# Patient Record
Sex: FEMALE | Race: WHITE | NOT HISPANIC OR LATINO | Employment: OTHER | URBAN - METROPOLITAN AREA
[De-identification: names, ages, dates, MRNs, and addresses within clinical notes are randomized per-mention and may not be internally consistent; named-entity substitution may affect disease eponyms.]

---

## 2017-04-02 ENCOUNTER — HOSPITAL ENCOUNTER (EMERGENCY)
Facility: HOSPITAL | Age: 32
Discharge: HOME | End: 2017-04-03
Attending: EMERGENCY MEDICINE | Admitting: EMERGENCY MEDICINE

## 2017-04-02 VITALS
RESPIRATION RATE: 20 BRPM | HEIGHT: 63 IN | OXYGEN SATURATION: 94 % | WEIGHT: 188 LBS | SYSTOLIC BLOOD PRESSURE: 115 MMHG | DIASTOLIC BLOOD PRESSURE: 74 MMHG | BODY MASS INDEX: 33.31 KG/M2 | HEART RATE: 122 BPM | TEMPERATURE: 97.9 F

## 2017-04-02 DIAGNOSIS — F32.A DEPRESSION, UNSPECIFIED DEPRESSION TYPE: ICD-10-CM

## 2017-04-02 DIAGNOSIS — E86.0 DEHYDRATION: Primary | ICD-10-CM

## 2017-04-02 DIAGNOSIS — F41.9 ANXIETY DISORDER, UNSPECIFIED TYPE: ICD-10-CM

## 2017-04-02 LAB
AMPHET+METHAMPHET UR QL: NEGATIVE
AMPHETAMINES UR QL: NEGATIVE
B-HCG UR QL: NEGATIVE
BACTERIA UR QL AUTO: ABNORMAL /HPF
BARBITURATES UR QL SCN: NEGATIVE
BASOPHILS # BLD AUTO: 0.04 10*3/MM3 (ref 0–0.2)
BASOPHILS NFR BLD AUTO: 0.4 % (ref 0–1)
BENZODIAZ UR QL SCN: NEGATIVE
BILIRUB UR QL STRIP: ABNORMAL
BUPRENORPHINE SERPL-MCNC: NEGATIVE NG/ML
CANNABINOIDS SERPL QL: NEGATIVE
CLARITY UR: ABNORMAL
COCAINE UR QL: NEGATIVE
COD CRY URNS QL: ABNORMAL /HPF
COLOR UR: ABNORMAL
DEPRECATED RDW RBC AUTO: 48.4 FL (ref 37–54)
EOSINOPHIL # BLD AUTO: 0.19 10*3/MM3 (ref 0.1–0.3)
EOSINOPHIL NFR BLD AUTO: 1.9 % (ref 0–3)
ERYTHROCYTE [DISTWIDTH] IN BLOOD BY AUTOMATED COUNT: 15.1 % (ref 11.3–14.5)
ETHANOL BLD-MCNC: <10 MG/DL (ref 0–10)
GLUCOSE UR STRIP-MCNC: NEGATIVE MG/DL
HCT VFR BLD AUTO: 40.6 % (ref 34.5–44)
HGB BLD-MCNC: 12.6 G/DL (ref 11.5–15.5)
HGB UR QL STRIP.AUTO: ABNORMAL
HYALINE CASTS UR QL AUTO: ABNORMAL /LPF
IMM GRANULOCYTES # BLD: 0.03 10*3/MM3 (ref 0–0.03)
IMM GRANULOCYTES NFR BLD: 0.3 % (ref 0–0.6)
INTERNAL NEGATIVE CONTROL: NEGATIVE
INTERNAL POSITIVE CONTROL: POSITIVE
KETONES UR QL STRIP: NEGATIVE
LEUKOCYTE ESTERASE UR QL STRIP.AUTO: ABNORMAL
LYMPHOCYTES # BLD AUTO: 4.5 10*3/MM3 (ref 0.6–4.8)
LYMPHOCYTES NFR BLD AUTO: 44.7 % (ref 24–44)
Lab: ABNORMAL
MCH RBC QN AUTO: 26.9 PG (ref 27–31)
MCHC RBC AUTO-ENTMCNC: 31 G/DL (ref 32–36)
MCV RBC AUTO: 86.6 FL (ref 80–99)
METHADONE UR QL SCN: NEGATIVE
MONOCYTES # BLD AUTO: 1.13 10*3/MM3 (ref 0–1)
MONOCYTES NFR BLD AUTO: 11.2 % (ref 0–12)
MUCOUS THREADS URNS QL MICRO: ABNORMAL /HPF
NEUTROPHILS # BLD AUTO: 4.18 10*3/MM3 (ref 1.5–8.3)
NEUTROPHILS NFR BLD AUTO: 41.5 % (ref 41–71)
NITRITE UR QL STRIP: NEGATIVE
OPIATES UR QL: POSITIVE
OXYCODONE UR QL SCN: NEGATIVE
PCP UR QL SCN: NEGATIVE
PH UR STRIP.AUTO: 5.5 [PH] (ref 5–8)
PLATELET # BLD AUTO: 271 10*3/MM3 (ref 150–450)
PMV BLD AUTO: 11.5 FL (ref 6–12)
PROPOXYPH UR QL: NEGATIVE
PROT UR QL STRIP: ABNORMAL
RBC # BLD AUTO: 4.69 10*6/MM3 (ref 3.89–5.14)
RBC # UR: ABNORMAL /HPF
REF LAB TEST METHOD: ABNORMAL
SP GR UR STRIP: 1.04 (ref 1–1.03)
SQUAMOUS #/AREA URNS HPF: ABNORMAL /HPF
TRICYCLICS UR QL SCN: POSITIVE
UROBILINOGEN UR QL STRIP: ABNORMAL
WBC NRBC COR # BLD: 10.07 10*3/MM3 (ref 3.5–10.8)
WBC UR QL AUTO: ABNORMAL /HPF

## 2017-04-02 PROCEDURE — 99283 EMERGENCY DEPT VISIT LOW MDM: CPT

## 2017-04-02 PROCEDURE — 93005 ELECTROCARDIOGRAM TRACING: CPT | Performed by: EMERGENCY MEDICINE

## 2017-04-02 PROCEDURE — 81001 URINALYSIS AUTO W/SCOPE: CPT | Performed by: EMERGENCY MEDICINE

## 2017-04-02 PROCEDURE — 96361 HYDRATE IV INFUSION ADD-ON: CPT

## 2017-04-02 PROCEDURE — 87086 URINE CULTURE/COLONY COUNT: CPT | Performed by: EMERGENCY MEDICINE

## 2017-04-02 PROCEDURE — 80053 COMPREHEN METABOLIC PANEL: CPT | Performed by: EMERGENCY MEDICINE

## 2017-04-02 PROCEDURE — 80306 DRUG TEST PRSMV INSTRMNT: CPT | Performed by: EMERGENCY MEDICINE

## 2017-04-02 PROCEDURE — 80307 DRUG TEST PRSMV CHEM ANLYZR: CPT | Performed by: EMERGENCY MEDICINE

## 2017-04-02 PROCEDURE — 25010000002 ONDANSETRON PER 1 MG: Performed by: EMERGENCY MEDICINE

## 2017-04-02 PROCEDURE — 85025 COMPLETE CBC W/AUTO DIFF WBC: CPT | Performed by: EMERGENCY MEDICINE

## 2017-04-02 PROCEDURE — 96374 THER/PROPH/DIAG INJ IV PUSH: CPT

## 2017-04-02 RX ORDER — TRIHEXYPHENIDYL HYDROCHLORIDE 2 MG/1
5 TABLET ORAL 2 TIMES DAILY WITH MEALS
COMMUNITY
End: 2017-05-05 | Stop reason: DRUGHIGH

## 2017-04-02 RX ORDER — OLANZAPINE 10 MG/1
TABLET ORAL
COMMUNITY
End: 2018-02-13

## 2017-04-02 RX ORDER — MELOXICAM 15 MG/1
15 TABLET ORAL DAILY
COMMUNITY
End: 2017-05-05 | Stop reason: SDUPTHER

## 2017-04-02 RX ORDER — ONDANSETRON 2 MG/ML
4 INJECTION INTRAMUSCULAR; INTRAVENOUS ONCE
Status: COMPLETED | OUTPATIENT
Start: 2017-04-02 | End: 2017-04-02

## 2017-04-02 RX ORDER — MORPHINE SULFATE 15 MG/1
15 TABLET ORAL EVERY 4 HOURS PRN
COMMUNITY
End: 2018-02-13

## 2017-04-02 RX ORDER — DICYCLOMINE HYDROCHLORIDE 10 MG/1
10 CAPSULE ORAL 2 TIMES DAILY
COMMUNITY
End: 2017-05-05 | Stop reason: SDUPTHER

## 2017-04-02 RX ORDER — GABAPENTIN 300 MG/1
300 CAPSULE ORAL 3 TIMES DAILY
COMMUNITY
End: 2017-05-05 | Stop reason: SDUPTHER

## 2017-04-02 RX ORDER — SODIUM CHLORIDE 0.9 % (FLUSH) 0.9 %
10 SYRINGE (ML) INJECTION AS NEEDED
Status: DISCONTINUED | OUTPATIENT
Start: 2017-04-02 | End: 2017-04-03 | Stop reason: HOSPADM

## 2017-04-02 RX ORDER — PERPHENAZINE 8 MG/1
16 TABLET ORAL 2 TIMES DAILY
COMMUNITY
End: 2017-05-05 | Stop reason: DRUGHIGH

## 2017-04-02 RX ORDER — PANTOPRAZOLE SODIUM 40 MG/1
40 TABLET, DELAYED RELEASE ORAL DAILY
COMMUNITY
End: 2017-05-05 | Stop reason: SDUPTHER

## 2017-04-02 RX ORDER — PROMETHAZINE HYDROCHLORIDE 25 MG/1
25 TABLET ORAL EVERY 6 HOURS PRN
COMMUNITY
End: 2017-08-08 | Stop reason: SDUPTHER

## 2017-04-02 RX ORDER — MIRTAZAPINE 15 MG/1
15 TABLET, FILM COATED ORAL NIGHTLY
COMMUNITY
End: 2017-08-08 | Stop reason: SDUPTHER

## 2017-04-02 RX ORDER — CYCLOBENZAPRINE HCL 10 MG
10 TABLET ORAL 2 TIMES DAILY PRN
COMMUNITY
End: 2018-02-13

## 2017-04-02 RX ORDER — DULOXETIN HYDROCHLORIDE 60 MG/1
60 CAPSULE, DELAYED RELEASE ORAL DAILY
COMMUNITY
End: 2017-05-05 | Stop reason: ALTCHOICE

## 2017-04-02 RX ORDER — CLONIDINE HYDROCHLORIDE 0.1 MG/1
0.1 TABLET ORAL 2 TIMES DAILY
COMMUNITY
End: 2018-02-13

## 2017-04-02 RX ORDER — OXCARBAZEPINE 300 MG/1
150 TABLET, FILM COATED ORAL 2 TIMES DAILY
COMMUNITY
End: 2018-02-13

## 2017-04-02 RX ADMIN — ONDANSETRON 4 MG: 2 INJECTION INTRAMUSCULAR; INTRAVENOUS at 23:50

## 2017-04-02 RX ADMIN — SODIUM CHLORIDE 1000 ML: 9 INJECTION, SOLUTION INTRAVENOUS at 23:24

## 2017-04-03 LAB
ALBUMIN SERPL-MCNC: 4.8 G/DL (ref 3.2–4.8)
ALBUMIN/GLOB SERPL: 1.4 G/DL (ref 1.5–2.5)
ALP SERPL-CCNC: 131 U/L (ref 25–100)
ALT SERPL W P-5'-P-CCNC: 17 U/L (ref 7–40)
ANION GAP SERPL CALCULATED.3IONS-SCNC: 14 MMOL/L (ref 3–11)
AST SERPL-CCNC: 21 U/L (ref 0–33)
BILIRUB SERPL-MCNC: 0.2 MG/DL (ref 0.3–1.2)
BUN BLD-MCNC: 16 MG/DL (ref 9–23)
BUN/CREAT SERPL: 20 (ref 7–25)
CALCIUM SPEC-SCNC: 10.6 MG/DL (ref 8.7–10.4)
CHLORIDE SERPL-SCNC: 106 MMOL/L (ref 99–109)
CO2 SERPL-SCNC: 30 MMOL/L (ref 20–31)
CREAT BLD-MCNC: 0.8 MG/DL (ref 0.6–1.3)
GFR SERPL CREATININE-BSD FRML MDRD: 82 ML/MIN/1.73
GLOBULIN UR ELPH-MCNC: 3.4 GM/DL
GLUCOSE BLD-MCNC: 118 MG/DL (ref 70–100)
HOLD SPECIMEN: NORMAL
HOLD SPECIMEN: NORMAL
POTASSIUM BLD-SCNC: 4 MMOL/L (ref 3.5–5.5)
PROT SERPL-MCNC: 8.2 G/DL (ref 5.7–8.2)
SODIUM BLD-SCNC: 150 MMOL/L (ref 132–146)
WHOLE BLOOD HOLD SPECIMEN: NORMAL
WHOLE BLOOD HOLD SPECIMEN: NORMAL

## 2017-04-03 RX ORDER — DIAZEPAM 2 MG/1
2 TABLET ORAL ONCE
Status: COMPLETED | OUTPATIENT
Start: 2017-04-03 | End: 2017-04-03

## 2017-04-03 RX ADMIN — DIAZEPAM 2 MG: 2 TABLET ORAL at 00:54

## 2017-04-03 NOTE — ED PROVIDER NOTES
Subjective   HPI Comments: 32 y.o. female presents to the ED for evaluation of depression and anxiety. Pt and  recently moved from [place] to [place] 3 days ago and are unfortunately homeless for the time being. She is currently living in a separate shelter from her . She has a history of schizophrenia and depression, and over the last 3 days has been much more depressed and anxious. She has not been eating or sleeping well. She denies suicidal ideations currently.    Patient is a 32 y.o. female presenting with mental health disorder.   History provided by:  Patient and spouse  Mental Health Problem   Presenting symptoms: agitation and depression    Presenting symptoms: no homicidal ideas and no suicidal thoughts    Degree of incapacity (severity):  Severe  Onset quality:  Gradual  Duration:  3 days  Timing:  Constant  Chronicity:  New  Context: stressful life event    Relieved by:  Nothing  Worsened by:  Nothing  Associated symptoms: anxiety and appetite change    Risk factors: hx of mental illness        Review of Systems   Constitutional: Positive for activity change and appetite change.   Psychiatric/Behavioral: Positive for agitation and dysphoric mood. Negative for homicidal ideas and suicidal ideas. The patient is nervous/anxious.    All other systems reviewed and are negative.      Past Medical History:   Diagnosis Date   • Asthma    • Chronic back pain    • COPD (chronic obstructive pulmonary disease)    • Depression    • GERD (gastroesophageal reflux disease)    • Schizophrenia, schizo-affective        Allergies   Allergen Reactions   • Paxil [Paroxetine Hcl]    • Prozac [Fluoxetine Hcl]        Past Surgical History:   Procedure Laterality Date   • BACK SURGERY         History reviewed. No pertinent family history.    Social History     Social History   • Marital status:      Spouse name: N/A   • Number of children: N/A   • Years of education: N/A     Social History Main Topics   •  Smoking status: Never Smoker   • Smokeless tobacco: None   • Alcohol use No   • Drug use: No   • Sexual activity: Defer     Other Topics Concern   • None     Social History Narrative   • None         Objective   Physical Exam   Constitutional: She appears well-developed and well-nourished. No distress.   HENT:   Head: Normocephalic and atraumatic.   Eyes: Conjunctivae are normal. Pupils are equal, round, and reactive to light.   Neck: Normal range of motion. Neck supple.   Cardiovascular: Regular rhythm.  Tachycardia present.    Pulmonary/Chest: Effort normal and breath sounds normal. No respiratory distress. She has no wheezes. She has no rales. She exhibits no tenderness.   Abdominal: There is tenderness (mild lower abdomen).   Musculoskeletal: She exhibits no edema.   Neurological: She is alert.   Answers questions and following commands appropriately.    Skin: Skin is warm and dry. She is not diaphoretic.   Nursing note and vitals reviewed.      Procedures         ED Course  ED Course       Recent Results (from the past 24 hour(s))   Ethanol    Collection Time: 04/02/17  8:21 PM   Result Value Ref Range    Ethanol <10 0 - 10 mg/dL   Urine Drug Screen    Collection Time: 04/02/17 11:09 PM   Result Value Ref Range    THC, Screen, Urine Negative Negative    Phencyclidine (PCP), Urine Negative Negative    Cocaine Screen, Urine Negative Negative    Methamphetamine, Urine Negative Negative    Opiate Screen Positive (A) Negative    Amphetamine Screen, Urine Negative Negative    Benzodiazepine Screen, Urine Negative Negative    Tricyclic Antidepressants Screen Positive (A) Negative    Methadone Screen, Urine Negative Negative    Barbiturates Screen, Urine Negative Negative    Oxycodone Screen, Urine Negative Negative    Propoxyphene Screen Negative Negative    Buprenorphine, Screen, Urine Negative Negative   Urinalysis With / Culture If Indicated    Collection Time: 04/02/17 11:09 PM   Result Value Ref Range    Color,  UA Dark Yellow (A) Yellow, Straw    Appearance, UA Cloudy (A) Clear    pH, UA 5.5 5.0 - 8.0    Specific Gravity, UA 1.044 (H) 1.001 - 1.030    Glucose, UA Negative Negative    Ketones, UA Negative Negative    Bilirubin, UA Small (1+) (A) Negative    Blood, UA Large (3+) (A) Negative    Protein, UA Trace (A) Negative    Leuk Esterase, UA Small (1+) (A) Negative    Nitrite, UA Negative Negative    Urobilinogen, UA 1.0 E.U./dL 0.2 - 1.0 E.U./dL   Urinalysis, Microscopic Only    Collection Time: 04/02/17 11:09 PM   Result Value Ref Range    RBC, UA 0-2 None Seen, 0-2 /HPF    WBC, UA 6-12 (A) None Seen /HPF    Bacteria, UA Trace None Seen, Trace /HPF    Squamous Epithelial Cells, UA 7-12 (A) None Seen, 0-2 /HPF    Hyaline Casts, UA 0-6 0 - 6 /LPF    Calcium Oxalate Crystals, UA Small/1+ None Seen /HPF    Mucus, UA Moderate/2+ (A) None Seen, Trace /HPF    Methodology Manual Light Microscopy    POCT Pregnancy, urine    Collection Time: 04/02/17 11:10 PM   Result Value Ref Range    HCG, Urine, QL Negative Negative    Lot Number [id number]     Internal Positive Control Positive     Internal Negative Control Negative    Comprehensive Metabolic Panel    Collection Time: 04/02/17 11:21 PM   Result Value Ref Range    Glucose 118 (H) 70 - 100 mg/dL    BUN 16 9 - 23 mg/dL    Creatinine 0.80 0.60 - 1.30 mg/dL    Sodium 150 (H) 132 - 146 mmol/L    Potassium 4.0 3.5 - 5.5 mmol/L    Chloride 106 99 - 109 mmol/L    CO2 30.0 20.0 - 31.0 mmol/L    Calcium 10.6 (H) 8.7 - 10.4 mg/dL    Total Protein 8.2 5.7 - 8.2 g/dL    Albumin 4.80 3.20 - 4.80 g/dL    ALT (SGPT) 17 7 - 40 U/L    AST (SGOT) 21 0 - 33 U/L    Alkaline Phosphatase 131 (H) 25 - 100 U/L    Total Bilirubin 0.2 (L) 0.3 - 1.2 mg/dL    eGFR Non African Amer 82 >60 mL/min/1.73    Globulin 3.4 gm/dL    A/G Ratio 1.4 (L) 1.5 - 2.5 g/dL    BUN/Creatinine Ratio 20.0 7.0 - 25.0    Anion Gap 14.0 (H) 3.0 - 11.0 mmol/L   CBC Auto Differential    Collection Time: 04/02/17 11:21 PM   Result  "Value Ref Range    WBC 10.07 3.50 - 10.80 10*3/mm3    RBC 4.69 3.89 - 5.14 10*6/mm3    Hemoglobin 12.6 11.5 - 15.5 g/dL    Hematocrit 40.6 34.5 - 44.0 %    MCV 86.6 80.0 - 99.0 fL    MCH 26.9 (L) 27.0 - 31.0 pg    MCHC 31.0 (L) 32.0 - 36.0 g/dL    RDW 15.1 (H) 11.3 - 14.5 %    RDW-SD 48.4 37.0 - 54.0 fl    MPV 11.5 6.0 - 12.0 fL    Platelets 271 150 - 450 10*3/mm3    Neutrophil % 41.5 41.0 - 71.0 %    Lymphocyte % 44.7 (H) 24.0 - 44.0 %    Monocyte % 11.2 0.0 - 12.0 %    Eosinophil % 1.9 0.0 - 3.0 %    Basophil % 0.4 0.0 - 1.0 %    Immature Grans % 0.3 0.0 - 0.6 %    Neutrophils, Absolute 4.18 1.50 - 8.30 10*3/mm3    Lymphocytes, Absolute 4.50 0.60 - 4.80 10*3/mm3    Monocytes, Absolute 1.13 (H) 0.00 - 1.00 10*3/mm3    Eosinophils, Absolute 0.19 0.10 - 0.30 10*3/mm3    Basophils, Absolute 0.04 0.00 - 0.20 10*3/mm3    Immature Grans, Absolute 0.03 0.00 - 0.03 10*3/mm3     Note: In addition to lab results from this visit, the labs listed above may include labs taken at another facility or during a different encounter within the last 24 hours. Please correlate lab times with ED admission and discharge times for further clarification of the services performed during this visit.    No orders to display     Vitals:    04/02/17 1936 04/02/17 2208 04/02/17 2220   BP: 122/71 130/79 115/74   BP Location: Right arm     Patient Position: Sitting     Pulse: (!) 122     Resp: 20     Temp: 97.9 °F (36.6 °C)     TempSrc: Oral     SpO2: 95%  94%   Weight: 188 lb (85.3 kg)     Height: 63\" (160 cm)       Medications   sodium chloride 0.9 % flush 10 mL (not administered)   diazePAM (VALIUM) tablet 2 mg (not administered)   sodium chloride 0.9 % bolus 1,000 mL (0 mL Intravenous Stopped 4/3/17 0045)   ondansetron (ZOFRAN) injection 4 mg (4 mg Intravenous Given 4/2/17 0290)     ECG/EMG Results (last 24 hours)     ** No results found for the last 24 hours. **                      MDM  Number of Diagnoses or Management Options  Anxiety " disorder, unspecified type: new and requires workup  Dehydration: new and requires workup  Depression, unspecified depression type: new and requires workup  Diagnosis management comments: Patient has blood in urine, but currently is on period. Appears contaminated, no initiation of antibiotics.     Specific gravity of urine slightly elevated, given IV fluids here and zofran for mild nausea, no vomiting witnessed here.    Patient has remained stable while here in ER.     Discussed patient with psychiatrist at [facility], Dr. Conley who has accepted patient for tranfer.     Patient was evaluated by [facility] staff, and patient has agreed to go voluntarily to [facility].     Patient was also given small dose of valium PO here for anxiety.            Amount and/or Complexity of Data Reviewed  Clinical lab tests: ordered and reviewed  Tests in the radiology section of CPT®: ordered and reviewed  Obtain history from someone other than the patient: yes  Review and summarize past medical records: yes  Discuss the patient with other providers: yes  Independent visualization of images, tracings, or specimens: yes    Patient Progress  Patient progress: stable      Final diagnoses:   Dehydration   Depression, unspecified depression type   Anxiety disorder, unspecified type       Documentation assistance provided by renato Jiménez.  Information recorded by the renato was done at my direction and has been verified and validated by me.     Damaso Jiménez  04/02/17 4611       Ivis Meneses MD  04/03/17 8785

## 2017-04-05 LAB — BACTERIA SPEC AEROBE CULT: NORMAL

## 2017-05-05 ENCOUNTER — TELEPHONE (OUTPATIENT)
Dept: INTERNAL MEDICINE | Facility: CLINIC | Age: 32
End: 2017-05-05

## 2017-05-05 ENCOUNTER — OFFICE VISIT (OUTPATIENT)
Dept: INTERNAL MEDICINE | Facility: CLINIC | Age: 32
End: 2017-05-05

## 2017-05-05 VITALS
HEART RATE: 96 BPM | RESPIRATION RATE: 16 BRPM | OXYGEN SATURATION: 98 % | BODY MASS INDEX: 35.58 KG/M2 | HEIGHT: 63 IN | TEMPERATURE: 98.2 F | DIASTOLIC BLOOD PRESSURE: 76 MMHG | WEIGHT: 200.8 LBS | SYSTOLIC BLOOD PRESSURE: 106 MMHG

## 2017-05-05 DIAGNOSIS — K21.9 GASTROESOPHAGEAL REFLUX DISEASE, ESOPHAGITIS PRESENCE NOT SPECIFIED: ICD-10-CM

## 2017-05-05 DIAGNOSIS — J44.9 CHRONIC OBSTRUCTIVE PULMONARY DISEASE, UNSPECIFIED COPD TYPE (HCC): Primary | ICD-10-CM

## 2017-05-05 DIAGNOSIS — R32 URINARY INCONTINENCE, UNSPECIFIED TYPE: ICD-10-CM

## 2017-05-05 DIAGNOSIS — G89.29 OTHER CHRONIC PAIN: ICD-10-CM

## 2017-05-05 DIAGNOSIS — R10.9 ABDOMINAL PAIN, UNSPECIFIED LOCATION: ICD-10-CM

## 2017-05-05 LAB
BILIRUB BLD-MCNC: NEGATIVE MG/DL
CLARITY, POC: CLEAR
COLOR UR: YELLOW
GLUCOSE UR STRIP-MCNC: NEGATIVE MG/DL
KETONES UR QL: NEGATIVE
LEUKOCYTE EST, POC: NEGATIVE
NITRITE UR-MCNC: NEGATIVE MG/ML
PH UR: 6 [PH] (ref 5–8)
PROT UR STRIP-MCNC: NEGATIVE MG/DL
RBC # UR STRIP: NEGATIVE /UL
SP GR UR: 1.03 (ref 1–1.03)
UROBILINOGEN UR QL: NORMAL

## 2017-05-05 PROCEDURE — 81003 URINALYSIS AUTO W/O SCOPE: CPT | Performed by: NURSE PRACTITIONER

## 2017-05-05 PROCEDURE — 99204 OFFICE O/P NEW MOD 45 MIN: CPT | Performed by: NURSE PRACTITIONER

## 2017-05-05 RX ORDER — NORGESTIMATE AND ETHINYL ESTRADIOL 7DAYSX3 28
1 KIT ORAL DAILY
Qty: 28 TABLET | Refills: 2 | Status: SHIPPED | OUTPATIENT
Start: 2017-05-05 | End: 2018-02-27 | Stop reason: SDUPTHER

## 2017-05-05 RX ORDER — MELOXICAM 15 MG/1
15 TABLET ORAL DAILY
Qty: 30 TABLET | Refills: 2 | Status: SHIPPED | OUTPATIENT
Start: 2017-05-05 | End: 2017-08-08

## 2017-05-05 RX ORDER — PERPHENAZINE 16 MG/1
TABLET ORAL
COMMUNITY
End: 2018-02-13

## 2017-05-05 RX ORDER — TRIHEXYPHENIDYL HYDROCHLORIDE 5 MG/1
5 TABLET ORAL 2 TIMES DAILY
COMMUNITY
End: 2018-02-13

## 2017-05-05 RX ORDER — PANTOPRAZOLE SODIUM 40 MG/1
40 TABLET, DELAYED RELEASE ORAL DAILY
Qty: 30 TABLET | Refills: 2 | Status: SHIPPED | OUTPATIENT
Start: 2017-05-05 | End: 2017-08-04 | Stop reason: SDUPTHER

## 2017-05-05 RX ORDER — GABAPENTIN 300 MG/1
300 CAPSULE ORAL 3 TIMES DAILY
Qty: 90 CAPSULE | Refills: 0 | Status: SHIPPED | OUTPATIENT
Start: 2017-05-05 | End: 2018-08-27

## 2017-05-05 RX ORDER — DICYCLOMINE HYDROCHLORIDE 10 MG/1
10 CAPSULE ORAL 2 TIMES DAILY
Qty: 60 CAPSULE | Refills: 2 | Status: SHIPPED | OUTPATIENT
Start: 2017-05-05 | End: 2018-03-23 | Stop reason: SDUPTHER

## 2017-05-08 ENCOUNTER — TELEPHONE (OUTPATIENT)
Dept: INTERNAL MEDICINE | Facility: CLINIC | Age: 32
End: 2017-05-08

## 2017-05-08 RX ORDER — FLUCONAZOLE 150 MG/1
150 TABLET ORAL ONCE
Qty: 1 TABLET | Refills: 1 | Status: SHIPPED | OUTPATIENT
Start: 2017-05-08 | End: 2017-05-08

## 2017-05-12 ENCOUNTER — TELEPHONE (OUTPATIENT)
Dept: INTERNAL MEDICINE | Facility: CLINIC | Age: 32
End: 2017-05-12

## 2017-05-17 ENCOUNTER — TELEPHONE (OUTPATIENT)
Dept: INTERNAL MEDICINE | Facility: CLINIC | Age: 32
End: 2017-05-17

## 2017-05-22 ENCOUNTER — TELEPHONE (OUTPATIENT)
Dept: INTERNAL MEDICINE | Facility: CLINIC | Age: 32
End: 2017-05-22

## 2017-05-22 RX ORDER — POLYETHYLENE GLYCOL 3350 17 G/17G
17 POWDER, FOR SOLUTION ORAL DAILY
Qty: 30 EACH | Refills: 11 | Status: SHIPPED | OUTPATIENT
Start: 2017-05-22 | End: 2018-02-13

## 2017-05-23 ENCOUNTER — TELEPHONE (OUTPATIENT)
Dept: INTERNAL MEDICINE | Facility: CLINIC | Age: 32
End: 2017-05-23

## 2017-05-24 ENCOUNTER — TELEPHONE (OUTPATIENT)
Dept: INTERNAL MEDICINE | Facility: CLINIC | Age: 32
End: 2017-05-24

## 2017-08-04 ENCOUNTER — TELEPHONE (OUTPATIENT)
Dept: INTERNAL MEDICINE | Facility: CLINIC | Age: 32
End: 2017-08-04

## 2017-08-04 RX ORDER — PANTOPRAZOLE SODIUM 40 MG/1
40 TABLET, DELAYED RELEASE ORAL DAILY
Qty: 30 TABLET | Refills: 2 | Status: SHIPPED | OUTPATIENT
Start: 2017-08-04 | End: 2018-04-10 | Stop reason: SDUPTHER

## 2017-08-04 NOTE — TELEPHONE ENCOUNTER
REFILL: PANTOPRAZOLE @  [place] [facility] [place] [id number] NO LONGER USES [facility].

## 2017-08-07 ENCOUNTER — TELEPHONE (OUTPATIENT)
Dept: INTERNAL MEDICINE | Facility: CLINIC | Age: 32
End: 2017-08-07

## 2017-08-07 RX ORDER — ONDANSETRON 4 MG/1
4 TABLET, FILM COATED ORAL EVERY 8 HOURS PRN
Qty: 30 TABLET | Refills: 0 | Status: SHIPPED | OUTPATIENT
Start: 2017-08-07 | End: 2018-02-13

## 2017-08-08 ENCOUNTER — TELEPHONE (OUTPATIENT)
Dept: INTERNAL MEDICINE | Facility: CLINIC | Age: 32
End: 2017-08-08

## 2017-08-08 RX ORDER — MIRTAZAPINE 15 MG/1
15 TABLET, FILM COATED ORAL NIGHTLY
Qty: 30 TABLET | Refills: 0 | Status: SHIPPED | OUTPATIENT
Start: 2017-08-08 | End: 2018-02-13

## 2017-08-08 RX ORDER — PROMETHAZINE HYDROCHLORIDE 25 MG/1
25 TABLET ORAL EVERY 8 HOURS PRN
Qty: 30 TABLET | Refills: 0 | Status: SHIPPED | OUTPATIENT
Start: 2017-08-08 | End: 2018-02-13

## 2017-08-08 RX ORDER — NAPROXEN 500 MG/1
500 TABLET ORAL 2 TIMES DAILY WITH MEALS
Qty: 60 TABLET | Refills: 0 | Status: SHIPPED | OUTPATIENT
Start: 2017-08-08 | End: 2018-02-13

## 2017-08-08 NOTE — TELEPHONE ENCOUNTER
Patient went to the hospital about 9 days ago for her knee and want Naproxen sent in for her because that helped her a lot. She also said the Zofran is not covered so she wants the Phenergan sent in now and She also wants her Remeron refilled. She said that the other Dr that prescribed her other medication will not fill this for her and she thought that you would?

## 2017-08-22 ENCOUNTER — TELEPHONE (OUTPATIENT)
Dept: INTERNAL MEDICINE | Facility: CLINIC | Age: 32
End: 2017-08-22

## 2018-02-13 ENCOUNTER — OFFICE VISIT (OUTPATIENT)
Dept: FAMILY MEDICINE CLINIC | Facility: CLINIC | Age: 33
End: 2018-02-13

## 2018-02-13 VITALS
HEIGHT: 63 IN | BODY MASS INDEX: 34.23 KG/M2 | HEART RATE: 80 BPM | TEMPERATURE: 97.6 F | DIASTOLIC BLOOD PRESSURE: 62 MMHG | RESPIRATION RATE: 20 BRPM | WEIGHT: 193.2 LBS | SYSTOLIC BLOOD PRESSURE: 98 MMHG

## 2018-02-13 DIAGNOSIS — J45.20 MILD INTERMITTENT ASTHMA WITHOUT COMPLICATION: Primary | ICD-10-CM

## 2018-02-13 DIAGNOSIS — R10.9 ABDOMINAL DISCOMFORT: ICD-10-CM

## 2018-02-13 PROCEDURE — 99213 OFFICE O/P EST LOW 20 MIN: CPT | Performed by: NURSE PRACTITIONER

## 2018-02-13 RX ORDER — BACLOFEN 10 MG/1
20 TABLET ORAL 3 TIMES DAILY
COMMUNITY
End: 2019-06-05

## 2018-02-13 RX ORDER — ALBUTEROL SULFATE 90 UG/1
2 AEROSOL, METERED RESPIRATORY (INHALATION) EVERY 4 HOURS PRN
COMMUNITY
End: 2018-05-25

## 2018-02-13 RX ORDER — QUETIAPINE FUMARATE 300 MG/1
300 TABLET, FILM COATED ORAL NIGHTLY
COMMUNITY
End: 2019-12-26

## 2018-02-13 RX ORDER — TIZANIDINE 4 MG/1
4 TABLET ORAL EVERY 8 HOURS PRN
COMMUNITY
End: 2018-08-27 | Stop reason: SDUPTHER

## 2018-02-13 RX ORDER — BUSPIRONE HYDROCHLORIDE 7.5 MG/1
15 TABLET ORAL 3 TIMES DAILY
COMMUNITY
End: 2020-05-12

## 2018-02-13 RX ORDER — METHADONE HYDROCHLORIDE 5 MG/1
5 TABLET ORAL EVERY 8 HOURS PRN
COMMUNITY
End: 2019-01-31 | Stop reason: ALTCHOICE

## 2018-02-13 RX ORDER — CITALOPRAM 40 MG/1
40 TABLET ORAL DAILY
COMMUNITY
End: 2018-06-06 | Stop reason: ALTCHOICE

## 2018-02-13 NOTE — PROGRESS NOTES
Subjective   Marguerite Edwards is a 32 y.o. female.     History of Present Illness   Pt is accompanied by her  with numerous complaints  Pt is poor historian and relies heavily on her  to discuss problems she is currently having.  She requests a referral to pulmonary for asthma and wants a new breathing machine  A pulmonary referral was made approx 6 months ago but pt did not keep appointment. She has a hx of making appt then cancelling them or not showing   She has Albuterol inhaler and Breo inhaler for treatment    Stomach problems, cramping in stomach, IBS stable on Bentyl  Was just seen at  GI specialist 2/2/18 less than 2 weeks ago but pt says she has not recollection of this    Hx of urinary incontinence, she sees urology    She is on numerous psych meds for schizoprenia and insomnia. She is unable to say who is treating her     Low back pain  She is on methadone for chronic pain; hx of several back surgeries, not sure if she was in an accident     The following portions of the patient's history were reviewed and updated as appropriate: allergies, current medications, past family history, past medical history, past social history, past surgical history and problem list.    Review of Systems   Respiratory: Negative for cough, shortness of breath and wheezing.    Gastrointestinal: Positive for abdominal distention and abdominal pain. Negative for blood in stool.   Genitourinary: Positive for difficulty urinating. Negative for dysuria.   Musculoskeletal: Positive for back pain.   Neurological: Positive for light-headedness.   Psychiatric/Behavioral: Positive for sleep disturbance. The patient is nervous/anxious.        Objective   Physical Exam   Constitutional: She appears well-developed and well-nourished. No distress.   HENT:   Head: Normocephalic.   Right Ear: External ear normal.   Left Ear: External ear normal.   Nose: Nose normal.   Mouth/Throat: Oropharynx is clear and moist.   Neck: Neck  supple. No thyromegaly present.   Cardiovascular: Normal rate, regular rhythm and normal heart sounds.    Pulmonary/Chest: Effort normal and breath sounds normal. No respiratory distress. She has no wheezes. She has no rales.   Abdominal: Soft. Bowel sounds are normal. She exhibits no distension. There is tenderness (generalized tenderness).   Musculoskeletal: She exhibits no edema.   Neurological: She is alert.   Skin: Skin is warm and dry.   Psychiatric:   Looks to her  through out the visit for answers to my questions copied her husbands responses   Nursing note and vitals reviewed.      Assessment/Plan   Marguerite was seen today for establish care, dizziness, vomiting, vaginal discharge, insomnia and earache.    Diagnoses and all orders for this visit:    Mild intermittent asthma without complication    Abdominal discomfort  Continue Bentyl for abdominal discomfort offered to refill, keep regular follow ups with GI (has an appt for EGD and colonoscopy coming up    Pt requesting referral to pulmonary but did not keep appt made a few months ago. Her asthma could be managed by her PCP then if having difficulty with asthma control can make referral   Pt and her  became angered when referral denied and said they would not be back.

## 2018-02-14 ENCOUNTER — HOSPITAL ENCOUNTER (EMERGENCY)
Facility: HOSPITAL | Age: 33
Discharge: HOME | End: 2018-02-14
Attending: EMERGENCY MEDICINE | Admitting: EMERGENCY MEDICINE

## 2018-02-14 VITALS
RESPIRATION RATE: 20 BRPM | OXYGEN SATURATION: 99 % | BODY MASS INDEX: 34.02 KG/M2 | HEART RATE: 70 BPM | WEIGHT: 192 LBS | TEMPERATURE: 98.3 F | SYSTOLIC BLOOD PRESSURE: 99 MMHG | HEIGHT: 63 IN | DIASTOLIC BLOOD PRESSURE: 57 MMHG

## 2018-02-14 DIAGNOSIS — J45.909 UNCOMPLICATED ASTHMA, UNSPECIFIED ASTHMA SEVERITY, UNSPECIFIED WHETHER PERSISTENT: ICD-10-CM

## 2018-02-14 DIAGNOSIS — M94.0 COSTOCHONDRITIS: Primary | ICD-10-CM

## 2018-02-14 DIAGNOSIS — K59.00 CONSTIPATION, UNSPECIFIED CONSTIPATION TYPE: ICD-10-CM

## 2018-02-14 PROCEDURE — 99283 EMERGENCY DEPT VISIT LOW MDM: CPT

## 2018-02-14 RX ORDER — PREDNISONE 20 MG/1
40 TABLET ORAL DAILY
Qty: 8 TABLET | Refills: 0 | Status: SHIPPED | OUTPATIENT
Start: 2018-02-14 | End: 2018-02-27

## 2018-02-14 RX ORDER — POLYETHYLENE GLYCOL 3350 17 G/17G
17 POWDER, FOR SOLUTION ORAL DAILY
Qty: 10 EACH | Refills: 0 | Status: SHIPPED | OUTPATIENT
Start: 2018-02-14 | End: 2018-03-01 | Stop reason: SDUPTHER

## 2018-02-14 NOTE — ED PROVIDER NOTES
"Subjective   HPI Comments: Marguerite Edwards is a 32 y.o.female with a history of COPD, asthma who presents to the ED with c/o chest pain that onset several years ago but she is unable to specify the exact time the chest pain onset. She states her anterior chest wall pain worsens with deep breathing but she denies shortness of breath. She has a history of costochondritis and this is the same pain that she is experiencing.  She also c/o constipation but denies abdominal pain or any other other complaints at this time. She is requesting a prescription for \"something to help it\" along with a referral to a pulmonologist and GI. Please see KATRINA Gates note from yesterday.     Patient is a 32 y.o. female presenting with chest pain.   History provided by:  Patient  Chest Pain   Pain location:  Substernal area  Pain quality comment:  Unable to specify  Pain radiates to:  Does not radiate  Pain severity:  Mild  Onset quality:  Gradual  Timing:  Intermittent  Progression:  Unchanged  Chronicity:  Recurrent  Context: breathing    Relieved by:  Nothing  Worsened by:  Deep breathing  Ineffective treatments:  Rest  Associated symptoms: no abdominal pain and no shortness of breath    Risk factors comment:  COPD      Review of Systems   Respiratory: Negative for shortness of breath.    Cardiovascular: Positive for chest pain.   Gastrointestinal: Positive for constipation. Negative for abdominal pain.   All other systems reviewed and are negative.      Past Medical History:   Diagnosis Date   • Anxiety    • Asthma    • Bronchitis    • Chronic back pain    • COPD (chronic obstructive pulmonary disease)    • Depression    • GERD (gastroesophageal reflux disease)    • Hyperlipidemia    • Kidney stone    • PTSD (post-traumatic stress disorder)    • Schizo affective schizophrenia    • Schizophrenia, schizo-affective    • Urinary incontinence    • Urinary tract infection        Allergies   Allergen Reactions   • Paxil [Paroxetine Hcl] " Mental Status Change   • Prozac [Fluoxetine Hcl] Mental Status Change       Past Surgical History:   Procedure Laterality Date   • ADENOIDECTOMY     • BACK SURGERY  2013    x2; discetomy and herniated discs   • BLADDER SURGERY     • CHOLECYSTECTOMY     • FOOT SURGERY  2011    achilles tendon repair   • TONSILLECTOMY         Family History   Problem Relation Age of Onset   • Cancer Paternal Grandfather      possible stomach cancer   • Mental illness Father    • Pancreatitis Father    • Hypertension Father    • Diabetes Father    • Hyperlipidemia Father    • Mental illness Sister        Social History     Social History   • Marital status:      Spouse name: N/A   • Number of children: N/A   • Years of education: N/A     Social History Main Topics   • Smoking status: Former Smoker     Types: Cigarettes     Quit date: 1999   • Smokeless tobacco: Never Used   • Alcohol use No   • Drug use: No      Comment: former marijuana   • Sexual activity: Yes     Birth control/ protection: Condom      Comment:      Other Topics Concern   • None     Social History Narrative         Objective   Physical Exam   Constitutional: She is oriented to person, place, and time. She appears well-developed and well-nourished. No distress.   HENT:   Head: Normocephalic and atraumatic.   Right Ear: External ear normal.   Left Ear: External ear normal.   Nose: Nose normal.   Ear wax in the left ear canal but still able to view the left TM.    Eyes: Conjunctivae are normal. No scleral icterus.   Neck: Normal range of motion. Neck supple.   Cardiovascular: Normal rate, regular rhythm and normal heart sounds.    No murmur heard.  Pulmonary/Chest: Effort normal and breath sounds normal. No respiratory distress. She has no wheezes. She has no rales.   Abdominal: Soft. Bowel sounds are normal. She exhibits no distension. There is no tenderness.   Musculoskeletal: Normal range of motion. She exhibits tenderness. She exhibits no edema.   Pain  "easily reproducible to anterior chest wall. This is the same pain she has experienced for years.    Neurological: She is alert and oriented to person, place, and time.   Skin: Skin is warm. She is not diaphoretic.   Psychiatric: She has a normal mood and affect. Her behavior is normal.   Nursing note and vitals reviewed.      Procedures         ED Course  No results found for this or any previous visit (from the past 24 hour(s)).  Note: In addition to lab results from this visit, the labs listed above may include labs taken at another facility or during a different encounter within the last 24 hours. Please correlate lab times with ED admission and discharge times for further clarification of the services performed during this visit.    No orders to display     Vitals:    02/14/18 1318   BP: 99/57   BP Location: Right arm   Patient Position: Sitting   Pulse: 70   Resp: 20   Temp: 98.3 °F (36.8 °C)   TempSrc: Oral   SpO2: 99%   Weight: 87.1 kg (192 lb)   Height: 160 cm (63\")     Medications - No data to display  ECG/EMG Results (last 24 hours)     ** No results found for the last 24 hours. **          ED Course   Comment By Time   Not in room Mynor Boyd, KATRINA 02/14 4468   Here pain is cw here usual cw pain she has had for years.    There is no fever. She is non toxic. She does have a hx of mental illness. She would like a pulm referral for her asthma, gi referral for constipation, steroids for her cw pain, miralax for constipation and otc ear drops for her wax.    She says several times thankful for being nice. She mentions she has been to [facility] recently and they were mean and did not giver her or her  a sandwich.    Agreeable to poc. All thankful and agreeable. Well aware of the ss of worsening condition. Mynor Oliver, APRZULEYMA 02/14 1411                     [facility]    Final diagnoses:   Costochondritis   Constipation, unspecified constipation type   Uncomplicated asthma, unspecified asthma severity, unspecified whether " persistent       Documentation assistance provided by renato Mensah.  Information recorded by the scribreilly was done at my direction and has been verified and validated by me.     Toribio Mensah  02/14/18 1420       KATRINA Haro  02/14/18 1421       KATRINA Haro  02/14/18 1500

## 2018-02-27 ENCOUNTER — OFFICE VISIT (OUTPATIENT)
Dept: FAMILY MEDICINE CLINIC | Facility: CLINIC | Age: 33
End: 2018-02-27

## 2018-02-27 VITALS
WEIGHT: 191 LBS | OXYGEN SATURATION: 100 % | RESPIRATION RATE: 16 BRPM | TEMPERATURE: 97.2 F | DIASTOLIC BLOOD PRESSURE: 58 MMHG | BODY MASS INDEX: 33.84 KG/M2 | HEART RATE: 64 BPM | HEIGHT: 63 IN | SYSTOLIC BLOOD PRESSURE: 96 MMHG

## 2018-02-27 DIAGNOSIS — N76.0 ACUTE VAGINITIS: ICD-10-CM

## 2018-02-27 DIAGNOSIS — J45.30 MILD PERSISTENT ASTHMA WITHOUT COMPLICATION: Primary | ICD-10-CM

## 2018-02-27 DIAGNOSIS — J44.9 CHRONIC OBSTRUCTIVE PULMONARY DISEASE, UNSPECIFIED COPD TYPE (HCC): ICD-10-CM

## 2018-02-27 DIAGNOSIS — R07.89 CHEST WALL PAIN: ICD-10-CM

## 2018-02-27 PROBLEM — F20.9 SCHIZOPHRENIA (HCC): Status: ACTIVE | Noted: 2018-02-27

## 2018-02-27 PROCEDURE — 99214 OFFICE O/P EST MOD 30 MIN: CPT | Performed by: FAMILY MEDICINE

## 2018-02-27 RX ORDER — IBUPROFEN 600 MG/1
600 TABLET ORAL EVERY 8 HOURS PRN
Qty: 30 TABLET | Refills: 5 | Status: SHIPPED | OUTPATIENT
Start: 2018-02-27 | End: 2018-06-04

## 2018-02-27 RX ORDER — FLUCONAZOLE 150 MG/1
TABLET ORAL
Qty: 2 TABLET | Refills: 0 | Status: SHIPPED | OUTPATIENT
Start: 2018-02-27 | End: 2018-03-23

## 2018-02-27 RX ORDER — NORGESTIMATE AND ETHINYL ESTRADIOL 7DAYSX3 28
1 KIT ORAL DAILY
Qty: 28 TABLET | Refills: 5 | Status: SHIPPED | OUTPATIENT
Start: 2018-02-27 | End: 2018-04-30 | Stop reason: ALTCHOICE

## 2018-02-27 RX ORDER — ALBUTEROL SULFATE 2.5 MG/3ML
2.5 SOLUTION RESPIRATORY (INHALATION) EVERY 6 HOURS PRN
Qty: 50 VIAL | Refills: 5 | Status: SHIPPED | OUTPATIENT
Start: 2018-02-27 | End: 2019-02-06

## 2018-02-27 NOTE — PROGRESS NOTES
Assessment/Plan     Problem List Items Addressed This Visit     None      Visit Diagnoses     Mild persistent asthma without complication    -  Primary    Relevant Medications    albuterol (PROVENTIL) (2.5 MG/3ML) 0.083% nebulizer solution    Other Relevant Orders    Home Nebulizer    Chronic obstructive pulmonary disease, unspecified COPD type        Relevant Medications    albuterol (PROVENTIL) (2.5 MG/3ML) 0.083% nebulizer solution    Other Relevant Orders    Home Nebulizer    Acute vaginitis        Relevant Medications    fluconazole (DIFLUCAN) 150 MG tablet    Chest wall pain        Relevant Medications    ibuprofen (ADVIL,MOTRIN) 600 MG tablet           Follow up: Return if symptoms worsen or fail to improve.     DISCUSSION  Vaginal discharge.  Most likely yeast vaginitis.  Will try Diflucan.  Told to only take one half dose of citalopram well on this given potential interaction.  She and her  agree.    Mild persistent asthma with possible diagnosis of COPD.  Will be seeing pulmonology as being arranged previously.  Prescription for nebulizer was given since Thursday in storage and she needs to have this to help with her breathing.  If not improving or worsening, she is to call.    Chest wall pain.  Most likely musculoskeletal.  May try Motrin as prescribed.  Prescription sent.    Schizophrenia.  Stable.  Continue follow-up with psychiatry.      MEDICATIONS PRESCRIBED  Requested Prescriptions     Signed Prescriptions Disp Refills   • fluconazole (DIFLUCAN) 150 MG tablet 2 tablet 0     Sig: one by mouth today and repeat in 3 days if not better   • albuterol (PROVENTIL) (2.5 MG/3ML) 0.083% nebulizer solution 50 vial 5     Sig: Take 2.5 mg by nebulization Every 6 (Six) Hours As Needed for Wheezing or Shortness of Air.   • ibuprofen (ADVIL,MOTRIN) 600 MG tablet 30 tablet 5     Sig: Take 1 tablet by mouth Every 8 (Eight) Hours As Needed for Mild Pain  or Moderate Pain .   • norgestimate-ethinyl estradiol  "(TRINESSA, 28,) 0.18/0.215/0.25 MG-35 MCG per tablet 28 tablet 5     Sig: Take 1 tablet by mouth Daily.               -------------------------------------------    Subjective     Chief Complaint   Patient presents with   • Vaginitis     white discharge, itching   • hurts when breathing     pt states she has asthma and COPD and she is needing a new neb machine.    • left ear hurting.   • Med Refill     miralax and birth control       HPI    Vaginal d/c  White and creamy  Sx for 6 days  No dysuria   + itches  No odor    Feels like when had yeast infection  Has had diflucan and helps in he past    Having 4th botox for bladder infection , [facility] 3/8/2018    Asthma/COPD  Had a nebulizer and in storage and not able to get to it  See pulm 3/22/2018  On ventolin and does not help    No tobacco, no exposure now to cigarette  Does not have the med for it    Anterior chest is tender at times.  Sharp pain.  Hurts to breathe.    Ear pain  Left ear pain   Since used drops on 2/14  Helped the wax    Schizophrenia  Dr Tristan    Comp pain specialist  Methadone 5 mg q 8 hrs      LMP  1/21/2018  Denies pregnancy  Not sexual active      Past Medical History,Medications, Allergies, and social history was reviewed.    Review of Systems   Constitutional: Negative.    HENT: Positive for ear pain. Negative for congestion.    Respiratory: Positive for wheezing. Negative for shortness of breath.    Cardiovascular: Negative.    Gastrointestinal: Negative.    Genitourinary: Positive for vaginal discharge. Negative for dysuria and frequency.   Musculoskeletal: Negative.    Neurological: Negative.    Psychiatric/Behavioral: The patient is nervous/anxious.         Diagnosis of schizophrenia       Objective     Vitals:    02/27/18 1114   BP: 96/58   Pulse: 64   Resp: 16   Temp: 97.2 °F (36.2 °C)   TempSrc: Temporal Artery    SpO2: 100%   Weight: 86.6 kg (191 lb)   Height: 160 cm (62.99\")        Physical Exam   Constitutional: She is oriented to person, " place, and time. She appears well-developed and well-nourished.   HENT:   Head: Normocephalic and atraumatic.   Right Ear: Hearing, tympanic membrane, external ear and ear canal normal.   Left Ear: Hearing, tympanic membrane, external ear and ear canal normal.   Mouth/Throat: Oropharynx is clear and moist.   Eyes: Conjunctivae and EOM are normal. Pupils are equal, round, and reactive to light.   Neck: Normal range of motion. Neck supple. No thyromegaly present.   Cardiovascular: Normal rate, regular rhythm and normal heart sounds.  Exam reveals no gallop and no friction rub.    No murmur heard.  Pulmonary/Chest: Effort normal. No respiratory distress. She has wheezes (faint expiratory). She has no rales.   Abdominal: Soft. Bowel sounds are normal. She exhibits no distension. There is no tenderness.   Musculoskeletal: She exhibits no edema.   Neurological: She is alert and oriented to person, place, and time.   Skin: Skin is warm and dry.   Psychiatric: She has a normal mood and affect. Her behavior is normal. Her speech is tangential.   Nursing note and vitals reviewed.  Tender anterior left chest wall pain.  Sharp in nature.  It same pain that she is explaining or describing.            Vish Torres MD

## 2018-03-01 ENCOUNTER — TELEPHONE (OUTPATIENT)
Dept: FAMILY MEDICINE CLINIC | Facility: CLINIC | Age: 33
End: 2018-03-01

## 2018-03-01 RX ORDER — POLYETHYLENE GLYCOL 3350 17 G/17G
17 POWDER, FOR SOLUTION ORAL DAILY
Qty: 30 PACKET | Refills: 5 | Status: SHIPPED | OUTPATIENT
Start: 2018-03-01 | End: 2019-03-12

## 2018-03-01 NOTE — TELEPHONE ENCOUNTER
----- Message from Oleg Wilkinson sent at 3/1/2018  4:51 PM EST -----  Contact: SCOTT / PT CALL  PT CALLED AND IS REQUESTING RX FOR MIRALAX       Pharmacy:   - [facility] - [place], [place] - [address] [place] [address] - [phone]  - [phone] FX Phone:  [phone] Fax:  [phone]   Address:  [address] [place] [address] [address]OM [place] [address]     PT CALL BACK [phone]

## 2018-03-09 ENCOUNTER — TELEPHONE (OUTPATIENT)
Dept: FAMILY MEDICINE CLINIC | Facility: CLINIC | Age: 33
End: 2018-03-09

## 2018-03-09 DIAGNOSIS — R23.2 HOT FLASHES: Primary | ICD-10-CM

## 2018-03-09 NOTE — TELEPHONE ENCOUNTER
----- Message from Katie Gray sent at 3/9/2018  1:03 PM EST -----  Contact: SCOTT;PT CALLED  PT IS HAVING HOT FLASHES HAVING USE ICE PACKS-SHE IS REQUESTING A REFERRAL FOR A SPECIALIST-LAISHA-    [phone]  MESSAGE OK

## 2018-03-12 ENCOUNTER — TELEPHONE (OUTPATIENT)
Dept: FAMILY MEDICINE CLINIC | Facility: CLINIC | Age: 33
End: 2018-03-12

## 2018-03-12 DIAGNOSIS — R23.2 HOT FLASHES: Primary | ICD-10-CM

## 2018-03-12 NOTE — TELEPHONE ENCOUNTER
She had requested referral to endocrinology for hot flashes. They rec labs be done 1st and if normal, then would need to see GYN for this and not endocrinology.   I have placed orders for labs.

## 2018-03-12 NOTE — TELEPHONE ENCOUNTER
Spoke with pt and went over msg. Verbalized understanding and will come in for labs tomorrow. She has an appt with GYN 3/14/18.

## 2018-03-13 ENCOUNTER — RESULTS ENCOUNTER (OUTPATIENT)
Dept: FAMILY MEDICINE CLINIC | Facility: CLINIC | Age: 33
End: 2018-03-13

## 2018-03-13 ENCOUNTER — TELEPHONE (OUTPATIENT)
Dept: FAMILY MEDICINE CLINIC | Facility: CLINIC | Age: 33
End: 2018-03-13

## 2018-03-13 DIAGNOSIS — R23.2 HOT FLASHES: ICD-10-CM

## 2018-03-13 RX ORDER — PROMETHAZINE HYDROCHLORIDE 25 MG/1
25 TABLET ORAL EVERY 6 HOURS PRN
Qty: 20 TABLET | Refills: 0 | Status: SHIPPED | OUTPATIENT
Start: 2018-03-13 | End: 2018-05-25

## 2018-03-13 NOTE — TELEPHONE ENCOUNTER
----- Message from Katie Gray sent at 3/13/2018  2:18 PM EDT -----  Contact: SCOTT; PT STOP BY  PT IS HAVING A LOT OF SWEATING AND CAUSING HER TO HAVE UPSET  STOMACH/NAUSEA-COULD SHE GET SOME PHENERGAN CALLED INTO  PHARMACY ON FILE[facility]    [phone]

## 2018-03-14 LAB
ALBUMIN SERPL-MCNC: 4.3 G/DL (ref 3.2–4.8)
ALBUMIN/GLOB SERPL: 1.5 G/DL (ref 1.5–2.5)
ALP SERPL-CCNC: 160 U/L (ref 25–100)
ALT SERPL-CCNC: 21 U/L (ref 7–40)
AST SERPL-CCNC: 26 U/L (ref 0–33)
BASOPHILS # BLD AUTO: 0.02 10*3/MM3 (ref 0–0.2)
BASOPHILS NFR BLD AUTO: 0.3 % (ref 0–1)
BILIRUB SERPL-MCNC: 0.4 MG/DL (ref 0.3–1.2)
BUN SERPL-MCNC: 18 MG/DL (ref 9–23)
BUN/CREAT SERPL: 18 (ref 7–25)
CALCIUM SERPL-MCNC: 9.6 MG/DL (ref 8.7–10.4)
CHLORIDE SERPL-SCNC: 102 MMOL/L (ref 99–109)
CHOLEST SERPL-MCNC: 207 MG/DL (ref 0–200)
CHOLEST/HDLC SERPL: 4.6 {RATIO}
CO2 SERPL-SCNC: 27 MMOL/L (ref 20–31)
CREAT SERPL-MCNC: 1 MG/DL (ref 0.6–1.3)
EOSINOPHIL # BLD AUTO: 0.1 10*3/MM3 (ref 0–0.3)
EOSINOPHIL NFR BLD AUTO: 1.5 % (ref 0–3)
ERYTHROCYTE [DISTWIDTH] IN BLOOD BY AUTOMATED COUNT: 16.5 % (ref 11.3–14.5)
ESTRADIOL SERPL-MCNC: <5 PG/ML
GFR SERPLBLD CREATININE-BSD FMLA CKD-EPI: 63 ML/MIN/1.73
GFR SERPLBLD CREATININE-BSD FMLA CKD-EPI: 76 ML/MIN/1.73
GLOBULIN SER CALC-MCNC: 2.9 GM/DL
GLUCOSE SERPL-MCNC: 96 MG/DL (ref 70–100)
HCT VFR BLD AUTO: 38.5 % (ref 34.5–44)
HDLC SERPL-MCNC: 45 MG/DL (ref 40–60)
HGB BLD-MCNC: 12.3 G/DL (ref 11.5–15.5)
IMM GRANULOCYTES # BLD: 0.01 10*3/MM3 (ref 0–0.03)
IMM GRANULOCYTES NFR BLD: 0.1 % (ref 0–0.6)
LDLC SERPL CALC-MCNC: 104 MG/DL (ref 0–100)
LYMPHOCYTES # BLD AUTO: 2.36 10*3/MM3 (ref 0.6–4.8)
LYMPHOCYTES NFR BLD AUTO: 34.6 % (ref 24–44)
MCH RBC QN AUTO: 26.6 PG (ref 27–31)
MCHC RBC AUTO-ENTMCNC: 31.9 G/DL (ref 32–36)
MCV RBC AUTO: 83.3 FL (ref 80–99)
MONOCYTES # BLD AUTO: 0.72 10*3/MM3 (ref 0–1)
MONOCYTES NFR BLD AUTO: 10.5 % (ref 0–12)
NEUTROPHILS # BLD AUTO: 3.62 10*3/MM3 (ref 1.5–8.3)
NEUTROPHILS NFR BLD AUTO: 53 % (ref 41–71)
PLATELET # BLD AUTO: 222 10*3/MM3 (ref 150–450)
POTASSIUM SERPL-SCNC: 5 MMOL/L (ref 3.5–5.5)
PROT SERPL-MCNC: 7.2 G/DL (ref 5.7–8.2)
RBC # BLD AUTO: 4.62 10*6/MM3 (ref 3.89–5.14)
SODIUM SERPL-SCNC: 140 MMOL/L (ref 132–146)
T3 SERPL-MCNC: 170 NG/DL (ref 71–180)
T4 FREE SERPL-MCNC: 0.87 NG/DL (ref 0.89–1.76)
TRIGL SERPL-MCNC: 292 MG/DL (ref 0–150)
TSH SERPL DL<=0.005 MIU/L-ACNC: 3.75 MIU/ML (ref 0.35–5.35)
VLDLC SERPL CALC-MCNC: 58.4 MG/DL
WBC # BLD AUTO: 6.83 10*3/MM3 (ref 3.5–10.8)

## 2018-03-15 ENCOUNTER — TELEPHONE (OUTPATIENT)
Dept: FAMILY MEDICINE CLINIC | Facility: CLINIC | Age: 33
End: 2018-03-15

## 2018-03-15 DIAGNOSIS — R23.2 HOT FLASHES: Primary | ICD-10-CM

## 2018-03-15 DIAGNOSIS — N95.1 MENOPAUSE SYNDROME: ICD-10-CM

## 2018-03-15 NOTE — TELEPHONE ENCOUNTER
----- Message from Katie Gray sent at 3/15/2018  9:16 AM EDT -----  Contact: SCOTT;PT CALLED  PT CALLING FOR LAB RESULTS    [phone]  MESSAGE OK

## 2018-03-16 ENCOUNTER — TELEPHONE (OUTPATIENT)
Dept: OBSTETRICS AND GYNECOLOGY | Facility: CLINIC | Age: 33
End: 2018-03-16

## 2018-03-20 ENCOUNTER — OFFICE VISIT (OUTPATIENT)
Dept: GASTROENTEROLOGY | Facility: CLINIC | Age: 33
End: 2018-03-20

## 2018-03-20 VITALS
WEIGHT: 188 LBS | OXYGEN SATURATION: 99 % | TEMPERATURE: 97.1 F | RESPIRATION RATE: 16 BRPM | DIASTOLIC BLOOD PRESSURE: 62 MMHG | HEIGHT: 63 IN | SYSTOLIC BLOOD PRESSURE: 108 MMHG | HEART RATE: 78 BPM | BODY MASS INDEX: 33.31 KG/M2

## 2018-03-20 DIAGNOSIS — R10.9 ABDOMINAL PAIN, UNSPECIFIED ABDOMINAL LOCATION: ICD-10-CM

## 2018-03-20 DIAGNOSIS — K59.00 CONSTIPATION, UNSPECIFIED CONSTIPATION TYPE: Primary | ICD-10-CM

## 2018-03-20 PROCEDURE — 99203 OFFICE O/P NEW LOW 30 MIN: CPT | Performed by: INTERNAL MEDICINE

## 2018-03-20 RX ORDER — DICYCLOMINE HYDROCHLORIDE 10 MG/1
10 CAPSULE ORAL 3 TIMES DAILY PRN
Qty: 180 CAPSULE | Refills: 3 | Status: SHIPPED | OUTPATIENT
Start: 2018-03-20 | End: 2018-06-04

## 2018-03-20 NOTE — PROGRESS NOTES
"PCP: Vish Torres MD    Chief Complaint   Patient presents with   • Constipation        History of Present Illness:   Magruerite Edwards is a 33 y.o. female who presents to the GI clinic after ER visit for abdominal pain. Previously followed at  for IBS, She has a h/o chronic constipation, opiate use/methadone, schizoprenia, and homelessness-since resolved. She complains of heartburn and difficulty with bowel movements. +epigastric 5/10, intermittent, nonradiating, \"achy\" pain.  + 30 lb wt loss.    Past Medical History:   Diagnosis Date   • Anxiety    • Asthma    • Bronchitis    • Chronic back pain    • COPD (chronic obstructive pulmonary disease)    • Depression    • GERD (gastroesophageal reflux disease)    • Hyperlipidemia    • Kidney stone    • PTSD (post-traumatic stress disorder)    • Schizo affective schizophrenia    • Schizophrenia, schizo-affective    • Urinary incontinence    • Urinary tract infection        Past Surgical History:   Procedure Laterality Date   • ADENOIDECTOMY     • BACK SURGERY  2013    x2; discetomy and herniated discs   • BLADDER SURGERY     • CHOLECYSTECTOMY     • FOOT SURGERY  2011    achilles tendon repair   • TONSILLECTOMY           Current Outpatient Prescriptions:   •  albuterol (PROVENTIL HFA;VENTOLIN HFA) 108 (90 Base) MCG/ACT inhaler, Inhale 2 puffs Every 4 (Four) Hours As Needed for Wheezing., Disp: , Rfl:   •  albuterol (PROVENTIL) (2.5 MG/3ML) 0.083% nebulizer solution, Take 2.5 mg by nebulization Every 6 (Six) Hours As Needed for Wheezing or Shortness of Air., Disp: 50 vial, Rfl: 5  •  baclofen (LIORESAL) 10 MG tablet, Take 20 mg by mouth 3 (Three) Times a Day., Disp: , Rfl:   •  busPIRone (BUSPAR) 7.5 MG tablet, Take 7.5 mg by mouth Daily., Disp: , Rfl:   •  citalopram (CeleXA) 40 MG tablet, Take 40 mg by mouth Daily., Disp: , Rfl:   •  dicyclomine (BENTYL) 10 MG capsule, Take 1 capsule by mouth 2 (Two) Times a Day. (Patient taking differently: Take 20 mg by mouth 4 " (Four) Times a Day Before Meals & at Bedtime.), Disp: 60 capsule, Rfl: 2  •  fluconazole (DIFLUCAN) 150 MG tablet, one by mouth today and repeat in 3 days if not better, Disp: 2 tablet, Rfl: 0  •  gabapentin (NEURONTIN) 300 MG capsule, Take 1 capsule by mouth 3 (Three) Times a Day., Disp: 90 capsule, Rfl: 0  •  ibuprofen (ADVIL,MOTRIN) 600 MG tablet, Take 1 tablet by mouth Every 8 (Eight) Hours As Needed for Mild Pain  or Moderate Pain ., Disp: 30 tablet, Rfl: 5  •  methadone (DOLOPHINE) 5 MG tablet, Take 5 mg by mouth Every 8 (Eight) Hours As Needed for Moderate Pain ., Disp: , Rfl:   •  norgestimate-ethinyl estradiol (TRINESSA, 28,) 0.18/0.215/0.25 MG-35 MCG per tablet, Take 1 tablet by mouth Daily., Disp: 28 tablet, Rfl: 5  •  pantoprazole (PROTONIX) 40 MG EC tablet, Take 1 tablet by mouth Daily., Disp: 30 tablet, Rfl: 2  •  polyethylene glycol (MIRALAX) packet, Take 17 g by mouth Daily. Mix with 8 oz water of juice, Disp: 30 packet, Rfl: 5  •  promethazine (PHENERGAN) 25 MG tablet, Take 1 tablet by mouth Every 6 (Six) Hours As Needed for Nausea or Vomiting., Disp: 20 tablet, Rfl: 0  •  QUEtiapine (SEROquel) 300 MG tablet, Take 300 mg by mouth Every Night., Disp: , Rfl:   •  tiZANidine (ZANAFLEX) 4 MG tablet, Take 4 mg by mouth Every 8 (Eight) Hours As Needed for Muscle Spasms., Disp: , Rfl:     Allergies   Allergen Reactions   • Paxil [Paroxetine Hcl] Mental Status Change   • Prozac [Fluoxetine Hcl] Mental Status Change   • Amitiza [Lubiprostone] Palpitations and Dizziness       Family History   Problem Relation Age of Onset   • Cancer Paternal Grandfather      possible stomach cancer   • Mental illness Father    • Pancreatitis Father    • Hypertension Father    • Diabetes Father    • Hyperlipidemia Father    • Mental illness Sister        Social History     Social History   • Marital status:      Spouse name: N/A   • Number of children: N/A   • Years of education: N/A     Occupational History   • Not on  file.     Social History Main Topics   • Smoking status: Former Smoker     Types: Cigarettes     Quit date: 1999   • Smokeless tobacco: Never Used   • Alcohol use No   • Drug use: No      Comment: former marijuana   • Sexual activity: Yes     Birth control/ protection: Condom      Comment:      Other Topics Concern   • Not on file     Social History Narrative   • No narrative on file       Review of Systems   Constitutional: Positive for unexpected weight change. Negative for fever.   HENT: Negative for nosebleeds.    Eyes: Negative for pain.   Respiratory: Negative for choking.    Gastrointestinal: Positive for constipation and nausea.        Heartburn     Allergic/Immunologic: Negative for food allergies and immunocompromised state.   Psychiatric/Behavioral: Negative for confusion and suicidal ideas.     All other systems reviewed and are negative.    Vitals:    03/20/18 1442   BP: 108/62   Pulse: 78   Resp: 16   Temp: 97.1 °F (36.2 °C)   SpO2: 99%       Physical Exam  General Appearance:  Vitals as above. no acute distress  High bmi  Head/face:  Normocephalic, atraumatic  Eyes:   EOMI, no conjunctivitis or icterus   Nose/Sinuses:  Nares patent bilaterally without discharge or lesions  Mouth/Throat:  Posterior pharynx is pink without drainage or exudates;  dentition is in good condition and repair  Neck:  trachea is midline, no thyromegaly  Lungs:  Clear to auscultation bilaterally  Heart:  Regular rate and rhythm without murmur, gallop or rub  Abdomen:  Soft, non-tender to palpation, no obvious masses, bowel sounds positive in four quadrants; no abdominal bruits; no guarding or rebound tenderness  Neurologic:  Alert; no focal deficits; age appropriate behavior and speech  Psychiatric: mood is good, affect is incongruent  Vascular: extremities without edema  Skin: no rash or cyanosis.      Assessment/Plan  1.) Heartburn  2.) wt loss  3.) Constipation  4.) h/o IBS, managed at     Due to persistent  symptoms, wt loss, heartburn, will go to bidirectional endoscopy. She may have opiate induced constipation, medicine (psychiatric) induced constipation or IBS-C exacerbated by opiates. Gave samples of linzess. Will refill linzess and bentyl if insurance will pay.  F/u in 3months after endoscopy.    Ronny Thomas MD  3/20/2018

## 2018-03-21 ENCOUNTER — TELEPHONE (OUTPATIENT)
Dept: GASTROENTEROLOGY | Facility: CLINIC | Age: 33
End: 2018-03-21

## 2018-03-21 DIAGNOSIS — R11.0 NAUSEA: Primary | ICD-10-CM

## 2018-03-21 RX ORDER — ONDANSETRON 4 MG/1
4 TABLET, FILM COATED ORAL EVERY 6 HOURS PRN
Qty: 60 TABLET | Refills: 0 | Status: SHIPPED | OUTPATIENT
Start: 2018-03-21 | End: 2018-07-10 | Stop reason: SDUPTHER

## 2018-03-21 NOTE — TELEPHONE ENCOUNTER
Got the message.     Ok for zofran 4 mg q 6 hours as needed for nausea  Qs: 60  No refills    She was educated on risk/benefit of this medicine including psychiatric and constipation.  I think we should keep egd as scheduled and try to optimize her bowel movements.

## 2018-03-21 NOTE — TELEPHONE ENCOUNTER
Patient called this morning stating she ate when she got home yesterday, patient states that she vomited eight times afterward. Patient would like to know if she should have EGD sooner and if you would be able to call her in some Zofran.

## 2018-03-22 ENCOUNTER — TELEPHONE (OUTPATIENT)
Dept: GASTROENTEROLOGY | Facility: CLINIC | Age: 33
End: 2018-03-22

## 2018-03-22 NOTE — TELEPHONE ENCOUNTER
RENATO  [facility] REP CALLED CONCERNING ROCKY 290 MCG PA. INSURANCE WANTS TO KNOW IF PATIENT HAS TRIED OR FAILED ANY ALTERNATIVES. PLEASE CALL THEM BACK. THANKS

## 2018-03-23 ENCOUNTER — TELEPHONE (OUTPATIENT)
Dept: OBSTETRICS AND GYNECOLOGY | Facility: CLINIC | Age: 33
End: 2018-03-23

## 2018-03-23 ENCOUNTER — OFFICE VISIT (OUTPATIENT)
Dept: OBSTETRICS AND GYNECOLOGY | Facility: CLINIC | Age: 33
End: 2018-03-23

## 2018-03-23 VITALS — RESPIRATION RATE: 14 BRPM | HEIGHT: 62 IN | WEIGHT: 183.8 LBS | BODY MASS INDEX: 33.82 KG/M2

## 2018-03-23 DIAGNOSIS — N95.1 VASOMOTOR SYMPTOMS DUE TO MENOPAUSE: Primary | ICD-10-CM

## 2018-03-23 DIAGNOSIS — D22.9 CHANGE IN MOLE: Primary | ICD-10-CM

## 2018-03-23 PROCEDURE — 99203 OFFICE O/P NEW LOW 30 MIN: CPT | Performed by: OBSTETRICS & GYNECOLOGY

## 2018-03-23 RX ORDER — OXCARBAZEPINE 150 MG/1
TABLET, FILM COATED ORAL
COMMUNITY
Start: 2018-02-22 | End: 2020-02-10 | Stop reason: ALTCHOICE

## 2018-03-26 ENCOUNTER — LAB (OUTPATIENT)
Dept: LAB | Facility: HOSPITAL | Age: 33
End: 2018-03-26

## 2018-03-26 ENCOUNTER — TELEPHONE (OUTPATIENT)
Dept: OBSTETRICS AND GYNECOLOGY | Facility: CLINIC | Age: 33
End: 2018-03-26

## 2018-03-26 DIAGNOSIS — N95.1 VASOMOTOR SYMPTOMS DUE TO MENOPAUSE: ICD-10-CM

## 2018-03-26 LAB
25(OH)D3 SERPL-MCNC: 11.6 NG/ML
ESTRADIOL SERPL HS-MCNC: <12 PG/ML
FSH SERPL-ACNC: 7 MIU/ML
LH SERPL-ACNC: 3.4 MIU/ML
PROGEST SERPL-MCNC: <0.15 NG/ML

## 2018-03-26 PROCEDURE — 84144 ASSAY OF PROGESTERONE: CPT

## 2018-03-26 PROCEDURE — 82306 VITAMIN D 25 HYDROXY: CPT | Performed by: OBSTETRICS & GYNECOLOGY

## 2018-03-26 PROCEDURE — 82670 ASSAY OF TOTAL ESTRADIOL: CPT

## 2018-03-26 PROCEDURE — 83002 ASSAY OF GONADOTROPIN (LH): CPT

## 2018-03-26 PROCEDURE — 36415 COLL VENOUS BLD VENIPUNCTURE: CPT

## 2018-03-26 PROCEDURE — 83001 ASSAY OF GONADOTROPIN (FSH): CPT

## 2018-03-26 NOTE — TELEPHONE ENCOUNTER
Provider Name:Yousif  Reason for Call: calling for Results  Pharmacy Name  Call Back Number:[phone]

## 2018-03-26 NOTE — PROGRESS NOTES
"Subjective   Chief Complaint   Patient presents with   • Establish Care     Hot flashes and excessive sweating     Margueriteagnes Edwards is a 33 y.o. year old .  Patient's last menstrual period was 2018 (exact date).  She presents to be seen for Vasomotor symptoms.  She describes hot flashes and excessive sweating episodes.  She also notes some vaginal dryness and discomfort with intercourse.     OTHER COMPLAINTS:  She also notes a blister type lesion on the right breast that has been present for approximately 3 months    The following portions of the patient's history were reviewed and updated as appropriate:current medications, allergies, past family history, past medical history, past social history and past surgical history    Smoking status: Former Smoker                                                              Packs/day: 0.00      Years: 0.00         Types: Cigarettes     Quit date:   Smokeless tobacco: Never Used                        Review of Systems  Constitutional POS: see HPI    NEG: anorexia or night sweats   Gastointestinal POS: nothing reported    NEG: bloating, change in bowel habits, melena or reflux symptoms   Genitourinary POS: see HPI    NEG: dysuria or hematuria   Integument POS: see HPI    NEG: moles that are changing in size, shape, color or rashes   Breast POS: nothing reported    NEG: persistent breast lump, skin dimpling or nipple discharge         Objective   Resp 14   Ht 157.5 cm (62\")   Wt 83.4 kg (183 lb 12.8 oz)   LMP 2018 (Exact Date)   BMI 33.62 kg/m²     General:  well developed; well nourished  no acute distress   Skin:  No suspicious lesions seen   Thyroid: normal to inspection and palpation   Lungs:  breathing is unlabored   Heart:  regular rate and rhythm, S1, S2 normal, no murmur, click, rub or gallop   Breasts:  Patient with a 3 x 1 cm elongated blister like lesion that is to the right of the nipple approximately 2 cm.  Single lesion noted.  Mildly " tender to the touch and fluctuant to palpation   Abdomen: soft, non-tender; no masses  no umbilical or inginual hernias are present  no hepato-splenomegaly   Pelvis: Clinical staff was present for exam     Lab Review   No data reviewed    Imaging   No data reviewed        Assessment   1. Vasomotor symptoms  2. Breast lesion     Plan   1. Await lab results for plan of care.  Dermatology referral sent      New Medications Ordered This Visit   Medications   • OXcarbazepine (TRILEPTAL) 150 MG tablet          This note was electronically signed.    Moncho Dodd M.D. MARIUSZ

## 2018-03-27 ENCOUNTER — TELEPHONE (OUTPATIENT)
Dept: OBSTETRICS AND GYNECOLOGY | Facility: CLINIC | Age: 33
End: 2018-03-27

## 2018-03-27 NOTE — TELEPHONE ENCOUNTER
Provider Name: Yousif  Reason for Call: Results . Needs a nurse to call  Pharmacy Name:   Call Back Number:[phone]

## 2018-03-28 ENCOUNTER — TELEPHONE (OUTPATIENT)
Dept: OBSTETRICS AND GYNECOLOGY | Facility: CLINIC | Age: 33
End: 2018-03-28

## 2018-03-28 NOTE — TELEPHONE ENCOUNTER
DR REYNOLDS PATIENT    PATIENT CALLING BACK FOR BLOOD WORK RESULTS    CALL BACK NUMBER  [phone]

## 2018-03-28 NOTE — TELEPHONE ENCOUNTER
Dr. Dodd patient called requesting her lab results.   [phone] I called patient back and informed her that Dr. Dodd has not reviewed her lab results yet. I will have Dr. Dodd review her results and I will call her back later today. Patient verbalized understanding.

## 2018-03-28 NOTE — TELEPHONE ENCOUNTER
Provider Name:Yousif  Reason for Call: Results of form hormone level, she states she needs them today so she can start back on her birth control  Pharmacy Name  Call Back Number:[phone]

## 2018-03-28 NOTE — TELEPHONE ENCOUNTER
Dr. Dodd patient called requesting lab results.  [phone] I called patient back and informed her that Dr. Dodd has not reviewed her lab results yet. I will have Dr. Dodd review her results and I will call her back later today.

## 2018-03-29 ENCOUNTER — TELEPHONE (OUTPATIENT)
Dept: OBSTETRICS AND GYNECOLOGY | Facility: CLINIC | Age: 33
End: 2018-03-29

## 2018-03-29 RX ORDER — ERGOCALCIFEROL 1.25 MG/1
50000 CAPSULE ORAL WEEKLY
Qty: 4 CAPSULE | Refills: 11 | Status: SHIPPED | OUTPATIENT
Start: 2018-03-29 | End: 2018-06-06 | Stop reason: DRUGHIGH

## 2018-03-29 RX ORDER — PAROXETINE 7.5 MG/1
1 CAPSULE ORAL DAILY
Qty: 30 CAPSULE | Refills: 3 | Status: SHIPPED | OUTPATIENT
Start: 2018-03-29 | End: 2018-05-25

## 2018-03-29 NOTE — TELEPHONE ENCOUNTER
Kaylan about her lab results.  Will start meds and have her return to clinic in 3 months.  She will need to be called for an appointment.

## 2018-03-29 NOTE — TELEPHONE ENCOUNTER
Provider Name: Yousif  Reason for Call: The medicine called in she is allergic to it ,she states she needs something else called in  Pharmacy Name: [facility]  Call Back Number

## 2018-03-29 NOTE — TELEPHONE ENCOUNTER
DR REYNOLDS PATIENT    WOULD LIKE URIEL OR DR REYNOLDS TO CALL HER TODAY REGARDING HER MEDICINE; NOT COVERED BY INSURANCE, CANNOT TAKE PAXIL OR THE MEDICINE LIKE PAXIL DUE TO ALLERGY    PHARMACY, [facility] IN [place]  (PLEASE TAKE OUT [facility] IN [place])    CALL BACK NUMBER  [phone]

## 2018-03-29 NOTE — TELEPHONE ENCOUNTER
DR REYNOLDS PATIENT    PATIENT CALLING TO LET DR REYNOLDS KNOW THAT MEDICINE THAT WAS CALLED IN IS NOT COVERED BY HER INSURANCE, WOULD LIKE SUBSTITUTE    PHARMACY:  [facility] [place] [facility]    CALL BACK NUMBER  [phone]

## 2018-03-29 NOTE — TELEPHONE ENCOUNTER
Prescription sent to [facility], not [facility].  Patient had 4 pharmacies in her profile. Took all out except [facility], called patient and advised her  that prempro is at correct pharmacy.

## 2018-04-02 ENCOUNTER — TELEPHONE (OUTPATIENT)
Dept: GASTROENTEROLOGY | Facility: CLINIC | Age: 33
End: 2018-04-02

## 2018-04-02 NOTE — TELEPHONE ENCOUNTER
Patient called and ask if she can have vanilla pudding starting the day before. I explained to patient that she couldn't have vanilla pudding the day before because it's not consider a clear liquids. Only jello, chicken or beef broth, popsicles, clear liquids. Transfer call to Cherry. Patient had some more questions to ask. Dr Thomas patient's.

## 2018-04-03 ENCOUNTER — TELEPHONE (OUTPATIENT)
Dept: GASTROENTEROLOGY | Facility: CLINIC | Age: 33
End: 2018-04-03

## 2018-04-03 ENCOUNTER — TELEPHONE (OUTPATIENT)
Dept: OBSTETRICS AND GYNECOLOGY | Facility: CLINIC | Age: 33
End: 2018-04-03

## 2018-04-03 NOTE — TELEPHONE ENCOUNTER
Spoke with patient, went over her bowel prep and what she was allowed to eat and drink the day before her colon. Patient gave verbal understanding.

## 2018-04-03 NOTE — TELEPHONE ENCOUNTER
Provider Name  Dr Dodd    Reason for Call:  Wants to know how long it will take the medicine to work, patient continues to sweat, and has questions about when to start birth control if she doesn't start her period    Pharmacy Name  [facility] on [address]  (please change pharmacy)    Call Back Number  [phone]

## 2018-04-03 NOTE — TELEPHONE ENCOUNTER
Cherry,  Patient called back. She left message on nurse line. She stated she isn't going to the bathroom like she should. Please call patient back. Thanks

## 2018-04-05 ENCOUNTER — TELEPHONE (OUTPATIENT)
Dept: OBSTETRICS AND GYNECOLOGY | Facility: CLINIC | Age: 33
End: 2018-04-05

## 2018-04-05 NOTE — TELEPHONE ENCOUNTER
Provider Name:Yousif  Reason for Call: Patient states that she needs Dr Dodd to give her a call  Pharmacy Name:  Call Back Number:[phone]

## 2018-04-05 NOTE — TELEPHONE ENCOUNTER
Dr. Dodd patient  [phone] returned patient's call. Patient wants to know the name of the medication that Dr. Dodd put her on. I advised patient that Dr. Dodd put her on Prempro to help with her hot flashed and excessive sweating. Patient verbalized understanding.

## 2018-04-06 ENCOUNTER — TELEPHONE (OUTPATIENT)
Dept: GASTROENTEROLOGY | Facility: CLINIC | Age: 33
End: 2018-04-06

## 2018-04-06 ENCOUNTER — TELEPHONE (OUTPATIENT)
Dept: FAMILY MEDICINE CLINIC | Facility: CLINIC | Age: 33
End: 2018-04-06

## 2018-04-06 RX ORDER — FLUCONAZOLE 150 MG/1
TABLET ORAL
Qty: 2 TABLET | Refills: 0 | Status: SHIPPED | OUTPATIENT
Start: 2018-04-06 | End: 2018-04-30

## 2018-04-06 NOTE — TELEPHONE ENCOUNTER
Got the message. Would not recommend increasing linzess at this time. Would recommend to do a 48 hour clear liquid diet prior to colonoscopy, bottle of magnesium citrate night before bowel prep, clear liquid diet and standard bowel prep as given in instructions prior to colonoscopy.  Follow up after colonoscopy

## 2018-04-06 NOTE — TELEPHONE ENCOUNTER
Gave patient the message, patient states she is unable to schedule her colonoscopy at this time and will call back when she is ready.

## 2018-04-06 NOTE — TELEPHONE ENCOUNTER
Cherry,  Patient called back today. She wants to know if you Dr Thomas will increase Linzess. Patient cancelled Colonoscopy yesterday.

## 2018-04-06 NOTE — TELEPHONE ENCOUNTER
----- Message from Lola Marshall sent at 4/6/2018  8:05 AM EDT -----  Contact: SCOTT; MED REQUEST   Pt is having a yeast infection and she would like to know if you could call her in two dicflucan for her. Please send to [facility] in [place].       Call back   [phone]

## 2018-04-06 NOTE — TELEPHONE ENCOUNTER
Pt is has been having itching for the past 5 days, did not mention any discharge or odor etc. . .

## 2018-04-06 NOTE — TELEPHONE ENCOUNTER
When I called to inform pt she wanted you to know that the doctor you sent her to found out she is perimenopausal and they gave her meds to help with the sweating.

## 2018-04-09 ENCOUNTER — TELEPHONE (OUTPATIENT)
Dept: GASTROENTEROLOGY | Facility: CLINIC | Age: 33
End: 2018-04-09

## 2018-04-10 ENCOUNTER — TELEPHONE (OUTPATIENT)
Dept: FAMILY MEDICINE CLINIC | Facility: CLINIC | Age: 33
End: 2018-04-10

## 2018-04-10 DIAGNOSIS — K21.9 GASTROESOPHAGEAL REFLUX DISEASE, ESOPHAGITIS PRESENCE NOT SPECIFIED: Primary | ICD-10-CM

## 2018-04-10 DIAGNOSIS — M79.642 LEFT HAND PAIN: Primary | ICD-10-CM

## 2018-04-10 RX ORDER — PANTOPRAZOLE SODIUM 40 MG/1
40 TABLET, DELAYED RELEASE ORAL DAILY
Qty: 90 TABLET | Refills: 3 | Status: SHIPPED | OUTPATIENT
Start: 2018-04-10 | End: 2018-08-27

## 2018-04-10 NOTE — TELEPHONE ENCOUNTER
----- Message from Katie Gray sent at 4/10/2018 11:39 AM EDT -----  Contact: SCOTT;PT CALLED  PT REQUESTING A ORDER FOR XRAY LEFT HAND DUE TO PAIN FROM A FALL  SEVERAL MONTHS AGO WITH A BROKEN FINGERS-WOULD LIKE IT FOR [facility].  PT WAS OFFERED AN APPT BUT WOULD NOT SEE ANOTHER PROVIDER DUE  TO DR CHAVEZ'S NEXT APPT IS April 23.    [phone]

## 2018-04-10 NOTE — TELEPHONE ENCOUNTER
Please call.  I placed order for the hand x-ray in the system.  She can go to any [facility] facility and they can do that x-ray.

## 2018-04-30 ENCOUNTER — OFFICE VISIT (OUTPATIENT)
Dept: PULMONOLOGY | Facility: CLINIC | Age: 33
End: 2018-04-30

## 2018-04-30 VITALS
WEIGHT: 188 LBS | HEIGHT: 63 IN | SYSTOLIC BLOOD PRESSURE: 92 MMHG | HEART RATE: 71 BPM | BODY MASS INDEX: 33.31 KG/M2 | TEMPERATURE: 98.2 F | RESPIRATION RATE: 18 BRPM | OXYGEN SATURATION: 98 % | DIASTOLIC BLOOD PRESSURE: 70 MMHG

## 2018-04-30 DIAGNOSIS — J45.20 MILD INTERMITTENT ASTHMA WITHOUT COMPLICATION: ICD-10-CM

## 2018-04-30 DIAGNOSIS — F20.9 SCHIZOPHRENIA, UNSPECIFIED TYPE (HCC): ICD-10-CM

## 2018-04-30 DIAGNOSIS — K21.9 GASTROESOPHAGEAL REFLUX DISEASE, ESOPHAGITIS PRESENCE NOT SPECIFIED: ICD-10-CM

## 2018-04-30 DIAGNOSIS — R06.02 SOB (SHORTNESS OF BREATH): Primary | ICD-10-CM

## 2018-04-30 PROCEDURE — 99214 OFFICE O/P EST MOD 30 MIN: CPT | Performed by: NURSE PRACTITIONER

## 2018-04-30 PROCEDURE — 94729 DIFFUSING CAPACITY: CPT | Performed by: NURSE PRACTITIONER

## 2018-04-30 PROCEDURE — 94375 RESPIRATORY FLOW VOLUME LOOP: CPT | Performed by: NURSE PRACTITIONER

## 2018-04-30 PROCEDURE — 94726 PLETHYSMOGRAPHY LUNG VOLUMES: CPT | Performed by: NURSE PRACTITIONER

## 2018-05-01 PROBLEM — J45.909 ASTHMA: Status: ACTIVE | Noted: 2018-05-01

## 2018-05-01 PROBLEM — K21.9 GERD (GASTROESOPHAGEAL REFLUX DISEASE): Status: ACTIVE | Noted: 2018-05-01

## 2018-05-01 RX ORDER — ALBUTEROL SULFATE 90 UG/1
2 AEROSOL, METERED RESPIRATORY (INHALATION) EVERY 4 HOURS PRN
Qty: 6.7 G | Refills: 11 | Status: SHIPPED | OUTPATIENT
Start: 2018-05-01 | End: 2019-02-06

## 2018-05-01 NOTE — PROGRESS NOTES
[facility] Pulmonary Evaluation    CHIEF COMPLAINT    Asthma    Refered by:  KATRINA Marcano  [facility] family medicine      HISTORY OF PRESENT ILLNESS    Marguerite Edwards is a 33 y.o.female here today for evaluation of a history of COPD and asthma.  She recently moved from [place] take here and needs to establish care.  She states she was diagnosed with COPD and asthma in 2015.  She's had multiple inhalers including Dulera, Serevent, Symbicort, Advair she doesn't feel like anything has helped very much.  She does feel like Breo helps her overall symptoms.  She does, no shortness of air, not worsened with activity.  She occasionally has some wheezing.  She denies any cough or sputum production.    She does have schizophrenia, so history is a bit difficult.  She also has significant reflux and IBS symptoms.  She follows up at  gastroenterology.  She does have significant reflux symptoms.  We did go over how reflux does attribute to her worsening airway disease.  She is on a PPI but does not follow reflux precautions.    She does have a history of tobacco abuse, she quit in 1999.          Patient Active Problem List   Diagnosis   • Anxiety   • Urinary incontinence   • Schizophrenia   • GERD (gastroesophageal reflux disease)   • Asthma       Allergies   Allergen Reactions   • Paxil [Paroxetine Hcl] Mental Status Change   • Prozac [Fluoxetine Hcl] Mental Status Change   • Amitiza [Lubiprostone] Palpitations and Dizziness       Current Outpatient Prescriptions:   •  albuterol (PROVENTIL HFA;VENTOLIN HFA) 108 (90 Base) MCG/ACT inhaler, Inhale 2 puffs Every 4 (Four) Hours As Needed for Wheezing., Disp: , Rfl:   •  albuterol (PROVENTIL) (2.5 MG/3ML) 0.083% nebulizer solution, Take 2.5 mg by nebulization Every 6 (Six) Hours As Needed for Wheezing or Shortness of Air., Disp: 50 vial, Rfl: 5  •  baclofen (LIORESAL) 10 MG tablet, Take 20 mg by mouth 3 (Three) Times a Day., Disp: , Rfl:   •  busPIRone (BUSPAR) 7.5 MG  tablet, Take 7.5 mg by mouth Daily., Disp: , Rfl:   •  citalopram (CeleXA) 40 MG tablet, Take 40 mg by mouth Daily., Disp: , Rfl:   •  dicyclomine (BENTYL) 10 MG capsule, Take 1 capsule by mouth 3 (Three) Times a Day As Needed (abdominal pain)., Disp: 180 capsule, Rfl: 3  •  ergocalciferol (ERGOCALCIFEROL) 69085 units capsule, Take 1 capsule by mouth 1 (One) Time Per Week., Disp: 4 capsule, Rfl: 11  •  estrogen, conjugated,-medroxyprogesterone (PREMPRO) 0.3-1.5 MG per tablet, Take 1 tablet by mouth Daily., Disp: 30 tablet, Rfl: 2  •  gabapentin (NEURONTIN) 300 MG capsule, Take 1 capsule by mouth 3 (Three) Times a Day., Disp: 90 capsule, Rfl: 0  •  ibuprofen (ADVIL,MOTRIN) 600 MG tablet, Take 1 tablet by mouth Every 8 (Eight) Hours As Needed for Mild Pain  or Moderate Pain ., Disp: 30 tablet, Rfl: 5  •  methadone (DOLOPHINE) 5 MG tablet, Take 5 mg by mouth Every 8 (Eight) Hours As Needed for Moderate Pain ., Disp: , Rfl:   •  ondansetron (ZOFRAN) 4 MG tablet, Take 1 tablet by mouth Every 6 (Six) Hours As Needed for Nausea or Vomiting., Disp: 60 tablet, Rfl: 0  •  OXcarbazepine (TRILEPTAL) 150 MG tablet, , Disp: , Rfl:   •  pantoprazole (PROTONIX) 40 MG EC tablet, Take 1 tablet by mouth Daily., Disp: 90 tablet, Rfl: 3  •  PARoxetine Mesylate 7.5 MG capsule, Take 1 tablet by mouth Daily., Disp: 30 capsule, Rfl: 3  •  polyethylene glycol (MIRALAX) packet, Take 17 g by mouth Daily. Mix with 8 oz water of juice (Patient taking differently: Take 17 g by mouth As Needed. Mix with 8 oz water of juice), Disp: 30 packet, Rfl: 5  •  promethazine (PHENERGAN) 25 MG tablet, Take 1 tablet by mouth Every 6 (Six) Hours As Needed for Nausea or Vomiting., Disp: 20 tablet, Rfl: 0  •  QUEtiapine (SEROquel) 300 MG tablet, Take 300 mg by mouth Every Night., Disp: , Rfl:   •  tiZANidine (ZANAFLEX) 4 MG tablet, Take 4 mg by mouth Every 8 (Eight) Hours As Needed for Muscle Spasms., Disp: , Rfl:   •  Fluticasone Furoate-Vilanterol 100-25  "MCG/INH aerosol powder , Inhale 1 puff Daily. 1 inhalation once a day, Disp: 1 each, Rfl: 6    MEDICATION LIST AND ALLERGIES REVIEWED.    Social History   Substance Use Topics   • Smoking status: Former Smoker     Types: Cigarettes     Quit date: 1999   • Smokeless tobacco: Never Used   • Alcohol use 0.6 oz/week     1 Cans of beer per week      Comment: occasionally       FAMILY AND SOCIAL HISTORY REVIEWED AND UPDATED IN EPIC    Review of Systems   Constitutional: Positive for fatigue. Negative for chills, fever and unexpected weight change.   HENT: Negative for congestion, nosebleeds, postnasal drip, rhinorrhea, sinus pressure and trouble swallowing.    Respiratory: Positive for shortness of breath. Negative for cough, chest tightness and wheezing.    Cardiovascular: Negative for chest pain and leg swelling.   Gastrointestinal: Positive for constipation. Negative for abdominal pain, diarrhea, nausea and vomiting.   Genitourinary: Negative for dysuria, frequency, hematuria and urgency.   Musculoskeletal: Positive for back pain. Negative for myalgias.   Neurological: Negative for dizziness, weakness, numbness and headaches.   Psychiatric/Behavioral: Positive for dysphoric mood. The patient is nervous/anxious.    All other systems reviewed and are negative.  .    BP 92/70 (BP Location: Right arm, Patient Position: Sitting, Cuff Size: Adult)   Pulse 71   Temp 98.2 °F (36.8 °C)   Resp 18   Ht 160 cm (63\")   Wt 85.3 kg (188 lb)   SpO2 98%   BMI 33.30 kg/m²   Physical Exam   Constitutional: She is oriented to person, place, and time. She appears well-developed and well-nourished.   HENT:   Head: Normocephalic and atraumatic.   Eyes: EOM are normal. Pupils are equal, round, and reactive to light.   Neck: Normal range of motion. Neck supple.   Cardiovascular: Normal rate and regular rhythm.    No murmur heard.  Pulmonary/Chest: Effort normal and breath sounds normal. No respiratory distress. She has no wheezes. She " has no rales.   Abdominal: Soft. Bowel sounds are normal. She exhibits no distension.   Musculoskeletal: Normal range of motion. She exhibits no edema.   Neurological: She is alert and oriented to person, place, and time.   Skin: Skin is warm and dry. No erythema.   Psychiatric: She has a normal mood and affect. Her behavior is normal.   Vitals reviewed.      RESULTS    Lab Results   Component Value Date    WBC 6.83 03/13/2018    HGB 12.3 03/13/2018    HCT 38.5 03/13/2018    MCV 83.3 03/13/2018     03/13/2018     Lab Results   Component Value Date    GLUCOSE 118 (H) 04/02/2017    CALCIUM 9.6 03/13/2018     03/13/2018    K 5.0 03/13/2018    CO2 27.0 03/13/2018     03/13/2018    BUN 18 03/13/2018    CREATININE 1.00 03/13/2018    EGFRIFAFRI 76 03/13/2018    EGFRIFNONA 63 03/13/2018    BCR 18.0 03/13/2018    ANIONGAP 14.0 (H) 04/02/2017       PFTs done in the office today, and read by me:  Normal PFTs, no obstruction or restriction FEV1 3.34, 111%     PA/LAT CXR done in the office today, and reviewed by me:  No acute process, no infiltrates or effusions.  Evidence of an old granulomatous disease in the left lower lobe    no comparison available    PROBLEM LIST    Problem List Items Addressed This Visit        Respiratory    Asthma    Relevant Medications    Fluticasone Furoate-Vilanterol 100-25 MCG/INH aerosol powder        Digestive    GERD (gastroesophageal reflux disease)       Other    Schizophrenia      Other Visit Diagnoses     SOB (shortness of breath)    -  Primary    Relevant Orders    XR Chest PA & Lateral    Pulmonary Function Test (Completed)            DISCUSSION    We reviewed her PFTs in the office today, there is no evidence of obstruction or restriction.  She can continue to use the Breo for symptom management of her airway inflammation and occasional wheezing.  She may need a prior authorization, she has tried and failed multiple other inhalers as noted above.    I did assure her  that there is no evidence of obstruction on her pulmonary function testing.      We did go over reflux precautions, and how reflux and her chronic GI issues can cause worsening airway inflammation and irritation.  I gave her reflux precaution handout in the office today and encouraged her to follow-up with gastroenterology    Follow-up in 3 months or as needed.    Diane Parra, APRN  04/30/201811:39 AM  Electronically signed     Please note that portions of this note were completed with a voice recognition program. Efforts were made to edit the dictations, but occasionally words are mistranscribed.      CC: Vish Torres MD

## 2018-05-08 ENCOUNTER — RESULTS ENCOUNTER (OUTPATIENT)
Dept: OBSTETRICS AND GYNECOLOGY | Facility: CLINIC | Age: 33
End: 2018-05-08

## 2018-05-08 ENCOUNTER — OFFICE VISIT (OUTPATIENT)
Dept: OBSTETRICS AND GYNECOLOGY | Facility: CLINIC | Age: 33
End: 2018-05-08

## 2018-05-08 VITALS
WEIGHT: 181.8 LBS | DIASTOLIC BLOOD PRESSURE: 60 MMHG | BODY MASS INDEX: 32.21 KG/M2 | RESPIRATION RATE: 14 BRPM | HEIGHT: 63 IN | OXYGEN SATURATION: 99 % | HEART RATE: 89 BPM | SYSTOLIC BLOOD PRESSURE: 100 MMHG

## 2018-05-08 DIAGNOSIS — B37.49 CANDIDA INFECTION OF GENITAL REGION: ICD-10-CM

## 2018-05-08 DIAGNOSIS — B37.49 CANDIDA INFECTION OF GENITAL REGION: Primary | ICD-10-CM

## 2018-05-08 PROCEDURE — 99213 OFFICE O/P EST LOW 20 MIN: CPT | Performed by: OBSTETRICS & GYNECOLOGY

## 2018-05-08 RX ORDER — FLUCONAZOLE 150 MG/1
150 TABLET ORAL DAILY
Qty: 2 TABLET | Refills: 2 | Status: SHIPPED | OUTPATIENT
Start: 2018-05-08 | End: 2018-05-25 | Stop reason: SDUPTHER

## 2018-05-08 NOTE — PROGRESS NOTES
"Subjective   Chief Complaint   Patient presents with   • Follow-up     Menses absent since March, hot flashes and yeast infection: external itching and pain     Marguerite QUIQUE Edwards is a 33 y.o. year old .  No LMP recorded (lmp unknown).  She presents to be seen for A 2 week history of a thick vaginal discharge and labial irritation.     OTHER COMPLAINTS:  Nothing else    The following portions of the patient's history were reviewed and updated as appropriate:current medications and allergies    Smoking status: Former Smoker                                                              Packs/day: 0.00      Years: 0.00         Types: Cigarettes     Quit date:   Smokeless tobacco: Never Used                        Review of Systems  Constitutional POS: nothing reported    NEG: anorexia or night sweats   Gastointestinal POS: nothing reported    NEG: bloating, change in bowel habits, melena or reflux symptoms   Genitourinary POS: see HPI    NEG: dysuria or hematuria   Integument POS: nothing reported    NEG: moles that are changing in size, shape, color or rashes   Breast POS: nothing reported    NEG: persistent breast lump, skin dimpling or nipple discharge         Objective   /60   Pulse 89   Resp 14   Ht 160 cm (63\")   Wt 82.5 kg (181 lb 12.8 oz)   LMP  (LMP Unknown)   SpO2 99%   Breastfeeding? No   BMI 32.20 kg/m²     General:  well developed; well nourished  no acute distress   Skin:  No suspicious lesions seen   Thyroid: normal to inspection and palpation   Lungs:  breathing is unlabored   Heart:  regular rate and rhythm, S1, S2 normal, no murmur, click, rub or gallop   Breasts:  Not performed.   Abdomen: soft, non-tender; no masses  no umbilical or inginual hernias are present  no hepato-splenomegaly   Pelvis: Clinical staff was present for exam  External genitalia:  normal appearance of the external genitalia including Bartholin's and [place]'s glands.  :  urethral meatus normal;  Vaginal:  " normal pink mucosa without prolapse or lesions. discharge present -  watery, white, thick and One swabs collected;     Lab Review   No data reviewed    Imaging   No data reviewed        Assessment   1. Candidiasis     Plan   1. Await culture results for plan of care.  Will give the patient empiric Diflucan for treatment      No orders of the defined types were placed in this encounter.         This note was electronically signed.    Moncho Dodd M.D. MARIUSZ

## 2018-05-16 ENCOUNTER — TELEPHONE (OUTPATIENT)
Dept: OBSTETRICS AND GYNECOLOGY | Facility: CLINIC | Age: 33
End: 2018-05-16

## 2018-05-16 RX ORDER — ESTROGEN,CON/M-PROGEST ACET 0.3-1.5MG
1 TABLET ORAL DAILY
Qty: 30 TABLET | Refills: 3 | Status: SHIPPED | OUTPATIENT
Start: 2018-05-16 | End: 2018-05-25

## 2018-05-16 NOTE — TELEPHONE ENCOUNTER
Dr Dodd Pt    Pt called and said she needs a refill on Prempro.  Pt states she needs it today.  Pt states she has been taking two a day per Dr. Dodd.    Callback  [phone]    Mary Markham

## 2018-05-16 NOTE — TELEPHONE ENCOUNTER
Sent rx thru epic for prempro, called and left message for patient that this medication is daily not twice a day.

## 2018-05-16 NOTE — TELEPHONE ENCOUNTER
Patient was very rude to me on the phone, raising her voice at me saying that she could yell at me if she wanted to.  I was trying to tell patient that prempro is a once daily med and that I would see if Dr Dodd would increase dosage. Tried to tell her that after I talked with Dr Dodd I would send in a new script.  She would not let me talk, so I told her goodbye and we would call her back .

## 2018-05-18 ENCOUNTER — TELEPHONE (OUTPATIENT)
Dept: OBSTETRICS AND GYNECOLOGY | Facility: CLINIC | Age: 33
End: 2018-05-18

## 2018-05-18 NOTE — TELEPHONE ENCOUNTER
She won't need any blood work drawn for 2 more months.  If she wants to take vitamin D daily she can buy over-the-counter vitamin D and stop the once every week vitamin D

## 2018-05-18 NOTE — TELEPHONE ENCOUNTER
Provider Name  Dr Dodd    Reason for Call  Wants to know when she will need blood work drawn again, would also like to know if she can take more vitamin D (would like to take it daily) also started Prempro Tuesday as is doing well on it    Pharmacy Name  [facility] on [address]    Call Back Number  [phone]

## 2018-05-21 ENCOUNTER — TELEPHONE (OUTPATIENT)
Dept: OBSTETRICS AND GYNECOLOGY | Facility: CLINIC | Age: 33
End: 2018-05-21

## 2018-05-21 NOTE — TELEPHONE ENCOUNTER
Dr. Dodd patient   [phone] [facility] 5/20/2018 patient states she is having real bad cramps, having to take Tylenol because she cannot take Ibuprofen it causes her to bleed. Patient wants to switch from Dr. Dodd to KATRINA [facility]. I will call patient back tomorrow to schedule with Jaci.

## 2018-05-21 NOTE — TELEPHONE ENCOUNTER
Yousif pt called stating she is upset because she started her cycle and has no energy. Is wanting to change to another provider here in the practice. Is wanting to speak to a nurse. Please contact her back

## 2018-05-23 NOTE — TELEPHONE ENCOUNTER
[phone] left detailed message for patient to return my call regarding her appointment with Jaci Marinelli on 5/30/2018. I informed patient that I need to reschedule her appointment for that day.

## 2018-05-25 ENCOUNTER — OFFICE VISIT (OUTPATIENT)
Dept: FAMILY MEDICINE CLINIC | Facility: CLINIC | Age: 33
End: 2018-05-25

## 2018-05-25 VITALS
OXYGEN SATURATION: 99 % | TEMPERATURE: 98.1 F | WEIGHT: 186.5 LBS | DIASTOLIC BLOOD PRESSURE: 58 MMHG | BODY MASS INDEX: 33.04 KG/M2 | SYSTOLIC BLOOD PRESSURE: 82 MMHG | HEART RATE: 69 BPM | HEIGHT: 63 IN | RESPIRATION RATE: 16 BRPM

## 2018-05-25 DIAGNOSIS — E55.9 VITAMIN D DEFICIENCY: ICD-10-CM

## 2018-05-25 DIAGNOSIS — N76.0 ACUTE VAGINITIS: ICD-10-CM

## 2018-05-25 DIAGNOSIS — J30.1 ACUTE SEASONAL ALLERGIC RHINITIS DUE TO POLLEN: ICD-10-CM

## 2018-05-25 DIAGNOSIS — R07.89 CHEST WALL PAIN: Primary | ICD-10-CM

## 2018-05-25 DIAGNOSIS — I95.9 HYPOTENSION, UNSPECIFIED HYPOTENSION TYPE: ICD-10-CM

## 2018-05-25 DIAGNOSIS — J45.20 MILD INTERMITTENT ASTHMA WITHOUT COMPLICATION: ICD-10-CM

## 2018-05-25 LAB
25(OH)D3+25(OH)D2 SERPL-MCNC: 26.8 NG/ML
ALBUMIN SERPL-MCNC: 4.5 G/DL (ref 3.2–4.8)
ALBUMIN/GLOB SERPL: 1.6 G/DL (ref 1.5–2.5)
ALP SERPL-CCNC: 119 U/L (ref 25–100)
ALT SERPL-CCNC: 17 U/L (ref 7–40)
AST SERPL-CCNC: 19 U/L (ref 0–33)
BASOPHILS # BLD AUTO: 0.03 10*3/MM3 (ref 0–0.2)
BASOPHILS NFR BLD AUTO: 0.3 % (ref 0–1)
BILIRUB SERPL-MCNC: 0.2 MG/DL (ref 0.3–1.2)
BUN SERPL-MCNC: 18 MG/DL (ref 9–23)
BUN/CREAT SERPL: 22.5 (ref 7–25)
CALCIUM SERPL-MCNC: 9.7 MG/DL (ref 8.7–10.4)
CHLORIDE SERPL-SCNC: 102 MMOL/L (ref 99–109)
CO2 SERPL-SCNC: 29 MMOL/L (ref 20–31)
CREAT SERPL-MCNC: 0.8 MG/DL (ref 0.6–1.3)
EOSINOPHIL # BLD AUTO: 0.11 10*3/MM3 (ref 0–0.3)
EOSINOPHIL NFR BLD AUTO: 1.3 % (ref 0–3)
ERYTHROCYTE [DISTWIDTH] IN BLOOD BY AUTOMATED COUNT: 12.9 % (ref 11.3–14.5)
GFR SERPLBLD CREATININE-BSD FMLA CKD-EPI: 82 ML/MIN/1.73
GFR SERPLBLD CREATININE-BSD FMLA CKD-EPI: 99 ML/MIN/1.73
GLOBULIN SER CALC-MCNC: 2.8 GM/DL
GLUCOSE SERPL-MCNC: 119 MG/DL (ref 70–100)
HCT VFR BLD AUTO: 40.4 % (ref 34.5–44)
HGB BLD-MCNC: 12.6 G/DL (ref 11.5–15.5)
IMM GRANULOCYTES # BLD: 0.01 10*3/MM3 (ref 0–0.03)
IMM GRANULOCYTES NFR BLD: 0.1 % (ref 0–0.6)
LYMPHOCYTES # BLD AUTO: 3.56 10*3/MM3 (ref 0.6–4.8)
LYMPHOCYTES NFR BLD AUTO: 40.8 % (ref 24–44)
MCH RBC QN AUTO: 27.3 PG (ref 27–31)
MCHC RBC AUTO-ENTMCNC: 31.2 G/DL (ref 32–36)
MCV RBC AUTO: 87.6 FL (ref 80–99)
MONOCYTES # BLD AUTO: 0.62 10*3/MM3 (ref 0–1)
MONOCYTES NFR BLD AUTO: 7.1 % (ref 0–12)
NEUTROPHILS # BLD AUTO: 4.39 10*3/MM3 (ref 1.5–8.3)
NEUTROPHILS NFR BLD AUTO: 50.4 % (ref 41–71)
PLATELET # BLD AUTO: 240 10*3/MM3 (ref 150–450)
POTASSIUM SERPL-SCNC: 5.1 MMOL/L (ref 3.5–5.5)
PROT SERPL-MCNC: 7.3 G/DL (ref 5.7–8.2)
RBC # BLD AUTO: 4.61 10*6/MM3 (ref 3.89–5.14)
SODIUM SERPL-SCNC: 138 MMOL/L (ref 132–146)
WBC # BLD AUTO: 8.72 10*3/MM3 (ref 3.5–10.8)

## 2018-05-25 PROCEDURE — 99214 OFFICE O/P EST MOD 30 MIN: CPT | Performed by: FAMILY MEDICINE

## 2018-05-25 RX ORDER — FLUCONAZOLE 150 MG/1
150 TABLET ORAL DAILY
Qty: 2 TABLET | Refills: 2 | Status: SHIPPED | OUTPATIENT
Start: 2018-05-25 | End: 2018-07-02

## 2018-05-25 RX ORDER — LORATADINE 10 MG/1
10 TABLET ORAL DAILY
COMMUNITY
Start: 2018-05-25 | End: 2018-07-02

## 2018-05-25 RX ORDER — NAPROXEN 500 MG/1
500 TABLET ORAL 2 TIMES DAILY WITH MEALS
Qty: 40 TABLET | Refills: 2 | Status: SHIPPED | OUTPATIENT
Start: 2018-05-25 | End: 2018-07-02

## 2018-05-25 NOTE — PROGRESS NOTES
Assessment/Plan       Problems Addressed this Visit        Respiratory    Asthma      Other Visit Diagnoses     Chest wall pain    -  Primary    Relevant Medications    naproxen (NAPROSYN) 500 MG tablet    Acute vaginitis        Relevant Medications    fluconazole (DIFLUCAN) 150 MG tablet    Acute seasonal allergic rhinitis due to pollen        Relevant Medications    loratadine (CLARITIN) 10 MG tablet    Vitamin D deficiency        Relevant Orders    Vitamin D 25 hydroxy    Hypotension, unspecified hypotension type        Relevant Orders    Comprehensive Metabolic Panel    CBC & Differential            Follow up: Return if symptoms worsen or fail to improve.     DISCUSSION  Chest wall pain.  We will try naproxen.  Side effects explained.    Recurrent vaginitis.  Will try Diflucan again.  Follow-up with oncology.    I am indeed deficiency.  Recheck vitamin D level.    Asthma.  Continue follow-up with specialist.    Hypotension.  Blood pressures a little low today.  Really not symptomatic except she just does not feel well.  Denies dizziness.  No shortness of breath.  No chest pain other than related to chest wall pain.  Check labs as noted to evaluate for a hydration, kidney issues or anemia.  If feels worse over the weekend, recommend emergency room evaluation  She and her  agree.    Allergic rhinitis.  Recommend over-the-counter Claritin.      MEDICATIONS PRESCRIBED  Requested Prescriptions     Signed Prescriptions Disp Refills   • naproxen (NAPROSYN) 500 MG tablet 40 tablet 2     Sig: Take 1 tablet by mouth 2 (Two) Times a Day With Meals.   • loratadine (CLARITIN) 10 MG tablet       Sig: Take 1 tablet by mouth Daily.   • fluconazole (DIFLUCAN) 150 MG tablet 2 tablet 2     Sig: Take 1 tablet by mouth Daily. Repeat in 3 days        -------------------------------------------    Subjective     Chief Complaint   Patient presents with   • hormones   • Vaginitis     discharge, itching   • Allergies   • right  "arm pain         History of Present Illness    Chest wall pain  Still hurting  Ibuprofen no help  Feels worse  No shortness of breath  If laugh hard, increased pain     Asthma  Saw  and no COPD. + asthma    Allergies and nose runs all the time  Nose spray run downs her throat  Not taking any meds for this       Vaginal infection  Started 2 weeks ago  + discharge, white, + itch,   Diflucan helped in the past  Has seen Dr Dodd for gyn  Seeing female in that office now    Had botox surg      Hypotension   urology 80/50 yesterday    Tired, no dizziness  No chest pain except the chest wall pain   Has been drinking fluids  No fevers    Vitamin D deficiency  Recent vitamin D level was 11.  Has been on replacement.  Needs recheck.        History   Smoking Status   • Former Smoker   • Types: Cigarettes   • Quit date: 1999   Smokeless Tobacco   • Never Used        Past Medical History,Medications, Allergies, and social history was reviewed.      Review of Systems   Constitutional: Positive for fatigue.   HENT: Positive for congestion ( Allergy symptoms.).    Respiratory: Negative.    Cardiovascular: Negative.    Gastrointestinal: Negative.    Genitourinary: Positive for vaginal discharge.   Neurological: Negative.  Negative for dizziness and syncope.       Objective     Vitals:    05/25/18 1054 05/25/18 1058   BP: (!) 80/50 (!) 82/58   Pulse: 69    Resp: 16    Temp: 98.1 °F (36.7 °C)    TempSrc: Temporal Artery     SpO2: 99%    Weight: 84.6 kg (186 lb 8 oz)    Height: 160 cm (62.99\")           Physical Exam   Constitutional: She is oriented to person, place, and time. She appears well-developed and well-nourished.   HENT:   Head: Normocephalic and atraumatic.   Right Ear: Hearing and external ear normal.   Left Ear: Hearing and external ear normal.   Mouth/Throat: Oropharynx is clear and moist.   Eyes: Conjunctivae and EOM are normal. Pupils are equal, round, and reactive to light.   Cardiovascular: Normal rate, regular " rhythm and normal heart sounds.  Exam reveals no friction rub.    No murmur heard.  Pulmonary/Chest: Effort normal and breath sounds normal. No respiratory distress. She has no wheezes. She has no rales.   Neurological: She is alert and oriented to person, place, and time.   Skin: Skin is warm.   Psychiatric: She has a normal mood and affect. Her behavior is normal.   Nursing note and vitals reviewed.                Vish Torres MD

## 2018-05-29 RX ORDER — CHOLECALCIFEROL (VITAMIN D3) 25 MCG
1000 CAPSULE ORAL DAILY
Qty: 30 CAPSULE | Refills: 5 | Status: SHIPPED | OUTPATIENT
Start: 2018-05-29 | End: 2018-11-13 | Stop reason: SDUPTHER

## 2018-06-04 ENCOUNTER — HOSPITAL ENCOUNTER (EMERGENCY)
Facility: HOSPITAL | Age: 33
Discharge: HOME | End: 2018-06-04
Attending: EMERGENCY MEDICINE | Admitting: EMERGENCY MEDICINE

## 2018-06-04 ENCOUNTER — TELEPHONE (OUTPATIENT)
Dept: FAMILY MEDICINE CLINIC | Facility: CLINIC | Age: 33
End: 2018-06-04

## 2018-06-04 VITALS
BODY MASS INDEX: 32.25 KG/M2 | SYSTOLIC BLOOD PRESSURE: 128 MMHG | RESPIRATION RATE: 18 BRPM | OXYGEN SATURATION: 96 % | DIASTOLIC BLOOD PRESSURE: 76 MMHG | WEIGHT: 182 LBS | TEMPERATURE: 98.7 F | HEART RATE: 93 BPM | HEIGHT: 63 IN

## 2018-06-04 DIAGNOSIS — N39.0 URINARY TRACT INFECTION WITH HEMATURIA, SITE UNSPECIFIED: Primary | ICD-10-CM

## 2018-06-04 DIAGNOSIS — R31.9 URINARY TRACT INFECTION WITH HEMATURIA, SITE UNSPECIFIED: Primary | ICD-10-CM

## 2018-06-04 LAB
BACTERIA UR QL AUTO: ABNORMAL /HPF
BILIRUB UR QL STRIP: NEGATIVE
CLARITY UR: ABNORMAL
COLOR UR: YELLOW
GLUCOSE UR STRIP-MCNC: NEGATIVE MG/DL
HGB UR QL STRIP.AUTO: ABNORMAL
HYALINE CASTS UR QL AUTO: ABNORMAL /LPF
KETONES UR QL STRIP: NEGATIVE
LEUKOCYTE ESTERASE UR QL STRIP.AUTO: ABNORMAL
NITRITE UR QL STRIP: NEGATIVE
PH UR STRIP.AUTO: 6 [PH] (ref 5–8)
PROT UR QL STRIP: ABNORMAL
RBC # UR: ABNORMAL /HPF
REF LAB TEST METHOD: ABNORMAL
SP GR UR STRIP: 1.02 (ref 1–1.03)
SQUAMOUS #/AREA URNS HPF: ABNORMAL /HPF
UROBILINOGEN UR QL STRIP: ABNORMAL
WBC UR QL AUTO: ABNORMAL /HPF

## 2018-06-04 PROCEDURE — 87186 SC STD MICRODIL/AGAR DIL: CPT | Performed by: EMERGENCY MEDICINE

## 2018-06-04 PROCEDURE — 99283 EMERGENCY DEPT VISIT LOW MDM: CPT

## 2018-06-04 PROCEDURE — 81001 URINALYSIS AUTO W/SCOPE: CPT

## 2018-06-04 PROCEDURE — 25010000002 CEFTRIAXONE PER 250 MG: Performed by: PHYSICIAN ASSISTANT

## 2018-06-04 PROCEDURE — 87077 CULTURE AEROBIC IDENTIFY: CPT | Performed by: EMERGENCY MEDICINE

## 2018-06-04 PROCEDURE — 96372 THER/PROPH/DIAG INJ SC/IM: CPT

## 2018-06-04 PROCEDURE — 87086 URINE CULTURE/COLONY COUNT: CPT

## 2018-06-04 RX ORDER — TRAMADOL HYDROCHLORIDE 50 MG/1
50 TABLET ORAL ONCE
Status: COMPLETED | OUTPATIENT
Start: 2018-06-04 | End: 2018-06-04

## 2018-06-04 RX ORDER — CEFDINIR 300 MG/1
300 CAPSULE ORAL 2 TIMES DAILY
Qty: 14 CAPSULE | Refills: 0 | Status: SHIPPED | OUTPATIENT
Start: 2018-06-04 | End: 2018-07-02

## 2018-06-04 RX ORDER — CEFTRIAXONE 1 G/1
1000 INJECTION, POWDER, FOR SOLUTION INTRAMUSCULAR; INTRAVENOUS ONCE
Status: COMPLETED | OUTPATIENT
Start: 2018-06-04 | End: 2018-06-04

## 2018-06-04 RX ORDER — LIDOCAINE HYDROCHLORIDE 10 MG/ML
2.1 INJECTION, SOLUTION EPIDURAL; INFILTRATION; INTRACAUDAL; PERINEURAL ONCE
Status: COMPLETED | OUTPATIENT
Start: 2018-06-04 | End: 2018-06-04

## 2018-06-04 RX ORDER — TRAMADOL HYDROCHLORIDE 50 MG/1
50 TABLET ORAL EVERY 6 HOURS PRN
Qty: 15 TABLET | Refills: 0 | Status: SHIPPED | OUTPATIENT
Start: 2018-06-04 | End: 2018-07-02

## 2018-06-04 RX ADMIN — TRAMADOL HYDROCHLORIDE 50 MG: 50 TABLET, COATED ORAL at 16:12

## 2018-06-04 RX ADMIN — CEFTRIAXONE 1000 MG: 1 INJECTION, POWDER, FOR SOLUTION INTRAMUSCULAR; INTRAVENOUS at 16:11

## 2018-06-04 RX ADMIN — LIDOCAINE HYDROCHLORIDE 2.1 ML: 10 INJECTION, SOLUTION EPIDURAL; INFILTRATION; INTRACAUDAL; PERINEURAL at 16:11

## 2018-06-04 NOTE — TELEPHONE ENCOUNTER
----- Message from Lola Marshall sent at 6/4/2018  8:06 AM EDT -----  Contact: SCOTT; REFRRAL REQUEST   PT CALLED IN STATED THAT SHE WOULD LIKE TO HAVE A REFERRAL TO A UROLOGIST. THE CURRENT ONE THAT SHE IS SEEING IS NOT HELPING HER. SHE WOULD LIKE TO STAY WITH [facility] IF POSSIBLE.     CALL BACK   [phone]

## 2018-06-04 NOTE — DISCHARGE INSTRUCTIONS
Increase clear fluid intake.  Follow-up with Dr. Walker with urology, call tomorrow to schedule your appointment.  Follow-up with your primary care provider in 2-3 days for recheck.  Return to the emergency department immediately if any change or worsening of symptoms.
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
## 2018-06-04 NOTE — TELEPHONE ENCOUNTER
Please call.  What symptoms is she having related to her urinary tract.  Infection, incontinence?  More information please

## 2018-06-05 ENCOUNTER — TELEPHONE (OUTPATIENT)
Dept: FAMILY MEDICINE CLINIC | Facility: CLINIC | Age: 33
End: 2018-06-05

## 2018-06-05 DIAGNOSIS — N39.0 RECURRENT UTI: Primary | ICD-10-CM

## 2018-06-05 NOTE — TELEPHONE ENCOUNTER
----- Message from Lola Marshall sent at 6/5/2018  3:11 PM EDT -----  Contact: MARCELINO CHAVEZ REQUEST   PT WOULD LIKE A REFERRAL TO A UROLOGIST DUE TO FREQ UTI.     CALL BACK   [phone]

## 2018-06-06 ENCOUNTER — OFFICE VISIT (OUTPATIENT)
Dept: OBSTETRICS AND GYNECOLOGY | Facility: CLINIC | Age: 33
End: 2018-06-06

## 2018-06-06 VITALS
RESPIRATION RATE: 14 BRPM | HEIGHT: 63 IN | WEIGHT: 192.8 LBS | BODY MASS INDEX: 34.16 KG/M2 | SYSTOLIC BLOOD PRESSURE: 104 MMHG | OXYGEN SATURATION: 99 % | DIASTOLIC BLOOD PRESSURE: 62 MMHG | HEART RATE: 87 BPM

## 2018-06-06 DIAGNOSIS — E28.39 PREMATURE OVARIAN FAILURE: Primary | ICD-10-CM

## 2018-06-06 LAB — BACTERIA SPEC AEROBE CULT: ABNORMAL

## 2018-06-06 PROCEDURE — 99215 OFFICE O/P EST HI 40 MIN: CPT | Performed by: NURSE PRACTITIONER

## 2018-06-06 RX ORDER — MEDROXYPROGESTERONE ACETATE 2.5 MG/1
2.5 TABLET ORAL DAILY
Qty: 30 TABLET | Refills: 5 | Status: SHIPPED | OUTPATIENT
Start: 2018-06-06 | End: 2018-08-27

## 2018-06-06 RX ORDER — ESTRADIOL 2 MG/1
2 TABLET ORAL DAILY
Qty: 30 TABLET | Refills: 5 | Status: SHIPPED | OUTPATIENT
Start: 2018-06-06 | End: 2018-07-18 | Stop reason: ALTCHOICE
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
## 2018-06-07 ENCOUNTER — TELEPHONE (OUTPATIENT)
Dept: FAMILY MEDICINE CLINIC | Facility: CLINIC | Age: 33
End: 2018-06-07

## 2018-06-07 NOTE — TELEPHONE ENCOUNTER
PLease call, more info. Is this for the incontinence?  What pharmacy? How many per day does she use?

## 2018-06-07 NOTE — TELEPHONE ENCOUNTER
SPOKE WITH PT AND GENTLEMAN WITH HER ALL TIME AND PT IS USING FOR INCONTINENCE AND THEY USE [facility] [address] AND SHE USES 2 DAILY ONE IN MORNING AND ONE AT NIGHT.   PT CALLED BACK AND STATES THAT WE NEED TO WRITE A NOTE THAT SHE NEEDS 90 DIAPERS AND FAX TO INS. PT WAS INFORMED THAT ISN'T THE WAY IT WORKS. PT STATES THAT IS WHAT THEY TOLD HER AND THEY WILL MAIL THEM TO HER DOOR. PT INFORMED TO CALL [employer] AND FIND OUT WHAT MAIL ORDER SHE NEEDS TO USE AND WE CAN REQUEST. PT STATES SHE DOESN'T USE MAIL ORDER AND INFORMED HER THAT SHE WILL NEED TO SEND THIS TO A PHARM NOT [employer]. PT ASK FOR ME TO CALL INS TO FINE OUT. PT INFORMED THAT I WAS BUSY AND WASN'T ABLE TO CALL TO ASK WHAT MAIL ORDER SHE USES. PT THEN GOT UPSET AND SAID SHE WOULD CALL. WILL WAIT FOR INS TO RETURN CALL OR PT.

## 2018-06-07 NOTE — TELEPHONE ENCOUNTER
"----- Message from Yasemin Guzman sent at 6/7/2018 11:12 AM EDT -----  Contact: Torres  Patient states she can get diapers through her insurance for $4 and would like Dr. Torres to do an \"override\" for this. Please call patient with any questions [phone].  "

## 2018-06-12 ENCOUNTER — TELEPHONE (OUTPATIENT)
Dept: FAMILY MEDICINE CLINIC | Facility: CLINIC | Age: 33
End: 2018-06-12

## 2018-06-12 ENCOUNTER — OFFICE VISIT (OUTPATIENT)
Dept: GASTROENTEROLOGY | Facility: CLINIC | Age: 33
End: 2018-06-12

## 2018-06-12 VITALS
OXYGEN SATURATION: 95 % | TEMPERATURE: 97.6 F | BODY MASS INDEX: 33.49 KG/M2 | SYSTOLIC BLOOD PRESSURE: 118 MMHG | HEART RATE: 41 BPM | WEIGHT: 189 LBS | RESPIRATION RATE: 16 BRPM | DIASTOLIC BLOOD PRESSURE: 78 MMHG | HEIGHT: 63 IN

## 2018-06-12 DIAGNOSIS — M79.642 BILATERAL HAND PAIN: ICD-10-CM

## 2018-06-12 DIAGNOSIS — M25.562 CHRONIC PAIN OF BOTH KNEES: Primary | ICD-10-CM

## 2018-06-12 DIAGNOSIS — M25.561 CHRONIC PAIN OF BOTH KNEES: Primary | ICD-10-CM

## 2018-06-12 DIAGNOSIS — M79.641 BILATERAL HAND PAIN: ICD-10-CM

## 2018-06-12 DIAGNOSIS — G89.29 CHRONIC PAIN OF BOTH KNEES: Primary | ICD-10-CM

## 2018-06-12 DIAGNOSIS — K21.9 GASTROESOPHAGEAL REFLUX DISEASE, ESOPHAGITIS PRESENCE NOT SPECIFIED: Primary | ICD-10-CM

## 2018-06-12 PROCEDURE — 99213 OFFICE O/P EST LOW 20 MIN: CPT | Performed by: INTERNAL MEDICINE

## 2018-06-12 RX ORDER — PANTOPRAZOLE SODIUM 40 MG/1
TABLET, DELAYED RELEASE ORAL
Qty: 90 TABLET | Refills: 3 | Status: SHIPPED | OUTPATIENT
Start: 2018-06-12 | End: 2018-07-10 | Stop reason: SDUPTHER
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
## 2018-06-12 NOTE — TELEPHONE ENCOUNTER
Please call.  Has she had x-rays of her knees done?  If not, recommend checking x-rays first before referring so we know what we are dealing with.  His me know if she wants to get x-rays and I will place order for that.  Find out where she would want to get the x-rays done because she may need to  an order if not done any [facility].

## 2018-06-12 NOTE — TELEPHONE ENCOUNTER
----- Message from Sergey Islas sent at 6/12/2018 10:48 AM EDT -----  Contact: PT   PT CALLED AND IS REQUESTING THAT DR CHAVEZ WILL SET HER UP WITH A KNEE DR, BOTH KNEES HAVE ARTHRITIS AND SHE WANTS TO GET IT CHECKED OUT, STATES SHE IS IN A LOT OF PAIN

## 2018-06-13 ENCOUNTER — TELEPHONE (OUTPATIENT)
Dept: FAMILY MEDICINE CLINIC | Facility: CLINIC | Age: 33
End: 2018-06-13

## 2018-06-13 NOTE — TELEPHONE ENCOUNTER
----- Message from Isabel Mendez sent at 6/13/2018 11:21 AM EDT -----  Contact: DR CHAVEZ  PATIENT WANTS A REFERRAL TO A RHEUMATOLOGIST FOR HER ARTHRITIS. SHE WANTS ONE AFFILIATED WITH [facility].

## 2018-06-13 NOTE — TELEPHONE ENCOUNTER
Pt has had knee xrays years ago, she doesn't remember when or where. She is willing to have them done at a [facility]. She also wants to have the hand xray done that you ordered in April too but she say's, she is having pain in both hands and would like both of them done.

## 2018-06-14 ENCOUNTER — TELEPHONE (OUTPATIENT)
Dept: OBSTETRICS AND GYNECOLOGY | Facility: CLINIC | Age: 33
End: 2018-06-14

## 2018-06-14 NOTE — TELEPHONE ENCOUNTER
[phone] Called patient to let her know that Jaci has placed the referral in Epic for Endocrinology. I informed patient if she has not heard from Endocrinology by Thursday of next week to give us a call back. Patient verbalized understanding.

## 2018-06-14 NOTE — TELEPHONE ENCOUNTER
Jaci Marinelli Pt    Pt called to ask how long it would take for the medicine presribed at her last appt - Estradiol to take effect.  Pt also asked about an appointment with another doctor.    Callback [phone]    [facility] [place]

## 2018-06-14 NOTE — TELEPHONE ENCOUNTER
Please call.  See other message.  We need to wait until we get the x-ray reports back before making a referral. antwon

## 2018-06-14 NOTE — TELEPHONE ENCOUNTER
Jaci Marinelli patient  [phone] returned patient's call. Patient wants KATRINA Main to know that she just started taking her medicines (Estradiol and Provera) this morning. Patient requesting a referral to see an Endocrinologist.

## 2018-06-19 ENCOUNTER — TELEPHONE (OUTPATIENT)
Dept: INTERNAL MEDICINE | Facility: CLINIC | Age: 33
End: 2018-06-19

## 2018-06-19 NOTE — TELEPHONE ENCOUNTER
CALL BACK [phone] PATIENT IS REQUESTING A RETURN CALL TO DISCUSS QUESTIONS SHE HAS ABOUT HER 'HORMONES AND STUFF'. SHE WILL NOT BE SEEN UNTIL November AS A NEW PATIENT. I ASKED THE PATIENT TO ELABORATE MORE ABOUT HER CONCERNS AND SHE WAS UNABLE TO FOR THIS MESSAGE.

## 2018-06-19 NOTE — TELEPHONE ENCOUNTER
Spoke with patient and she wanted to have Dr. Sanchez to give her a call about her premenopausal symptoms and trying to conceive . Informed patient she will have to be seen  for her appointment to have questions answered being that she will be a new patient.

## 2018-06-21 ENCOUNTER — TELEPHONE (OUTPATIENT)
Dept: GASTROENTEROLOGY | Facility: CLINIC | Age: 33
End: 2018-06-21

## 2018-06-21 DIAGNOSIS — T40.2X5A THERAPEUTIC OPIOID INDUCED CONSTIPATION: Primary | ICD-10-CM

## 2018-06-21 DIAGNOSIS — K59.03 THERAPEUTIC OPIOID INDUCED CONSTIPATION: Primary | ICD-10-CM

## 2018-06-21 NOTE — TELEPHONE ENCOUNTER
Alma,  Patient called and stated that the Symproic is working. Dr Thomas gave samples. Can you send prescription to her pharmacy? Thanks

## 2018-06-22 ENCOUNTER — TELEPHONE (OUTPATIENT)
Dept: GASTROENTEROLOGY | Facility: CLINIC | Age: 33
End: 2018-06-22

## 2018-06-22 DIAGNOSIS — T40.2X5A THERAPEUTIC OPIOID INDUCED CONSTIPATION: ICD-10-CM

## 2018-06-22 DIAGNOSIS — K59.03 THERAPEUTIC OPIOID INDUCED CONSTIPATION: ICD-10-CM

## 2018-06-22 NOTE — TELEPHONE ENCOUNTER
Called patient to inform her that the Symproic has been sent to pharmacy. PA has also been completed. It takes up to 24-72 hours to hear back from insurance company to see if they will pay for medication.

## 2018-06-25 ENCOUNTER — TELEPHONE (OUTPATIENT)
Dept: FAMILY MEDICINE CLINIC | Facility: CLINIC | Age: 33
End: 2018-06-25

## 2018-06-25 NOTE — TELEPHONE ENCOUNTER
Please call, I have not sent anything because we had not heard back on where to send. I have not rec'd a fax form [facility] [employer] for this.     Also, need to know what size.     Vish Torres MD

## 2018-06-25 NOTE — TELEPHONE ENCOUNTER
----- Message from Katie Gray sent at 6/25/2018  4:42 PM EDT -----  Contact: SCOTT;PT CALLED  CHECKING TO SEE IF DR CHAVEZ HAS SENT IN THE PAPERWORK FOR HER DIAPERS  FROM [employer]?    ALSO SHE WENT TO SEE DR BRYSON BUT WOULD NOT BE SEEN BECAUSE   HE DID NOT LIKE HER????      [phone]

## 2018-06-26 ENCOUNTER — HOSPITAL ENCOUNTER (EMERGENCY)
Facility: HOSPITAL | Age: 33
Discharge: HOME | End: 2018-06-26
Attending: EMERGENCY MEDICINE | Admitting: EMERGENCY MEDICINE

## 2018-06-26 VITALS
RESPIRATION RATE: 16 BRPM | WEIGHT: 188 LBS | BODY MASS INDEX: 33.31 KG/M2 | TEMPERATURE: 98.5 F | HEART RATE: 99 BPM | OXYGEN SATURATION: 99 % | SYSTOLIC BLOOD PRESSURE: 116 MMHG | HEIGHT: 63 IN | DIASTOLIC BLOOD PRESSURE: 66 MMHG

## 2018-06-26 DIAGNOSIS — R30.0 DYSURIA: Primary | ICD-10-CM

## 2018-06-26 LAB
BACTERIA UR QL AUTO: NORMAL /HPF
BILIRUB UR QL STRIP: NEGATIVE
BUN BLDA-MCNC: 9 MG/DL (ref 8–26)
CA-I BLDA-SCNC: 1.34 MMOL/L (ref 1.2–1.32)
CHLORIDE BLDA-SCNC: 100 MMOL/L (ref 98–109)
CLARITY UR: ABNORMAL
CO2 BLDA-SCNC: 27 MMOL/L (ref 24–29)
COLOR UR: YELLOW
CREAT BLDA-MCNC: 0.6 MG/DL (ref 0.6–1.3)
GLUCOSE BLDC GLUCOMTR-MCNC: 100 MG/DL (ref 70–130)
GLUCOSE UR STRIP-MCNC: NEGATIVE MG/DL
HCT VFR BLDA CALC: 37 % (ref 38–51)
HGB BLDA-MCNC: 12.6 G/DL (ref 12–17)
HGB UR QL STRIP.AUTO: NEGATIVE
HYALINE CASTS UR QL AUTO: NORMAL /LPF
KETONES UR QL STRIP: NEGATIVE
LEUKOCYTE ESTERASE UR QL STRIP.AUTO: ABNORMAL
NITRITE UR QL STRIP: NEGATIVE
PH UR STRIP.AUTO: <=5 [PH] (ref 5–8)
POTASSIUM BLDA-SCNC: 4.1 MMOL/L (ref 3.5–4.9)
PROT UR QL STRIP: NEGATIVE
RBC # UR: NORMAL /HPF
REF LAB TEST METHOD: NORMAL
SODIUM BLDA-SCNC: 144 MMOL/L (ref 138–146)
SP GR UR STRIP: 1.02 (ref 1–1.03)
SQUAMOUS #/AREA URNS HPF: NORMAL /HPF
UROBILINOGEN UR QL STRIP: ABNORMAL
WBC UR QL AUTO: NORMAL /HPF

## 2018-06-26 PROCEDURE — 85014 HEMATOCRIT: CPT

## 2018-06-26 PROCEDURE — 96361 HYDRATE IV INFUSION ADD-ON: CPT

## 2018-06-26 PROCEDURE — 80047 BASIC METABLC PNL IONIZED CA: CPT

## 2018-06-26 PROCEDURE — 96374 THER/PROPH/DIAG INJ IV PUSH: CPT

## 2018-06-26 PROCEDURE — 81001 URINALYSIS AUTO W/SCOPE: CPT | Performed by: EMERGENCY MEDICINE

## 2018-06-26 PROCEDURE — 25010000002 ONDANSETRON PER 1 MG: Performed by: EMERGENCY MEDICINE

## 2018-06-26 PROCEDURE — 99283 EMERGENCY DEPT VISIT LOW MDM: CPT

## 2018-06-26 RX ORDER — ONDANSETRON 2 MG/ML
4 INJECTION INTRAMUSCULAR; INTRAVENOUS ONCE
Status: COMPLETED | OUTPATIENT
Start: 2018-06-26 | End: 2018-06-26

## 2018-06-26 RX ADMIN — ONDANSETRON 4 MG: 2 INJECTION, SOLUTION INTRAMUSCULAR; INTRAVENOUS at 09:15

## 2018-06-26 RX ADMIN — SODIUM CHLORIDE 500 ML: 9 INJECTION, SOLUTION INTRAVENOUS at 09:14

## 2018-06-27 ENCOUNTER — TELEPHONE (OUTPATIENT)
Dept: GASTROENTEROLOGY | Facility: CLINIC | Age: 33
End: 2018-06-27

## 2018-06-27 NOTE — TELEPHONE ENCOUNTER
SPOKE WITH MALE THAT IS WITH PT AND INFORMED HIM OF THIS AS PT WAS SLEEPING. MALE VERBALIZED UNDERSTANDING. HE STATES THEY WILL CALL [facility] AND TOUCH BASE WITH THEM AND HAVE THEM FAX PAPERWORK TO US.

## 2018-06-27 NOTE — TELEPHONE ENCOUNTER
Patient called and stated that the new medication is not working. She had a bowel movement two days ago and has not had another one. She wants to take OTC laxative.

## 2018-07-02 ENCOUNTER — OFFICE VISIT (OUTPATIENT)
Dept: FAMILY MEDICINE CLINIC | Facility: CLINIC | Age: 33
End: 2018-07-02

## 2018-07-02 ENCOUNTER — HOSPITAL ENCOUNTER (OUTPATIENT)
Dept: GENERAL RADIOLOGY | Facility: HOSPITAL | Age: 33
Discharge: HOME | End: 2018-07-02
Admitting: FAMILY MEDICINE

## 2018-07-02 VITALS
BODY MASS INDEX: 34.11 KG/M2 | RESPIRATION RATE: 16 BRPM | DIASTOLIC BLOOD PRESSURE: 52 MMHG | WEIGHT: 192.5 LBS | HEIGHT: 63 IN | SYSTOLIC BLOOD PRESSURE: 106 MMHG | TEMPERATURE: 97.7 F | OXYGEN SATURATION: 98 % | HEART RATE: 91 BPM

## 2018-07-02 DIAGNOSIS — M25.562 CHRONIC PAIN OF BOTH KNEES: Primary | ICD-10-CM

## 2018-07-02 DIAGNOSIS — N39.498 OTHER URINARY INCONTINENCE: ICD-10-CM

## 2018-07-02 DIAGNOSIS — G89.29 CHRONIC PAIN OF BOTH KNEES: Primary | ICD-10-CM

## 2018-07-02 DIAGNOSIS — M25.561 CHRONIC PAIN OF BOTH KNEES: Primary | ICD-10-CM

## 2018-07-02 DIAGNOSIS — M79.641 BILATERAL HAND PAIN: ICD-10-CM

## 2018-07-02 DIAGNOSIS — J30.1 CHRONIC SEASONAL ALLERGIC RHINITIS DUE TO POLLEN: ICD-10-CM

## 2018-07-02 DIAGNOSIS — M79.642 BILATERAL HAND PAIN: ICD-10-CM

## 2018-07-02 PROCEDURE — 73560 X-RAY EXAM OF KNEE 1 OR 2: CPT

## 2018-07-02 PROCEDURE — 99214 OFFICE O/P EST MOD 30 MIN: CPT | Performed by: FAMILY MEDICINE

## 2018-07-02 PROCEDURE — 73130 X-RAY EXAM OF HAND: CPT

## 2018-07-02 RX ORDER — CETIRIZINE HYDROCHLORIDE 10 MG/1
10 TABLET ORAL DAILY
Qty: 30 TABLET | Refills: 5 | Status: SHIPPED | OUTPATIENT
Start: 2018-07-02 | End: 2018-08-29

## 2018-07-02 NOTE — PROGRESS NOTES
Assessment/Plan       Problems Addressed this Visit        Other    Urinary incontinence      Other Visit Diagnoses     Chronic pain of both knees    -  Primary    Relevant Orders    XR Knee 1 or 2 View Bilateral    Bilateral hand pain        Relevant Orders    XR hand 3+ vw bilateral    Chronic seasonal allergic rhinitis due to pollen        Relevant Medications    cetirizine (zyrTEC) 10 MG tablet            Follow up: Return for follow up depends on review of labs and testing.     DISCUSSION  Check x-rays of knees and hands given chronic persistent pain.    Chronic seasonal allergic rhinitis.  Has failed Claritin, Allegra, Benadryl.  Would like Zyrtec.  Prescription sent.    Urinary incontinence.  Chronic.  Continue adult diapers.  Recommend that she go back to [facility] urology since she did not get along well with Dr. Dubose in [place].  She agrees.    Abrasions in ear.  Appears to be scratches.  She denies doing that.  Although she has used Q-tips.  Recommend antibiotic ointment and avoid taking her scratching.      MEDICATIONS PRESCRIBED  Requested Prescriptions     Signed Prescriptions Disp Refills   • cetirizine (zyrTEC) 10 MG tablet 30 tablet 5     Sig: Take 1 tablet by mouth Daily.            -------------------------------------------    Subjective     Chief Complaint   Patient presents with   • follow up ER     please place order for XR for pt as she is going down to get them at [facility] when leaving office   • ear right         History of Present Illness    Knee pain   Pain x years  Give out and falls at times  Some swelling at times  gabapentin and pain meds  Naproxen was not helping        Hand pain   Pain x years  Hands swell at times  injuries in the past  Broke 5th finger of the left hand in the past    Right ear pain  + bleeds  Has place there  No picking it    Allergic rhinitis    Meds no help  Nose runs  + sneezing at times    Claritin did not  "help  loratadine  Allegra  Benadryl  None have helped    Worse here    Urinary incontinence  Saw [facility] and had issue with catheter order  Saw Dr Dubose but he would not see her  Rec go back to [facility]                History   Smoking Status   • Former Smoker   • Types: Cigarettes   • Quit date: 1999   Smokeless Tobacco   • Never Used        Past Medical History,Medications, Allergies, and social history was reviewed.      Review of Systems   Constitutional: Positive for fatigue.   HENT: Positive for congestion, rhinorrhea and sneezing.    Respiratory: Negative.    Cardiovascular: Negative.    Gastrointestinal: Negative.    Genitourinary: Positive for urinary incontinence and difficulty urinating.   Musculoskeletal: Positive for arthralgias, gait problem and joint swelling.   Psychiatric/Behavioral: The patient is nervous/anxious.        Objective     Vitals:    07/02/18 1539   BP: 106/52   Pulse: 91   Resp: 16   Temp: 97.7 °F (36.5 °C)   TempSrc: Temporal Artery    SpO2: 98%   Weight: 87.3 kg (192 lb 8 oz)   Height: 160 cm (62.99\")          Physical Exam   Constitutional: She is oriented to person, place, and time. She appears well-developed and well-nourished.   HENT:   Head: Normocephalic and atraumatic.   Right Ear: Hearing, tympanic membrane and external ear normal. Tympanic membrane is not erythematous and not retracted.   Left Ear: Hearing, tympanic membrane and external ear normal. Tympanic membrane is not erythematous and not retracted.   On inside each ear canal, there is a very small scab.  One on each side.  Otherwise no lesions.   Eyes: Conjunctivae and EOM are normal. Pupils are equal, round, and reactive to light.   Cardiovascular: Normal rate, regular rhythm and normal heart sounds.  Exam reveals no friction rub.    No murmur heard.  Pulmonary/Chest: Effort normal.   Musculoskeletal:        Right knee: She exhibits decreased range of motion. She exhibits no swelling and no effusion.        Left knee: She " exhibits decreased range of motion. She exhibits no swelling and no effusion.        Right hand: Normal.        Left hand: Normal.   Neurological: She is alert and oriented to person, place, and time.   Skin: Skin is warm.   Psychiatric: She has a normal mood and affect. Her behavior is normal.   Nursing note and vitals reviewed.                Vish Torres MD

## 2018-07-05 ENCOUNTER — TELEPHONE (OUTPATIENT)
Dept: FAMILY MEDICINE CLINIC | Facility: CLINIC | Age: 33
End: 2018-07-05

## 2018-07-05 DIAGNOSIS — M79.641 BILATERAL HAND PAIN: Primary | ICD-10-CM

## 2018-07-05 DIAGNOSIS — G89.29 CHRONIC PAIN OF BOTH KNEES: ICD-10-CM

## 2018-07-05 DIAGNOSIS — M25.562 CHRONIC PAIN OF BOTH KNEES: ICD-10-CM

## 2018-07-05 DIAGNOSIS — M25.561 CHRONIC PAIN OF BOTH KNEES: ICD-10-CM

## 2018-07-05 DIAGNOSIS — M79.642 BILATERAL HAND PAIN: Primary | ICD-10-CM

## 2018-07-05 RX ORDER — NABUMETONE 500 MG/1
500 TABLET, FILM COATED ORAL 2 TIMES DAILY PRN
Qty: 40 TABLET | Refills: 0 | Status: SHIPPED | OUTPATIENT
Start: 2018-07-05 | End: 2018-07-20

## 2018-07-05 NOTE — TELEPHONE ENCOUNTER
Please call, the arthritis is minimal in both hands. Hand braces would not be indicated for arthritis and could make it worse. Needs to keep moving the hands. We can refer to ortho for eval of both hand pain and knee pain. I will place referral. Previous message had requested referral to rheumatologist but would better to see ortho to eval the knees as well,   Also, I will send in med called Relafen to take for the pain.

## 2018-07-05 NOTE — TELEPHONE ENCOUNTER
Spoke with pt and informed her of message and pt verbalized understanding. Pt would like to go to [facility] in [place]

## 2018-07-05 NOTE — TELEPHONE ENCOUNTER
----- Message from Katie Gray sent at 7/3/2018  4:47 PM EDT -----  Contact: SCOTT;PT CALLED  PT CALLED FOR RESULTS OF HER XRAY FROM YESTERDAY    [phone]

## 2018-07-05 NOTE — TELEPHONE ENCOUNTER
----- Message from Katie Nelson sent at 7/5/2018  8:23 AM EDT -----  Contact: CHAVEZ  PATIENT CALLED TO REQUEST A REFERRAL TO ORTHOPEDIC FOR THE ARTHRITIS IN HER HANDS.   ALSO, SHE WOULD LIKE BRACES FOR BOTH HANDS SENT TO [facility] THIS IS WHERE SHE GETS HER OTHER PRODUCTS.     [phone]

## 2018-07-10 ENCOUNTER — HOSPITAL ENCOUNTER (EMERGENCY)
Facility: HOSPITAL | Age: 33
Discharge: HOME | End: 2018-07-10
Attending: EMERGENCY MEDICINE | Admitting: EMERGENCY MEDICINE

## 2018-07-10 VITALS
WEIGHT: 188 LBS | SYSTOLIC BLOOD PRESSURE: 100 MMHG | OXYGEN SATURATION: 100 % | DIASTOLIC BLOOD PRESSURE: 66 MMHG | RESPIRATION RATE: 18 BRPM | HEART RATE: 88 BPM | BODY MASS INDEX: 33.31 KG/M2 | TEMPERATURE: 98.8 F | HEIGHT: 63 IN

## 2018-07-10 DIAGNOSIS — R11.2 NON-INTRACTABLE VOMITING WITH NAUSEA, UNSPECIFIED VOMITING TYPE: ICD-10-CM

## 2018-07-10 DIAGNOSIS — N39.0 RECURRENT URINARY TRACT INFECTION: Primary | ICD-10-CM

## 2018-07-10 DIAGNOSIS — R32 URINARY INCONTINENCE, UNSPECIFIED TYPE: ICD-10-CM

## 2018-07-10 LAB
ALBUMIN SERPL-MCNC: 4.39 G/DL (ref 3.2–4.8)
ALBUMIN/GLOB SERPL: 1.5 G/DL (ref 1.5–2.5)
ALP SERPL-CCNC: 139 U/L (ref 25–100)
ALT SERPL W P-5'-P-CCNC: 41 U/L (ref 7–40)
ANION GAP SERPL CALCULATED.3IONS-SCNC: 8 MMOL/L (ref 3–11)
AST SERPL-CCNC: 55 U/L (ref 0–33)
BACTERIA UR QL AUTO: ABNORMAL /HPF
BASOPHILS # BLD AUTO: 0.02 10*3/MM3 (ref 0–0.2)
BASOPHILS NFR BLD AUTO: 0.3 % (ref 0–1)
BILIRUB SERPL-MCNC: 0.2 MG/DL (ref 0.3–1.2)
BILIRUB UR QL STRIP: NEGATIVE
BUN BLD-MCNC: 13 MG/DL (ref 9–23)
BUN/CREAT SERPL: 20 (ref 7–25)
CALCIUM SPEC-SCNC: 9.7 MG/DL (ref 8.7–10.4)
CHLORIDE SERPL-SCNC: 103 MMOL/L (ref 99–109)
CLARITY UR: ABNORMAL
CO2 SERPL-SCNC: 29 MMOL/L (ref 20–31)
COLOR UR: YELLOW
CREAT BLD-MCNC: 0.65 MG/DL (ref 0.6–1.3)
DEPRECATED RDW RBC AUTO: 40.3 FL (ref 37–54)
EOSINOPHIL # BLD AUTO: 0.09 10*3/MM3 (ref 0–0.3)
EOSINOPHIL NFR BLD AUTO: 1.2 % (ref 0–3)
ERYTHROCYTE [DISTWIDTH] IN BLOOD BY AUTOMATED COUNT: 13.2 % (ref 11.3–14.5)
GFR SERPL CREATININE-BSD FRML MDRD: 104 ML/MIN/1.73
GLOBULIN UR ELPH-MCNC: 2.9 GM/DL
GLUCOSE BLD-MCNC: 88 MG/DL (ref 70–100)
GLUCOSE UR STRIP-MCNC: NEGATIVE MG/DL
HCT VFR BLD AUTO: 38.8 % (ref 34.5–44)
HGB BLD-MCNC: 12.5 G/DL (ref 11.5–15.5)
HGB UR QL STRIP.AUTO: NEGATIVE
HYALINE CASTS UR QL AUTO: ABNORMAL /LPF
IMM GRANULOCYTES # BLD: 0.01 10*3/MM3 (ref 0–0.03)
IMM GRANULOCYTES NFR BLD: 0.1 % (ref 0–0.6)
KETONES UR QL STRIP: NEGATIVE
LEUKOCYTE ESTERASE UR QL STRIP.AUTO: ABNORMAL
LYMPHOCYTES # BLD AUTO: 3.04 10*3/MM3 (ref 0.6–4.8)
LYMPHOCYTES NFR BLD AUTO: 40 % (ref 24–44)
MCH RBC QN AUTO: 27.3 PG (ref 27–31)
MCHC RBC AUTO-ENTMCNC: 32.2 G/DL (ref 32–36)
MCV RBC AUTO: 84.7 FL (ref 80–99)
MONOCYTES # BLD AUTO: 0.66 10*3/MM3 (ref 0–1)
MONOCYTES NFR BLD AUTO: 8.7 % (ref 0–12)
MUCOUS THREADS URNS QL MICRO: ABNORMAL /HPF
NEUTROPHILS # BLD AUTO: 3.79 10*3/MM3 (ref 1.5–8.3)
NEUTROPHILS NFR BLD AUTO: 49.8 % (ref 41–71)
NITRITE UR QL STRIP: NEGATIVE
PH UR STRIP.AUTO: <=5 [PH] (ref 5–8)
PLATELET # BLD AUTO: 222 10*3/MM3 (ref 150–450)
PMV BLD AUTO: 11.9 FL (ref 6–12)
POTASSIUM BLD-SCNC: 4.8 MMOL/L (ref 3.5–5.5)
PROT SERPL-MCNC: 7.3 G/DL (ref 5.7–8.2)
PROT UR QL STRIP: NEGATIVE
RBC # BLD AUTO: 4.58 10*6/MM3 (ref 3.89–5.14)
RBC # UR: ABNORMAL /HPF
REF LAB TEST METHOD: ABNORMAL
SODIUM BLD-SCNC: 140 MMOL/L (ref 132–146)
SP GR UR STRIP: 1.02 (ref 1–1.03)
SQUAMOUS #/AREA URNS HPF: ABNORMAL /HPF
UROBILINOGEN UR QL STRIP: ABNORMAL
WBC NRBC COR # BLD: 7.6 10*3/MM3 (ref 3.5–10.8)
WBC UR QL AUTO: ABNORMAL /HPF

## 2018-07-10 PROCEDURE — 85025 COMPLETE CBC W/AUTO DIFF WBC: CPT | Performed by: EMERGENCY MEDICINE

## 2018-07-10 PROCEDURE — 96375 TX/PRO/DX INJ NEW DRUG ADDON: CPT

## 2018-07-10 PROCEDURE — 25010000002 ONDANSETRON PER 1 MG: Performed by: EMERGENCY MEDICINE

## 2018-07-10 PROCEDURE — 96365 THER/PROPH/DIAG IV INF INIT: CPT

## 2018-07-10 PROCEDURE — 99284 EMERGENCY DEPT VISIT MOD MDM: CPT

## 2018-07-10 PROCEDURE — 87491 CHLMYD TRACH DNA AMP PROBE: CPT | Performed by: EMERGENCY MEDICINE

## 2018-07-10 PROCEDURE — 25010000002 CEFTRIAXONE PER 250 MG: Performed by: EMERGENCY MEDICINE

## 2018-07-10 PROCEDURE — 80053 COMPREHEN METABOLIC PANEL: CPT | Performed by: EMERGENCY MEDICINE

## 2018-07-10 PROCEDURE — 87086 URINE CULTURE/COLONY COUNT: CPT | Performed by: EMERGENCY MEDICINE

## 2018-07-10 PROCEDURE — 81001 URINALYSIS AUTO W/SCOPE: CPT | Performed by: EMERGENCY MEDICINE

## 2018-07-10 PROCEDURE — 87591 N.GONORRHOEAE DNA AMP PROB: CPT | Performed by: EMERGENCY MEDICINE

## 2018-07-10 RX ORDER — ONDANSETRON 2 MG/ML
4 INJECTION INTRAMUSCULAR; INTRAVENOUS ONCE
Status: COMPLETED | OUTPATIENT
Start: 2018-07-10 | End: 2018-07-10

## 2018-07-10 RX ORDER — ONDANSETRON 8 MG/1
8 TABLET, ORALLY DISINTEGRATING ORAL EVERY 8 HOURS PRN
Qty: 15 TABLET | Refills: 0 | Status: SHIPPED | OUTPATIENT
Start: 2018-07-10 | End: 2018-07-20 | Stop reason: ALTCHOICE

## 2018-07-10 RX ORDER — FLUCONAZOLE 100 MG/1
100 TABLET ORAL DAILY
Qty: 1 TABLET | Refills: 0 | Status: SHIPPED | OUTPATIENT
Start: 2018-07-10 | End: 2018-07-16

## 2018-07-10 RX ORDER — CEFDINIR 300 MG/1
300 CAPSULE ORAL 2 TIMES DAILY
Qty: 14 CAPSULE | Refills: 0 | Status: SHIPPED | OUTPATIENT
Start: 2018-07-10 | End: 2018-07-10

## 2018-07-10 RX ORDER — SODIUM CHLORIDE 0.9 % (FLUSH) 0.9 %
10 SYRINGE (ML) INJECTION AS NEEDED
Status: DISCONTINUED | OUTPATIENT
Start: 2018-07-10 | End: 2018-07-10 | Stop reason: HOSPADM

## 2018-07-10 RX ORDER — FLUCONAZOLE 100 MG/1
100 TABLET ORAL ONCE
Status: COMPLETED | OUTPATIENT
Start: 2018-07-10 | End: 2018-07-10

## 2018-07-10 RX ORDER — CEFTRIAXONE SODIUM 1 G/50ML
1 INJECTION, SOLUTION INTRAVENOUS ONCE
Status: COMPLETED | OUTPATIENT
Start: 2018-07-10 | End: 2018-07-10

## 2018-07-10 RX ORDER — LORATADINE 10 MG/1
10 TABLET ORAL DAILY
Qty: 30 TABLET | Refills: 3 | Status: SHIPPED | OUTPATIENT
Start: 2018-07-10 | End: 2018-08-29

## 2018-07-10 RX ADMIN — ONDANSETRON 4 MG: 2 INJECTION, SOLUTION INTRAMUSCULAR; INTRAVENOUS at 12:13

## 2018-07-10 RX ADMIN — SODIUM CHLORIDE 1000 ML: 9 INJECTION, SOLUTION INTRAVENOUS at 12:12

## 2018-07-10 RX ADMIN — CEFTRIAXONE SODIUM 1 G: 1 INJECTION, SOLUTION INTRAVENOUS at 13:20

## 2018-07-10 RX ADMIN — FLUCONAZOLE 100 MG: 100 TABLET ORAL at 13:24
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
## 2018-07-12 ENCOUNTER — TELEPHONE (OUTPATIENT)
Dept: GASTROENTEROLOGY | Facility: CLINIC | Age: 33
End: 2018-07-12

## 2018-07-12 LAB
BACTERIA SPEC AEROBE CULT: NORMAL
C TRACH RRNA SPEC DONR QL NAA+PROBE: NEGATIVE
N GONORRHOEA DNA SPEC QL NAA+PROBE: NEGATIVE

## 2018-07-12 NOTE — TELEPHONE ENCOUNTER
Returned pt call, advised pt to use dulcolax prn. Pt verbalized understanding.  Pt stated she is currently using it bid.

## 2018-07-16 ENCOUNTER — HOSPITAL ENCOUNTER (EMERGENCY)
Facility: HOSPITAL | Age: 33
Discharge: HOME | End: 2018-07-16
Attending: EMERGENCY MEDICINE | Admitting: EMERGENCY MEDICINE

## 2018-07-16 ENCOUNTER — OFFICE VISIT (OUTPATIENT)
Dept: OBSTETRICS AND GYNECOLOGY | Facility: CLINIC | Age: 33
End: 2018-07-16

## 2018-07-16 VITALS
HEART RATE: 78 BPM | WEIGHT: 188 LBS | SYSTOLIC BLOOD PRESSURE: 116 MMHG | TEMPERATURE: 98.8 F | HEIGHT: 63 IN | BODY MASS INDEX: 33.31 KG/M2 | RESPIRATION RATE: 18 BRPM | OXYGEN SATURATION: 100 % | DIASTOLIC BLOOD PRESSURE: 59 MMHG

## 2018-07-16 VITALS
WEIGHT: 195 LBS | SYSTOLIC BLOOD PRESSURE: 118 MMHG | RESPIRATION RATE: 16 BRPM | DIASTOLIC BLOOD PRESSURE: 70 MMHG | BODY MASS INDEX: 34.54 KG/M2

## 2018-07-16 DIAGNOSIS — M25.562 CHRONIC PAIN OF LEFT KNEE: ICD-10-CM

## 2018-07-16 DIAGNOSIS — R68.82 DECREASED LIBIDO: ICD-10-CM

## 2018-07-16 DIAGNOSIS — M15.9 OSTEOARTHRITIS OF MULTIPLE JOINTS, UNSPECIFIED OSTEOARTHRITIS TYPE: ICD-10-CM

## 2018-07-16 DIAGNOSIS — G89.29 CHRONIC PAIN OF LEFT KNEE: ICD-10-CM

## 2018-07-16 DIAGNOSIS — R23.2 HOT FLASHES: Primary | ICD-10-CM

## 2018-07-16 DIAGNOSIS — M65.9 TENOSYNOVITIS OF THUMB: Primary | ICD-10-CM

## 2018-07-16 DIAGNOSIS — E28.39 PREMATURE OVARIAN FAILURE: ICD-10-CM

## 2018-07-16 PROCEDURE — 99214 OFFICE O/P EST MOD 30 MIN: CPT | Performed by: NURSE PRACTITIONER

## 2018-07-16 PROCEDURE — 99283 EMERGENCY DEPT VISIT LOW MDM: CPT

## 2018-07-16 RX ORDER — ONDANSETRON 4 MG/1
4 TABLET, FILM COATED ORAL EVERY 8 HOURS PRN
Qty: 10 TABLET | Refills: 0 | Status: SHIPPED | OUTPATIENT
Start: 2018-07-16 | End: 2018-07-20 | Stop reason: ALTCHOICE

## 2018-07-16 RX ORDER — ONDANSETRON 4 MG/1
4 TABLET, ORALLY DISINTEGRATING ORAL ONCE
Status: COMPLETED | OUTPATIENT
Start: 2018-07-16 | End: 2018-07-16

## 2018-07-16 RX ADMIN — ONDANSETRON 4 MG: 4 TABLET, ORALLY DISINTEGRATING ORAL at 17:26
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
## 2018-07-17 ENCOUNTER — OFFICE VISIT (OUTPATIENT)
Dept: ORTHOPEDIC SURGERY | Facility: CLINIC | Age: 33
End: 2018-07-17

## 2018-07-17 ENCOUNTER — TELEPHONE (OUTPATIENT)
Dept: OBSTETRICS AND GYNECOLOGY | Facility: CLINIC | Age: 33
End: 2018-07-17

## 2018-07-17 VITALS — OXYGEN SATURATION: 97 % | WEIGHT: 188.05 LBS | BODY MASS INDEX: 33.32 KG/M2 | HEART RATE: 82 BPM | HEIGHT: 63 IN

## 2018-07-17 DIAGNOSIS — M79.641 PAIN IN BOTH HANDS: Primary | ICD-10-CM

## 2018-07-17 DIAGNOSIS — M79.642 PAIN IN BOTH HANDS: Primary | ICD-10-CM

## 2018-07-17 DIAGNOSIS — M65.4 DE QUERVAIN'S DISEASE (RADIAL STYLOID TENOSYNOVITIS): ICD-10-CM

## 2018-07-17 PROCEDURE — 99203 OFFICE O/P NEW LOW 30 MIN: CPT | Performed by: ORTHOPAEDIC SURGERY

## 2018-07-17 RX ORDER — DICLOFENAC SODIUM 75 MG/1
75 TABLET, DELAYED RELEASE ORAL 2 TIMES DAILY
Qty: 60 TABLET | Refills: 0 | Status: SHIPPED | OUTPATIENT
Start: 2018-07-17 | End: 2018-08-27

## 2018-07-17 NOTE — TELEPHONE ENCOUNTER
This is being done through [facility].   I told the patient that this will not be covered by her insurance. [facility] can often do compounded testosterone at a low cost to the patient and they will be calling her with a price later this week. It will depend on if the cash cost is something they are able to afford or not. It is not possible to prior-auth compounded testosterones for women. Thanks!
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
## 2018-07-17 NOTE — TELEPHONE ENCOUNTER
Jaci Marinelli    Pt calling since yesterday was prescibed a hormone cream to rub on and was denied since need approval.    Pt call back [phone]    Pt not sure what pharmacy script sent to

## 2018-07-18 ENCOUNTER — TELEPHONE (OUTPATIENT)
Dept: ORTHOPEDIC SURGERY | Facility: CLINIC | Age: 33
End: 2018-07-18

## 2018-07-18 RX ORDER — ESTERIFIED ESTROGEN AND METHYLTESTOSTERONE .625; 1.25 MG/1; MG/1
1 TABLET ORAL DAILY
Qty: 30 TABLET | Refills: 5 | Status: SHIPPED | OUTPATIENT
Start: 2018-07-18 | End: 2018-08-24

## 2018-07-18 RX ORDER — ESTERIFIED ESTROGEN AND METHYLTESTOSTERONE .625; 1.25 MG/1; MG/1
1 TABLET ORAL DAILY
Qty: 30 TABLET | Refills: 5 | Status: SHIPPED | OUTPATIENT
Start: 2018-07-18 | End: 2018-07-18 | Stop reason: SDUPTHER

## 2018-07-18 NOTE — TELEPHONE ENCOUNTER
PATIENT SAYS HER L KNEE IS SWOLLEN AND VERY PAINFUL.  SHE SAYS SHE CAN' T BEND IT.  SHE HAS AN APPT Friday WITH DR MORRISON.  PLEASE ADVISE.

## 2018-07-18 NOTE — TELEPHONE ENCOUNTER
Pt calling back regarding and wants to speak to Jaci Rodriguez since she really needs to speak to her. Also pt cant remember what endocrinologist she referred to.

## 2018-07-18 NOTE — TELEPHONE ENCOUNTER
Returned call to patient and explained again testosterone prescriptions. She states the lowest cost through [facility] will be $30 and they just cannot afford that at this time. She asks if we can try the oral EstraTest. I explained at her visit that this is not on formulary with her insurance, but we can try to [place] for her. With this rx, her Estrace will need to be discontinued, but she will continue her Provera. If not covered, she may return to Estradiol 2 mg with Provera.   Questions also answered regarding second endocrinology referral.

## 2018-07-18 NOTE — TELEPHONE ENCOUNTER
I called her back,  I explained there wasn't much we can do at this point just rest ice and elevate until we see her on Friday.  She understood and will do so.  Khushboo

## 2018-07-20 ENCOUNTER — OFFICE VISIT (OUTPATIENT)
Dept: ORTHOPEDIC SURGERY | Facility: CLINIC | Age: 33
End: 2018-07-20

## 2018-07-20 ENCOUNTER — OFFICE VISIT (OUTPATIENT)
Dept: FAMILY MEDICINE CLINIC | Facility: CLINIC | Age: 33
End: 2018-07-20

## 2018-07-20 VITALS
BODY MASS INDEX: 35.44 KG/M2 | DIASTOLIC BLOOD PRESSURE: 52 MMHG | SYSTOLIC BLOOD PRESSURE: 84 MMHG | HEIGHT: 63 IN | TEMPERATURE: 98 F | RESPIRATION RATE: 16 BRPM | HEART RATE: 73 BPM | WEIGHT: 200 LBS | OXYGEN SATURATION: 99 %

## 2018-07-20 VITALS — OXYGEN SATURATION: 97 % | HEIGHT: 63 IN | HEART RATE: 82 BPM | WEIGHT: 188.05 LBS | BODY MASS INDEX: 33.32 KG/M2

## 2018-07-20 DIAGNOSIS — M94.20 CHONDROMALACIA: ICD-10-CM

## 2018-07-20 DIAGNOSIS — M17.0 PRIMARY OSTEOARTHRITIS OF BOTH KNEES: Primary | ICD-10-CM

## 2018-07-20 DIAGNOSIS — R20.0 NUMBNESS OF RIGHT HAND: ICD-10-CM

## 2018-07-20 DIAGNOSIS — R10.32 INTERMITTENT LEFT LOWER QUADRANT ABDOMINAL PAIN: ICD-10-CM

## 2018-07-20 DIAGNOSIS — N76.0 ACUTE VAGINITIS: Primary | ICD-10-CM

## 2018-07-20 PROCEDURE — 99214 OFFICE O/P EST MOD 30 MIN: CPT | Performed by: FAMILY MEDICINE

## 2018-07-20 PROCEDURE — 99214 OFFICE O/P EST MOD 30 MIN: CPT | Performed by: ORTHOPAEDIC SURGERY

## 2018-07-20 RX ORDER — ONDANSETRON 4 MG/1
TABLET, ORALLY DISINTEGRATING ORAL
COMMUNITY
Start: 2018-07-17 | End: 2019-02-06

## 2018-07-20 RX ORDER — FLUCONAZOLE 150 MG/1
TABLET ORAL
Qty: 2 TABLET | Refills: 0 | Status: SHIPPED | OUTPATIENT
Start: 2018-07-20 | End: 2018-07-31
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
## 2018-07-20 NOTE — PROGRESS NOTES
Assessment/Plan       Problems Addressed this Visit     None      Visit Diagnoses     Acute vaginitis    -  Primary    Relevant Medications    fluconazole (DIFLUCAN) 150 MG tablet    Numbness of right hand        Intermittent left lower quadrant abdominal pain                Follow up: Return if symptoms worsen or fail to improve.     DISCUSSION  Recurrent vaginitis.  Yeast infection.  Recommend follow-up with gynecology.  Will retreat with Diflucan.    Numbness of right hand.  Mainly the right fingers.  May be carpal tunnel or ulnar tunnel issue.  Right now she has a thumb spica splint on.  Continue this and if persisting, we will need a nerve conduction study.    Intermittent left lower quadrant abdominal pain.  Unclear etiology.  Has seen multiple specialists.  Being started on Voltaren for the synovitis in thumbs.  We will see if that helps with this as well.      MEDICATIONS PRESCRIBED  Requested Prescriptions     Signed Prescriptions Disp Refills   • fluconazole (DIFLUCAN) 150 MG tablet 2 tablet 0     Sig: one by mouth today and repeat in 3 days if not better             -------------------------------------------    Subjective     Chief Complaint   Patient presents with   • Abdominal Pain   • Vaginitis     itching and uncomfortable         History of Present Illness    Lower abd pain   Left lower and pain  Has seen multiple specialists.  Sees urology.  Going to have Botox injections.  Has seen gynecology as well.  No reported change in bowel movements.  Does have urinary incontinence and supposed to have self catheter or  is to catheterize her to help prevent her from keeping wet.    Vaginal Discharge  Diflucan helps and then symptoms come back  Has been going on for several weeks and comes and goes.  Gets better with Diflucan but then symptoms come back.  Has seen gynecology on several occasions.    Hand pain   Arthritis in hand  And tendonitis , in thumb spica splint    Baclofen was given for  "urinary sx and pelvic pain   Still has been taking it  Tizanidine and Pain meds for back  To get botox in Oct. Has to wait (q 3 months)    Numb x 2 weeks in the right hand      Depressed due to pain and sexual issues as well  No sexual desire  Bothers her          History   Smoking Status   • Former Smoker   • Types: Cigarettes   • Quit date: 1999   Smokeless Tobacco   • Never Used        Past Medical History,Medications, Allergies, and social history was reviewed.      Review of Systems   Constitutional: Positive for fatigue.   HENT: Negative.    Respiratory: Negative.    Cardiovascular: Negative.    Gastrointestinal: Negative.    Genitourinary: Positive for urinary incontinence and vaginal discharge.   Musculoskeletal: Positive for arthralgias and gait problem.   Neurological: Positive for numbness.       Objective     Vitals:    07/20/18 1230   BP: (!) 84/52   Pulse: 73   Resp: 16   Temp: 98 °F (36.7 °C)   TempSrc: Temporal Artery    SpO2: 99%   Weight: 90.7 kg (200 lb)   Height: 160 cm (62.99\")          Physical Exam   Constitutional: She is oriented to person, place, and time. She appears well-developed and well-nourished.   HENT:   Head: Normocephalic and atraumatic.   Right Ear: Hearing and external ear normal.   Left Ear: Hearing and external ear normal.   Eyes: Pupils are equal, round, and reactive to light. Conjunctivae and EOM are normal.   Cardiovascular: Normal rate, regular rhythm and normal heart sounds.  Exam reveals no friction rub.    No murmur heard.  Pulmonary/Chest: Effort normal and breath sounds normal. No respiratory distress. She has no wheezes. She has no rales.   Abdominal: Soft. Bowel sounds are normal. She exhibits no distension. There is tenderness (mild left lower quadrant). There is no rebound and no guarding.   Musculoskeletal:   Decreased sensation of the fingers of the right hand.  Splints are in place and both wrists.   Neurological: She is alert and oriented to person, place, and " time.   Skin: Skin is warm.   Psychiatric: She has a normal mood and affect.   Nursing note and vitals reviewed.                Vish Torres MD

## 2018-07-25 ENCOUNTER — TELEPHONE (OUTPATIENT)
Dept: GASTROENTEROLOGY | Facility: CLINIC | Age: 33
End: 2018-07-25

## 2018-07-25 ENCOUNTER — HOSPITAL ENCOUNTER (EMERGENCY)
Facility: HOSPITAL | Age: 33
Discharge: LEFT BEFORE DISCHARGE | End: 2018-07-25
Attending: EMERGENCY MEDICINE | Admitting: EMERGENCY MEDICINE

## 2018-07-25 VITALS
TEMPERATURE: 98.8 F | BODY MASS INDEX: 33.31 KG/M2 | DIASTOLIC BLOOD PRESSURE: 58 MMHG | SYSTOLIC BLOOD PRESSURE: 115 MMHG | WEIGHT: 188 LBS | RESPIRATION RATE: 18 BRPM | HEIGHT: 63 IN | HEART RATE: 72 BPM | OXYGEN SATURATION: 98 %

## 2018-07-25 DIAGNOSIS — Z53.29 CARE REFUSED BY PATIENT: Primary | ICD-10-CM

## 2018-07-25 DIAGNOSIS — R19.7 DIARRHEA, UNSPECIFIED TYPE: ICD-10-CM

## 2018-07-25 DIAGNOSIS — R10.9 ABDOMINAL PAIN, UNSPECIFIED ABDOMINAL LOCATION: ICD-10-CM

## 2018-07-25 DIAGNOSIS — Z53.29 LEFT AGAINST MEDICAL ADVICE: ICD-10-CM

## 2018-07-25 LAB
ALBUMIN SERPL-MCNC: 4.89 G/DL (ref 3.2–4.8)
ALBUMIN/GLOB SERPL: 1.4 G/DL (ref 1.5–2.5)
ALP SERPL-CCNC: 136 U/L (ref 25–100)
ALT SERPL W P-5'-P-CCNC: 16 U/L (ref 7–40)
ANION GAP SERPL CALCULATED.3IONS-SCNC: 10 MMOL/L (ref 3–11)
AST SERPL-CCNC: 18 U/L (ref 0–33)
B-HCG UR QL: NEGATIVE
BACTERIA UR QL AUTO: ABNORMAL /HPF
BASOPHILS # BLD AUTO: 0.02 10*3/MM3 (ref 0–0.2)
BASOPHILS NFR BLD AUTO: 0.2 % (ref 0–1)
BILIRUB SERPL-MCNC: 0.2 MG/DL (ref 0.3–1.2)
BILIRUB UR QL STRIP: ABNORMAL
BUN BLD-MCNC: 14 MG/DL (ref 9–23)
BUN/CREAT SERPL: 18.7 (ref 7–25)
CALCIUM SPEC-SCNC: 10 MG/DL (ref 8.7–10.4)
CHLORIDE SERPL-SCNC: 105 MMOL/L (ref 99–109)
CLARITY UR: ABNORMAL
CO2 SERPL-SCNC: 23 MMOL/L (ref 20–31)
COLOR UR: ABNORMAL
CREAT BLD-MCNC: 0.75 MG/DL (ref 0.6–1.3)
CRP SERPL-MCNC: 3.14 MG/DL (ref 0–1)
DEPRECATED RDW RBC AUTO: 40.2 FL (ref 37–54)
EOSINOPHIL # BLD AUTO: 0.11 10*3/MM3 (ref 0–0.3)
EOSINOPHIL NFR BLD AUTO: 1.2 % (ref 0–3)
ERYTHROCYTE [DISTWIDTH] IN BLOOD BY AUTOMATED COUNT: 13.3 % (ref 11.3–14.5)
GFR SERPL CREATININE-BSD FRML MDRD: 88 ML/MIN/1.73
GLOBULIN UR ELPH-MCNC: 3.6 GM/DL
GLUCOSE BLD-MCNC: 76 MG/DL (ref 70–100)
GLUCOSE UR STRIP-MCNC: NEGATIVE MG/DL
HCT VFR BLD AUTO: 39.5 % (ref 34.5–44)
HGB BLD-MCNC: 13.4 G/DL (ref 11.5–15.5)
HGB UR QL STRIP.AUTO: ABNORMAL
HOLD SPECIMEN: NORMAL
HOLD SPECIMEN: NORMAL
HYALINE CASTS UR QL AUTO: ABNORMAL /LPF
IMM GRANULOCYTES # BLD: 0.02 10*3/MM3 (ref 0–0.03)
IMM GRANULOCYTES NFR BLD: 0.2 % (ref 0–0.6)
INTERNAL NEGATIVE CONTROL: NEGATIVE
INTERNAL POSITIVE CONTROL: POSITIVE
KETONES UR QL STRIP: ABNORMAL
LEUKOCYTE ESTERASE UR QL STRIP.AUTO: ABNORMAL
LIPASE SERPL-CCNC: 34 U/L (ref 6–51)
LYMPHOCYTES # BLD AUTO: 3.78 10*3/MM3 (ref 0.6–4.8)
LYMPHOCYTES NFR BLD AUTO: 39.7 % (ref 24–44)
Lab: NORMAL
MCH RBC QN AUTO: 28.2 PG (ref 27–31)
MCHC RBC AUTO-ENTMCNC: 33.9 G/DL (ref 32–36)
MCV RBC AUTO: 83.2 FL (ref 80–99)
MONOCYTES # BLD AUTO: 0.79 10*3/MM3 (ref 0–1)
MONOCYTES NFR BLD AUTO: 8.3 % (ref 0–12)
NEUTROPHILS # BLD AUTO: 4.82 10*3/MM3 (ref 1.5–8.3)
NEUTROPHILS NFR BLD AUTO: 50.6 % (ref 41–71)
NITRITE UR QL STRIP: NEGATIVE
PH UR STRIP.AUTO: <=5 [PH] (ref 5–8)
PLATELET # BLD AUTO: 244 10*3/MM3 (ref 150–450)
PMV BLD AUTO: 12.1 FL (ref 6–12)
POTASSIUM BLD-SCNC: 4 MMOL/L (ref 3.5–5.5)
PROT SERPL-MCNC: 8.5 G/DL (ref 5.7–8.2)
PROT UR QL STRIP: ABNORMAL
RBC # BLD AUTO: 4.75 10*6/MM3 (ref 3.89–5.14)
RBC # UR: ABNORMAL /HPF
REF LAB TEST METHOD: ABNORMAL
SODIUM BLD-SCNC: 138 MMOL/L (ref 132–146)
SP GR UR STRIP: 1.03 (ref 1–1.03)
SQUAMOUS #/AREA URNS HPF: ABNORMAL /HPF
UROBILINOGEN UR QL STRIP: ABNORMAL
WBC NRBC COR # BLD: 9.52 10*3/MM3 (ref 3.5–10.8)
WBC UR QL AUTO: ABNORMAL /HPF
WHOLE BLOOD HOLD SPECIMEN: NORMAL
WHOLE BLOOD HOLD SPECIMEN: NORMAL

## 2018-07-25 PROCEDURE — 87086 URINE CULTURE/COLONY COUNT: CPT | Performed by: EMERGENCY MEDICINE

## 2018-07-25 PROCEDURE — 85025 COMPLETE CBC W/AUTO DIFF WBC: CPT | Performed by: EMERGENCY MEDICINE

## 2018-07-25 PROCEDURE — 80053 COMPREHEN METABOLIC PANEL: CPT | Performed by: EMERGENCY MEDICINE

## 2018-07-25 PROCEDURE — 81025 URINE PREGNANCY TEST: CPT | Performed by: EMERGENCY MEDICINE

## 2018-07-25 PROCEDURE — 87186 SC STD MICRODIL/AGAR DIL: CPT | Performed by: EMERGENCY MEDICINE

## 2018-07-25 PROCEDURE — 99284 EMERGENCY DEPT VISIT MOD MDM: CPT

## 2018-07-25 PROCEDURE — 83690 ASSAY OF LIPASE: CPT | Performed by: EMERGENCY MEDICINE

## 2018-07-25 PROCEDURE — 87077 CULTURE AEROBIC IDENTIFY: CPT | Performed by: EMERGENCY MEDICINE

## 2018-07-25 PROCEDURE — 36415 COLL VENOUS BLD VENIPUNCTURE: CPT

## 2018-07-25 PROCEDURE — 86140 C-REACTIVE PROTEIN: CPT | Performed by: EMERGENCY MEDICINE

## 2018-07-25 PROCEDURE — 81001 URINALYSIS AUTO W/SCOPE: CPT | Performed by: EMERGENCY MEDICINE

## 2018-07-25 RX ORDER — SODIUM CHLORIDE 0.9 % (FLUSH) 0.9 %
10 SYRINGE (ML) INJECTION AS NEEDED
Status: DISCONTINUED | OUTPATIENT
Start: 2018-07-25 | End: 2018-07-25

## 2018-07-25 NOTE — TELEPHONE ENCOUNTER
Patient left voicemail states she has not had BM in week and wanted to know what Dr. Thomas has suggest she take. LVM for her to call back.       If patient calls back please give her this message:  She can take OTC laxative. Dulcolax bid, miralax tid and prn enemas. Per Dr. Thomas on 06/28/2018.

## 2018-07-26 ENCOUNTER — TELEPHONE (OUTPATIENT)
Dept: ORTHOPEDIC SURGERY | Facility: CLINIC | Age: 33
End: 2018-07-26

## 2018-07-26 NOTE — TELEPHONE ENCOUNTER
Patient stated that she has been taking the Voltaren that Dr. Cosme had prescribed her on 07/17/18 but it is not helping and is wondering what else she could do. I advised her that she is able to take OTC medication to help alleviate some of that pain she is having in her shoulder. I spoke with her and her  both and told them that Dr. Cosme suggested that she takes either 600mg ibuprofen or 800mg to help with some of the pain which they can get it at the store in 200mg form. They understood and stated that if she is having any more pain that is intolerable, she will go to the ER.

## 2018-07-26 NOTE — TELEPHONE ENCOUNTER
PT CALLED COMPLAINING OF HER ARM PAIN THAT SHE SAYS SHE'S SEEN DR TINOCO FOR IN THE PAST. SHE SAID IT'S THROBBING AND WANTS TO KNOW WHAT SHE SHOULD DO UNTIL HER APPT ON Tuesday?

## 2018-07-27 ENCOUNTER — HOSPITAL ENCOUNTER (EMERGENCY)
Facility: HOSPITAL | Age: 33
Discharge: HOME | End: 2018-07-27
Attending: EMERGENCY MEDICINE | Admitting: EMERGENCY MEDICINE

## 2018-07-27 VITALS
TEMPERATURE: 98.7 F | HEIGHT: 63 IN | RESPIRATION RATE: 20 BRPM | BODY MASS INDEX: 31.54 KG/M2 | DIASTOLIC BLOOD PRESSURE: 79 MMHG | OXYGEN SATURATION: 99 % | SYSTOLIC BLOOD PRESSURE: 110 MMHG | HEART RATE: 64 BPM | WEIGHT: 178 LBS

## 2018-07-27 DIAGNOSIS — N76.0 BACTERIAL VAGINITIS: ICD-10-CM

## 2018-07-27 DIAGNOSIS — B37.31 CANDIDA VAGINITIS: Primary | ICD-10-CM

## 2018-07-27 DIAGNOSIS — B96.89 BACTERIAL VAGINITIS: ICD-10-CM

## 2018-07-27 DIAGNOSIS — M79.601 RIGHT ARM PAIN: ICD-10-CM

## 2018-07-27 LAB
BACTERIA SPEC AEROBE CULT: ABNORMAL
CLUE CELLS SPEC QL WET PREP: ABNORMAL
HYDATID CYST SPEC WET PREP: ABNORMAL
KOH PREP NAIL: ABNORMAL
T VAGINALIS SPEC QL WET PREP: ABNORMAL
WBC SPEC QL WET PREP: ABNORMAL
YEAST GENITAL QL WET PREP: ABNORMAL

## 2018-07-27 PROCEDURE — 87210 SMEAR WET MOUNT SALINE/INK: CPT | Performed by: EMERGENCY MEDICINE

## 2018-07-27 PROCEDURE — 99284 EMERGENCY DEPT VISIT MOD MDM: CPT

## 2018-07-27 PROCEDURE — 87591 N.GONORRHOEAE DNA AMP PROB: CPT | Performed by: EMERGENCY MEDICINE

## 2018-07-27 PROCEDURE — 87491 CHLMYD TRACH DNA AMP PROBE: CPT | Performed by: EMERGENCY MEDICINE

## 2018-07-27 PROCEDURE — 87220 TISSUE EXAM FOR FUNGI: CPT | Performed by: EMERGENCY MEDICINE

## 2018-07-27 RX ORDER — LIDOCAINE 50 MG/G
1 PATCH TOPICAL
Status: DISCONTINUED | OUTPATIENT
Start: 2018-07-27 | End: 2018-07-27 | Stop reason: HOSPADM

## 2018-07-27 RX ORDER — LIDOCAINE 50 MG/G
1 PATCH TOPICAL EVERY 24 HOURS
Qty: 7 PATCH | Refills: 0 | Status: SHIPPED | OUTPATIENT
Start: 2018-07-27 | End: 2018-07-31

## 2018-07-27 RX ORDER — METRONIDAZOLE 500 MG/1
500 TABLET ORAL 2 TIMES DAILY
Qty: 14 TABLET | Refills: 0 | Status: SHIPPED | OUTPATIENT
Start: 2018-07-27 | End: 2018-08-27

## 2018-07-27 RX ORDER — FLUCONAZOLE 150 MG/1
150 TABLET ORAL ONCE
Qty: 1 TABLET | Refills: 1 | Status: SHIPPED | OUTPATIENT
Start: 2018-07-27 | End: 2018-07-28

## 2018-07-27 RX ADMIN — LIDOCAINE 1 PATCH: 50 PATCH CUTANEOUS at 14:15

## 2018-07-30 LAB
C TRACH RRNA SPEC DONR QL NAA+PROBE: NEGATIVE
N GONORRHOEA DNA SPEC QL NAA+PROBE: NEGATIVE

## 2018-07-31 ENCOUNTER — HOSPITAL ENCOUNTER (EMERGENCY)
Facility: HOSPITAL | Age: 33
Discharge: HOME | End: 2018-07-31
Attending: EMERGENCY MEDICINE | Admitting: EMERGENCY MEDICINE

## 2018-07-31 VITALS
RESPIRATION RATE: 12 BRPM | WEIGHT: 178 LBS | BODY MASS INDEX: 31.54 KG/M2 | HEIGHT: 63 IN | DIASTOLIC BLOOD PRESSURE: 53 MMHG | TEMPERATURE: 97.7 F | HEART RATE: 79 BPM | OXYGEN SATURATION: 96 % | SYSTOLIC BLOOD PRESSURE: 94 MMHG

## 2018-07-31 DIAGNOSIS — L03.113 CELLULITIS OF RIGHT UPPER EXTREMITY: ICD-10-CM
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
## 2018-07-31 PROCEDURE — 99283 EMERGENCY DEPT VISIT LOW MDM: CPT

## 2018-07-31 RX ORDER — HYDROCODONE BITARTRATE AND ACETAMINOPHEN 10; 325 MG/1; MG/1
1 TABLET ORAL ONCE
Status: COMPLETED | OUTPATIENT
Start: 2018-07-31 | End: 2018-07-31

## 2018-07-31 RX ORDER — FLUCONAZOLE 150 MG/1
150 TABLET ORAL DAILY
Qty: 2 TABLET | Refills: 0 | Status: SHIPPED | OUTPATIENT
Start: 2018-07-31 | End: 2018-08-24

## 2018-07-31 RX ORDER — SULFAMETHOXAZOLE AND TRIMETHOPRIM 800; 160 MG/1; MG/1
1 TABLET ORAL 2 TIMES DAILY
Qty: 20 TABLET | Refills: 0 | Status: SHIPPED | OUTPATIENT
Start: 2018-07-31 | End: 2018-08-24

## 2018-07-31 RX ORDER — CEPHALEXIN 500 MG/1
500 CAPSULE ORAL 4 TIMES DAILY
Qty: 40 CAPSULE | Refills: 0 | Status: SHIPPED | OUTPATIENT
Start: 2018-07-31 | End: 2018-08-24

## 2018-07-31 RX ADMIN — HYDROCODONE BITARTRATE AND ACETAMINOPHEN 1 TABLET: 10; 325 TABLET ORAL at 10:46

## 2018-08-02 ENCOUNTER — TELEPHONE (OUTPATIENT)
Dept: GASTROENTEROLOGY | Facility: CLINIC | Age: 33
End: 2018-08-02

## 2018-08-03 DIAGNOSIS — R10.9 ABDOMINAL CRAMPING: Primary | ICD-10-CM

## 2018-08-03 NOTE — TELEPHONE ENCOUNTER
Called patient to let her Levsin had been refilled. Patient wanted me to let Dr. Thomas know she has been sick all night and the  Zofran is not helping.

## 2018-08-07 ENCOUNTER — OFFICE VISIT (OUTPATIENT)
Dept: GASTROENTEROLOGY | Facility: CLINIC | Age: 33
End: 2018-08-07

## 2018-08-07 ENCOUNTER — TELEPHONE (OUTPATIENT)
Dept: OBSTETRICS AND GYNECOLOGY | Facility: CLINIC | Age: 33
End: 2018-08-07

## 2018-08-07 VITALS
WEIGHT: 189 LBS | HEART RATE: 85 BPM | TEMPERATURE: 97.3 F | SYSTOLIC BLOOD PRESSURE: 112 MMHG | BODY MASS INDEX: 33.49 KG/M2 | RESPIRATION RATE: 16 BRPM | OXYGEN SATURATION: 95 % | DIASTOLIC BLOOD PRESSURE: 68 MMHG | HEIGHT: 63 IN

## 2018-08-07 DIAGNOSIS — K59.03 DRUG-INDUCED CONSTIPATION: Primary | ICD-10-CM

## 2018-08-07 PROCEDURE — 99213 OFFICE O/P EST LOW 20 MIN: CPT | Performed by: INTERNAL MEDICINE

## 2018-08-07 RX ORDER — PROMETHAZINE HYDROCHLORIDE 25 MG/1
25 TABLET ORAL EVERY 6 HOURS PRN
Qty: 45 TABLET | Refills: 0 | Status: SHIPPED | OUTPATIENT
Start: 2018-08-07 | End: 2019-02-06
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
## 2018-08-07 NOTE — TELEPHONE ENCOUNTER
Pt transferring from Jaci Marinelli to Dr. Bone-called stating she is on Estradiol and Provera that is helping with her hot flashes and cycle. She wants to know if she can be on a birth control to help regulate her cycle and for contraception. Please advise pt at [phone]

## 2018-08-09 ENCOUNTER — TELEPHONE (OUTPATIENT)
Dept: OBSTETRICS AND GYNECOLOGY | Facility: CLINIC | Age: 33
End: 2018-08-09

## 2018-08-09 NOTE — TELEPHONE ENCOUNTER
Spoke with [facility] pharmacy concerning this issue, they had to script on hold and will now fill today.

## 2018-08-09 NOTE — TELEPHONE ENCOUNTER
Dr. Bone Pt    Pt called very upset.  [facility] pharmacy told her that we had discontinued her Estradiol.  Pt has only a few left and needs them for hot flashes.  Pt would greatly a call back today.    Callback [phone]    Mary Callahan

## 2018-08-10 ENCOUNTER — TELEPHONE (OUTPATIENT)
Dept: OBSTETRICS AND GYNECOLOGY | Facility: CLINIC | Age: 33
End: 2018-08-10
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
## 2018-08-10 NOTE — TELEPHONE ENCOUNTER
"Spoke with patient and she complains that no one here at the office has taken care of her. Patient complains of being on hormones for a lot of years and her rx had . When her Rx , she called and asked for a refill. It had been so long that she needed to be seen, so she was put in with Jaci Rodriguez. Jaci has documented the conversations in her office visits. Per patient's chart she has only been on the meds since 18. Patient is very upset because she called [facility] and [facility] continues to tell her that they have not heard from our office about her meds. I explained that, that statement is true because we are waiting on the prior authorization to get approved. The prior auth was sent in at 3:41pm on 8/10/18 and per Cover My Meds (Prior auth portal)  it will take 3-5 days. Because I could not \"make\" the insurance say \"yes\" to covering her meds, she stated that she was upset with me also and that she was going to transfer her care.   "

## 2018-08-11 ENCOUNTER — DOCUMENTATION (OUTPATIENT)
Dept: OBSTETRICS AND GYNECOLOGY | Facility: CLINIC | Age: 33
End: 2018-08-11

## 2018-08-16 ENCOUNTER — TRANSCRIBE ORDERS (OUTPATIENT)
Dept: LAB | Facility: HOSPITAL | Age: 33
End: 2018-08-16

## 2018-08-16 ENCOUNTER — LAB (OUTPATIENT)
Dept: LAB | Facility: HOSPITAL | Age: 33
End: 2018-08-16

## 2018-08-16 DIAGNOSIS — N95.1 SYMPTOMATIC MENOPAUSAL OR FEMALE CLIMACTERIC STATES: Primary | ICD-10-CM

## 2018-08-16 DIAGNOSIS — N95.1 SYMPTOMATIC MENOPAUSAL OR FEMALE CLIMACTERIC STATES: ICD-10-CM

## 2018-08-16 LAB
ESTRADIOL SERPL HS-MCNC: <12 PG/ML
FSH SERPL-ACNC: 8.2 MIU/ML
LH SERPL-ACNC: 4.8 MIU/ML
PROGEST SERPL-MCNC: <0.15 NG/ML
PROLACTIN SERPL-MCNC: 4.7 NG/ML
TSH SERPL DL<=0.05 MIU/L-ACNC: 2.21 MIU/ML (ref 0.35–5.35)

## 2018-08-16 PROCEDURE — 82670 ASSAY OF TOTAL ESTRADIOL: CPT

## 2018-08-16 PROCEDURE — 36415 COLL VENOUS BLD VENIPUNCTURE: CPT

## 2018-08-16 PROCEDURE — 84146 ASSAY OF PROLACTIN: CPT

## 2018-08-16 PROCEDURE — 84443 ASSAY THYROID STIM HORMONE: CPT

## 2018-08-16 PROCEDURE — 83002 ASSAY OF GONADOTROPIN (LH): CPT

## 2018-08-16 PROCEDURE — 83001 ASSAY OF GONADOTROPIN (FSH): CPT

## 2018-08-16 PROCEDURE — 84144 ASSAY OF PROGESTERONE: CPT

## 2018-08-21 ENCOUNTER — HOSPITAL ENCOUNTER (EMERGENCY)
Facility: HOSPITAL | Age: 33
Discharge: HOME | End: 2018-08-21
Attending: EMERGENCY MEDICINE | Admitting: EMERGENCY MEDICINE

## 2018-08-21 VITALS
WEIGHT: 185 LBS | HEART RATE: 87 BPM | DIASTOLIC BLOOD PRESSURE: 71 MMHG | TEMPERATURE: 97.5 F | BODY MASS INDEX: 32.78 KG/M2 | RESPIRATION RATE: 18 BRPM | OXYGEN SATURATION: 98 % | HEIGHT: 63 IN | SYSTOLIC BLOOD PRESSURE: 109 MMHG

## 2018-08-21 DIAGNOSIS — R10.9 ABDOMINAL PAIN, UNSPECIFIED ABDOMINAL LOCATION: Primary | ICD-10-CM

## 2018-08-21 LAB
ALBUMIN SERPL-MCNC: 4.61 G/DL (ref 3.2–4.8)
ALBUMIN/GLOB SERPL: 1.7 G/DL (ref 1.5–2.5)
ALP SERPL-CCNC: 112 U/L (ref 25–100)
ALT SERPL W P-5'-P-CCNC: 39 U/L (ref 7–40)
ANION GAP SERPL CALCULATED.3IONS-SCNC: 9 MMOL/L (ref 3–11)
AST SERPL-CCNC: 36 U/L (ref 0–33)
BASOPHILS # BLD AUTO: 0.03 10*3/MM3 (ref 0–0.2)
BASOPHILS NFR BLD AUTO: 0.4 % (ref 0–1)
BILIRUB SERPL-MCNC: 0.2 MG/DL (ref 0.3–1.2)
BUN BLD-MCNC: 7 MG/DL (ref 9–23)
BUN/CREAT SERPL: 9.7 (ref 7–25)
CALCIUM SPEC-SCNC: 9.6 MG/DL (ref 8.7–10.4)
CHLORIDE SERPL-SCNC: 107 MMOL/L (ref 99–109)
CO2 SERPL-SCNC: 26 MMOL/L (ref 20–31)
CREAT BLD-MCNC: 0.72 MG/DL (ref 0.6–1.3)
DEPRECATED RDW RBC AUTO: 42.9 FL (ref 37–54)
EOSINOPHIL # BLD AUTO: 0.29 10*3/MM3 (ref 0–0.3)
EOSINOPHIL NFR BLD AUTO: 3.6 % (ref 0–3)
ERYTHROCYTE [DISTWIDTH] IN BLOOD BY AUTOMATED COUNT: 13.7 % (ref 11.3–14.5)
GFR SERPL CREATININE-BSD FRML MDRD: 92 ML/MIN/1.73
GLOBULIN UR ELPH-MCNC: 2.8 GM/DL
GLUCOSE BLD-MCNC: 97 MG/DL (ref 70–100)
HCT VFR BLD AUTO: 39.2 % (ref 34.5–44)
HGB BLD-MCNC: 12.5 G/DL (ref 11.5–15.5)
HOLD SPECIMEN: NORMAL
HOLD SPECIMEN: NORMAL
IMM GRANULOCYTES # BLD: 0.01 10*3/MM3 (ref 0–0.03)
IMM GRANULOCYTES NFR BLD: 0.1 % (ref 0–0.6)
LYMPHOCYTES # BLD AUTO: 3.45 10*3/MM3 (ref 0.6–4.8)
LYMPHOCYTES NFR BLD AUTO: 43.1 % (ref 24–44)
MCH RBC QN AUTO: 27.5 PG (ref 27–31)
MCHC RBC AUTO-ENTMCNC: 31.9 G/DL (ref 32–36)
MCV RBC AUTO: 86.2 FL (ref 80–99)
MONOCYTES # BLD AUTO: 0.82 10*3/MM3 (ref 0–1)
MONOCYTES NFR BLD AUTO: 10.3 % (ref 0–12)
NEUTROPHILS # BLD AUTO: 3.41 10*3/MM3 (ref 1.5–8.3)
NEUTROPHILS NFR BLD AUTO: 42.6 % (ref 41–71)
PLATELET # BLD AUTO: 254 10*3/MM3 (ref 150–450)
PMV BLD AUTO: 11.7 FL (ref 6–12)
POTASSIUM BLD-SCNC: 4.4 MMOL/L (ref 3.5–5.5)
PROT SERPL-MCNC: 7.4 G/DL (ref 5.7–8.2)
RBC # BLD AUTO: 4.55 10*6/MM3 (ref 3.89–5.14)
SODIUM BLD-SCNC: 142 MMOL/L (ref 132–146)
TROPONIN I SERPL-MCNC: 0 NG/ML (ref 0–0.07)
WBC NRBC COR # BLD: 8 10*3/MM3 (ref 3.5–10.8)
WHOLE BLOOD HOLD SPECIMEN: NORMAL
WHOLE BLOOD HOLD SPECIMEN: NORMAL

## 2018-08-21 PROCEDURE — 99211 OFF/OP EST MAY X REQ PHY/QHP: CPT

## 2018-08-21 PROCEDURE — 93005 ELECTROCARDIOGRAM TRACING: CPT

## 2018-08-21 PROCEDURE — 80053 COMPREHEN METABOLIC PANEL: CPT | Performed by: EMERGENCY MEDICINE

## 2018-08-21 PROCEDURE — 93005 ELECTROCARDIOGRAM TRACING: CPT | Performed by: EMERGENCY MEDICINE

## 2018-08-21 PROCEDURE — 84484 ASSAY OF TROPONIN QUANT: CPT

## 2018-08-21 PROCEDURE — 80053 COMPREHEN METABOLIC PANEL: CPT

## 2018-08-21 PROCEDURE — 85025 COMPLETE CBC W/AUTO DIFF WBC: CPT | Performed by: EMERGENCY MEDICINE

## 2018-08-21 PROCEDURE — 85025 COMPLETE CBC W/AUTO DIFF WBC: CPT

## 2018-08-21 PROCEDURE — 99284 EMERGENCY DEPT VISIT MOD MDM: CPT

## 2018-08-21 RX ORDER — SODIUM CHLORIDE 0.9 % (FLUSH) 0.9 %
10 SYRINGE (ML) INJECTION AS NEEDED
Status: DISCONTINUED | OUTPATIENT
Start: 2018-08-21 | End: 2018-08-21 | Stop reason: HOSPADM
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
## 2018-08-22 ENCOUNTER — TELEPHONE (OUTPATIENT)
Dept: GASTROENTEROLOGY | Facility: CLINIC | Age: 33
End: 2018-08-22

## 2018-08-22 DIAGNOSIS — K59.00 CONSTIPATION, UNSPECIFIED CONSTIPATION TYPE: Primary | ICD-10-CM

## 2018-08-22 NOTE — TELEPHONE ENCOUNTER
Got the message    Relistor   450 mg po q am  Qs 30 days  3 refills    This is going to be expensive and we may need to activate [facility] stating she has failed otc laxatives and linzess and takes methadone

## 2018-08-23 ENCOUNTER — TELEPHONE (OUTPATIENT)
Dept: GASTROENTEROLOGY | Facility: CLINIC | Age: 33
End: 2018-08-23

## 2018-08-23 NOTE — TELEPHONE ENCOUNTER
Called patient to let her know that we needed her to sign appeal for [employer]. Patient will come by tomorrow. I have also left patient samples at front until we can the medication approved.

## 2018-08-24 ENCOUNTER — OFFICE VISIT (OUTPATIENT)
Dept: FAMILY MEDICINE CLINIC | Facility: CLINIC | Age: 33
End: 2018-08-24

## 2018-08-24 VITALS
HEIGHT: 63 IN | BODY MASS INDEX: 33.66 KG/M2 | RESPIRATION RATE: 18 BRPM | TEMPERATURE: 97.8 F | WEIGHT: 190 LBS | DIASTOLIC BLOOD PRESSURE: 72 MMHG | SYSTOLIC BLOOD PRESSURE: 108 MMHG | HEART RATE: 76 BPM
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
## 2018-08-24 DIAGNOSIS — R82.81 PYURIA: ICD-10-CM

## 2018-08-24 DIAGNOSIS — R42 DIZZINESS: Primary | ICD-10-CM

## 2018-08-24 DIAGNOSIS — R11.0 NAUSEA: ICD-10-CM

## 2018-08-24 LAB
BILIRUB BLD-MCNC: NEGATIVE MG/DL
GLUCOSE UR STRIP-MCNC: NEGATIVE MG/DL
KETONES UR QL: NEGATIVE
LEUKOCYTE EST, POC: ABNORMAL
NITRITE UR-MCNC: NEGATIVE MG/ML
PH UR: 1 [PH] (ref 5–8)
PROT UR STRIP-MCNC: ABNORMAL MG/DL
RBC # UR STRIP: NEGATIVE /UL
SP GR UR: 7 (ref 1–1.03)
UROBILINOGEN UR QL: NORMAL

## 2018-08-24 PROCEDURE — 99214 OFFICE O/P EST MOD 30 MIN: CPT | Performed by: FAMILY MEDICINE

## 2018-08-24 RX ORDER — TRIAMCINOLONE ACETONIDE 1 MG/G
CREAM TOPICAL 2 TIMES DAILY
Qty: 30 G | Refills: 1 | Status: SHIPPED | OUTPATIENT
Start: 2018-08-24 | End: 2019-02-06

## 2018-08-24 NOTE — PROGRESS NOTES
Assessment/Plan       Problems Addressed this Visit     None      Visit Diagnoses     Dizziness    -  Primary    Nausea        Relevant Orders    POC Urinalysis Dipstick, Automated (Completed)    Insect bite, initial encounter        Relevant Medications    triamcinolone (KENALOG) 0.1 % cream    Pyuria        Relevant Orders    Urine Culture - Urine, Urine, Clean Catch            Follow up: Return if symptoms worsen or fail to improve.     DISCUSSION  Hold Norethindrone for 1-2 days and see if sx go away.     Sees endocrinology on Monday    Had labs at ER on Tuesday and normal    Insect bite is benign appearing.  May try some steroid cream.    Check urine culture and treat if positive.    MEDICATIONS PRESCRIBED  Requested Prescriptions     Signed Prescriptions Disp Refills   • triamcinolone (KENALOG) 0.1 % cream 30 g 1     Sig: Apply  topically to the appropriate area as directed 2 (Two) Times a Day.          -------------------------------------------    Subjective     Chief Complaint   Patient presents with   • Dizziness     nausea, weak.    • Insect Bite         Dizziness   This is a new (went to ER on Tuesday, no labs and no eval, had labs last Friday ( OB GYN)) problem. The current episode started in the past 7 days (since Sunday). The problem occurs constantly. The problem has been unchanged. Associated symptoms include fatigue, nausea and vertigo. Pertinent negatives include no fever or vomiting. Associated symptoms comments: Has been getting bug bites, one on the left elbow and arm, 3-4 weeks ago. + sore to touch. Nothing aggravates the symptoms. She has tried nothing for the symptoms. The treatment provided no relief.   Insect Bite   This is a new (has gotten multiple bited) problem. The current episode started 1 to 4 weeks ago. Associated symptoms include fatigue, nausea and vertigo. Pertinent negatives include no fever or vomiting.     New med Norethindrone on Friday, symptoms seem to start after  "that.          History   Smoking Status   • Former Smoker   • Types: Cigarettes   • Quit date: 1999   Smokeless Tobacco   • Never Used        Past Medical History,Medications, Allergies, and social history was reviewed.      Review of Systems   Constitutional: Positive for fatigue. Negative for fever.   HENT: Negative.    Respiratory: Negative.    Cardiovascular: Negative.    Gastrointestinal: Positive for nausea. Negative for vomiting.   Neurological: Positive for dizziness and vertigo.   Psychiatric/Behavioral: The patient is nervous/anxious.        Objective     Vitals:    08/24/18 1108   BP: 108/72   Pulse: 76   Resp: 18   Temp: 97.8 °F (36.6 °C)   Weight: 86.2 kg (190 lb)   Height: 160 cm (63\")          Physical Exam   Constitutional: She appears well-developed and well-nourished.   HENT:   Head: Normocephalic and atraumatic.   Right Ear: Hearing normal.   Left Ear: Hearing normal.   Eyes: Pupils are equal, round, and reactive to light. Conjunctivae and EOM are normal.   Cardiovascular: Normal rate, regular rhythm and normal heart sounds.  Exam reveals no friction rub.    No murmur heard.  Pulmonary/Chest: Effort normal and breath sounds normal. No respiratory distress. She has no wheezes. She has no rales.   Abdominal: Soft. Bowel sounds are normal. She exhibits no distension. There is no tenderness. There is no guarding.   Neurological: She is alert.   Skin: Skin is warm.   Psychiatric: She has a normal mood and affect. Her behavior is normal.   Nursing note and vitals reviewed.  Area of left lateral forearm shows a very mild erythematous area slightly tender.  No abscess formation.    Urinalysis shows leukocyte esterase.  Nitrate negative.          Vish Torres MD    "

## 2018-08-27 ENCOUNTER — TELEPHONE (OUTPATIENT)
Dept: FAMILY MEDICINE CLINIC | Facility: CLINIC | Age: 33
End: 2018-08-27

## 2018-08-27 ENCOUNTER — OFFICE VISIT (OUTPATIENT)
Dept: ENDOCRINOLOGY | Facility: CLINIC | Age: 33
End: 2018-08-27

## 2018-08-27 VITALS
OXYGEN SATURATION: 96 % | WEIGHT: 188 LBS | HEART RATE: 76 BPM | BODY MASS INDEX: 33.31 KG/M2 | SYSTOLIC BLOOD PRESSURE: 120 MMHG | HEIGHT: 63 IN | DIASTOLIC BLOOD PRESSURE: 90 MMHG

## 2018-08-27 DIAGNOSIS — N91.1 SECONDARY AMENORRHEA: Primary | ICD-10-CM

## 2018-08-27 PROBLEM — E28.39 PREMATURE OVARIAN FAILURE: Status: ACTIVE | Noted: 2018-08-27

## 2018-08-27 LAB
FSH SERPL-ACNC: 11.4 MIU/ML
LH SERPL-ACNC: 4.1 MIU/ML

## 2018-08-27 PROCEDURE — 99244 OFF/OP CNSLTJ NEW/EST MOD 40: CPT | Performed by: INTERNAL MEDICINE

## 2018-08-27 PROCEDURE — 83519 RIA NONANTIBODY: CPT | Performed by: INTERNAL MEDICINE

## 2018-08-27 PROCEDURE — 83001 ASSAY OF GONADOTROPIN (FSH): CPT | Performed by: INTERNAL MEDICINE

## 2018-08-27 PROCEDURE — 83002 ASSAY OF GONADOTROPIN (LH): CPT | Performed by: INTERNAL MEDICINE

## 2018-08-27 PROCEDURE — 82397 CHEMILUMINESCENT ASSAY: CPT | Performed by: INTERNAL MEDICINE

## 2018-08-27 RX ORDER — MEDROXYPROGESTERONE ACETATE 10 MG/1
TABLET ORAL
Qty: 12 TABLET | Refills: 5 | Status: SHIPPED | OUTPATIENT
Start: 2018-08-27 | End: 2018-09-26 | Stop reason: SDUPTHER

## 2018-08-27 RX ORDER — ESTRADIOL 1 MG/1
1 TABLET ORAL DAILY
Qty: 30 TABLET | Refills: 5 | Status: SHIPPED | OUTPATIENT
Start: 2018-08-27 | End: 2018-11-29 | Stop reason: DRUGHIGH

## 2018-08-27 NOTE — TELEPHONE ENCOUNTER
----- Message from Katie Nelson sent at 8/27/2018  2:27 PM EDT -----  Contact: CHAVEZ  PATIENT ASK IF YOU COULD PLEASE GIVE HER A CALL IF ANY OF HER LAB RESULTS ARE IN.

## 2018-08-27 NOTE — TELEPHONE ENCOUNTER
----- Message from Meenu Barros sent at 8/27/2018 12:49 PM EDT -----  Contact: sue, patient  Lab results? [phone] may leave a message

## 2018-08-29 ENCOUNTER — OFFICE VISIT (OUTPATIENT)
Dept: PULMONOLOGY | Facility: CLINIC | Age: 33
End: 2018-08-29

## 2018-08-29 ENCOUNTER — TELEPHONE (OUTPATIENT)
Dept: INTERNAL MEDICINE | Facility: CLINIC | Age: 33
End: 2018-08-29

## 2018-08-29 ENCOUNTER — TELEPHONE (OUTPATIENT)
Dept: FAMILY MEDICINE CLINIC | Facility: CLINIC | Age: 33
End: 2018-08-29

## 2018-08-29 VITALS
DIASTOLIC BLOOD PRESSURE: 80 MMHG | BODY MASS INDEX: 33.31 KG/M2 | OXYGEN SATURATION: 93 % | HEIGHT: 63 IN | TEMPERATURE: 98.4 F | HEART RATE: 90 BPM | SYSTOLIC BLOOD PRESSURE: 120 MMHG | WEIGHT: 188 LBS

## 2018-08-29 DIAGNOSIS — K21.9 GASTROESOPHAGEAL REFLUX DISEASE, ESOPHAGITIS PRESENCE NOT SPECIFIED: ICD-10-CM

## 2018-08-29 DIAGNOSIS — J30.2 ACUTE SEASONAL ALLERGIC RHINITIS, UNSPECIFIED TRIGGER: ICD-10-CM

## 2018-08-29 DIAGNOSIS — J45.20 MILD INTERMITTENT ASTHMA WITHOUT COMPLICATION: Primary | ICD-10-CM

## 2018-08-29 DIAGNOSIS — F20.9 SCHIZOPHRENIA, UNSPECIFIED TYPE (HCC): ICD-10-CM

## 2018-08-29 PROCEDURE — 99213 OFFICE O/P EST LOW 20 MIN: CPT | Performed by: NURSE PRACTITIONER

## 2018-08-29 RX ORDER — LEVOCETIRIZINE DIHYDROCHLORIDE 5 MG/1
5 TABLET, FILM COATED ORAL EVERY EVENING
Qty: 30 TABLET | Refills: 3 | Status: SHIPPED | OUTPATIENT
Start: 2018-08-29 | End: 2018-12-31 | Stop reason: SDUPTHER

## 2018-08-29 RX ORDER — OXYMETAZOLINE HYDROCHLORIDE 0.05 G/100ML
2 SPRAY NASAL 2 TIMES DAILY
Qty: 30 ML | Refills: 2 | Status: SHIPPED | OUTPATIENT
Start: 2018-08-29 | End: 2018-12-14

## 2018-08-29 NOTE — TELEPHONE ENCOUNTER
----- Message from Katie Nelson sent at 8/29/2018 11:28 AM EDT -----  Contact: JULIUS ESTRADA HAS CALLED EVERY DAY SEVERAL TIMES A DAY TO CHECK ON HER LAB RESULTS. THERE IS STILL ONE THAT SAYS ACTIVE IN PROGRESS. CAN YOU CALL HER WITH THE RESULTS IF BACK OR IF THEY ARE NOT BACK CAN YOU CALL AND CHECK ON THOSE PLEASE.

## 2018-08-29 NOTE — PROGRESS NOTES
[facility] Pulmonary Follow up    CHIEF COMPLAINT    Mild intermittent asthma    HISTORY OF PRESENT ILLNESS    Marguerite Edwards is a 33 y.o.female here today for follow-up on mild intermittent asthma after her evaluation in April.  She moved here from [place] to [facility] care with history of COPD and asthma.  She has been on multiple inhalers in the past currently she is on Breo, and tolerating it very well with overall relief of her symptoms.  She rarely has to use her rescue inhaler.  her PFTs in April showed no obstruction and no restriction.  She has no cough or sputum production no chest tightness or wheezing.  She has not been of any dyspnea on exertion.    She does have a history of significant reflux and IBS symptoms.  She follows up with gastroenterology.  She is on a PPI but does not follow reflux precautions.    Today she's having a bit of postnasal drainage and rhinorrhea, she does complain of chronic seasonal allergies.        Patient Active Problem List   Diagnosis   • Anxiety   • Urinary incontinence   • Schizophrenia (CMS/HCC)   • GERD (gastroesophageal reflux disease)   • Asthma   • Premature ovarian failure   • Acute seasonal allergic rhinitis       Allergies   Allergen Reactions   • Paxil [Paroxetine Hcl] Mental Status Change   • Prozac [Fluoxetine Hcl] Mental Status Change   • Amitiza [Lubiprostone] Palpitations and Dizziness       Current Outpatient Prescriptions:   •  albuterol (PROVENTIL HFA;VENTOLIN HFA) 108 (90 Base) MCG/ACT inhaler, Inhale 2 puffs Every 4 (Four) Hours As Needed for Wheezing., Disp: 6.7 g, Rfl: 11  •  albuterol (PROVENTIL) (2.5 MG/3ML) 0.083% nebulizer solution, Take 2.5 mg by nebulization Every 6 (Six) Hours As Needed for Wheezing or Shortness of Air., Disp: 50 vial, Rfl: 5  •  baclofen (LIORESAL) 10 MG tablet, Take 20 mg by mouth 3 (Three) Times a Day., Disp: , Rfl:   •  busPIRone (BUSPAR) 7.5 MG tablet, Take 7.5 mg by mouth Daily., Disp: , Rfl:   •  Cholecalciferol  (VITAMIN D-3) 1000 units capsule, Take 1,000 Units by mouth Daily., Disp: 30 capsule, Rfl: 5  •  estradiol (ESTRACE) 1 MG tablet, Take 1 tablet by mouth Daily., Disp: 30 tablet, Rfl: 5  •  Fluticasone Furoate-Vilanterol 100-25 MCG/INH aerosol powder , Inhale 1 puff Daily. 1 inhalation once a day, Disp: 1 each, Rfl: 6  •  gabapentin (NEURONTIN) 300 MG capsule, Take 1 capsule by mouth 3 (Three) Times a Day. (Refill no sooner than 30 days), Disp: 90 capsule, Rfl: 1  •  hyoscyamine (LEVSIN) 0.125 MG SL tablet, Take 1 tablet by mouth Every 4 (Four) Hours As Needed (bladder spasms)., Disp: 30 tablet, Rfl: 0  •  medroxyPROGESTERone (PROVERA) 10 MG tablet, Take on tablet daily the first 12 days of the month, Disp: 12 tablet, Rfl: 5  •  methadone (DOLOPHINE) 5 MG tablet, Take 5 mg by mouth Every 8 (Eight) Hours As Needed for Moderate Pain ., Disp: , Rfl:   •  Methylnaltrexone Bromide (RELISTOR) 150 MG tablet, Take 450 mg by mouth Daily., Disp: 270 tablet, Rfl: 3  •  Naldemedine Tosylate 0.2 MG tablet, Take 0.2 mg by mouth Daily., Disp: 30 tablet, Rfl: 2  •  ondansetron ODT (ZOFRAN-ODT) 4 MG disintegrating tablet, , Disp: , Rfl:   •  OXcarbazepine (TRILEPTAL) 150 MG tablet, , Disp: , Rfl:   •  polyethylene glycol (MIRALAX) packet, Take 17 g by mouth Daily. Mix with 8 oz water of juice (Patient taking differently: Take 17 g by mouth As Needed. Mix with 8 oz water of juice), Disp: 30 packet, Rfl: 5  •  promethazine (PHENERGAN) 25 MG tablet, Take 1 tablet by mouth Every 6 (Six) Hours As Needed for Nausea or Vomiting., Disp: 45 tablet, Rfl: 0  •  QUEtiapine (SEROquel) 300 MG tablet, Take 300 mg by mouth Every Night., Disp: , Rfl:   •  tiZANidine (ZANAFLEX) 4 MG tablet, Take 1 tablet by mouth 3 (Three) Times a Day., Disp: 90 tablet, Rfl: 1  •  triamcinolone (KENALOG) 0.1 % cream, Apply  topically to the appropriate area as directed 2 (Two) Times a Day., Disp: 30 g, Rfl: 1  •  levocetirizine (XYZAL) 5 MG tablet, Take 1 tablet by  "mouth Every Evening., Disp: 30 tablet, Rfl: 3  •  oxymetazoline (QC NASAL RELIEF SINUS) 0.05 % nasal spray, Use 2 sprays into each nostril as directed by provider 2 (Two) Times a Day., Disp: 30 mL, Rfl: 2  MEDICATION LIST AND ALLERGIES REVIEWED.    Social History   Substance Use Topics   • Smoking status: Former Smoker     Types: Cigarettes     Quit date: 1999   • Smokeless tobacco: Never Used   • Alcohol use 0.6 oz/week     1 Cans of beer per week      Comment: occasionally       FAMILY AND SOCIAL HISTORY REVIEWED.    Review of Systems   Constitutional: Negative for chills, fatigue, fever and unexpected weight change.   HENT: Positive for postnasal drip and rhinorrhea. Negative for congestion, nosebleeds, sinus pressure and trouble swallowing.    Respiratory: Negative for cough, chest tightness, shortness of breath and wheezing.    Cardiovascular: Negative for chest pain and leg swelling.   Gastrointestinal: Negative for abdominal pain, constipation, diarrhea, nausea and vomiting.   Genitourinary: Negative for dysuria, frequency, hematuria and urgency.   Musculoskeletal: Negative for myalgias.   Neurological: Negative for dizziness, weakness, numbness and headaches.   All other systems reviewed and are negative.  .    /80   Pulse 90   Temp 98.4 °F (36.9 °C)   Ht 160 cm (63\")   Wt 85.3 kg (188 lb)   SpO2 93% Comment: RA at rest  BMI 33.30 kg/m²       There is no immunization history on file for this patient.    Physical Exam   Constitutional: She is oriented to person, place, and time. She appears well-developed and well-nourished.   HENT:   Head: Normocephalic and atraumatic.   Eyes: Pupils are equal, round, and reactive to light. EOM are normal.   Neck: Normal range of motion. Neck supple.   Cardiovascular: Normal rate and regular rhythm.    No murmur heard.  Pulmonary/Chest: Effort normal and breath sounds normal. No respiratory distress. She has no wheezes. She has no rales.   Abdominal: Soft. Bowel " sounds are normal. She exhibits no distension.   Musculoskeletal: Normal range of motion. She exhibits no edema.   Neurological: She is alert and oriented to person, place, and time.   Skin: Skin is warm and dry. No erythema.   Psychiatric: She has a normal mood and affect. Her behavior is normal.   Vitals reviewed.        RESULTS        PROBLEM LIST    Problem List Items Addressed This Visit        Respiratory    Asthma - Primary    Acute seasonal allergic rhinitis       Digestive    GERD (gastroesophageal reflux disease)       Other    Schizophrenia (CMS/[facility])            DISCUSSION    Continue on her Breo daily with albuterol as needed.    For her seasonal allergies I added her on some Xyzal and Qnasl.     Continued follow-up with gastroenterology and adherence to reflux precautions.    Follow-up in 6 months    I spent 15 minutes with the patient and family. I spent > 50% percent of this time counseling and discussing evaluation, current status, treatment options and management.    Diane Parra, KATRINA  08/29/201812:43 PM  Electronically signed     Please note that portions of this note were completed with a voice recognition program. Efforts were made to edit the dictations, but occasionally words are mistranscribed.      CC: Vish Torres MD

## 2018-08-29 NOTE — TELEPHONE ENCOUNTER
Patient wanted to know the urine test results done on the 24th. Informed her of the dipstick results again and that we haven't received a urine culture back yet.

## 2018-08-29 NOTE — TELEPHONE ENCOUNTER
Please call, these were ordered by specialist. She needs to call them to expedite results since we did not order.

## 2018-08-31 ENCOUNTER — TELEPHONE (OUTPATIENT)
Dept: FAMILY MEDICINE CLINIC | Facility: CLINIC | Age: 33
End: 2018-08-31

## 2018-08-31 RX ORDER — FLUCONAZOLE 150 MG/1
TABLET ORAL
Qty: 2 TABLET | Refills: 0 | Status: SHIPPED | OUTPATIENT
Start: 2018-08-31 | End: 2018-09-14

## 2018-08-31 RX ORDER — NITROFURANTOIN 25; 75 MG/1; MG/1
100 CAPSULE ORAL EVERY 12 HOURS SCHEDULED
Qty: 14 CAPSULE | Refills: 0 | Status: SHIPPED | OUTPATIENT
Start: 2018-08-31 | End: 2019-02-06

## 2018-09-01 LAB
21HYDROXYLASE AB SER-ACNC: <1 U/ML
MIS SERPL-MCNC: 2.45 NG/ML

## 2018-09-04 ENCOUNTER — TELEPHONE (OUTPATIENT)
Dept: FAMILY MEDICINE CLINIC | Facility: CLINIC | Age: 33
End: 2018-09-04

## 2018-09-04 NOTE — TELEPHONE ENCOUNTER
Please call.  She would need an appointment to document need for a walker.  Insurance requires this.

## 2018-09-04 NOTE — TELEPHONE ENCOUNTER
----- Message from Katie Gray sent at 9/4/2018  9:58 AM EDT -----  Contact: SCOTT;PT CALLED  PT IS REQUESTING A WALKER DUE TO HER FALLING ALL THE TIME    [phone]

## 2018-09-05 ENCOUNTER — TELEPHONE (OUTPATIENT)
Dept: FAMILY MEDICINE CLINIC | Facility: CLINIC | Age: 33
End: 2018-09-05

## 2018-09-05 NOTE — TELEPHONE ENCOUNTER
----- Message from Isabel Mendez sent at 9/5/2018  8:45 AM EDT -----  Contact: DR CHAVEZ   [facility]

## 2018-09-06 NOTE — TELEPHONE ENCOUNTER
Message   Received: Today   Message Contents   Debra Baez [facility] Rep [facility] [place] [facility] Clinical [facility]   Caller: PIERRE / PT CALL (Today,  3:35 PM)             PT CALLED AND STATED THAT WHEN RETURNING THE CALL TO SPEAK TO HER  TRINO [phone].  HE IS ON THE RELEASE

## 2018-09-07 ENCOUNTER — TELEPHONE (OUTPATIENT)
Dept: FAMILY MEDICINE CLINIC | Facility: CLINIC | Age: 33
End: 2018-09-07

## 2018-09-07 DIAGNOSIS — M54.5 CHRONIC BILATERAL LOW BACK PAIN, WITH SCIATICA PRESENCE UNSPECIFIED: Primary | ICD-10-CM

## 2018-09-07 DIAGNOSIS — G89.29 CHRONIC BILATERAL LOW BACK PAIN, WITH SCIATICA PRESENCE UNSPECIFIED: Primary | ICD-10-CM

## 2018-09-07 PROBLEM — M54.9 CHRONIC BACK PAIN: Status: ACTIVE | Noted: 2018-09-07

## 2018-09-07 NOTE — TELEPHONE ENCOUNTER
----- Message from Isabel Mendez sent at 9/7/2018 12:13 PM EDT -----  Contact: DR CHAVEZ   PATIENT NEEDS A REFERRAL TO [facility] BECAUSE THE HAVE SWITCHED THE PRACTICE NAME. IT IS THE SAME DR THAT SHE CURRENTLY GOES TO BUT WHERE THEY SWITCHED PRACTICE NAMES THEY ARE REQUIRING A NEW REFERRAL.  PLEASE CALL  BACK AT [phone] (TRINO)

## 2018-09-10 ENCOUNTER — TELEPHONE (OUTPATIENT)
Dept: GASTROENTEROLOGY | Facility: CLINIC | Age: 33
End: 2018-09-10

## 2018-09-11 NOTE — TELEPHONE ENCOUNTER
SPOKE WITH PT AND INFORMED HER OF THE MESSAGE THAT DR MCCAIN IS STAYING WITH HIS CURRENT PRACTICE AND THE NUMBER WAS GIVEN. AND ALL. [phone] [facility] AND PT DOESN'T NEED A NEW REFERRAL.     PT HAS APPT ALREADY SCHEDULED.

## 2018-09-12 DIAGNOSIS — K21.9 GASTROESOPHAGEAL REFLUX DISEASE, ESOPHAGITIS PRESENCE NOT SPECIFIED: Primary | ICD-10-CM

## 2018-09-12 RX ORDER — PANTOPRAZOLE SODIUM 40 MG/1
40 TABLET, DELAYED RELEASE ORAL DAILY
Qty: 90 TABLET | Refills: 3 | Status: SHIPPED | OUTPATIENT
Start: 2018-09-12 | End: 2019-12-09 | Stop reason: SDUPTHER

## 2018-09-13 ENCOUNTER — OFFICE VISIT (OUTPATIENT)
Dept: ORTHOPEDIC SURGERY | Facility: CLINIC | Age: 33
End: 2018-09-13

## 2018-09-13 VITALS — HEART RATE: 66 BPM | OXYGEN SATURATION: 75 % | HEIGHT: 63 IN | WEIGHT: 188.05 LBS | BODY MASS INDEX: 33.32 KG/M2

## 2018-09-13 DIAGNOSIS — R20.2 NUMBNESS AND TINGLING IN BOTH HANDS: ICD-10-CM

## 2018-09-13 DIAGNOSIS — M79.642 PAIN IN BOTH HANDS: Primary | ICD-10-CM

## 2018-09-13 DIAGNOSIS — M79.641 PAIN IN BOTH HANDS: Primary | ICD-10-CM

## 2018-09-13 DIAGNOSIS — R20.0 NUMBNESS AND TINGLING IN BOTH HANDS: ICD-10-CM

## 2018-09-13 PROCEDURE — 99213 OFFICE O/P EST LOW 20 MIN: CPT | Performed by: ORTHOPAEDIC SURGERY

## 2018-09-13 RX ORDER — SERTRALINE HYDROCHLORIDE 100 MG/1
TABLET, FILM COATED ORAL
COMMUNITY
Start: 2018-09-07 | End: 2020-02-10 | Stop reason: ALTCHOICE
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
## 2018-09-14 ENCOUNTER — LAB (OUTPATIENT)
Dept: LAB | Facility: HOSPITAL | Age: 33
End: 2018-09-14

## 2018-09-14 ENCOUNTER — TRANSCRIBE ORDERS (OUTPATIENT)
Dept: LAB | Facility: HOSPITAL | Age: 33
End: 2018-09-14

## 2018-09-14 ENCOUNTER — OFFICE VISIT (OUTPATIENT)
Dept: FAMILY MEDICINE CLINIC | Facility: CLINIC | Age: 33
End: 2018-09-14

## 2018-09-14 ENCOUNTER — TELEPHONE (OUTPATIENT)
Dept: INTERNAL MEDICINE | Facility: CLINIC | Age: 33
End: 2018-09-14

## 2018-09-14 VITALS
SYSTOLIC BLOOD PRESSURE: 116 MMHG | HEART RATE: 76 BPM | TEMPERATURE: 97.6 F | BODY MASS INDEX: 31.18 KG/M2 | WEIGHT: 176 LBS | DIASTOLIC BLOOD PRESSURE: 72 MMHG | RESPIRATION RATE: 18 BRPM | HEIGHT: 63 IN

## 2018-09-14 DIAGNOSIS — F20.9 SUBCHRONIC SCHIZOPHRENIA (HCC): ICD-10-CM

## 2018-09-14 DIAGNOSIS — M79.672 PAIN IN BOTH FEET: ICD-10-CM

## 2018-09-14 DIAGNOSIS — F20.9 SUBCHRONIC SCHIZOPHRENIA (HCC): Primary | ICD-10-CM

## 2018-09-14 DIAGNOSIS — R29.6 FALLS FREQUENTLY: Primary | ICD-10-CM

## 2018-09-14 DIAGNOSIS — M79.671 PAIN IN BOTH FEET: ICD-10-CM

## 2018-09-14 DIAGNOSIS — N76.0 ACUTE VAGINITIS: ICD-10-CM

## 2018-09-14 LAB
ALBUMIN SERPL-MCNC: 4.92 G/DL (ref 3.2–4.8)
ALBUMIN/GLOB SERPL: 2.1 G/DL (ref 1.5–2.5)
ALP SERPL-CCNC: 97 U/L (ref 25–100)
ALT SERPL W P-5'-P-CCNC: 16 U/L (ref 7–40)
ANION GAP SERPL CALCULATED.3IONS-SCNC: 12 MMOL/L (ref 3–11)
ARTICHOKE IGE QN: 213 MG/DL (ref 0–130)
AST SERPL-CCNC: 19 U/L (ref 0–33)
BILIRUB SERPL-MCNC: 0.2 MG/DL (ref 0.3–1.2)
BUN BLD-MCNC: 13 MG/DL (ref 9–23)
BUN/CREAT SERPL: 17.3 (ref 7–25)
CALCIUM SPEC-SCNC: 10.1 MG/DL (ref 8.7–10.4)
CHLORIDE SERPL-SCNC: 107 MMOL/L (ref 99–109)
CHOLEST SERPL-MCNC: 253 MG/DL (ref 0–200)
CO2 SERPL-SCNC: 28 MMOL/L (ref 20–31)
CREAT BLD-MCNC: 0.75 MG/DL (ref 0.6–1.3)
DEPRECATED RDW RBC AUTO: 40.6 FL (ref 37–54)
ERYTHROCYTE [DISTWIDTH] IN BLOOD BY AUTOMATED COUNT: 13.2 % (ref 11.3–14.5)
GFR SERPL CREATININE-BSD FRML MDRD: 88 ML/MIN/1.73
GLOBULIN UR ELPH-MCNC: 2.4 GM/DL
GLUCOSE BLD-MCNC: 96 MG/DL (ref 70–100)
HBA1C MFR BLD: 5.6 % (ref 4.8–5.6)
HCT VFR BLD AUTO: 44.3 % (ref 34.5–44)
HDLC SERPL-MCNC: 49 MG/DL (ref 40–60)
HGB BLD-MCNC: 14.2 G/DL (ref 11.5–15.5)
MCH RBC QN AUTO: 27.2 PG (ref 27–31)
MCHC RBC AUTO-ENTMCNC: 32.1 G/DL (ref 32–36)
MCV RBC AUTO: 84.7 FL (ref 80–99)
PLATELET # BLD AUTO: 244 10*3/MM3 (ref 150–450)
PMV BLD AUTO: 13.1 FL (ref 6–12)
POTASSIUM BLD-SCNC: 4.7 MMOL/L (ref 3.5–5.5)
PROT SERPL-MCNC: 7.3 G/DL (ref 5.7–8.2)
RBC # BLD AUTO: 5.23 10*6/MM3 (ref 3.89–5.14)
SODIUM BLD-SCNC: 147 MMOL/L (ref 132–146)
TRIGL SERPL-MCNC: 105 MG/DL (ref 0–150)
TSH SERPL DL<=0.05 MIU/L-ACNC: 1.19 MIU/ML (ref 0.35–5.35)
VIT B12 BLD-MCNC: 519 PG/ML (ref 211–911)
WBC NRBC COR # BLD: 9 10*3/MM3 (ref 3.5–10.8)

## 2018-09-14 PROCEDURE — 82607 VITAMIN B-12: CPT

## 2018-09-14 PROCEDURE — 80050 GENERAL HEALTH PANEL: CPT

## 2018-09-14 PROCEDURE — 99213 OFFICE O/P EST LOW 20 MIN: CPT | Performed by: FAMILY MEDICINE

## 2018-09-14 PROCEDURE — 36415 COLL VENOUS BLD VENIPUNCTURE: CPT | Performed by: PSYCHIATRY & NEUROLOGY

## 2018-09-14 PROCEDURE — 80061 LIPID PANEL: CPT

## 2018-09-14 PROCEDURE — 83036 HEMOGLOBIN GLYCOSYLATED A1C: CPT | Performed by: PSYCHIATRY & NEUROLOGY

## 2018-09-14 RX ORDER — FLUCONAZOLE 150 MG/1
TABLET ORAL
Qty: 2 TABLET | Refills: 0 | Status: SHIPPED | OUTPATIENT
Start: 2018-09-14 | End: 2018-10-16 | Stop reason: SDUPTHER

## 2018-09-14 NOTE — PROGRESS NOTES
Assessment/Plan       Problems Addressed this Visit     None      Visit Diagnoses     Falls frequently    -  Primary    Relevant Orders    Walker    Pain in both feet        Relevant Orders    Walker    Acute vaginitis                Follow up: Return if symptoms worsen or fail to improve.     DISCUSSION  Due to persistent falls, will agree to prescribe a walker.  She also has chronic pain in her feet due to arthritis.    Continue follow-up with specialists.    Complains of vaginal discharge again.  Refilled Diflucan.  White discharge consistent with Candida.    She asked me to examine her ear wash she was here and they were both normal.  MEDICATIONS PRESCRIBED  Requested Prescriptions     Signed Prescriptions Disp Refills   • fluconazole (DIFLUCAN) 150 MG tablet 2 tablet 0     Sig: one by mouth today and repeat in 3 days if not better            -------------------------------------------    Subjective     Chief Complaint   Patient presents with   • Walker evaluation         History of Present Illness    Falls  Last fall was last week  Falling frequently   Trip and lose balance  Wearing boot for arthritis in foot  No weakness in legs or numbness    No dizziness just loses balance.  No reports of headaches.    Needs walker to help with walking    Needs labs for Dr Tristan    Vaginal discharge  White discharge  Itches        History   Smoking Status   • Former Smoker   • Types: Cigarettes   • Quit date: 1999   Smokeless Tobacco   • Never Used        Past Medical History,Medications, Allergies, and social history was reviewed.      Review of Systems   Constitutional: Positive for fatigue.   HENT: Positive for ear pain.    Respiratory: Negative.    Cardiovascular: Negative.    Gastrointestinal: Negative.    Genitourinary: Positive for vaginal discharge.   Musculoskeletal: Positive for gait problem.   Psychiatric/Behavioral: Positive for sleep disturbance and depressed mood. The patient is nervous/anxious.   "      Objective     Vitals:    09/14/18 1119   BP: 116/72   Pulse: 76   Resp: 18   Temp: 97.6 °F (36.4 °C)   Weight: 79.8 kg (176 lb)   Height: 160 cm (63\")          Physical Exam   Constitutional: She appears well-developed and well-nourished.   HENT:   Head: Normocephalic and atraumatic.   Right Ear: Hearing, tympanic membrane, external ear and ear canal normal.   Left Ear: Hearing, tympanic membrane, external ear and ear canal normal.   Eyes: Pupils are equal, round, and reactive to light. Conjunctivae and EOM are normal.   Cardiovascular: Normal rate, regular rhythm and normal heart sounds.  Exam reveals no friction rub.    No murmur heard.  Pulmonary/Chest: Effort normal and breath sounds normal. No respiratory distress. She has no wheezes. She has no rales.   Musculoskeletal:   Able to ambulate across the room but some off balance due to orthopedic boot on foot.  Mild weakness of both lower extremities.  Upper extremity strength is intact and able to use a walker.   Neurological: She is alert.   Skin: Skin is warm.   Psychiatric: She has a normal mood and affect.   Nursing note and vitals reviewed.                Vish Torres MD    "

## 2018-09-14 NOTE — TELEPHONE ENCOUNTER
PATIENT WOULD LIKE TO INFORM YOU THAT HER PERIOD ONLY LASTED ONE DAY. SHE FEELS THIS IS VERY UNUSUAL AND WOULD LIKE TO SPEAK WITH YOU ON THIS MATTER

## 2018-09-17 NOTE — TELEPHONE ENCOUNTER
Spoke to patient. Answered questions about her medications. Encouraged her to continue the same estradiol and progesterone doses for now.   Trisha Sanchez MD

## 2018-09-23 PROBLEM — N91.1 SECONDARY AMENORRHEA: Status: ACTIVE | Noted: 2018-08-27

## 2018-09-24 ENCOUNTER — TELEPHONE (OUTPATIENT)
Dept: FAMILY MEDICINE CLINIC | Facility: CLINIC | Age: 33
End: 2018-09-24

## 2018-09-24 ENCOUNTER — TELEPHONE (OUTPATIENT)
Dept: INTERNAL MEDICINE | Facility: CLINIC | Age: 33
End: 2018-09-24

## 2018-09-24 NOTE — TELEPHONE ENCOUNTER
----- Message from Lola Marshall sent at 9/24/2018  8:59 AM EDT -----  Contact: SCOTT; ORDER REQUESTED TO BE FAXED.   PT NEEDS A NEW ORDER FOR HER WALKER FAXED TO [facility] BECAUSE THEY WILL BRING IT OUT TO HER.   PHOINE NUMBER FOR [facility] :  [phone]        CALL BACK   [phone]

## 2018-09-24 NOTE — TELEPHONE ENCOUNTER
PT WOULD LIKE TO GET A CALL BACK IN REGARDS TO ONE OF HER MEDICATIONS THAT SHE THINKS SHE MAY BE HAVING A REACTION TOO. THE PT WOULD LIKE TO GET A CALL BACK -[phone]

## 2018-09-25 NOTE — TELEPHONE ENCOUNTER
PATIENT CALLED AGAIN REQUESTING A RETURN CALL. SHE STATES THAT IT IS VERY IMPORTANT THAT SHE SPEAK WITH YOU. HER NUMBER IS LISTED IN PREVIOUS MESSAGE.

## 2018-09-26 ENCOUNTER — TELEPHONE (OUTPATIENT)
Dept: INTERNAL MEDICINE | Facility: CLINIC | Age: 33
End: 2018-09-26

## 2018-09-26 DIAGNOSIS — N91.1 SECONDARY AMENORRHEA: ICD-10-CM

## 2018-09-26 RX ORDER — MEDROXYPROGESTERONE ACETATE 10 MG/1
10 TABLET ORAL DAILY
Qty: 30 TABLET | Refills: 5 | Status: SHIPPED | OUTPATIENT
Start: 2018-09-26 | End: 2019-02-06

## 2018-09-26 RX ORDER — MEDROXYPROGESTERONE ACETATE 10 MG/1
10 TABLET ORAL DAILY
Qty: 30 TABLET | Refills: 5 | Status: SHIPPED | OUTPATIENT
Start: 2018-09-26 | End: 2018-09-26 | Stop reason: SDUPTHER

## 2018-09-26 NOTE — TELEPHONE ENCOUNTER
Spoke to patient. She wants to take the progesterone daily, not just first 12 days of the month. Resent the prescription for progesterone   Verified that she is taking methadone 5 mg TID for at least 16 months.   Trisha Sanchez MD

## 2018-09-28 ENCOUNTER — TELEPHONE (OUTPATIENT)
Dept: INTERNAL MEDICINE | Facility: CLINIC | Age: 33
End: 2018-09-28

## 2018-09-28 NOTE — TELEPHONE ENCOUNTER
Spoke to patient and her . Answered questions regarding possible opiod induced amenorrhea. Asked her to discuss with her pain management MD if she could be switched to another narcotic to see if any change in her menstrual cycle. But explained other chronic narcotics could also cause amenorrhea.   Trisha Sanchez MD

## 2018-09-28 NOTE — TELEPHONE ENCOUNTER
PATIENT WOULD LIKE A CALL BACK ABOUT MEDICATIONS AND GETTING PREGNANT. SHE STATES WE MENTIONED HER TAKING A MEDICATION IS EFFECTING PATIENT FROM GETTING PREGNANT. YOU CAN CALL HER BACK -[phone]

## 2018-10-04 ENCOUNTER — TELEPHONE (OUTPATIENT)
Dept: FAMILY MEDICINE CLINIC | Facility: CLINIC | Age: 33
End: 2018-10-04

## 2018-10-04 DIAGNOSIS — N94.6 PAINFUL MENSTRUAL PERIODS: Primary | ICD-10-CM

## 2018-10-04 RX ORDER — IBUPROFEN 600 MG/1
600 TABLET ORAL EVERY 6 HOURS PRN
Qty: 30 TABLET | Refills: 1 | Status: SHIPPED | OUTPATIENT
Start: 2018-10-04 | End: 2019-02-06 | Stop reason: DRUGHIGH

## 2018-10-04 NOTE — TELEPHONE ENCOUNTER
----- Message from Lola Marshall sent at 10/4/2018 10:43 AM EDT -----  Contact: SCOTT; MED REFILL   PT CALLED IN AND IS CURRENTLY ON HER PERIOD AND OWULD LIKE FOR US TO CALL IN MOTRIN/ TO HER NEW PHARMACY [facility] IN [place]       CALL BACK   [phone]

## 2018-10-04 NOTE — TELEPHONE ENCOUNTER
Pt called back to tell me I was rude, loud, and hung up loudly. He was upset because I check the LOKI before talking with him. Viviana Cortés was my witness of the conversation she was siting beside me.

## 2018-10-12 ENCOUNTER — TELEPHONE (OUTPATIENT)
Dept: INTERNAL MEDICINE | Facility: CLINIC | Age: 33
End: 2018-10-12

## 2018-10-12 NOTE — TELEPHONE ENCOUNTER
Called pt. Was difficult to understand if she had a true question. She wanted to tell me that she has had menses on the continuous estradiol and progesterone.   Trisha Sanchez MD

## 2018-10-12 NOTE — TELEPHONE ENCOUNTER
PLEASE CALL PT SHE WANTED TO DISCUSS HER PERIODS WITH YOU NAD SOMETHING ELSE THAT'S ALL SHE WANTED TO TELL ME    PTS NUMBER [phone]

## 2018-10-15 ENCOUNTER — TELEPHONE (OUTPATIENT)
Dept: INTERNAL MEDICINE | Facility: CLINIC | Age: 33
End: 2018-10-15

## 2018-10-15 NOTE — TELEPHONE ENCOUNTER
MS CISNEROS WOULD LIKE A RETURN CALL TO DISCUSS HER MEDICATION. SHE HAS BECOME CONFUSED ABOUT WHAT YOU TOLD HER TO DO.    CALL BACK [phone]

## 2018-10-16 ENCOUNTER — TELEPHONE (OUTPATIENT)
Dept: FAMILY MEDICINE CLINIC | Facility: CLINIC | Age: 33
End: 2018-10-16

## 2018-10-16 RX ORDER — FLUCONAZOLE 150 MG/1
TABLET ORAL
Qty: 2 TABLET | Refills: 0 | Status: SHIPPED | OUTPATIENT
Start: 2018-10-16 | End: 2018-12-18 | Stop reason: SDUPTHER

## 2018-10-16 NOTE — TELEPHONE ENCOUNTER
----- Message from Leia Cervantes MA sent at 10/16/2018 10:08 AM EDT -----  Contact: Barragan  Patient requesting medication for a yeast infection, diflucan x2 be sent to [facility] in [place]. States they deliver medication on Tuesdays and she is needing this sent in ASAP. Informed provider is seeing patients and was unsure when/if this could be completed but that someone would call her.    [phone] patient call back  
Please call and confirm current symptoms?   
Please call, requested meds sent to pharmacy. bds  
SPOKE WITH PT AND SHE IS HAVING WHITE DISCHARGE THICK. THESE ARE CURRENTLY ALL SX SHE  IS HAVING NOW. PLEASE SEND IN.   
myself

## 2018-10-23 ENCOUNTER — APPOINTMENT (OUTPATIENT)
Dept: NEUROLOGY | Facility: HOSPITAL | Age: 33
End: 2018-10-23
Attending: ORTHOPAEDIC SURGERY

## 2018-11-13 RX ORDER — FAMOTIDINE 20 MG
TABLET ORAL
Qty: 30 CAPSULE | Refills: 0 | Status: SHIPPED | OUTPATIENT
Start: 2018-11-13 | End: 2018-12-03 | Stop reason: SDUPTHER

## 2018-11-15 ENCOUNTER — TELEPHONE (OUTPATIENT)
Dept: INTERNAL MEDICINE | Facility: CLINIC | Age: 33
End: 2018-11-15

## 2018-11-15 NOTE — TELEPHONE ENCOUNTER
DR RUBY,    MRS CISNEROS IS REQUESTING A RETURN CALL TO DISCUSS IF SHE NEEDS TO CONTINUE OR STOP TAKING THE PROVERA AND ESTRACE WITH THE NEW PAIN MEDICATION SHE HAS BEGAN TAKING THAT SHE SAYS YOU ARE AWARE OF.     FROM WHAT I UNDERSTOOD FROM THE PATIENT, SHE IS CONCERNED THAT SHE SHOULD NOT BE TAKING THE ESTRACE AND PROVERA WHILE TAKING THIS NEW PAIN MEDICATION AND IS WANTING YOUR THOUGHTS ON THIS.    CALL BACK [phone]

## 2018-11-20 ENCOUNTER — TELEPHONE (OUTPATIENT)
Dept: PULMONOLOGY | Facility: CLINIC | Age: 33
End: 2018-11-20

## 2018-11-20 NOTE — TELEPHONE ENCOUNTER
Patient left message requesting samples of Breo as she can't get her rx filled for some reason and wants the Breo samples mailed to her home address.

## 2018-11-26 ENCOUNTER — TELEPHONE (OUTPATIENT)
Dept: INTERNAL MEDICINE | Facility: CLINIC | Age: 33
End: 2018-11-26

## 2018-11-26 ENCOUNTER — TELEPHONE (OUTPATIENT)
Dept: FAMILY MEDICINE CLINIC | Facility: CLINIC | Age: 33
End: 2018-11-26

## 2018-11-26 DIAGNOSIS — N91.1 SECONDARY AMENORRHEA: Primary | ICD-10-CM

## 2018-11-26 NOTE — TELEPHONE ENCOUNTER
Pt LVM requesting samples of Rx Breo. Office is currently out of samples for Rx Breo 100. LVM informing her that samples are not available and to contact the office later in the week. Office # given.

## 2018-11-26 NOTE — TELEPHONE ENCOUNTER
"Spoke with pt and she stated that she has lost her Rx Breo and pharmacy will not fill and is having severe SOB. Pt advised to go to ER but she refused and just requesting samples or switch to different inhaler. Informed pt that I will have to speak with Diane(KATRINA) first to get approved and to schedule an apt this week with her. Pt states that she has no ride and her  has surgery on Friday(11/30/18) and will not be able to schedule an apt on this day. Spoke with pharmacy and they stated that pt just filled Rx Breo on 11/5/18 and too soon to be filled. Pt is requesting samples to be mailed and informed her that samples are not able to get mailed and will need to be picked up. Pt got very upset and stated that \" she will just sit at home and suffer\" and hung up.  Will speak with Diane to advise.   "

## 2018-11-26 NOTE — TELEPHONE ENCOUNTER
Pt is calling back because she gave the incorrect number to call back on. Please call her at [phone]

## 2018-11-26 NOTE — TELEPHONE ENCOUNTER
PATIENT IS RETURNING OUR CALL BACK ABOUT HER PAIN MEDICATION AND ESTRACE AND PROVERA MEDICATIONS. YOU CAN REACH HER BACK -[phone].

## 2018-11-26 NOTE — TELEPHONE ENCOUNTER
----- Message from Oleg Joyner sent at 11/26/2018  2:33 PM EST -----  Contact: pt; sue  Patient wants referral to arthritis center.  States she has severe knee pain at night time.    Phone: [phone]

## 2018-11-27 NOTE — TELEPHONE ENCOUNTER
Spoke with Diane and agreed with the advise I given pt. Spoke with pt see how she was doing and she stated still having SOB. Informed pt that Rx will be filled on 12/1 and pt was still not happy and proceeded to hang up on me. Diane has been informed.    3

## 2018-11-29 RX ORDER — ESTRADIOL 2 MG/1
2 TABLET ORAL DAILY
Qty: 30 TABLET | Refills: 5 | Status: SHIPPED | OUTPATIENT
Start: 2018-11-29 | End: 2019-01-31 | Stop reason: ALTCHOICE

## 2018-11-29 NOTE — TELEPHONE ENCOUNTER
PATIENT WOULD LIKE A CALL BACK FROM DR. RUBY TODAY. SHE CANNOT BE BOTHERED TOMORROW OR THROUGHOUT THE WEEKEND BECAUSE HER  IS HAVING SURGERY AND SHE WILL BE TAKING CARE OF HIM. SHE WOULD LIKE TO DISCUSS HER PAIN MEDICATION AND HOT FLASHES. SHE WANTS TO STOP TAKING THE PAIN MEDICATION- IT MAKES HER VERY DEPRESSED AND SHE ISNT SEXUALLY ACTIVE. PLEASE GIVE PT A CALL BACK. THANKS! [phone]

## 2018-11-29 NOTE — TELEPHONE ENCOUNTER
Pt finally available on the phone after several attempts for the past 2 weeks. Spoke to patient. Pt still having hot flashes. Review of her chart shows she was on 2 mg of estradiol in the past and felt better on that. New Rx for estradiol 2mg daily sent to her pharmacy.   Trisha Sanchez MD

## 2018-12-03 RX ORDER — FAMOTIDINE 20 MG
TABLET ORAL
Qty: 30 CAPSULE | Refills: 2 | Status: SHIPPED | OUTPATIENT
Start: 2018-12-03 | End: 2019-02-19 | Stop reason: SDUPTHER

## 2018-12-14 ENCOUNTER — TELEPHONE (OUTPATIENT)
Dept: PULMONOLOGY | Facility: CLINIC | Age: 33
End: 2018-12-14

## 2018-12-14 DIAGNOSIS — J30.2 ACUTE SEASONAL ALLERGIC RHINITIS: Primary | ICD-10-CM

## 2018-12-14 RX ORDER — FLUTICASONE PROPIONATE 50 MCG
2 SPRAY, SUSPENSION (ML) NASAL DAILY
Qty: 9.9 ML | Refills: 5 | Status: SHIPPED | OUTPATIENT
Start: 2018-12-14 | End: 2019-02-06

## 2018-12-14 NOTE — TELEPHONE ENCOUNTER
Pt notified and verbalized understanding.   -- Message from KATRINA Yanes sent at 12/14/2018  3:33 PM EST -----  Contact: [phone]  Xyzal is only once a day, I called in flonase for her nasal spray.  ----- Message -----  From: Christine Sutherland MA  Sent: 12/14/2018   2:10 PM  To: KATRINA Yanes    Pt called and asking if she can take Rx Xyzal TID instead of QD. Pt states that taking Rx QD is not working(she is having running nose, blood when blowing). Also pt is requesting another nasal spray due to Oxymetazoline is costing too much. Please advise.

## 2018-12-18 ENCOUNTER — TELEPHONE (OUTPATIENT)
Dept: FAMILY MEDICINE CLINIC | Facility: CLINIC | Age: 33
End: 2018-12-18

## 2018-12-18 RX ORDER — FLUCONAZOLE 150 MG/1
TABLET ORAL
Qty: 2 TABLET | Refills: 0 | Status: SHIPPED | OUTPATIENT
Start: 2018-12-18 | End: 2019-02-06

## 2018-12-18 NOTE — TELEPHONE ENCOUNTER
----- Message from Lola Marshall sent at 12/18/2018  8:43 AM EST -----  Contact: roby reagan refil   Pt called in stating that she has burning and itching due to a yeast infection and she would like to know if diflucan can be called in for her.       [facility]     Please call the pt.     [phone]

## 2018-12-31 RX ORDER — LEVOCETIRIZINE DIHYDROCHLORIDE 5 MG/1
TABLET, FILM COATED ORAL
Qty: 30 TABLET | Refills: 2 | Status: SHIPPED | OUTPATIENT
Start: 2018-12-31 | End: 2019-03-20 | Stop reason: SDUPTHER

## 2019-01-07 ENCOUNTER — TRANSCRIBE ORDERS (OUTPATIENT)
Dept: LAB | Facility: HOSPITAL | Age: 34
End: 2019-01-07

## 2019-01-07 ENCOUNTER — LAB (OUTPATIENT)
Dept: LAB | Facility: HOSPITAL | Age: 34
End: 2019-01-07

## 2019-01-07 DIAGNOSIS — N90.89 HYPERTROPHY OF CLITORIS: ICD-10-CM

## 2019-01-07 DIAGNOSIS — N90.89 HYPERTROPHY OF CLITORIS: Primary | ICD-10-CM

## 2019-01-07 LAB
BACTERIA UR QL AUTO: ABNORMAL /HPF
BILIRUB UR QL STRIP: NEGATIVE
CLARITY UR: ABNORMAL
COLOR UR: YELLOW
GLUCOSE UR STRIP-MCNC: NEGATIVE MG/DL
HGB UR QL STRIP.AUTO: NEGATIVE
HYALINE CASTS UR QL AUTO: ABNORMAL /LPF
KETONES UR QL STRIP: NEGATIVE
LEUKOCYTE ESTERASE UR QL STRIP.AUTO: ABNORMAL
NITRITE UR QL STRIP: NEGATIVE
PH UR STRIP.AUTO: 6.5 [PH] (ref 5–8)
PROT UR QL STRIP: NEGATIVE
RBC # UR: ABNORMAL /HPF
REF LAB TEST METHOD: ABNORMAL
SP GR UR STRIP: 1.03 (ref 1–1.03)
SQUAMOUS #/AREA URNS HPF: ABNORMAL /HPF
UROBILINOGEN UR QL STRIP: ABNORMAL
WBC UR QL AUTO: ABNORMAL /HPF

## 2019-01-07 PROCEDURE — 87086 URINE CULTURE/COLONY COUNT: CPT

## 2019-01-07 PROCEDURE — 87186 SC STD MICRODIL/AGAR DIL: CPT

## 2019-01-07 PROCEDURE — 87077 CULTURE AEROBIC IDENTIFY: CPT

## 2019-01-07 PROCEDURE — 81001 URINALYSIS AUTO W/SCOPE: CPT

## 2019-01-09 LAB — BACTERIA SPEC AEROBE CULT: ABNORMAL

## 2019-01-18 ENCOUNTER — TELEPHONE (OUTPATIENT)
Dept: GASTROENTEROLOGY | Facility: CLINIC | Age: 34
End: 2019-01-18

## 2019-01-18 NOTE — TELEPHONE ENCOUNTER
Dr Thomas,  Patient wanted me to let you know that she is currently off the Methadone. She is only taking musclerelaxers.

## 2019-01-23 ENCOUNTER — TELEPHONE (OUTPATIENT)
Dept: PULMONOLOGY | Facility: CLINIC | Age: 34
End: 2019-01-23

## 2019-01-23 NOTE — TELEPHONE ENCOUNTER
Pt called and stated that she has been experiencing dizziness and very fatigue for at least 1 week. Pt is wanting to know if this may be sleep apnea. Pt has an upcoming apt on 2/28 and is okay with seeing you sooner. Please advise.

## 2019-01-31 ENCOUNTER — TELEPHONE (OUTPATIENT)
Dept: PULMONOLOGY | Facility: CLINIC | Age: 34
End: 2019-01-31

## 2019-01-31 ENCOUNTER — OFFICE VISIT (OUTPATIENT)
Dept: PULMONOLOGY | Facility: CLINIC | Age: 34
End: 2019-01-31

## 2019-01-31 VITALS
SYSTOLIC BLOOD PRESSURE: 138 MMHG | HEIGHT: 63 IN | BODY MASS INDEX: 31.71 KG/M2 | TEMPERATURE: 98.3 F | OXYGEN SATURATION: 97 % | RESPIRATION RATE: 18 BRPM | DIASTOLIC BLOOD PRESSURE: 90 MMHG | WEIGHT: 179 LBS | HEART RATE: 81 BPM

## 2019-01-31 DIAGNOSIS — J45.20 MILD INTERMITTENT ASTHMA WITHOUT COMPLICATION: Primary | ICD-10-CM

## 2019-01-31 DIAGNOSIS — F20.9 SCHIZOPHRENIA, UNSPECIFIED TYPE (HCC): ICD-10-CM

## 2019-01-31 DIAGNOSIS — J30.2 ACUTE SEASONAL ALLERGIC RHINITIS: ICD-10-CM

## 2019-01-31 DIAGNOSIS — K21.9 GASTROESOPHAGEAL REFLUX DISEASE, ESOPHAGITIS PRESENCE NOT SPECIFIED: ICD-10-CM

## 2019-01-31 PROBLEM — J44.9 COPD (CHRONIC OBSTRUCTIVE PULMONARY DISEASE): Status: ACTIVE | Noted: 2019-01-31

## 2019-01-31 PROCEDURE — 99213 OFFICE O/P EST LOW 20 MIN: CPT | Performed by: NURSE PRACTITIONER

## 2019-01-31 RX ORDER — NORGESTIMATE AND ETHINYL ESTRADIOL 7DAYSX3 28
1 KIT ORAL DAILY
Refills: 2 | COMMUNITY
Start: 2019-01-28 | End: 2021-06-08

## 2019-01-31 RX ORDER — IPRATROPIUM BROMIDE 21 UG/1
2 SPRAY, METERED NASAL EVERY 12 HOURS
Qty: 1 EACH | Refills: 3 | Status: SHIPPED | OUTPATIENT
Start: 2019-01-31 | End: 2019-02-06

## 2019-01-31 NOTE — PROGRESS NOTES
[facility], [place] [facility] Follow up    CHIEF COMPLAINT    Shortness of air    HISTORY OF PRESENT ILLNESS    Marguerite Edwards is a 33 y.o.female here today for routine follow-up for her shortness of air.  She is a history of chronic dyspnea with a former tobacco use.  Her prior PFTs have showed no obstruction or restriction.  She does have some mild intermittent asthma-like symptoms with wheezing and shortness of air.  She also has seasonal allergy symptoms with postnasal drainage and rhinorrhea.  I did call in a nose spray on her last visit, but she never filled it due to cost.  She has been using her Breo daily, as well as xyzal nightly.  She has used both more frequently than prescribed.      She has had some issues with her blood pressure lately.  She does follow-up with endocrinology and has a primary care.  I instructed her to follow-up with her primary care regarding her blood pressure issues.    Patient Active Problem List   Diagnosis   • Anxiety   • Urinary incontinence   • Schizophrenia (CMS/[facility])   • GERD (gastroesophageal reflux disease)   • Asthma   • Secondary amenorrhea   • Acute seasonal allergic rhinitis   • Chronic back pain       Allergies   Allergen Reactions   • Paxil [Paroxetine Hcl] Mental Status Change   • Prozac [Fluoxetine Hcl] Mental Status Change   • Amitiza [Lubiprostone] Palpitations and Dizziness       Current Outpatient Medications:   •  albuterol (PROVENTIL HFA;VENTOLIN HFA) 108 (90 Base) MCG/ACT inhaler, Inhale 2 puffs Every 4 (Four) Hours As Needed for Wheezing., Disp: 6.7 g, Rfl: 11  •  albuterol (PROVENTIL) (2.5 MG/3ML) 0.083% nebulizer solution, Take 2.5 mg by nebulization Every 6 (Six) Hours As Needed for Wheezing or Shortness of Air., Disp: 50 vial, Rfl: 5  •  baclofen (LIORESAL) 10 MG tablet, Take 20 mg by mouth 3 (Three) Times a Day., Disp: , Rfl:   •  busPIRone (BUSPAR) 7.5 MG tablet, Take 7.5 mg by mouth Daily., Disp: , Rfl:   •  fluconazole (DIFLUCAN) 150 MG tablet, one by mouth  Symbicort       Last Written Prescription Date: 2/24/16  Last Fill Quantity: 3 Inhalers, # refills: 4    Last Office Visit with G, P or [facility] prescribing provider:  10/26/16   Future Office Visit:       Date of Last Asthma Action Plan Letter:   Asthma Action Plan Q1 Year    Topic Date Due     Asthma Action Plan - yearly  10/26/2017      Asthma Control Test:   ACT Total Scores 10/26/2016   ACT TOTAL SCORE (Goal Greater than or Equal to 20) 20   In the past 12 months, how many times did you visit the emergency room for your asthma without being admitted to the hospital? 0   In the past 12 months, how many times were you hospitalized overnight because of your asthma? 0       Date of Last Spirometry Test:   No results found for this or any previous visit.      Hal Saunders CPhT  [facility]    On behalf of [facility]         today and repeat in 3 days if not better, Disp: 2 tablet, Rfl: 0  •  fluticasone (FLONASE) 50 MCG/ACT nasal spray, 2 sprays into the nostril(s) as directed by provider Daily., Disp: 9.9 mL, Rfl: 5  •  Fluticasone Furoate-Vilanterol (BREO ELLIPTA) 100-25 MCG/INH inhaler, Inhale 1 puff Daily. 1 inhalation once a day, Disp: 1 each, Rfl: 11  •  gabapentin (NEURONTIN) 300 MG capsule, Take 1 capsule by mouth 3 (Three) Times a Day. (Refill no sooner than 30 days), Disp: 90 capsule, Rfl: 1  •  hyoscyamine (LEVSIN) 0.125 MG SL tablet, Take 1 tablet by mouth Every 4 (Four) Hours As Needed (bladder spasms)., Disp: 30 tablet, Rfl: 0  •  ibuprofen (ADVIL,MOTRIN) 600 MG tablet, Take 1 tablet by mouth Every 6 (Six) Hours As Needed for Mild Pain ., Disp: 30 tablet, Rfl: 1  •  levocetirizine (XYZAL) 5 MG tablet, TAKE ONE TABLET BY MOUTH DAILY AS NEEDED, Disp: 30 tablet, Rfl: 2  •  medroxyPROGESTERone (PROVERA) 10 MG tablet, Take 1 tablet by mouth Daily., Disp: 30 tablet, Rfl: 5  •  Methylnaltrexone Bromide (RELISTOR) 150 MG tablet, Take 450 mg by mouth Daily., Disp: 270 tablet, Rfl: 3  •  Naldemedine Tosylate 0.2 MG tablet, Take 0.2 mg by mouth Daily., Disp: 30 tablet, Rfl: 2  •  nitrofurantoin, macrocrystal-monohydrate, (MACROBID) 100 MG capsule, Take 1 capsule by mouth Every 12 (Twelve) Hours., Disp: 14 capsule, Rfl: 0  •  ondansetron ODT (ZOFRAN-ODT) 4 MG disintegrating tablet, , Disp: , Rfl:   •  OXcarbazepine (TRILEPTAL) 150 MG tablet, , Disp: , Rfl:   •  pantoprazole (PROTONIX) 40 MG EC tablet, Take 1 tablet by mouth Daily., Disp: 90 tablet, Rfl: 3  •  polyethylene glycol (MIRALAX) packet, Take 17 g by mouth Daily. Mix with 8 oz water of juice (Patient taking differently: Take 17 g by mouth As Needed. Mix with 8 oz water of juice), Disp: 30 packet, Rfl: 5  •  promethazine (PHENERGAN) 25 MG tablet, Take 1 tablet by mouth Every 6 (Six) Hours As Needed for Nausea or Vomiting., Disp: 45 tablet, Rfl: 0  •  QUEtiapine (SEROquel) 300  MG tablet, Take 300 mg by mouth Every Night., Disp: , Rfl:   •  sertraline (ZOLOFT) 100 MG tablet, , Disp: , Rfl:   •  tiZANidine (ZANAFLEX) 4 MG tablet, Take 1 tablet by mouth 3 (Three) Times a Day., Disp: 90 tablet, Rfl: 1  •  TRI-SPRINTEC 0.18/0.215/0.25 MG-35 MCG per tablet, Take 1 tablet by mouth Daily., Disp: , Rfl: 2  •  triamcinolone (KENALOG) 0.1 % cream, Apply  topically to the appropriate area as directed 2 (Two) Times a Day., Disp: 30 g, Rfl: 1  •  Vitamin D, Cholecalciferol, 1000 units capsule, TAKE ONE CAPSULE BY MOUTH DAILY, Disp: 30 capsule, Rfl: 2  •  ipratropium (ATROVENT) 0.03 % nasal spray, 2 sprays into the nostril(s) as directed by provider Every 12 (Twelve) Hours., Disp: 1 each, Rfl: 3  MEDICATION LIST AND ALLERGIES REVIEWED.    Social History     Tobacco Use   • Smoking status: Former Smoker     Types: Cigarettes     Last attempt to quit:      Years since quittin.0   • Smokeless tobacco: Never Used   Substance Use Topics   • Alcohol use: Yes     Alcohol/week: 0.6 oz     Types: 1 Cans of beer per week     Comment: occasionally   • Drug use: No     Comment: former marijuana       FAMILY AND SOCIAL HISTORY REVIEWED.    Review of Systems   Constitutional: Negative for chills, fatigue, fever and unexpected weight change.   HENT: Negative for congestion, nosebleeds, postnasal drip, rhinorrhea, sinus pressure and trouble swallowing.    Respiratory: Positive for shortness of breath. Negative for cough, chest tightness and wheezing.    Cardiovascular: Negative for chest pain and leg swelling.   Gastrointestinal: Negative for abdominal pain, constipation, diarrhea, nausea and vomiting.   Genitourinary: Negative for dysuria, frequency, hematuria and urgency.   Musculoskeletal: Negative for myalgias.   Neurological: Negative for dizziness, weakness, numbness and headaches.   Psychiatric/Behavioral: Positive for sleep disturbance.   All other systems reviewed and are negative.  .    /90 (BP  "Location: Left arm, Patient Position: Sitting, Cuff Size: Adult)   Pulse 81   Temp 98.3 °F (36.8 °C)   Resp 18   Ht 160 cm (63\")   Wt 81.2 kg (179 lb)   SpO2 97% Comment: room air at rest  BMI 31.71 kg/m²       There is no immunization history on file for this patient.    Physical Exam   Constitutional: She is oriented to person, place, and time. She appears well-developed and well-nourished.   HENT:   Head: Normocephalic and atraumatic.   Eyes: EOM are normal. Pupils are equal, round, and reactive to light.   Neck: Normal range of motion. Neck supple.   Cardiovascular: Normal rate and regular rhythm.   No murmur heard.  Pulmonary/Chest: Effort normal and breath sounds normal. No respiratory distress. She has no wheezes. She has no rales.   Abdominal: Soft. Bowel sounds are normal. She exhibits no distension.   Musculoskeletal: Normal range of motion. She exhibits no edema.   Neurological: She is alert and oriented to person, place, and time.   Skin: Skin is warm and dry. No erythema.   Psychiatric: She has a normal mood and affect. Her behavior is normal.   Vitals reviewed.        RESULTS      PROBLEM LIST    Problem List Items Addressed This Visit        Respiratory    Asthma - Primary    Relevant Medications    Fluticasone Furoate-Vilanterol (BREO ELLIPTA) 100-25 MCG/INH inhaler    Acute seasonal allergic rhinitis       Digestive    GERD (gastroesophageal reflux disease)       Other    Schizophrenia (CMS/[facility])            DISCUSSION    We did discuss proper use of inhalers as well as nasal spray for her seasonal allergies.  We'll try different nasal spray to see if it's more covered, Atrovent to use for her rhinorrhea.  Continue on her Briel daily continue on her Xyzal once daily.    Follow-up with her primary care regarding her hypertension      I spent 15 minutes with the patient. I spent > 50% percent of this time counseling and discussing evaluation, current status and management.    Diane Parra, " APRN  01/31/201910:28 AM  Electronically signed     Please note that portions of this note were completed with a voice recognition program. Efforts were made to edit the dictations, but occasionally words are mistranscribed.      CC: Vish Torres MD

## 2019-02-01 ENCOUNTER — DOCUMENTATION (OUTPATIENT)
Dept: PULMONOLOGY | Facility: CLINIC | Age: 34
End: 2019-02-01

## 2019-02-01 DIAGNOSIS — G47.9 SLEEP DISTURBANCE: Primary | ICD-10-CM

## 2019-02-01 NOTE — TELEPHONE ENCOUNTER
Pt will have go get kit through PCP due to insurance will not cover through her local pharmacy or DME company. Pt notified.

## 2019-02-01 NOTE — PROGRESS NOTES
Patient called request a Blood pressure monitoring kit be sent to a [facility],  Patient was instructed to discuss this with her PCP

## 2019-02-06 ENCOUNTER — OFFICE VISIT (OUTPATIENT)
Dept: FAMILY MEDICINE CLINIC | Facility: CLINIC | Age: 34
End: 2019-02-06

## 2019-02-06 ENCOUNTER — LAB (OUTPATIENT)
Dept: LAB | Facility: HOSPITAL | Age: 34
End: 2019-02-06

## 2019-02-06 VITALS
HEART RATE: 70 BPM | SYSTOLIC BLOOD PRESSURE: 120 MMHG | BODY MASS INDEX: 32.07 KG/M2 | DIASTOLIC BLOOD PRESSURE: 68 MMHG | OXYGEN SATURATION: 99 % | WEIGHT: 181 LBS | HEIGHT: 63 IN

## 2019-02-06 DIAGNOSIS — F51.05 INSOMNIA DUE TO MENTAL DISORDER: ICD-10-CM

## 2019-02-06 DIAGNOSIS — Z28.21 INFLUENZA VACCINATION DECLINED: ICD-10-CM

## 2019-02-06 DIAGNOSIS — R51.9 CHRONIC INTRACTABLE HEADACHE, UNSPECIFIED HEADACHE TYPE: ICD-10-CM

## 2019-02-06 DIAGNOSIS — R53.82 CHRONIC FATIGUE: ICD-10-CM

## 2019-02-06 DIAGNOSIS — R73.9 HYPERGLYCEMIA: ICD-10-CM

## 2019-02-06 DIAGNOSIS — E55.9 VITAMIN D DEFICIENCY: ICD-10-CM

## 2019-02-06 DIAGNOSIS — Z13.1 SCREENING FOR DIABETES MELLITUS: ICD-10-CM

## 2019-02-06 DIAGNOSIS — Z13.29 SCREENING FOR THYROID DISORDER: ICD-10-CM

## 2019-02-06 DIAGNOSIS — R73.9 HYPERGLYCEMIA: Primary | ICD-10-CM

## 2019-02-06 DIAGNOSIS — Z13.0 SCREENING FOR DEFICIENCY ANEMIA: ICD-10-CM

## 2019-02-06 DIAGNOSIS — Z13.220 SCREENING, LIPID: ICD-10-CM

## 2019-02-06 DIAGNOSIS — R53.82 CHRONIC FATIGUE: Primary | ICD-10-CM

## 2019-02-06 DIAGNOSIS — Z23 NEED FOR VACCINATION AGAINST HEPATITIS A: ICD-10-CM

## 2019-02-06 DIAGNOSIS — G89.29 CHRONIC INTRACTABLE HEADACHE, UNSPECIFIED HEADACHE TYPE: ICD-10-CM

## 2019-02-06 DIAGNOSIS — F20.9 SCHIZOPHRENIA, UNSPECIFIED TYPE (HCC): ICD-10-CM

## 2019-02-06 LAB
25(OH)D3 SERPL-MCNC: 33.6 NG/ML
ALBUMIN SERPL-MCNC: 4.74 G/DL (ref 3.2–4.8)
ALBUMIN/GLOB SERPL: 2.1 G/DL (ref 1.5–2.5)
ALP SERPL-CCNC: 95 U/L (ref 25–100)
ALT SERPL W P-5'-P-CCNC: 20 U/L (ref 7–40)
ANION GAP SERPL CALCULATED.3IONS-SCNC: 6 MMOL/L (ref 3–11)
ARTICHOKE IGE QN: 131 MG/DL (ref 0–130)
AST SERPL-CCNC: 17 U/L (ref 0–33)
BASOPHILS # BLD AUTO: 0.02 10*3/MM3 (ref 0–0.2)
BASOPHILS NFR BLD AUTO: 0.3 % (ref 0–1)
BILIRUB SERPL-MCNC: 0.2 MG/DL (ref 0.3–1.2)
BUN BLD-MCNC: 9 MG/DL (ref 9–23)
BUN/CREAT SERPL: 13 (ref 7–25)
CALCIUM SPEC-SCNC: 9.7 MG/DL (ref 8.7–10.4)
CHLORIDE SERPL-SCNC: 109 MMOL/L (ref 99–109)
CHOLEST SERPL-MCNC: 194 MG/DL (ref 0–200)
CO2 SERPL-SCNC: 27 MMOL/L (ref 20–31)
CREAT BLD-MCNC: 0.69 MG/DL (ref 0.6–1.3)
DEPRECATED RDW RBC AUTO: 44 FL (ref 37–54)
EOSINOPHIL # BLD AUTO: 0.05 10*3/MM3 (ref 0–0.3)
EOSINOPHIL NFR BLD AUTO: 0.7 % (ref 0–3)
ERYTHROCYTE [DISTWIDTH] IN BLOOD BY AUTOMATED COUNT: 14.4 % (ref 11.3–14.5)
GFR SERPL CREATININE-BSD FRML MDRD: 97 ML/MIN/1.73
GLOBULIN UR ELPH-MCNC: 2.3 GM/DL
GLUCOSE BLD-MCNC: 103 MG/DL (ref 70–100)
HBA1C MFR BLD: 5.6 % (ref 4.8–5.6)
HCT VFR BLD AUTO: 39.7 % (ref 34.5–44)
HDLC SERPL-MCNC: 41 MG/DL (ref 40–60)
HGB BLD-MCNC: 12.9 G/DL (ref 11.5–15.5)
IMM GRANULOCYTES # BLD AUTO: 0.01 10*3/MM3 (ref 0–0.03)
IMM GRANULOCYTES NFR BLD AUTO: 0.1 % (ref 0–0.6)
LYMPHOCYTES # BLD AUTO: 2.75 10*3/MM3 (ref 0.6–4.8)
LYMPHOCYTES NFR BLD AUTO: 39.5 % (ref 24–44)
MCH RBC QN AUTO: 27.1 PG (ref 27–31)
MCHC RBC AUTO-ENTMCNC: 32.5 G/DL (ref 32–36)
MCV RBC AUTO: 83.4 FL (ref 80–99)
MONOCYTES # BLD AUTO: 0.52 10*3/MM3 (ref 0–1)
MONOCYTES NFR BLD AUTO: 7.5 % (ref 0–12)
NEUTROPHILS # BLD AUTO: 3.61 10*3/MM3 (ref 1.5–8.3)
NEUTROPHILS NFR BLD AUTO: 51.9 % (ref 41–71)
PLATELET # BLD AUTO: 218 10*3/MM3 (ref 150–450)
PMV BLD AUTO: 12.7 FL (ref 6–12)
POTASSIUM BLD-SCNC: 4.9 MMOL/L (ref 3.5–5.5)
PROT SERPL-MCNC: 7 G/DL (ref 5.7–8.2)
RBC # BLD AUTO: 4.76 10*6/MM3 (ref 3.89–5.14)
SODIUM BLD-SCNC: 142 MMOL/L (ref 132–146)
TRIGL SERPL-MCNC: 262 MG/DL (ref 0–150)
TSH SERPL DL<=0.05 MIU/L-ACNC: 1.53 MIU/ML (ref 0.35–5.35)
WBC NRBC COR # BLD: 6.96 10*3/MM3 (ref 3.5–10.8)

## 2019-02-06 PROCEDURE — 80061 LIPID PANEL: CPT | Performed by: FAMILY MEDICINE

## 2019-02-06 PROCEDURE — 99214 OFFICE O/P EST MOD 30 MIN: CPT | Performed by: FAMILY MEDICINE

## 2019-02-06 PROCEDURE — 83036 HEMOGLOBIN GLYCOSYLATED A1C: CPT | Performed by: FAMILY MEDICINE

## 2019-02-06 PROCEDURE — 96372 THER/PROPH/DIAG INJ SC/IM: CPT | Performed by: FAMILY MEDICINE

## 2019-02-06 PROCEDURE — 80050 GENERAL HEALTH PANEL: CPT | Performed by: FAMILY MEDICINE

## 2019-02-06 PROCEDURE — 90471 IMMUNIZATION ADMIN: CPT | Performed by: FAMILY MEDICINE

## 2019-02-06 PROCEDURE — 90632 HEPA VACCINE ADULT IM: CPT | Performed by: FAMILY MEDICINE

## 2019-02-06 PROCEDURE — 82306 VITAMIN D 25 HYDROXY: CPT | Performed by: FAMILY MEDICINE

## 2019-02-06 RX ORDER — DIPHENHYDRAMINE HCL 25 MG
25 TABLET ORAL EVERY 6 HOURS PRN
COMMUNITY
End: 2019-02-22

## 2019-02-06 RX ORDER — IBUPROFEN 800 MG/1
800 TABLET ORAL EVERY 8 HOURS PRN
Qty: 90 TABLET | Refills: 3 | Status: SHIPPED | OUTPATIENT
Start: 2019-02-06 | End: 2019-03-12

## 2019-02-06 RX ORDER — KETOROLAC TROMETHAMINE 30 MG/ML
60 INJECTION, SOLUTION INTRAMUSCULAR; INTRAVENOUS ONCE
Status: COMPLETED | OUTPATIENT
Start: 2019-02-06 | End: 2019-02-06

## 2019-02-06 RX ADMIN — KETOROLAC TROMETHAMINE 60 MG: 30 INJECTION, SOLUTION INTRAMUSCULAR; INTRAVENOUS at 08:36
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
## 2019-02-07 ENCOUNTER — TELEPHONE (OUTPATIENT)
Dept: FAMILY MEDICINE CLINIC | Facility: CLINIC | Age: 34
End: 2019-02-07

## 2019-02-07 NOTE — TELEPHONE ENCOUNTER
The test results show that your sugar is mildly elevated but you do not have diabetes.  Normal kidney function, liver function, and electrolytes.  Triglycerides are elevated, I recommend a low-fat diet.  Thyroid function is normal.  Vitamin D level is normal.  Normal white blood cells.  No anemia.    Letter also to be mailed.

## 2019-02-07 NOTE — TELEPHONE ENCOUNTER
Mrs. Edwards called and was wanting to know if we could tell her what her lab results are. She states that she feels like she is going to pass out and that her shoulder has been hurting down into her back.      Please give her a call back at [phone].

## 2019-02-07 NOTE — TELEPHONE ENCOUNTER
Spoke with  and informed her about her results. Since triglycerides were elevated the patient wants to know if she needs to be on medication for that like she was in the past. I informed her the recommendation for low-fat diet. The patient states she is already on a low-fat diet. Told her Per EDS that's shes not sure what could be causing her shoulder pain since her labs and past visit were normal. Patient believes her dizziness and passing out feeling may be due to her estrogen levels, said she will speak with her endocrinologist.

## 2019-02-07 NOTE — TELEPHONE ENCOUNTER
PATIENT WANTS TO SPEAK WITH MA OR PROVIDER ABOUT HER RESULTS. VERY CONCERNED ABOUT HER PAIN, HEADACHE AND BLURRY VISION. ALSO FEELS LIKE SHE IS GOING TO BLACK OUT.     PATIENT GAVE PERMISSION FOR US TO SPEAK TO HER  TRINO CISNEROS.

## 2019-02-07 NOTE — TELEPHONE ENCOUNTER
Per Dr. SMALL, not sure what could be causing the shoulder pain due to her labs are normal and her exam at yesterday's visit was normal.

## 2019-02-08 ENCOUNTER — TELEPHONE (OUTPATIENT)
Dept: FAMILY MEDICINE CLINIC | Facility: CLINIC | Age: 34
End: 2019-02-08

## 2019-02-11 ENCOUNTER — TELEPHONE (OUTPATIENT)
Dept: FAMILY MEDICINE CLINIC | Facility: CLINIC | Age: 34
End: 2019-02-11

## 2019-02-11 NOTE — TELEPHONE ENCOUNTER
Patient aware of recommendations.  Patient is also asking if she needs to start a medicine for her high cholesterol.  She says that she is doing a well balanced diet but the hyperlipidemia is in her family.

## 2019-02-11 NOTE — TELEPHONE ENCOUNTER
I recommend Tylenol for pain and ears getting hot. Ear exam was normal and so was blood pressure, I do not know what is causing the dizziness.

## 2019-02-12 ENCOUNTER — OFFICE VISIT (OUTPATIENT)
Dept: GASTROENTEROLOGY | Facility: CLINIC | Age: 34
End: 2019-02-12

## 2019-02-12 ENCOUNTER — TELEPHONE (OUTPATIENT)
Dept: FAMILY MEDICINE CLINIC | Facility: CLINIC | Age: 34
End: 2019-02-12

## 2019-02-12 VITALS
HEIGHT: 63 IN | BODY MASS INDEX: 32.92 KG/M2 | OXYGEN SATURATION: 98 % | HEART RATE: 61 BPM | TEMPERATURE: 97.2 F | RESPIRATION RATE: 16 BRPM | WEIGHT: 185.8 LBS | DIASTOLIC BLOOD PRESSURE: 88 MMHG | SYSTOLIC BLOOD PRESSURE: 148 MMHG

## 2019-02-12 DIAGNOSIS — K59.00 CONSTIPATION, UNSPECIFIED CONSTIPATION TYPE: Primary | ICD-10-CM

## 2019-02-12 PROCEDURE — 99213 OFFICE O/P EST LOW 20 MIN: CPT | Performed by: INTERNAL MEDICINE
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
## 2019-02-12 NOTE — TELEPHONE ENCOUNTER
See previous messages. I do not think she needs cholesterol medication. Additionally, I did not see where  had prescribed cholesterol medication for her either.

## 2019-02-12 NOTE — TELEPHONE ENCOUNTER
Pt said she was supposed to be called in a cholesterol medicine.     [facility]

## 2019-02-15 ENCOUNTER — TELEPHONE (OUTPATIENT)
Dept: FAMILY MEDICINE CLINIC | Facility: CLINIC | Age: 34
End: 2019-02-15

## 2019-02-15 NOTE — TELEPHONE ENCOUNTER
Pt called and is asking if can call something in for a sinus infection. Pt is having blood, and a headache. This has been going on for about 3 days. Pt can not come in for a visit due to no transportation.

## 2019-02-15 NOTE — TELEPHONE ENCOUNTER
Spoke with patient and advised of provider comments. She verbalized good understanding, thanked our office and we ended the call.

## 2019-02-15 NOTE — TELEPHONE ENCOUNTER
Most upper respiratory infections are viral and do not need antibiotics. I recommend over the counter cold medication to treat symptoms. If no improvement after a week, schedule an office visit for evaluation. No antibiotics called in.

## 2019-02-15 NOTE — TELEPHONE ENCOUNTER
Called pt blood(little) when blowing her nose, pressure around eyes  And in her head and has been sneezing and pt states that she has a runny nose. Pt stated she has been using nose spray Flonase but has not help

## 2019-02-19 RX ORDER — FAMOTIDINE 20 MG
TABLET ORAL
Qty: 30 CAPSULE | Refills: 2 | Status: SHIPPED | OUTPATIENT
Start: 2019-02-19 | End: 2019-05-21 | Stop reason: SDUPTHER

## 2019-02-19 NOTE — TELEPHONE ENCOUNTER
Pt called about sinus pressure. I verbalized provider comments. Pt acknowledged understanding and ended the call.

## 2019-02-20 ENCOUNTER — HOSPITAL ENCOUNTER (EMERGENCY)
Facility: HOSPITAL | Age: 34
Discharge: LEFT BEFORE DISCHARGE | End: 2019-02-20

## 2019-02-20 VITALS
HEART RATE: 101 BPM | HEIGHT: 63 IN | DIASTOLIC BLOOD PRESSURE: 86 MMHG | SYSTOLIC BLOOD PRESSURE: 174 MMHG | BODY MASS INDEX: 31.54 KG/M2 | TEMPERATURE: 98.4 F | WEIGHT: 178 LBS | OXYGEN SATURATION: 97 % | RESPIRATION RATE: 18 BRPM

## 2019-02-22 ENCOUNTER — HOSPITAL ENCOUNTER (EMERGENCY)
Facility: HOSPITAL | Age: 34
Discharge: HOME | End: 2019-02-22
Attending: EMERGENCY MEDICINE | Admitting: EMERGENCY MEDICINE

## 2019-02-22 ENCOUNTER — APPOINTMENT (OUTPATIENT)
Dept: CT IMAGING | Facility: HOSPITAL | Age: 34
End: 2019-02-22

## 2019-02-22 VITALS
DIASTOLIC BLOOD PRESSURE: 70 MMHG | RESPIRATION RATE: 20 BRPM | TEMPERATURE: 97.7 F | WEIGHT: 178 LBS | OXYGEN SATURATION: 99 % | SYSTOLIC BLOOD PRESSURE: 118 MMHG | HEART RATE: 80 BPM | HEIGHT: 63 IN | BODY MASS INDEX: 31.54 KG/M2

## 2019-02-22 DIAGNOSIS — G43.809 OTHER MIGRAINE WITHOUT STATUS MIGRAINOSUS, NOT INTRACTABLE: Primary | ICD-10-CM

## 2019-02-22 LAB
FLUAV AG NPH QL: NEGATIVE
FLUBV AG NPH QL IA: NEGATIVE

## 2019-02-22 PROCEDURE — 25010000002 KETOROLAC TROMETHAMINE PER 15 MG: Performed by: PHYSICIAN ASSISTANT

## 2019-02-22 PROCEDURE — 25010000002 DIPHENHYDRAMINE PER 50 MG: Performed by: PHYSICIAN ASSISTANT

## 2019-02-22 PROCEDURE — 25010000002 METOCLOPRAMIDE PER 10 MG: Performed by: PHYSICIAN ASSISTANT

## 2019-02-22 PROCEDURE — 87804 INFLUENZA ASSAY W/OPTIC: CPT | Performed by: PHYSICIAN ASSISTANT

## 2019-02-22 PROCEDURE — 96361 HYDRATE IV INFUSION ADD-ON: CPT

## 2019-02-22 PROCEDURE — 25010000002 DEXAMETHASONE SODIUM PHOSPHATE 120 MG/30ML SOLUTION: Performed by: PHYSICIAN ASSISTANT

## 2019-02-22 PROCEDURE — 99284 EMERGENCY DEPT VISIT MOD MDM: CPT

## 2019-02-22 PROCEDURE — 96375 TX/PRO/DX INJ NEW DRUG ADDON: CPT

## 2019-02-22 PROCEDURE — 96374 THER/PROPH/DIAG INJ IV PUSH: CPT

## 2019-02-22 PROCEDURE — 70450 CT HEAD/BRAIN W/O DYE: CPT

## 2019-02-22 RX ORDER — DIPHENHYDRAMINE HYDROCHLORIDE 50 MG/ML
25 INJECTION INTRAMUSCULAR; INTRAVENOUS ONCE
Status: COMPLETED | OUTPATIENT
Start: 2019-02-22 | End: 2019-02-22

## 2019-02-22 RX ORDER — KETOROLAC TROMETHAMINE 30 MG/ML
30 INJECTION, SOLUTION INTRAMUSCULAR; INTRAVENOUS ONCE
Status: COMPLETED | OUTPATIENT
Start: 2019-02-22 | End: 2019-02-22

## 2019-02-22 RX ORDER — HYDROXYZINE HYDROCHLORIDE 25 MG/1
25 TABLET, FILM COATED ORAL EVERY 8 HOURS PRN
Qty: 20 TABLET | Refills: 0 | Status: SHIPPED | OUTPATIENT
Start: 2019-02-22 | End: 2019-03-12

## 2019-02-22 RX ORDER — METOCLOPRAMIDE HYDROCHLORIDE 5 MG/ML
10 INJECTION INTRAMUSCULAR; INTRAVENOUS ONCE
Status: COMPLETED | OUTPATIENT
Start: 2019-02-22 | End: 2019-02-22

## 2019-02-22 RX ORDER — DEXAMETHASONE IN 0.9 % SOD CHL 10 MG/50ML
10 INTRAVENOUS SOLUTION, PIGGYBACK (ML) INTRAVENOUS ONCE
Status: COMPLETED | OUTPATIENT
Start: 2019-02-22 | End: 2019-02-22

## 2019-02-22 RX ORDER — PROMETHAZINE HYDROCHLORIDE 25 MG/1
25 TABLET ORAL EVERY 6 HOURS PRN
Qty: 12 TABLET | Refills: 0 | Status: SHIPPED | OUTPATIENT
Start: 2019-02-22 | End: 2019-04-04 | Stop reason: SDUPTHER

## 2019-02-22 RX ORDER — BUTALBITAL, ACETAMINOPHEN AND CAFFEINE 50; 325; 40 MG/1; MG/1; MG/1
1 TABLET ORAL ONCE
Status: COMPLETED | OUTPATIENT
Start: 2019-02-22 | End: 2019-02-22

## 2019-02-22 RX ADMIN — KETOROLAC TROMETHAMINE 30 MG: 30 INJECTION INTRAMUSCULAR; INTRAVENOUS at 13:12

## 2019-02-22 RX ADMIN — METOCLOPRAMIDE 10 MG: 5 INJECTION, SOLUTION INTRAMUSCULAR; INTRAVENOUS at 13:12

## 2019-02-22 RX ADMIN — SODIUM CHLORIDE 1000 ML: 9 INJECTION, SOLUTION INTRAVENOUS at 13:11

## 2019-02-22 RX ADMIN — BUTALBITAL, ACETAMINOPHEN AND CAFFEINE 1 TABLET: 50; 325; 40 TABLET ORAL at 16:26

## 2019-02-22 RX ADMIN — DEXAMETHASONE SODIUM PHOSPHATE 10 MG: 4 INJECTION, SOLUTION INTRA-ARTICULAR; INTRALESIONAL; INTRAMUSCULAR; INTRAVENOUS; SOFT TISSUE at 13:13

## 2019-02-22 RX ADMIN — DIPHENHYDRAMINE HYDROCHLORIDE 25 MG: 50 INJECTION INTRAMUSCULAR; INTRAVENOUS at 13:12

## 2019-03-12 ENCOUNTER — OFFICE VISIT (OUTPATIENT)
Dept: INTERNAL MEDICINE | Facility: CLINIC | Age: 34
End: 2019-03-12

## 2019-03-12 VITALS
BODY MASS INDEX: 33.6 KG/M2 | RESPIRATION RATE: 16 BRPM | HEIGHT: 62 IN | HEART RATE: 95 BPM | WEIGHT: 182.6 LBS | TEMPERATURE: 98.3 F | DIASTOLIC BLOOD PRESSURE: 80 MMHG | SYSTOLIC BLOOD PRESSURE: 152 MMHG | OXYGEN SATURATION: 99 %

## 2019-03-12 DIAGNOSIS — F20.9 SCHIZOPHRENIA, UNSPECIFIED TYPE (HCC): ICD-10-CM

## 2019-03-12 DIAGNOSIS — N39.46 MIXED STRESS AND URGE URINARY INCONTINENCE: ICD-10-CM

## 2019-03-12 DIAGNOSIS — K21.9 GASTROESOPHAGEAL REFLUX DISEASE, ESOPHAGITIS PRESENCE NOT SPECIFIED: ICD-10-CM

## 2019-03-12 DIAGNOSIS — I10 ESSENTIAL HYPERTENSION: Primary | ICD-10-CM

## 2019-03-12 DIAGNOSIS — E55.9 VITAMIN D DEFICIENCY: ICD-10-CM

## 2019-03-12 DIAGNOSIS — E78.2 MIXED HYPERLIPIDEMIA: ICD-10-CM

## 2019-03-12 DIAGNOSIS — F41.9 ANXIETY: ICD-10-CM

## 2019-03-12 DIAGNOSIS — J45.20 MILD INTERMITTENT ASTHMA WITHOUT COMPLICATION: ICD-10-CM

## 2019-03-12 DIAGNOSIS — J30.2 ACUTE SEASONAL ALLERGIC RHINITIS: ICD-10-CM

## 2019-03-12 PROCEDURE — 99214 OFFICE O/P EST MOD 30 MIN: CPT | Performed by: NURSE PRACTITIONER

## 2019-03-12 RX ORDER — LISINOPRIL 2.5 MG/1
2.5 TABLET ORAL DAILY
Qty: 30 TABLET | Refills: 1 | Status: SHIPPED | OUTPATIENT
Start: 2019-03-12 | End: 2019-04-09 | Stop reason: SDUPTHER
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
## 2019-03-13 ENCOUNTER — TELEPHONE (OUTPATIENT)
Dept: INTERNAL MEDICINE | Facility: CLINIC | Age: 34
End: 2019-03-13

## 2019-03-13 NOTE — TELEPHONE ENCOUNTER
Pt  notified that ins will not cover BP kit.  It will need to be bought OTC.  I told him  That [facility] has them at reasonable prices.  He stated understanding

## 2019-03-14 NOTE — TELEPHONE ENCOUNTER
I called pt to see who told her she need a PA.  She says pharm said she will need one.  She will call the pharm to have them send us PA request.  Pt states she figured out that the nose spray is what was causing the headaches so she stopped using it.

## 2019-03-14 NOTE — TELEPHONE ENCOUNTER
Pt called states she needs a prior auth on her blood pressure kit and wants to know why she keeps having these headaches

## 2019-03-15 ENCOUNTER — TELEPHONE (OUTPATIENT)
Dept: INTERNAL MEDICINE | Facility: CLINIC | Age: 34
End: 2019-03-15

## 2019-03-15 NOTE — TELEPHONE ENCOUNTER
Patient called and said her ears are hurting still, she wants to know if she could put something in her ears to dry up the ear wax.  Shes requesting a call at [phone].

## 2019-03-15 NOTE — TELEPHONE ENCOUNTER
PN that she can try debrox for the wax.  She says she has history of ear infection and she is also having headache.  I told her she would need to be seen to be evaluated.  I told her [facility] does not have any openings today, we can schedule her for Monday or she could try UC.  She stated understanding and did not schedule.

## 2019-03-20 RX ORDER — LEVOCETIRIZINE DIHYDROCHLORIDE 5 MG/1
5 TABLET, FILM COATED ORAL AS NEEDED
Qty: 30 TABLET | Refills: 2 | Status: SHIPPED | OUTPATIENT
Start: 2019-03-20 | End: 2019-05-07 | Stop reason: SDUPTHER

## 2019-03-20 NOTE — TELEPHONE ENCOUNTER
[facility] called to request a refill on Xyzal 5mg.  The PH is [phone]

## 2019-03-21 ENCOUNTER — TELEPHONE (OUTPATIENT)
Dept: PULMONOLOGY | Facility: CLINIC | Age: 34
End: 2019-03-21

## 2019-03-21 NOTE — TELEPHONE ENCOUNTER
PATIENT IS HAVING SEVERE ALLERGY SYMPTOMS AND NEEDS TO KNOW WHAT SHE CAN TAKE TO RELIEVE HER SYMPTOMS. SHE WANTS TO KNOW IF SHE CAN TAKE TWO DIFFERENT TYPES OF ALLERGY MEDICATION TOGETHER. PLEASE CALL HER [phone] PATIENT STATED YOU CAN TALK TO HER OR HER  (TRINO).  THANK YOU

## 2019-03-21 NOTE — TELEPHONE ENCOUNTER
Spoke with pt and she is wanting to know if okay to take Rx Claritin and Levocetirizine together. Per Diane okay to take both may cause drowsiness or even dryness. Pt verbalized understanding.

## 2019-03-29 DIAGNOSIS — J45.909 UNCOMPLICATED ASTHMA, UNSPECIFIED ASTHMA SEVERITY, UNSPECIFIED WHETHER PERSISTENT: Primary | ICD-10-CM

## 2019-03-29 RX ORDER — ALBUTEROL SULFATE 90 UG/1
2 AEROSOL, METERED RESPIRATORY (INHALATION) EVERY 4 HOURS PRN
Qty: 18 G | Refills: 5 | Status: SHIPPED | OUTPATIENT
Start: 2019-03-29 | End: 2020-12-18 | Stop reason: SDUPTHER

## 2019-04-04 RX ORDER — PROMETHAZINE HYDROCHLORIDE 25 MG/1
25 TABLET ORAL EVERY 6 HOURS PRN
Qty: 12 TABLET | Refills: 0 | Status: SHIPPED | OUTPATIENT
Start: 2019-04-04 | End: 2019-06-05

## 2019-04-04 NOTE — TELEPHONE ENCOUNTER
MEGAN CALLED [facility] REGARDING RX REFILL OF PROMETHAZINE.    ROUTING TO DR. HURLEY FOR APPROVAL.

## 2019-04-09 ENCOUNTER — TELEPHONE (OUTPATIENT)
Dept: INTERNAL MEDICINE | Facility: CLINIC | Age: 34
End: 2019-04-09

## 2019-04-09 DIAGNOSIS — I10 ESSENTIAL HYPERTENSION: ICD-10-CM

## 2019-04-09 RX ORDER — LISINOPRIL 2.5 MG/1
2.5 TABLET ORAL DAILY
Qty: 30 TABLET | Refills: 0 | Status: SHIPPED | OUTPATIENT
Start: 2019-04-09 | End: 2019-04-15 | Stop reason: SDUPTHER

## 2019-04-11 ENCOUNTER — TELEPHONE (OUTPATIENT)
Dept: PULMONOLOGY | Facility: CLINIC | Age: 34
End: 2019-04-11

## 2019-04-11 NOTE — TELEPHONE ENCOUNTER
Pt called c/o congestion and headaches. Pt advised to take OTC Mucinex and/or nasal spray due to her seeing her PCP on Monday. Pt verbalized understanding and appreciation.

## 2019-04-15 ENCOUNTER — OFFICE VISIT (OUTPATIENT)
Dept: INTERNAL MEDICINE | Facility: CLINIC | Age: 34
End: 2019-04-15

## 2019-04-15 VITALS
WEIGHT: 175 LBS | DIASTOLIC BLOOD PRESSURE: 70 MMHG | HEIGHT: 62 IN | TEMPERATURE: 97.6 F | BODY MASS INDEX: 32.2 KG/M2 | RESPIRATION RATE: 16 BRPM | OXYGEN SATURATION: 98 % | HEART RATE: 81 BPM | SYSTOLIC BLOOD PRESSURE: 118 MMHG

## 2019-04-15 DIAGNOSIS — M19.90 ARTHRITIS: ICD-10-CM

## 2019-04-15 DIAGNOSIS — E55.9 VITAMIN D DEFICIENCY: ICD-10-CM

## 2019-04-15 DIAGNOSIS — E78.2 MIXED HYPERLIPIDEMIA: ICD-10-CM

## 2019-04-15 DIAGNOSIS — N39.46 MIXED STRESS AND URGE URINARY INCONTINENCE: ICD-10-CM

## 2019-04-15 DIAGNOSIS — J30.2 ACUTE SEASONAL ALLERGIC RHINITIS: ICD-10-CM

## 2019-04-15 DIAGNOSIS — I10 ESSENTIAL HYPERTENSION: Primary | ICD-10-CM

## 2019-04-15 DIAGNOSIS — F41.9 ANXIETY: ICD-10-CM

## 2019-04-15 DIAGNOSIS — F20.9 SCHIZOPHRENIA, UNSPECIFIED TYPE (HCC): ICD-10-CM

## 2019-04-15 DIAGNOSIS — J45.20 MILD INTERMITTENT ASTHMA WITHOUT COMPLICATION: ICD-10-CM

## 2019-04-15 DIAGNOSIS — K21.9 GASTROESOPHAGEAL REFLUX DISEASE, ESOPHAGITIS PRESENCE NOT SPECIFIED: ICD-10-CM

## 2019-04-15 PROCEDURE — 99214 OFFICE O/P EST MOD 30 MIN: CPT | Performed by: NURSE PRACTITIONER

## 2019-04-15 RX ORDER — AZELASTINE 1 MG/ML
2 SPRAY, METERED NASAL 2 TIMES DAILY
Qty: 30 ML | Refills: 11 | Status: SHIPPED | OUTPATIENT
Start: 2019-04-15 | End: 2020-05-12

## 2019-04-15 RX ORDER — GUAIFENESIN 600 MG/1
600 TABLET, EXTENDED RELEASE ORAL 2 TIMES DAILY
Qty: 60 TABLET | Refills: 0 | Status: SHIPPED | OUTPATIENT
Start: 2019-04-15 | End: 2019-06-05

## 2019-04-15 RX ORDER — LISINOPRIL 2.5 MG/1
2.5 TABLET ORAL DAILY
Qty: 30 TABLET | Refills: 3 | Status: SHIPPED | OUTPATIENT
Start: 2019-04-15 | End: 2019-06-05 | Stop reason: SINTOL

## 2019-04-15 RX ORDER — MONTELUKAST SODIUM 10 MG/1
10 TABLET ORAL NIGHTLY
Qty: 30 TABLET | Refills: 11 | Status: SHIPPED | OUTPATIENT
Start: 2019-04-15 | End: 2019-12-12 | Stop reason: SDUPTHER

## 2019-04-15 NOTE — PROGRESS NOTES
Subjective   Marguerite Edwards is a 34 y.o. female.     Chief Complaint   Patient presents with   • Hypertension     4 week f/u   • Allergies     runny nose, scratchy throat. Discuss allergy        History of Present Illness     She has a history of Asthma (followed by KATRINA Gimenez), seasonal allergies, GERD and constipation (followed by Dr. Jose Carlos Thomas), incontinence (followed by Dr. Deshpande), and anxiety/schizophrenia (followed by [facility] with next appointment this Friday).     Asthma-she is currently taking Breo, followed by KATRINA Gimeenz.  Reports that she has no cough, wheezing, or shortness of breath, or activity limitations.  She is concerned that her seasonal allergies are not well controlled.  She has been taking Flonase occasionally and also taking Xyzal at night.  She reports that this gives her mild relief.      Just saw Dr. Thomas on 2/12/2019.  She was started on Linzess for her constipation at that time.  Reports that she is doing well with this regimen.  She will follow-up with him in 4 months.     Anxiety/schizophrenia-followed by [facility] last appointment was this past Friday.  She has upcoming appointment in the next several weeks.  She is currently taking BuSpar, Seroquel and Zoloft.  Compliant with dosing and denies adverse effects.  Denies suicidal or homicidal ideations.  Denies auditory or visual hallucinations.     Incontinence-she is followed by Dr. Deshpande with urology.  She reports that she was started on baclofen for this incontinence.  She also reports that she has had Botox injections for this as well with no relief.  She reports that the next step is to refer her to physical therapy for pelvic floor strengthening exercises that she is unable to do that at this time due to transportation issues, she does anticipate being able to do this in the near future     Chronic pain-she is currently followed by [facility], Dr. Mccormick, she is currently taking gabapentin tizanidine and is in the  process of weaning off of both of these.  Her pain is typically in her back in her bilateral knees.  She reports this is currently well controlled.   Right foot pain ongoing for years- has history of arthritis.  Diclofenac topical added at last visit to try for right foot pain.  Endorses this is helped.    GERD-chronic and well controlled with Protonix.     Vitamin D deficiency-vitamin D 33.6 in 2/2019.  She is currently taking vitamin D3 1000 units daily.     Hyperlipidemia-lipids last checked 2/2019 (total cholesterol 194, triglycerides 262, HDL 41, and ).  She is following a low-fat diet but tends to eat more carbohydrates and sugars through the day.  She does not have any routine exercise regimen.     Hypertension   This is a new problem. The current episode started more than 1 month ago (6 +months. ). The problem has been gradually worsening since onset. The problem is controlled.  No associated symptoms. Pertinent negatives include no anxiety, blurred vision, chest pain, malaise/fatigue, neck pain, orthopnea,   headaches and peripheral edema.palpitations, PND, shortness of breath or sweats. Agents associated with hypertension include oral contraceptives. Risk factors for coronary artery disease include obesity and sedentary lifestyle. Past treatments include nothing. Current antihypertension treatment includes  lisinopril. The current treatment provides  significant improvement. Compliance problems include psychosocial issues.  There is no history of angina, kidney disease, CAD/MI, CVA, heart failure, left ventricular hypertrophy, PVD or retinopathy. Identifiable causes of hypertension include a hypertension causing med (contraception could be contributing). There is no history of chronic renal disease, coarctation of the aorta, hyperaldosteronism, hypercortisolism, hyperparathyroidism, pheochromocytoma, renovascular disease, sleep apnea or a thyroid problem.       The following portions of the  patient's history were reviewed and updated as appropriate: allergies, current medications, past family history, past medical history, past social history, past surgical history and problem list.    Review of Systems   Constitutional: Negative for activity change, appetite change, chills, diaphoresis, fatigue and unexpected weight change.   HENT: Positive for postnasal drip, rhinorrhea and sneezing. Negative for congestion, ear discharge, ear pain, sinus pain, sore throat, tinnitus and trouble swallowing.    Eyes: Negative for photophobia, pain, redness and visual disturbance.   Respiratory: Negative for cough, chest tightness, shortness of breath and wheezing.    Cardiovascular: Negative for chest pain, palpitations and leg swelling.   Gastrointestinal: Negative for abdominal distention, abdominal pain, constipation, diarrhea, nausea and vomiting.        Patient experiencing heartburn/acid reflux occasionally   Endocrine: Negative for polydipsia, polyphagia and polyuria.   Genitourinary: Negative for difficulty urinating, dysuria, flank pain, frequency, pelvic pain, urgency, vaginal discharge and vaginal pain.        Chronic incontinence   Musculoskeletal: Negative for neck pain.   Skin: Negative for color change and rash.   Allergic/Immunologic: Positive for environmental allergies.   Neurological: Negative for dizziness, syncope, weakness, light-headedness, numbness and headaches.   Psychiatric/Behavioral: Negative for agitation, behavioral problems, confusion, decreased concentration, dysphoric mood, hallucinations, self-injury, sleep disturbance and suicidal ideas. The patient is nervous/anxious. The patient is not hyperactive.    All other systems reviewed and are negative.      Outpatient Medications Marked as Taking for the 4/15/19 encounter (Office Visit) with Liliya Hodges APRN   Medication Sig Dispense Refill   • albuterol sulfate  (90 Base) MCG/ACT inhaler Inhale 2 puffs Every 4 (Four) Hours  As Needed for Wheezing. 18 g 5   • baclofen (LIORESAL) 10 MG tablet Take 20 mg by mouth 3 (Three) Times a Day.     • busPIRone (BUSPAR) 7.5 MG tablet Take 7.5 mg by mouth Daily.     • Fluticasone Furoate-Vilanterol (BREO ELLIPTA) 100-25 MCG/INH inhaler Inhale 1 puff Daily. 1 inhalation once a day 1 each 11   • gabapentin (NEURONTIN) 300 MG capsule Take 1 capsule by mouth 3 (Three) Times a Day. (Refill no sooner than 30 days) 90 capsule 1   • levocetirizine (XYZAL) 5 MG tablet Take 1 tablet by mouth As Needed (Daily as needed). 30 tablet 2   • linaclotide (LINZESS) 290 MCG capsule capsule Take 1 capsule by mouth Every Morning Before Breakfast. 30 capsule 3   • lisinopril (PRINIVIL,ZESTRIL) 2.5 MG tablet Take 1 tablet by mouth Daily. 30 tablet 3   • OXcarbazepine (TRILEPTAL) 150 MG tablet      • pantoprazole (PROTONIX) 40 MG EC tablet Take 1 tablet by mouth Daily. 90 tablet 3   • promethazine (PHENERGAN) 25 MG tablet Take 1 tablet by mouth Every 6 (Six) Hours As Needed for Nausea or Vomiting. 12 tablet 0   • QUEtiapine (SEROquel) 300 MG tablet Take 300 mg by mouth Every Night.     • sertraline (ZOLOFT) 100 MG tablet      • tiZANidine (ZANAFLEX) 4 MG tablet Take 1 tablet by mouth 3 (Three) Times a Day. 90 tablet 1   • TRI-SPRINTEC 0.18/0.215/0.25 MG-35 MCG per tablet Take 1 tablet by mouth Daily.  2   • Vitamin D, Cholecalciferol, 1000 units capsule TAKE ONE CAPSULE BY MOUTH DAILY 30 capsule 2   • [DISCONTINUED] lisinopril (PRINIVIL,ZESTRIL) 2.5 MG tablet Take 1 tablet by mouth Daily. 30 tablet 0         Objective   Physical Exam   Constitutional: She is oriented to person, place, and time. She appears well-developed and well-nourished. No distress.   HENT:   Head: Normocephalic and atraumatic.   Mouth/Throat: Oropharynx is clear and moist.   Eyes: Conjunctivae and EOM are normal. Pupils are equal, round, and reactive to light. No scleral icterus.   Neck: Normal range of motion. Neck supple. No JVD present. No thyroid  mass and no thyromegaly present.   Cardiovascular: Normal rate, regular rhythm, normal heart sounds and intact distal pulses.   No murmur heard.  Pulses:       Dorsalis pedis pulses are 2+ on the right side, and 2+ on the left side.        Posterior tibial pulses are 2+ on the right side, and 2+ on the left side.   Pulmonary/Chest: Effort normal and breath sounds normal. No respiratory distress. She exhibits no tenderness.   Abdominal: Soft. Bowel sounds are normal. She exhibits no distension and no mass. There is no tenderness. There is no rebound and no guarding. No hernia.   Musculoskeletal: Normal range of motion. She exhibits no edema.   Neurological: She is alert and oriented to person, place, and time. She has normal reflexes. Coordination normal.   Skin: Skin is warm and dry. No rash noted. She is not diaphoretic. No erythema. No pallor.   Psychiatric: Her behavior is normal. Thought content normal. Her mood appears anxious. Her affect is not angry and not inappropriate. Her speech is tangential. Thought content is not paranoid and not delusional. Cognition and memory are normal. She expresses impulsivity. She does not exhibit a depressed mood. She expresses no homicidal and no suicidal ideation. She is attentive.   Nursing note and vitals reviewed.      Vitals:    04/15/19 1315   BP: 118/70   Pulse: 81   Resp: 16   Temp: 97.6 °F (36.4 °C)   SpO2: 98%         Assessment/Plan   Marguerite was seen today for hypertension and allergies.    Diagnoses and all orders for this visit:    Essential hypertension  -     lisinopril (PRINIVIL,ZESTRIL) 2.5 MG tablet; Take 1 tablet by mouth Daily.    Acute seasonal allergic rhinitis  -     guaiFENesin (MUCINEX) 600 MG 12 hr tablet; Take 1 tablet by mouth 2 (Two) Times a Day.  -     azelastine (ASTELIN) 0.1 % nasal spray; 2 sprays into the nostril(s) as directed by provider 2 (Two) Times a Day. Use in each nostril as directed  -     montelukast (SINGULAIR) 10 MG tablet; Take 1  tablet by mouth Every Night.    Mild intermittent asthma without complication  -     montelukast (SINGULAIR) 10 MG tablet; Take 1 tablet by mouth Every Night.    Arthritis  -     diclofenac (VOLTAREN) 1 % gel gel; Apply 4 g topically to the appropriate area as directed 4 (Four) Times a Day As Needed (pain).    Gastroesophageal reflux disease, esophagitis presence not specified    Vitamin D deficiency    Schizophrenia, unspecified type (CMS/HCC)    Anxiety    Mixed hyperlipidemia    Mixed stress and urge urinary incontinence         Diclofenac refill sent.  Continue lisinopril.  Refill sent.  We will add on Singulair, Astelin, and Mucinex for better allergy control.  Adverse effects discussed.  Encourage diet and exercise for weight reduction.  Return in about 3 months (around 7/15/2019), or if symptoms worsen or fail to improve, for chronic condition follow up.  Plan of care discussed with pt. They verbalized understanding and agreement.

## 2019-04-16 ENCOUNTER — TELEPHONE (OUTPATIENT)
Dept: INTERNAL MEDICINE | Facility: CLINIC | Age: 34
End: 2019-04-16

## 2019-04-16 NOTE — TELEPHONE ENCOUNTER
She wants to know if she can take allergy med in the morning one time a day that was called in.  She beth confused me and is wanting a call [phone]

## 2019-04-17 NOTE — TELEPHONE ENCOUNTER
PN that it is ok to take allergy med in the am instead of pm.  Try a few days to see how she feels.  She stated understanding

## 2019-04-18 ENCOUNTER — TELEPHONE (OUTPATIENT)
Dept: INTERNAL MEDICINE | Facility: CLINIC | Age: 34
End: 2019-04-18

## 2019-04-18 NOTE — TELEPHONE ENCOUNTER
Called pharm to let them know that we do not prescribe this for pt.  It is prescribed by PERFECTO Thomas.  They say he is a hospitalist so our office will need to do the PA.  They will fax the form withRx ins info.

## 2019-04-18 NOTE — TELEPHONE ENCOUNTER
Dr. Thomas is a gastroenterologist that she follows with. He is not a hospitalist. Please have the PA info forwarded to his office as they will need to do the PA on the medication they prescribe.

## 2019-04-19 ENCOUNTER — TELEPHONE (OUTPATIENT)
Dept: GASTROENTEROLOGY | Facility: CLINIC | Age: 34
End: 2019-04-19

## 2019-04-19 NOTE — TELEPHONE ENCOUNTER
PT CALLED [facility] REGARDING LINZESS; THIS IS NO LONGER COVERED ON HER INSURANCE. PT REQUESTED SAMPLES.  I CALLED PATIENT BACK AND WILL GIVE SAMPLES.  I HAVE (3 BOXES) INCLUDED 290 MCG PT WILL TAKE 1 EVERY MORNING BEFORE BREAKFAST.  I HAD TO SUPPLEMENT 145MCG (DID NOT HAVE ENOUGH 290MG) INSTRUCTED PT SHE WILL TAKE 2 CAPSULES EVERY MORNING BEFORE BREAKFAST.   WILL GO OVER INSTRUCTIONS AGAIN WHEN SHE COMES TO PICK SAMPLES UP.  PT VOICED UNDERSTANDING

## 2019-04-25 ENCOUNTER — TELEPHONE (OUTPATIENT)
Dept: INTERNAL MEDICINE | Facility: CLINIC | Age: 34
End: 2019-04-25

## 2019-04-25 NOTE — TELEPHONE ENCOUNTER
Patient states that her medication is not helping her. She still has nasal congestion,cough and runny eyes. She would like to know if she can have something else called in.

## 2019-04-25 NOTE — TELEPHONE ENCOUNTER
PN.  She stated understanding and says she does not have the money to get these.  She will call back to schedule an appointment if not getting better

## 2019-05-06 ENCOUNTER — OFFICE VISIT (OUTPATIENT)
Dept: INTERNAL MEDICINE | Facility: CLINIC | Age: 34
End: 2019-05-06

## 2019-05-06 VITALS
WEIGHT: 171.4 LBS | RESPIRATION RATE: 16 BRPM | BODY MASS INDEX: 31.54 KG/M2 | SYSTOLIC BLOOD PRESSURE: 120 MMHG | HEIGHT: 62 IN | HEART RATE: 90 BPM | OXYGEN SATURATION: 99 % | DIASTOLIC BLOOD PRESSURE: 70 MMHG | TEMPERATURE: 98 F

## 2019-05-06 DIAGNOSIS — Z91.09 ENVIRONMENTAL ALLERGIES: ICD-10-CM

## 2019-05-06 DIAGNOSIS — N76.0 ACUTE VAGINITIS: ICD-10-CM

## 2019-05-06 DIAGNOSIS — R30.0 BURNING WITH URINATION: Primary | ICD-10-CM

## 2019-05-06 DIAGNOSIS — R82.90 ABNORMAL URINALYSIS: ICD-10-CM

## 2019-05-06 LAB
BILIRUB BLD-MCNC: ABNORMAL MG/DL
CLARITY, POC: CLEAR
COLOR UR: YELLOW
GLUCOSE UR STRIP-MCNC: NEGATIVE MG/DL
KETONES UR QL: NEGATIVE
LEUKOCYTE EST, POC: NEGATIVE
NITRITE UR-MCNC: NEGATIVE MG/ML
PH UR: 6 [PH] (ref 5–8)
PROT UR STRIP-MCNC: ABNORMAL MG/DL
RBC # UR STRIP: NEGATIVE /UL
SP GR UR: 1.03 (ref 1–1.03)
UROBILINOGEN UR QL: NORMAL

## 2019-05-06 PROCEDURE — 87086 URINE CULTURE/COLONY COUNT: CPT | Performed by: NURSE PRACTITIONER

## 2019-05-06 PROCEDURE — 99214 OFFICE O/P EST MOD 30 MIN: CPT | Performed by: NURSE PRACTITIONER

## 2019-05-06 RX ORDER — FLUTICASONE PROPIONATE 50 MCG
2 SPRAY, SUSPENSION (ML) NASAL DAILY
Qty: 1 BOTTLE | Refills: 0 | Status: SHIPPED | OUTPATIENT
Start: 2019-05-06 | End: 2020-02-10

## 2019-05-06 RX ORDER — FLUCONAZOLE 150 MG/1
150 TABLET ORAL ONCE
Qty: 1 TABLET | Refills: 0 | Status: SHIPPED | OUTPATIENT
Start: 2019-05-06 | End: 2019-05-06

## 2019-05-06 RX ORDER — NITROFURANTOIN 25; 75 MG/1; MG/1
100 CAPSULE ORAL 2 TIMES DAILY
Qty: 10 CAPSULE | Refills: 0 | Status: SHIPPED | OUTPATIENT
Start: 2019-05-06 | End: 2019-06-05

## 2019-05-06 NOTE — PROGRESS NOTES
Subjective   Marguerite Edwards is a 34 y.o. female.     Chief Complaint   Patient presents with   • Allergies   • Urinary Tract Infection     burning, itching on urination.  1 week       Urinary Tract Infection    This is a new problem. The current episode started in the past 7 days. The problem occurs every urination. The problem has been unchanged. The quality of the pain is described as burning. The pain is mild. There has been no fever. She is sexually active. There is no history of pyelonephritis. Associated symptoms include frequency and urgency. Pertinent negatives include no chills, discharge, flank pain, hematuria, hesitancy, nausea, possible pregnancy, sweats or vomiting. She has tried increased fluids for the symptoms. The treatment provided no relief. There is no history of catheterization, kidney stones, recurrent UTIs, a single kidney, urinary stasis or a urological procedure.        Allergies- chronic. curently uncontrolled. Currently living in area with second hand smoke, thinks this is a problem for her. She is currently taking singulair and xyzal. Report sthis has helped some but she still has rhinorrhea. She want sto see an allergist for allergy shots.       The following portions of the patient's history were reviewed and updated as appropriate: allergies, current medications, past family history, past medical history, past social history, past surgical history and problem list.    Review of Systems   Constitutional: Negative for chills, diaphoresis, fatigue and fever.   HENT: Positive for postnasal drip, rhinorrhea and sneezing. Negative for congestion, sinus pressure, sinus pain, sore throat and tinnitus.    Gastrointestinal: Negative for abdominal pain, nausea and vomiting.   Genitourinary: Positive for dysuria, frequency and urgency. Negative for difficulty urinating, flank pain, hematuria and hesitancy.   Allergic/Immunologic: Positive for environmental allergies.       Outpatient  Medications Marked as Taking for the 5/6/19 encounter (Office Visit) with Liliya Hodges APRN   Medication Sig Dispense Refill   • albuterol sulfate  (90 Base) MCG/ACT inhaler Inhale 2 puffs Every 4 (Four) Hours As Needed for Wheezing. 18 g 5   • azelastine (ASTELIN) 0.1 % nasal spray 2 sprays into the nostril(s) as directed by provider 2 (Two) Times a Day. Use in each nostril as directed 30 mL 11   • baclofen (LIORESAL) 10 MG tablet Take 20 mg by mouth 3 (Three) Times a Day.     • busPIRone (BUSPAR) 7.5 MG tablet Take 7.5 mg by mouth Daily.     • diclofenac (VOLTAREN) 1 % gel gel Apply 4 g topically to the appropriate area as directed 4 (Four) Times a Day As Needed (pain). 1 tube 1   • Fluticasone Furoate-Vilanterol (BREO ELLIPTA) 100-25 MCG/INH inhaler Inhale 1 puff Daily. 1 inhalation once a day 1 each 11   • gabapentin (NEURONTIN) 300 MG capsule Take 1 capsule by mouth 3 (Three) Times a Day. (Refill no sooner than 30 days) 90 capsule 1   • guaiFENesin (MUCINEX) 600 MG 12 hr tablet Take 1 tablet by mouth 2 (Two) Times a Day. 60 tablet 0   • hyoscyamine (LEVSIN) 0.125 MG SL tablet Take 1 tablet by mouth Every 4 (Four) Hours As Needed (bladder spasms). 30 tablet 0   • linaclotide (LINZESS) 290 MCG capsule capsule Take 1 capsule by mouth Every Morning Before Breakfast. 30 capsule 3   • lisinopril (PRINIVIL,ZESTRIL) 2.5 MG tablet Take 1 tablet by mouth Daily. 30 tablet 3   • montelukast (SINGULAIR) 10 MG tablet Take 1 tablet by mouth Every Night. 30 tablet 11   • OXcarbazepine (TRILEPTAL) 150 MG tablet      • pantoprazole (PROTONIX) 40 MG EC tablet Take 1 tablet by mouth Daily. 90 tablet 3   • promethazine (PHENERGAN) 25 MG tablet Take 1 tablet by mouth Every 6 (Six) Hours As Needed for Nausea or Vomiting. 12 tablet 0   • QUEtiapine (SEROquel) 300 MG tablet Take 300 mg by mouth Every Night.     • sertraline (ZOLOFT) 100 MG tablet      • tiZANidine (ZANAFLEX) 4 MG tablet Take 1 tablet by mouth 3 (Three)  Times a Day. 90 tablet 1   • TRI-SPRINTEC 0.18/0.215/0.25 MG-35 MCG per tablet Take 1 tablet by mouth Daily.  2   • Vitamin D, Cholecalciferol, 1000 units capsule TAKE ONE CAPSULE BY MOUTH DAILY 30 capsule 2   • [DISCONTINUED] levocetirizine (XYZAL) 5 MG tablet Take 1 tablet by mouth As Needed (Daily as needed). 30 tablet 2         Objective   Physical Exam   Constitutional: She is oriented to person, place, and time. She appears well-developed and well-nourished. No distress.   HENT:   Head: Normocephalic and atraumatic.   Right Ear: External ear normal.   Left Ear: External ear normal.   Nose: Mucosal edema (boggy) and rhinorrhea present. Right sinus exhibits no maxillary sinus tenderness and no frontal sinus tenderness. Left sinus exhibits no maxillary sinus tenderness and no frontal sinus tenderness.   Mouth/Throat: Oropharynx is clear and moist.   Eyes: Conjunctivae are normal. Pupils are equal, round, and reactive to light.   Neck: Normal range of motion.   Cardiovascular: Normal rate, regular rhythm and normal heart sounds.   Pulmonary/Chest: Effort normal and breath sounds normal. No respiratory distress.   Abdominal: Soft. Normal appearance and bowel sounds are normal. She exhibits no distension and no mass. There is no tenderness. There is no rigidity, no rebound, no guarding and no CVA tenderness. No hernia.   Musculoskeletal: Normal range of motion.   Neurological: She is alert and oriented to person, place, and time.   Skin: Skin is warm and dry. She is not diaphoretic.   Psychiatric: She has a normal mood and affect. Her behavior is normal. Judgment and thought content normal.   Nursing note and vitals reviewed.      Vitals:    05/06/19 1122   BP: 120/70   Pulse: 90   Resp: 16   Temp: 98 °F (36.7 °C)   SpO2: 99%     Brief Urine Lab Results  (Last result in the past 365 days)      Color   Clarity   Blood   Leuk Est   Nitrite   Protein   CREAT   Urine HCG        05/06/19 1137 Yellow Clear Negative  "Negative Negative 1+  Comment:  0.3g/L                 Assessment/Plan   Marguerite was seen today for allergies and urinary tract infection.    Diagnoses and all orders for this visit:    Burning with urination  -     POCT urinalysis dipstick, automated  -     Urine Culture - Urine, Urine, Clean Catch  -     nitrofurantoin, macrocrystal-monohydrate, (MACROBID) 100 MG capsule; Take 1 capsule by mouth 2 (Two) Times a Day.    Abnormal urinalysis  -     Urine Culture - Urine, Urine, Clean Catch    Environmental allergies  -     Ambulatory Referral to Allergy  -     fluticasone (FLONASE) 50 MCG/ACT nasal spray; 2 sprays into the nostril(s) as directed by provider Daily.    Acute vaginitis  -     fluconazole (DIFLUCAN) 150 MG tablet; Take 1 tablet by mouth 1 (One) Time for 1 dose.               uein negative for leuks, nitrites. Will go ahead and treat with macrobid send for culture based on symptoms  She request diflucan as she \"always\" gets yeast infection with abx.   Add flonase daily.   Refer to allergist.   Continue Singulair and xyzal    Return for Next scheduled follow up.  Plan of care discussed with pt. They verbalized understanding and agreement.       * Please note that portions of this note were completed with a voice recognition program. Efforts were made to edit the dictation but occasionally words are erroneously transcribed.   "

## 2019-05-07 LAB — BACTERIA SPEC AEROBE CULT: NORMAL

## 2019-05-07 RX ORDER — LEVOCETIRIZINE DIHYDROCHLORIDE 5 MG/1
5 TABLET, FILM COATED ORAL AS NEEDED
Qty: 30 TABLET | Refills: 2 | Status: SHIPPED | OUTPATIENT
Start: 2019-05-07 | End: 2020-02-10

## 2019-05-07 NOTE — TELEPHONE ENCOUNTER
----- Message from KATRINA Acevedo sent at 5/7/2019 10:05 AM EDT -----  Her culture results suggest contamination Please have her leave another urine sample for culture. Thanks

## 2019-05-08 ENCOUNTER — TELEPHONE (OUTPATIENT)
Dept: INTERNAL MEDICINE | Facility: CLINIC | Age: 34
End: 2019-05-08

## 2019-05-08 RX ORDER — FLUCONAZOLE 150 MG/1
150 TABLET ORAL ONCE
Qty: 1 TABLET | Refills: 0 | Status: SHIPPED | OUTPATIENT
Start: 2019-05-08 | End: 2019-05-08

## 2019-05-08 NOTE — TELEPHONE ENCOUNTER
Patient is asking for another Diflucan to be called in please. She said it usually takes two for her and we called in one.

## 2019-05-10 ENCOUNTER — TELEPHONE (OUTPATIENT)
Dept: INTERNAL MEDICINE | Facility: CLINIC | Age: 34
End: 2019-05-10

## 2019-05-21 ENCOUNTER — TELEPHONE (OUTPATIENT)
Dept: INTERNAL MEDICINE | Facility: CLINIC | Age: 34
End: 2019-05-21

## 2019-05-21 RX ORDER — FAMOTIDINE 20 MG
1 TABLET ORAL DAILY
Qty: 30 CAPSULE | Refills: 2 | Status: SHIPPED | OUTPATIENT
Start: 2019-05-21 | End: 2019-12-10 | Stop reason: SDUPTHER

## 2019-05-21 NOTE — TELEPHONE ENCOUNTER
Rx sent to pharmacy. Nothing further needed as previous message was routed to referral coordinator Yrn

## 2019-05-29 ENCOUNTER — TELEPHONE (OUTPATIENT)
Dept: INTERNAL MEDICINE | Facility: CLINIC | Age: 34
End: 2019-05-29

## 2019-05-30 NOTE — TELEPHONE ENCOUNTER
PN that she will need to be seen to be evaluated.  She stated that she knows she has a UTI because she is having the discharge again.  I offered her an appointment-she declined because she was not sure when she can come in.  I told her she can call back to schedule or go to .  She said she would just call her GYN and they would send in something.

## 2019-05-30 NOTE — TELEPHONE ENCOUNTER
Pharm ([facility]) called to let you know the name of med that is requested is terconazole 0.8% cream-apply at bedtime for 3 nights

## 2019-06-05 ENCOUNTER — OFFICE VISIT (OUTPATIENT)
Dept: INTERNAL MEDICINE | Facility: CLINIC | Age: 34
End: 2019-06-05

## 2019-06-05 VITALS
DIASTOLIC BLOOD PRESSURE: 60 MMHG | HEART RATE: 75 BPM | SYSTOLIC BLOOD PRESSURE: 96 MMHG | BODY MASS INDEX: 31.47 KG/M2 | WEIGHT: 171 LBS | TEMPERATURE: 97.5 F | RESPIRATION RATE: 16 BRPM | OXYGEN SATURATION: 98 % | HEIGHT: 62 IN

## 2019-06-05 DIAGNOSIS — N32.81 OAB (OVERACTIVE BLADDER): ICD-10-CM

## 2019-06-05 DIAGNOSIS — G47.9 SLEEP DISTURBANCES: ICD-10-CM

## 2019-06-05 DIAGNOSIS — I10 ESSENTIAL HYPERTENSION: Primary | ICD-10-CM

## 2019-06-05 PROCEDURE — 99213 OFFICE O/P EST LOW 20 MIN: CPT | Performed by: NURSE PRACTITIONER

## 2019-06-05 NOTE — PATIENT INSTRUCTIONS
Kegel Exercises  Kegel exercises help strengthen the muscles that support the rectum, vagina, small intestine, bladder, and uterus. Doing Kegel exercises can help:  · Improve bladder and bowel control.  · Improve sexual response.  · Reduce problems and discomfort during pregnancy.    Kegel exercises involve squeezing your pelvic floor muscles, which are the same muscles you squeeze when you try to stop the flow of urine. The exercises can be done while sitting, standing, or lying down, but it is best to vary your position.  Exercises  1. Squeeze your pelvic floor muscles tight. You should feel a tight lift in your rectal area. If you are a female, you should also feel a tightness in your vaginal area. Keep your stomach, buttocks, and legs relaxed.  2. Hold the muscles tight for up to 10 seconds.  3. Relax your muscles.  Repeat this exercise 50 times a day or as many times as told by your health care provider. Continue to do this exercise for at least 4-6 weeks or for as long as told by your health care provider.  This information is not intended to replace advice given to you by your health care provider. Make sure you discuss any questions you have with your health care provider.  Document Released: 12/04/2013 Document Revised: 04/30/2018 Document Reviewed: 11/06/2016  [employer] Interactive Patient Education © 2019 [employer]  Insomnia  Insomnia is a sleep disorder that makes it difficult to fall asleep or to stay asleep. Insomnia can cause tiredness (fatigue), low energy, difficulty concentrating, mood swings, and poor performance at work or school.  There are three different ways to classify insomnia:  · Difficulty falling asleep.  · Difficulty staying asleep.  · Waking up too early in the morning.    Any type of insomnia can be long-term (chronic) or short-term (acute). Both are common. Short-term insomnia usually lasts for three months or less. Chronic insomnia occurs at least three times a week for longer  than three months.  What are the causes?  Insomnia may be caused by another condition, situation, or substance, such as:  · Anxiety.  · Certain medicines.  · Gastroesophageal reflux disease (GERD) or other gastrointestinal conditions.  · Asthma or other breathing conditions.  · Restless legs syndrome, sleep apnea, or other sleep disorders.  · Chronic pain.  · Menopause. This may include hot flashes.  · Stroke.  · Abuse of alcohol, tobacco, or illegal drugs.  · Depression.  · Caffeine.  · Neurological disorders, such as Alzheimer disease.  · An overactive thyroid (hyperthyroidism).    The cause of insomnia may not be known.  What increases the risk?  Risk factors for insomnia include:  · Gender. Women are more commonly affected than men.  · Age. Insomnia is more common as you get older.  · Stress. This may involve your professional or personal life.  · Income. Insomnia is more common in people with lower income.  · Lack of exercise.  · Irregular work schedule or night shifts.  · Traveling between different time zones.    What are the signs or symptoms?  If you have insomnia, trouble falling asleep or trouble staying asleep is the main symptom. This may lead to other symptoms, such as:  · Feeling fatigued.  · Feeling nervous about going to sleep.  · Not feeling rested in the morning.  · Having trouble concentrating.  · Feeling irritable, anxious, or depressed.    How is this treated?  Treatment for insomnia depends on the cause. If your insomnia is caused by an underlying condition, treatment will focus on addressing the condition. Treatment may also include:  · Medicines to help you sleep.  · Counseling or therapy.  · Lifestyle adjustments.    Follow these instructions at home:  · Take medicines only as directed by your health care provider.  · Keep regular sleeping and waking hours. Avoid naps.  · Keep a sleep diary to help you and your health care provider figure out what could be causing your insomnia.  Include:  ? When you sleep.  ? When you wake up during the night.  ? How well you sleep.  ? How rested you feel the next day.  ? Any side effects of medicines you are taking.  ? What you eat and drink.  · Make your bedroom a comfortable place where it is easy to fall asleep:  ? Put up shades or special blackout curtains to block light from outside.  ? Use a white noise machine to block noise.  ? Keep the temperature cool.  · Exercise regularly as directed by your health care provider. Avoid exercising right before bedtime.  · Use relaxation techniques to manage stress. Ask your health care provider to suggest some techniques that may work well for you. These may include:  ? Breathing exercises.  ? Routines to release muscle tension.  ? Visualizing peaceful scenes.  · Cut back on alcohol, caffeinated beverages, and cigarettes, especially close to bedtime. These can disrupt your sleep.  · Do not overeat or eat spicy foods right before bedtime. This can lead to digestive discomfort that can make it hard for you to sleep.  · Limit screen use before bedtime. This includes:  ? Watching TV.  ? Using your smartphone, tablet, and computer.  · Stick to a routine. This can help you fall asleep faster. Try to do a quiet activity, brush your teeth, and go to bed at the same time each night.  · Get out of bed if you are still awake after 15 minutes of trying to sleep. Keep the lights down, but try reading or doing a quiet activity. When you feel sleepy, go back to bed.  · Make sure that you drive carefully. Avoid driving if you feel very sleepy.  · Keep all follow-up appointments as directed by your health care provider. This is important.  Contact a health care provider if:  · You are tired throughout the day or have trouble in your daily routine due to sleepiness.  · You continue to have sleep problems or your sleep problems get worse.  Get help right away if:  · You have serious thoughts about hurting yourself or someone  else.  This information is not intended to replace advice given to you by your health care provider. Make sure you discuss any questions you have with your health care provider.  Document Released: 12/15/2001 Document Revised: 05/19/2017 Document Reviewed: 09/18/2015  [employer] Interactive Patient Education © 2018 [employer]    How to Take Your Blood Pressure  You can take your blood pressure at home with a machine. You may need to check your blood pressure at home:  · To check if you have high blood pressure (hypertension).  · To check your blood pressure over time.  · To make sure your blood pressure medicine is working.    Supplies needed:  You will need a blood pressure machine, or monitor. You can buy one at a [employer] or online. When choosing one:  · Choose one with an arm cuff.  · Choose one that wraps around your upper arm. Only one finger should fit between your arm and the cuff.  · Do not choose one that measures your blood pressure from your wrist or finger.    Your doctor can suggest a monitor.  How to prepare  Avoid these things for 30 minutes before checking your blood pressure:  · Drinking caffeine.  · Drinking alcohol.  · Eating.  · Smoking.  · Exercising.    Five minutes before checking your blood pressure:  · Pee.  · Sit in a dining chair. Avoid sitting in a soft couch or armchair.  · Be quiet. Do not talk.    How to take your blood pressure  Follow the instructions that came with your machine. If you have a digital blood pressure monitor, these may be the instructions:  1. Sit up straight.  2. Place your feet on the floor. Do not cross your ankles or legs.  3. Rest your left arm at the level of your heart. You may rest it on a table, desk, or chair.  4. Pull up your shirt sleeve.  5. Wrap the blood pressure cuff around the upper part of your left arm. The cuff should be 1 inch (2.5 cm) above your elbow. It is best to wrap the cuff around bare skin.  6. Fit the cuff snugly around your arm. You  "should be able to place only one finger between the cuff and your arm.  7. Put the cord inside the groove of your elbow.  8. Press the power button.  9. Sit quietly while the cuff fills with air and loses air.  10. Write down the numbers on the screen.  11. Wait 2-3 minutes and then repeat steps 1-10.    What do the numbers mean?  Two numbers make up your blood pressure. The first number is called systolic pressure. The second is called diastolic pressure. An example of a blood pressure reading is \"120 over 80\" (or 120/80).  If you are an adult and do not have a medical condition, use this guide to find out if your blood pressure is normal:  Normal  · First number: below 120.  · Second number: below 80.  Elevated  · First number: 120-129.  · Second number: below 80.  Hypertension stage 1  · First number: 130-139.  · Second number: 80-89.  Hypertension stage 2  · First number: 140 or above.  · Second number: 90 or above.  Your blood pressure is above normal even if only the top or bottom number is above normal.  Follow these instructions at home:  · Check your blood pressure as often as your doctor tells you to.  · Take your monitor to your next doctor's appointment. Your doctor will:  ? Make sure you are using it correctly.  ? Make sure it is working right.  · Make sure you understand what your blood pressure numbers should be.  · Tell your doctor if your medicines are causing side effects.  Contact a doctor if:  · Your blood pressure keeps being high.  Get help right away if:  · Your first blood pressure number is higher than 180.  · Your second blood pressure number is higher than 120.  This information is not intended to replace advice given to you by your health care provider. Make sure you discuss any questions you have with your health care provider.  Document Released: 11/30/2009 Document Revised: 11/15/2017 Document Reviewed: 05/26/2017  [employer] Interactive Patient Education © 2019 [employer]    "

## 2019-06-05 NOTE — PROGRESS NOTES
"Subjective   Marguerite Edwards is a 34 y.o. female.     Chief Complaint   Patient presents with   • Hypotension     fatigue.  Few days       History of Present Illness   Marguerite is here today complaining of low blood pressure with fatigue for the last few days.  She was recently started on lisinopril 2.5 mg daily.  She chronically takes multiple medications including 2 different muscle relaxants.  She reports that her blood pressure has been -26-80.   Has been sleepy lately as well double sleeping at night.  Sleeps about 4 hours a night.  She is requesting a \"sleeping pill.\"  Does not drink caffeine.     Followed by pain mgmt and psych- wants prescription to help her sleep.   She Does have schizophrenia and has appt with her psychiatrist July 23, 2019.         She is also concerned about her chronic overactive bladder.  Denies any incontinent episodes but does have urinary frequency she reports that she is currently being treated for a urinary tract infection by her gynecologist.  She does not recall the name of the medication but reports it is a cream.  She would like to have instructions on how to do bladder strengthening exercises.    The following portions of the patient's history were reviewed and updated as appropriate: allergies, current medications, past family history, past medical history, past social history, past surgical history and problem list.    Review of Systems   Constitutional: Positive for fatigue. Negative for appetite change, chills, diaphoresis and unexpected weight change.   Respiratory: Negative for cough, chest tightness, shortness of breath and wheezing.    Cardiovascular: Negative for chest pain, palpitations and leg swelling.   Gastrointestinal: Negative for constipation, diarrhea, nausea and vomiting.   Genitourinary: Positive for frequency. Negative for difficulty urinating, dysuria, flank pain, pelvic pain, urgency, vaginal discharge and vaginal pain.   Skin: Negative for " color change and rash.   Neurological: Negative for dizziness, syncope, weakness, light-headedness and headaches.   Psychiatric/Behavioral: Negative for sleep disturbance.       Outpatient Medications Marked as Taking for the 6/5/19 encounter (Office Visit) with Liliya Hodges APRN   Medication Sig Dispense Refill   • albuterol sulfate  (90 Base) MCG/ACT inhaler Inhale 2 puffs Every 4 (Four) Hours As Needed for Wheezing. 18 g 5   • azelastine (ASTELIN) 0.1 % nasal spray 2 sprays into the nostril(s) as directed by provider 2 (Two) Times a Day. Use in each nostril as directed 30 mL 11   • busPIRone (BUSPAR) 7.5 MG tablet Take 7.5 mg by mouth Daily.     • diclofenac (VOLTAREN) 1 % gel gel Apply 4 g topically to the appropriate area as directed 4 (Four) Times a Day As Needed (pain). 1 tube 1   • fluticasone (FLONASE) 50 MCG/ACT nasal spray 2 sprays into the nostril(s) as directed by provider Daily. 1 bottle 0   • Fluticasone Furoate-Vilanterol (BREO ELLIPTA) 100-25 MCG/INH inhaler Inhale 1 puff Daily. 1 inhalation once a day 1 each 11   • gabapentin (NEURONTIN) 300 MG capsule Take 1 capsule by mouth 3 (Three) Times a Day. (Refill no sooner than 30 days) 90 capsule 1   • glucose blood test strip Used to test blood sugar for diabetes E11.9 1-2 times daily.  Pt uses One Touch Aero 100 each 12   • hyoscyamine (LEVSIN) 0.125 MG SL tablet Take 1 tablet by mouth Every 4 (Four) Hours As Needed (bladder spasms). 30 tablet 0   • levocetirizine (XYZAL) 5 MG tablet TAKE 1 TABLET BY MOUTH AS NEEDED (DAILY AS NEEDED). 30 tablet 2   • linaclotide (LINZESS) 290 MCG capsule capsule Take 1 capsule by mouth Every Morning Before Breakfast. 30 capsule 3   • montelukast (SINGULAIR) 10 MG tablet Take 1 tablet by mouth Every Night. 30 tablet 11   • ONE TOUCH LANCETS misc Any brand lancets that ins will cover.  Used to check blood sugar twice daily for diabetes E11.9 200 each 11   • OXcarbazepine (TRILEPTAL) 150 MG tablet      •  pantoprazole (PROTONIX) 40 MG EC tablet Take 1 tablet by mouth Daily. 90 tablet 3   • QUEtiapine (SEROquel) 300 MG tablet Take 300 mg by mouth Every Night.     • sertraline (ZOLOFT) 100 MG tablet      • TRI-SPRINTEC 0.18/0.215/0.25 MG-35 MCG per tablet Take 1 tablet by mouth Daily.  2   • Vitamin D, Cholecalciferol, 1000 units capsule Take 1 capsule by mouth Daily. 30 capsule 2   • [DISCONTINUED] baclofen (LIORESAL) 10 MG tablet Take 20 mg by mouth 3 (Three) Times a Day.     • [DISCONTINUED] guaiFENesin (MUCINEX) 600 MG 12 hr tablet Take 1 tablet by mouth 2 (Two) Times a Day. 60 tablet 0   • [DISCONTINUED] lisinopril (PRINIVIL,ZESTRIL) 2.5 MG tablet Take 1 tablet by mouth Daily. 30 tablet 3   • [DISCONTINUED] promethazine (PHENERGAN) 25 MG tablet Take 1 tablet by mouth Every 6 (Six) Hours As Needed for Nausea or Vomiting. 12 tablet 0   • [DISCONTINUED] tiZANidine (ZANAFLEX) 4 MG tablet Take 1 tablet by mouth 3 (Three) Times a Day. 90 tablet 1         Objective   Physical Exam   Constitutional: She is oriented to person, place, and time. She appears well-developed and well-nourished. No distress.   HENT:   Head: Normocephalic and atraumatic.   Eyes: Conjunctivae are normal. Pupils are equal, round, and reactive to light.   Neck: Normal range of motion. No JVD present.   Cardiovascular: Normal rate, regular rhythm, normal heart sounds and intact distal pulses.   No murmur heard.  Pulmonary/Chest: Effort normal and breath sounds normal. No respiratory distress. She exhibits no tenderness.   Abdominal: Soft. Normal appearance and bowel sounds are normal. She exhibits no distension and no mass. There is no tenderness. There is no rigidity, no rebound, no guarding and no CVA tenderness. No hernia.   Musculoskeletal: Normal range of motion. She exhibits no edema.   Neurological: She is alert and oriented to person, place, and time.   Skin: Skin is warm and dry. Capillary refill takes less than 2 seconds. She is not  diaphoretic. No erythema. No pallor.   Psychiatric: She has a normal mood and affect. Her behavior is normal. Judgment and thought content normal.   Nursing note and vitals reviewed.      Vitals:    06/05/19 1230   BP: 96/60   Pulse: 75   Resp: 16   Temp: 97.5 °F (36.4 °C)   SpO2: 98%         Assessment/Plan   Marguerite was seen today for hypotension.    Diagnoses and all orders for this visit:    Essential hypertension  -     Blood Pressure Device    OAB (overactive bladder)    Sleep disturbances            Stop lisinopril    Stop muscle relaxants as these are likely contributing to her sleepiness and lower BP through the day.   Encourage good sleep hygiene practices.  Encouraged kegals.   FU with psych and pain mgmt as planned.   Return in about 4 weeks (around 7/3/2019) for Annual, with fasting labs, Recheck.  Plan of care discussed with pt. They verbalized understanding and agreement.       * Please note that portions of this note were completed with a voice recognition program. Efforts were made to edit the dictation but occasionally words are erroneously transcribed.

## 2019-11-06 ENCOUNTER — TELEPHONE (OUTPATIENT)
Dept: INTERNAL MEDICINE | Facility: CLINIC | Age: 34
End: 2019-11-06

## 2019-11-06 NOTE — TELEPHONE ENCOUNTER
BAY CALLED WITH [employer] SHE SENT A MEDICAID MAP 10 WAIVER FORM WANTS TO KNOW IF SOULEYMANE EVER REC PHONE NUMBER IS [phone]

## 2019-12-09 DIAGNOSIS — K21.9 GASTROESOPHAGEAL REFLUX DISEASE, ESOPHAGITIS PRESENCE NOT SPECIFIED: ICD-10-CM

## 2019-12-09 RX ORDER — PANTOPRAZOLE SODIUM 40 MG/1
40 TABLET, DELAYED RELEASE ORAL DAILY
Qty: 90 TABLET | Refills: 0 | Status: SHIPPED | OUTPATIENT
Start: 2019-12-09 | End: 2020-02-10 | Stop reason: SDUPTHER

## 2019-12-09 RX ORDER — PANTOPRAZOLE SODIUM 40 MG/1
40 TABLET, DELAYED RELEASE ORAL DAILY
Qty: 90 TABLET | Refills: 3 | Status: CANCELLED | OUTPATIENT
Start: 2019-12-09

## 2019-12-09 NOTE — TELEPHONE ENCOUNTER
Patient is requesting a refill on pantoporazole 40MG.  Pharmacy is [facility].  Patients Ph is [phone]

## 2019-12-10 ENCOUNTER — TELEPHONE (OUTPATIENT)
Dept: INTERNAL MEDICINE | Facility: CLINIC | Age: 34
End: 2019-12-10

## 2019-12-10 RX ORDER — FAMOTIDINE 20 MG
1 TABLET ORAL DAILY
Qty: 30 CAPSULE | Refills: 2 | Status: SHIPPED | OUTPATIENT
Start: 2019-12-10 | End: 2020-02-06 | Stop reason: SDUPTHER

## 2019-12-12 ENCOUNTER — TELEPHONE (OUTPATIENT)
Dept: PULMONOLOGY | Facility: CLINIC | Age: 34
End: 2019-12-12

## 2019-12-12 DIAGNOSIS — J45.20 MILD INTERMITTENT ASTHMA WITHOUT COMPLICATION: ICD-10-CM

## 2019-12-12 DIAGNOSIS — J30.2 ACUTE SEASONAL ALLERGIC RHINITIS: ICD-10-CM

## 2019-12-12 RX ORDER — MONTELUKAST SODIUM 10 MG/1
10 TABLET ORAL NIGHTLY
Qty: 30 TABLET | Refills: 11 | Status: SHIPPED | OUTPATIENT
Start: 2019-12-12 | End: 2019-12-26 | Stop reason: SDUPTHER

## 2019-12-26 ENCOUNTER — OFFICE VISIT (OUTPATIENT)
Dept: PULMONOLOGY | Facility: CLINIC | Age: 34
End: 2019-12-26

## 2019-12-26 VITALS
HEART RATE: 80 BPM | DIASTOLIC BLOOD PRESSURE: 78 MMHG | TEMPERATURE: 98.6 F | HEIGHT: 62 IN | OXYGEN SATURATION: 96 % | SYSTOLIC BLOOD PRESSURE: 110 MMHG | BODY MASS INDEX: 33.68 KG/M2 | WEIGHT: 183 LBS

## 2019-12-26 DIAGNOSIS — J45.20 MILD INTERMITTENT ASTHMA WITHOUT COMPLICATION: Primary | ICD-10-CM

## 2019-12-26 DIAGNOSIS — K21.9 GASTROESOPHAGEAL REFLUX DISEASE, ESOPHAGITIS PRESENCE NOT SPECIFIED: ICD-10-CM

## 2019-12-26 DIAGNOSIS — J30.2 ACUTE SEASONAL ALLERGIC RHINITIS: ICD-10-CM

## 2019-12-26 DIAGNOSIS — J30.89 ENVIRONMENTAL AND SEASONAL ALLERGIES: ICD-10-CM

## 2019-12-26 DIAGNOSIS — F20.9 SCHIZOPHRENIA, UNSPECIFIED TYPE (HCC): ICD-10-CM

## 2019-12-26 PROCEDURE — 94726 PLETHYSMOGRAPHY LUNG VOLUMES: CPT | Performed by: NURSE PRACTITIONER

## 2019-12-26 PROCEDURE — 94375 RESPIRATORY FLOW VOLUME LOOP: CPT | Performed by: NURSE PRACTITIONER

## 2019-12-26 PROCEDURE — 99214 OFFICE O/P EST MOD 30 MIN: CPT | Performed by: NURSE PRACTITIONER

## 2019-12-26 PROCEDURE — 94729 DIFFUSING CAPACITY: CPT | Performed by: NURSE PRACTITIONER

## 2019-12-26 RX ORDER — MONTELUKAST SODIUM 10 MG/1
10 TABLET ORAL NIGHTLY
Qty: 30 TABLET | Refills: 11 | Status: SHIPPED | OUTPATIENT
Start: 2019-12-26 | End: 2020-12-18 | Stop reason: SDUPTHER

## 2019-12-26 RX ORDER — MIRTAZAPINE 30 MG/1
30 TABLET, FILM COATED ORAL
COMMUNITY
Start: 2019-12-16 | End: 2020-05-12

## 2019-12-26 RX ORDER — ARIPIPRAZOLE 10 MG/1
TABLET ORAL
Refills: 1 | COMMUNITY
Start: 2019-12-05 | End: 2020-02-10 | Stop reason: ALTCHOICE

## 2019-12-26 RX ORDER — CHOLECALCIFEROL (VITAMIN D3) 125 MCG
5 CAPSULE ORAL
COMMUNITY
End: 2020-02-10

## 2019-12-26 RX ORDER — RISPERIDONE 3 MG/1
TABLET ORAL
Refills: 2 | COMMUNITY
Start: 2019-12-05 | End: 2020-02-10 | Stop reason: ALTCHOICE

## 2019-12-26 NOTE — PROGRESS NOTES
[facility] Pulmonary Follow up    CHIEF COMPLAINT    Mild intermittent asthma    Subjective   HISTORY OF PRESENT ILLNESS    Marguerite Edwards is a 34 y.o.female here today for routine follow-up on her chronic dyspnea as well as some mild intermittent asthma symptoms.  She continues on her Breo and Singulair daily.  She has had no evidence of acute exacerbation in the last year.  She denies any wheezing, chest tightness, cough or sputum production.  She does have some seasonal allergy symptoms mostly in the spring and the fall.  She takes an as needed Flonase as well as antihistamine.    Unfortunately since I last seen her she is had quite a bit of difficulty with her schizophrenia and depression.  She was hospitalized for several months in the summer for medication management related to her depression.  She is now seeing a new psychiatrist,  With medication adjustments.  She does continue to complain of quite a bit of difficulty sleeping.      Patient Active Problem List   Diagnosis   • Anxiety   • Urinary incontinence   • Schizophrenia (CMS/[facility])   • GERD (gastroesophageal reflux disease)   • Asthma   • Secondary amenorrhea   • Environmental and seasonal allergies   • Chronic back pain   • Chronic intractable headache   • Insomnia due to mental disorder   • Chronic fatigue   • Vitamin D deficiency   • Influenza vaccination declined   • Mixed hyperlipidemia   • Essential hypertension   • Arthritis       Allergies   Allergen Reactions   • Paxil [Paroxetine Hcl] Mental Status Change   • Prozac [Fluoxetine Hcl] Mental Status Change   • Amitiza [Lubiprostone] Palpitations and Dizziness       Current Outpatient Medications:   •  albuterol sulfate  (90 Base) MCG/ACT inhaler, Inhale 2 puffs Every 4 (Four) Hours As Needed for Wheezing., Disp: 18 g, Rfl: 5  •  ARIPiprazole (ABILIFY) 10 MG tablet, , Disp: , Rfl: 1  •  busPIRone (BUSPAR) 7.5 MG tablet, Take 7.5 mg by mouth Daily., Disp: , Rfl:   •  melatonin 5 MG tablet  tablet, Take 5 mg by mouth., Disp: , Rfl:   •  montelukast (SINGULAIR) 10 MG tablet, Take 1 tablet by mouth Every Night., Disp: 30 tablet, Rfl: 11  •  pantoprazole (PROTONIX) 40 MG EC tablet, Take 1 tablet by mouth Daily., Disp: 90 tablet, Rfl: 0  •  TRI-SPRINTEC 0.18/0.215/0.25 MG-35 MCG per tablet, Take 1 tablet by mouth Daily., Disp: , Rfl: 2  •  Vitamin D, Cholecalciferol, 25 MCG (1000 UT) capsule, Take 1 capsule by mouth Daily., Disp: 30 capsule, Rfl: 2  •  azelastine (ASTELIN) 0.1 % nasal spray, 2 sprays into the nostril(s) as directed by provider 2 (Two) Times a Day. Use in each nostril as directed, Disp: 30 mL, Rfl: 11  •  diclofenac (VOLTAREN) 1 % gel gel, Apply 4 g topically to the appropriate area as directed 4 (Four) Times a Day As Needed (pain)., Disp: 1 tube, Rfl: 1  •  fluticasone (FLONASE) 50 MCG/ACT nasal spray, 2 sprays into the nostril(s) as directed by provider Daily., Disp: 1 bottle, Rfl: 0  •  Fluticasone Furoate-Vilanterol (BREO ELLIPTA) 100-25 MCG/INH inhaler, Inhale 1 puff Daily. 1 inhalation once a day, Disp: 1 each, Rfl: 11  •  gabapentin (NEURONTIN) 300 MG capsule, Take 1 capsule by mouth 3 (Three) Times a Day. (Refill no sooner than 30 days), Disp: 90 capsule, Rfl: 1  •  glucose blood test strip, Used to test blood sugar for diabetes E11.9 1-2 times daily.  Pt uses One Touch Aero, Disp: 100 each, Rfl: 12  •  hyoscyamine (LEVSIN) 0.125 MG SL tablet, Take 1 tablet by mouth Every 4 (Four) Hours As Needed (bladder spasms)., Disp: 30 tablet, Rfl: 0  •  levocetirizine (XYZAL) 5 MG tablet, TAKE 1 TABLET BY MOUTH AS NEEDED (DAILY AS NEEDED)., Disp: 30 tablet, Rfl: 2  •  linaclotide (LINZESS) 290 MCG capsule capsule, Take 1 capsule by mouth Every Morning Before Breakfast., Disp: 30 capsule, Rfl: 3  •  mirtazapine (REMERON) 30 MG tablet, Take 30 mg by mouth every night at bedtime., Disp: , Rfl:   •  ONE TOUCH LANCETS misc, Any brand lancets that ins will cover.  Used to check blood sugar twice  "daily for diabetes E11.9, Disp: 200 each, Rfl: 11  •  OXcarbazepine (TRILEPTAL) 150 MG tablet, , Disp: , Rfl:   •  risperiDONE (risperDAL) 3 MG tablet, , Disp: , Rfl: 2  •  sertraline (ZOLOFT) 100 MG tablet, , Disp: , Rfl:   MEDICATION LIST AND ALLERGIES REVIEWED.    Social History     Tobacco Use   • Smoking status: Former Smoker     Packs/day: 5.00     Years: 4.00     Pack years: 20.00     Types: Cigarettes     Last attempt to quit:      Years since quittin.9   • Smokeless tobacco: Never Used   Substance Use Topics   • Alcohol use: Yes     Alcohol/week: 1.0 standard drinks     Types: 1 Cans of beer per week     Frequency: Monthly or less     Comment: occasionally- once a year   • Drug use: No     Comment: former marijuana as a teen       FAMILY AND SOCIAL HISTORY REVIEWED.    Review of Systems   Constitutional: Negative for chills, fatigue, fever and unexpected weight change.   HENT: Negative for congestion, nosebleeds, postnasal drip, rhinorrhea, sinus pressure and trouble swallowing.    Respiratory: Negative for cough, chest tightness, shortness of breath and wheezing.    Cardiovascular: Negative for chest pain and leg swelling.   Gastrointestinal: Negative for abdominal pain, constipation, diarrhea, nausea and vomiting.   Genitourinary: Negative for dysuria, frequency, hematuria and urgency.   Musculoskeletal: Negative for myalgias.   Neurological: Negative for dizziness, weakness, numbness and headaches.   Psychiatric/Behavioral: Positive for decreased concentration, dysphoric mood and sleep disturbance.   All other systems reviewed and are negative.  .  Objective   /78   Pulse 80   Temp 98.6 °F (37 °C)   Ht 157.5 cm (62\")   Wt 83 kg (183 lb)   SpO2 96% Comment: room air at rest  BMI 33.47 kg/m²     Immunization History   Administered Date(s) Administered   • Hepatitis A 2019       Physical Exam   Constitutional: She is oriented to person, place, and time. She appears well-developed " and well-nourished.   HENT:   Head: Normocephalic and atraumatic.   Eyes: Pupils are equal, round, and reactive to light. EOM are normal.   Neck: Normal range of motion. Neck supple.   Cardiovascular: Normal rate and regular rhythm.   No murmur heard.  Pulmonary/Chest: Effort normal and breath sounds normal. No respiratory distress. She has no wheezes. She has no rales.   Abdominal: Soft. Bowel sounds are normal. She exhibits no distension.   Musculoskeletal: Normal range of motion. She exhibits no edema.   Neurological: She is alert and oriented to person, place, and time.   Skin: Skin is warm and dry. No erythema.   Psychiatric: Her affect is labile. Her speech is delayed.   Vitals reviewed.        RESULTS    PFTS in the office today, read by me:  Normal pulmonary function testing.  No obstruction or restriction.      Assessment/Plan     PROBLEM LIST    Problem List Items Addressed This Visit        Respiratory    Asthma - Primary    Relevant Medications    Fluticasone Furoate-Vilanterol (BREO ELLIPTA) 100-25 MCG/INH inhaler    montelukast (SINGULAIR) 10 MG tablet    Other Relevant Orders    Pulmonary Function Test (Completed)       Digestive    GERD (gastroesophageal reflux disease)       Other    Schizophrenia (CMS/[facility])    Relevant Medications    ARIPiprazole (ABILIFY) 10 MG tablet    mirtazapine (REMERON) 30 MG tablet    risperiDONE (risperDAL) 3 MG tablet    Environmental and seasonal allergies    Relevant Medications    montelukast (SINGULAIR) 10 MG tablet      Other Visit Diagnoses     Acute seasonal allergic rhinitis        Relevant Medications    montelukast (SINGULAIR) 10 MG tablet            DISCUSSION    At this time her asthma and seasonal allergies are well controlled.  Continue on her Singulair and Breo.  Follow-up annually.    I encouraged her to continue follow-up with her psychiatrist for medication adjustments regarding her depression and insomnia.    Her significant other was with her in the  office today,  And provided help with the history.    I spent 25 minutes face to face with the patient and family. I spent > 50% percent of this time counseling and discussing diagnostic testing, evaluation, current status and management.    KATRINA Brady  12/26/20199:38 AM  Electronically signed     Please note that portions of this note were completed with a voice recognition program. Efforts were made to edit the dictations, but occasionally words are mistranscribed.      CC: Liliya Hodges, APRN

## 2020-01-21 ENCOUNTER — TELEPHONE (OUTPATIENT)
Dept: INTERNAL MEDICINE | Facility: CLINIC | Age: 35
End: 2020-01-21

## 2020-01-21 RX ORDER — FAMOTIDINE 20 MG
1 TABLET ORAL DAILY
Qty: 30 CAPSULE | Refills: 2 | Status: CANCELLED | OUTPATIENT
Start: 2020-01-21

## 2020-01-21 NOTE — TELEPHONE ENCOUNTER
Pharm says ins will not pay until tomorrow.  They let pt know this morning.  I called pt to let her know also.  She says she still needs us to call in refill.  I explained that there is still refills on it.  She stated understanding

## 2020-02-01 ENCOUNTER — IMMUNIZATION (OUTPATIENT)
Dept: RETAIL CLINIC | Facility: CLINIC | Age: 35
End: 2020-02-01

## 2020-02-01 DIAGNOSIS — Z23 FLU VACCINE NEED: Primary | ICD-10-CM

## 2020-02-01 PROCEDURE — 90686 IIV4 VACC NO PRSV 0.5 ML IM: CPT | Performed by: NURSE PRACTITIONER

## 2020-02-01 PROCEDURE — 90471 IMMUNIZATION ADMIN: CPT | Performed by: NURSE PRACTITIONER

## 2020-02-01 NOTE — PROGRESS NOTES
Encounter for Flu Vaccine.     Pt denies fever > 100.4 in the last 24 hours.   Denies allergy to latex, mercury, thimerosal, gelatin, chicken eggs/feathers or other vaccine components.   Denies history of Guillain-Walton syndrome or any other neurological diseases.   Denies ever having a serious reaction to the influenza vaccine in the past.       See scanned documents.

## 2020-02-03 ENCOUNTER — TELEPHONE (OUTPATIENT)
Dept: INTERNAL MEDICINE | Facility: CLINIC | Age: 35
End: 2020-02-03

## 2020-02-03 NOTE — TELEPHONE ENCOUNTER
I checked with [facility] to see if pt has been there in the past.  She did see them in 2018.  Her ins does not require a referral.  I called pt and gave her their phone number so she could call them to schedule.  The phone number is [phone]

## 2020-02-03 NOTE — TELEPHONE ENCOUNTER
Patient would like a referral to dermatology. Said she went to one at [facility] before, doesn't remember his name though

## 2020-02-06 ENCOUNTER — TELEPHONE (OUTPATIENT)
Dept: INTERNAL MEDICINE | Facility: CLINIC | Age: 35
End: 2020-02-06

## 2020-02-06 RX ORDER — FAMOTIDINE 20 MG
1 TABLET ORAL DAILY
Qty: 30 CAPSULE | Refills: 0 | Status: SHIPPED | OUTPATIENT
Start: 2020-02-06 | End: 2020-02-10 | Stop reason: SDUPTHER

## 2020-02-06 NOTE — TELEPHONE ENCOUNTER
Patient requested a refill of Vitamin D, Cholecalciferol, 25 MCG (1000 UT) capsule. Patient has 1 dose left.    Prasanna Hook on [email or web address]  in [place] confirmed    Patient call back [phone]

## 2020-02-10 ENCOUNTER — OFFICE VISIT (OUTPATIENT)
Dept: INTERNAL MEDICINE | Facility: CLINIC | Age: 35
End: 2020-02-10

## 2020-02-10 VITALS
HEART RATE: 91 BPM | DIASTOLIC BLOOD PRESSURE: 82 MMHG | HEIGHT: 62 IN | RESPIRATION RATE: 18 BRPM | WEIGHT: 191.6 LBS | TEMPERATURE: 98 F | BODY MASS INDEX: 35.26 KG/M2 | SYSTOLIC BLOOD PRESSURE: 114 MMHG | OXYGEN SATURATION: 97 %

## 2020-02-10 DIAGNOSIS — E53.8 B12 DEFICIENCY: ICD-10-CM

## 2020-02-10 DIAGNOSIS — Z28.39 HEPATITIS A VACCINATION NOT UP TO DATE: ICD-10-CM

## 2020-02-10 DIAGNOSIS — E55.9 VITAMIN D DEFICIENCY: ICD-10-CM

## 2020-02-10 DIAGNOSIS — K21.9 GASTROESOPHAGEAL REFLUX DISEASE, ESOPHAGITIS PRESENCE NOT SPECIFIED: ICD-10-CM

## 2020-02-10 DIAGNOSIS — Z00.00 ANNUAL PHYSICAL EXAM: Primary | ICD-10-CM

## 2020-02-10 DIAGNOSIS — J45.20 MILD INTERMITTENT ASTHMA WITHOUT COMPLICATION: ICD-10-CM

## 2020-02-10 DIAGNOSIS — F20.9 SCHIZOPHRENIA, UNSPECIFIED TYPE (HCC): ICD-10-CM

## 2020-02-10 LAB
25(OH)D3 SERPL-MCNC: 53.1 NG/ML (ref 30–100)
ALBUMIN SERPL-MCNC: 4.5 G/DL (ref 3.5–5.2)
ALBUMIN/GLOB SERPL: 1.3 G/DL
ALP SERPL-CCNC: 96 U/L (ref 39–117)
ALT SERPL W P-5'-P-CCNC: 11 U/L (ref 1–33)
ANION GAP SERPL CALCULATED.3IONS-SCNC: 14.4 MMOL/L (ref 5–15)
AST SERPL-CCNC: 12 U/L (ref 1–32)
BILIRUB SERPL-MCNC: 0.3 MG/DL (ref 0.2–1.2)
BUN BLD-MCNC: 14 MG/DL (ref 6–20)
BUN/CREAT SERPL: 17.5 (ref 7–25)
CALCIUM SPEC-SCNC: 10.2 MG/DL (ref 8.6–10.5)
CHLORIDE SERPL-SCNC: 102 MMOL/L (ref 98–107)
CHOLEST SERPL-MCNC: 242 MG/DL (ref 0–200)
CO2 SERPL-SCNC: 25.6 MMOL/L (ref 22–29)
CREAT BLD-MCNC: 0.8 MG/DL (ref 0.57–1)
DEPRECATED RDW RBC AUTO: 40.9 FL (ref 37–54)
ERYTHROCYTE [DISTWIDTH] IN BLOOD BY AUTOMATED COUNT: 13.8 % (ref 12.3–15.4)
GFR SERPL CREATININE-BSD FRML MDRD: 81 ML/MIN/1.73
GLOBULIN UR ELPH-MCNC: 3.4 GM/DL
GLUCOSE BLD-MCNC: 92 MG/DL (ref 65–99)
HCT VFR BLD AUTO: 43.5 % (ref 34–46.6)
HDLC SERPL-MCNC: 55 MG/DL (ref 40–60)
HGB BLD-MCNC: 14.7 G/DL (ref 12–15.9)
LDLC SERPL CALC-MCNC: 174 MG/DL (ref 0–100)
LDLC/HDLC SERPL: 3.17 {RATIO}
MCH RBC QN AUTO: 27.9 PG (ref 26.6–33)
MCHC RBC AUTO-ENTMCNC: 33.8 G/DL (ref 31.5–35.7)
MCV RBC AUTO: 82.5 FL (ref 79–97)
PLATELET # BLD AUTO: 310 10*3/MM3 (ref 140–450)
PMV BLD AUTO: 12.3 FL (ref 6–12)
POTASSIUM BLD-SCNC: 5 MMOL/L (ref 3.5–5.2)
PROT SERPL-MCNC: 7.9 G/DL (ref 6–8.5)
RBC # BLD AUTO: 5.27 10*6/MM3 (ref 3.77–5.28)
SODIUM BLD-SCNC: 142 MMOL/L (ref 136–145)
TRIGL SERPL-MCNC: 64 MG/DL (ref 0–150)
TSH SERPL DL<=0.05 MIU/L-ACNC: 1.31 UIU/ML (ref 0.27–4.2)
VIT B12 BLD-MCNC: 437 PG/ML (ref 211–946)
VLDLC SERPL-MCNC: 12.8 MG/DL (ref 5–40)
WBC NRBC COR # BLD: 10.06 10*3/MM3 (ref 3.4–10.8)

## 2020-02-10 PROCEDURE — 82306 VITAMIN D 25 HYDROXY: CPT | Performed by: NURSE PRACTITIONER

## 2020-02-10 PROCEDURE — 90471 IMMUNIZATION ADMIN: CPT | Performed by: NURSE PRACTITIONER

## 2020-02-10 PROCEDURE — 90632 HEPA VACCINE ADULT IM: CPT | Performed by: NURSE PRACTITIONER

## 2020-02-10 PROCEDURE — 80061 LIPID PANEL: CPT | Performed by: NURSE PRACTITIONER

## 2020-02-10 PROCEDURE — 99395 PREV VISIT EST AGE 18-39: CPT | Performed by: NURSE PRACTITIONER

## 2020-02-10 PROCEDURE — 82607 VITAMIN B-12: CPT | Performed by: NURSE PRACTITIONER

## 2020-02-10 PROCEDURE — 80050 GENERAL HEALTH PANEL: CPT | Performed by: NURSE PRACTITIONER

## 2020-02-10 RX ORDER — ESCITALOPRAM OXALATE 10 MG/1
1 TABLET ORAL DAILY
COMMUNITY
Start: 2020-01-30 | End: 2020-12-18

## 2020-02-10 RX ORDER — FAMOTIDINE 20 MG
1 TABLET ORAL DAILY
Qty: 30 CAPSULE | Refills: 11 | Status: SHIPPED | OUTPATIENT
Start: 2020-02-10 | End: 2020-05-12 | Stop reason: SDUPTHER

## 2020-02-10 RX ORDER — PANTOPRAZOLE SODIUM 40 MG/1
40 TABLET, DELAYED RELEASE ORAL DAILY
Qty: 90 TABLET | Refills: 0 | Status: SHIPPED | OUTPATIENT
Start: 2020-02-10 | End: 2020-05-12 | Stop reason: SDUPTHER
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
## 2020-02-10 NOTE — PATIENT INSTRUCTIONS
Calorie Counting for Weight Loss  Calories are units of energy. Your body needs a certain amount of calories from food to keep you going throughout the day. When you eat more calories than your body needs, your body stores the extra calories as fat. When you eat fewer calories than your body needs, your body burns fat to get the energy it needs.  Calorie counting means keeping track of how many calories you eat and drink each day. Calorie counting can be helpful if you need to lose weight. If you make sure to eat fewer calories than your body needs, you should lose weight. Ask your health care provider what a healthy weight is for you.  For calorie counting to work, you will need to eat the right number of calories in a day in order to lose a healthy amount of weight per week. A dietitian can help you determine how many calories you need in a day and will give you suggestions on how to reach your calorie goal.  · A healthy amount of weight to lose per week is usually 1-2 lb (0.5-0.9 kg). This usually means that your daily calorie intake should be reduced by 500-750 calories.  · Eating 1,200 - 1,500 calories per day can help most women lose weight.  · Eating 1,500 - 1,800 calories per day can help most men lose weight.  What is my plan?  My goal is to have __________ calories per day.  If I have this many calories per day, I should lose around __________ pounds per week.  What do I need to know about calorie counting?  In order to meet your daily calorie goal, you will need to:  · Find out how many calories are in each food you would like to eat. Try to do this before you eat.  · Decide how much of the food you plan to eat.  · Write down what you ate and how many calories it had. Doing this is called keeping a food log.  To successfully lose weight, it is important to balance calorie counting with a healthy lifestyle that includes regular activity. Aim for 150 minutes of moderate exercise (such as walking) or 75  minutes of vigorous exercise (such as running) each week.  Where do I find calorie information?    The number of calories in a food can be found on a Nutrition Facts label. If a food does not have a Nutrition Facts label, try to look up the calories online or ask your dietitian for help.  Remember that calories are listed per serving. If you choose to have more than one serving of a food, you will have to multiply the calories per serving by the amount of servings you plan to eat. For example, the label on a package of bread might say that a serving size is 1 slice and that there are 90 calories in a serving. If you eat 1 slice, you will have eaten 90 calories. If you eat 2 slices, you will have eaten 180 calories.  How do I keep a food log?  Immediately after each meal, record the following information in your food log:  · What you ate. Don't forget to include toppings, sauces, and other extras on the food.  · How much you ate. This can be measured in cups, ounces, or number of items.  · How many calories each food and drink had.  · The total number of calories in the meal.  Keep your food log near you, such as in a small notebook in your pocket, or use a mobile azeem or website. Some programs will calculate calories for you and show you how many calories you have left for the day to meet your goal.  What are some calorie counting tips?    · Use your calories on foods and drinks that will fill you up and not leave you hungry:  ? Some examples of foods that fill you up are nuts and nut butters, vegetables, lean proteins, and high-fiber foods like whole grains. High-fiber foods are foods with more than 5 g fiber per serving.  ? Drinks such as sodas, specialty coffee drinks, alcohol, and juices have a lot of calories, yet do not fill you up.  · Eat nutritious foods and avoid empty calories. Empty calories are calories you get from foods or beverages that do not have many vitamins or protein, such as candy, sweets, and  "soda. It is better to have a nutritious high-calorie food (such as an avocado) than a food with few nutrients (such as a bag of chips).  · Know how many calories are in the foods you eat most often. This will help you calculate calorie counts faster.  · Pay attention to calories in drinks. Low-calorie drinks include water and unsweetened drinks.  · Pay attention to nutrition labels for \"low fat\" or \"fat free\" foods. These foods sometimes have the same amount of calories or more calories than the full fat versions. They also often have added sugar, starch, or salt, to make up for flavor that was removed with the fat.  · Find a way of tracking calories that works for you. Get creative. Try different apps or programs if writing down calories does not work for you.  What are some portion control tips?  · Know how many calories are in a serving. This will help you know how many servings of a certain food you can have.  · Use a measuring cup to measure serving sizes. You could also try weighing out portions on a kitchen scale. With time, you will be able to estimate serving sizes for some foods.  · Take some time to put servings of different foods on your favorite plates, bowls, and cups so you know what a serving looks like.  · Try not to eat straight from a bag or box. Doing this can lead to overeating. Put the amount you would like to eat in a cup or on a plate to make sure you are eating the right portion.  · Use smaller plates, glasses, and bowls to prevent overeating.  · Try not to multitask (for example, watch TV or use your computer) while eating. If it is time to eat, sit down at a table and enjoy your food. This will help you to know when you are full. It will also help you to be aware of what you are eating and how much you are eating.  What are tips for following this plan?  Reading food labels  · Check the calorie count compared to the serving size. The serving size may be smaller than what you are used to " "eating.  · Check the source of the calories. Make sure the food you are eating is high in vitamins and protein and low in saturated and trans fats.  Shopping  · Read nutrition labels while you shop. This will help you make healthy decisions before you decide to purchase your food.  · Make a grocery list and stick to it.  Cooking  · Try to cook your favorite foods in a healthier way. For example, try baking instead of frying.  · Use low-fat dairy products.  Meal planning  · Use more fruits and vegetables. Half of your plate should be fruits and vegetables.  · Include lean proteins like poultry and fish.  How do I count calories when eating out?  · Ask for smaller portion sizes.  · Consider sharing an entree and sides instead of getting your own entree.  · If you get your own entree, eat only half. Ask for a box at the beginning of your meal and put the rest of your entree in it so you are not tempted to eat it.  · If calories are listed on the menu, choose the lower calorie options.  · Choose dishes that include vegetables, fruits, whole grains, low-fat dairy products, and lean protein.  · Choose items that are boiled, broiled, grilled, or steamed. Stay away from items that are buttered, battered, fried, or served with cream sauce. Items labeled \"crispy\" are usually fried, unless stated otherwise.  · Choose water, low-fat milk, unsweetened iced tea, or other drinks without added sugar. If you want an alcoholic beverage, choose a lower calorie option such as a glass of wine or light beer.  · Ask for dressings, sauces, and syrups on the side. These are usually high in calories, so you should limit the amount you eat.  · If you want a salad, choose a garden salad and ask for grilled meats. Avoid extra toppings like hernandez, cheese, or fried items. Ask for the dressing on the side, or ask for olive oil and vinegar or lemon to use as dressing.  · Estimate how many servings of a food you are given. For example, a serving of " cooked rice is ½ cup or about the size of half a baseball. Knowing serving sizes will help you be aware of how much food you are eating at restaurants. The list below tells you how big or small some common portion sizes are based on everyday objects:  ? 1 oz--4 stacked dice.  ? 3 oz--1 deck of cards.  ? 1 tsp--1 die.  ? 1 Tbsp--½ a ping-pong ball.  ? 2 Tbsp--1 ping-pong ball.  ? ½ cup--½ baseball.  ? 1 cup--1 baseball.  Summary  · Calorie counting means keeping track of how many calories you eat and drink each day. If you eat fewer calories than your body needs, you should lose weight.  · A healthy amount of weight to lose per week is usually 1-2 lb (0.5-0.9 kg). This usually means reducing your daily calorie intake by 500-750 calories.  · The number of calories in a food can be found on a Nutrition Facts label. If a food does not have a Nutrition Facts label, try to look up the calories online or ask your dietitian for help.  · Use your calories on foods and drinks that will fill you up, and not on foods and drinks that will leave you hungry.  · Use smaller plates, glasses, and bowls to prevent overeating.  This information is not intended to replace advice given to you by your health care provider. Make sure you discuss any questions you have with your health care provider.  Document Released: 12/18/2006 Document Revised: 09/06/2019 Document Reviewed: 11/17/2017  [employer] Patient Education © 2019 [employer]      Exercising to Lose Weight  Exercise is structured, repetitive physical activity to improve fitness and health. Getting regular exercise is important for everyone. It is especially important if you are overweight. Being overweight increases your risk of heart disease, stroke, diabetes, high blood pressure, and several types of cancer. Reducing your calorie intake and exercising can help you lose weight.  Exercise is usually categorized as moderate or vigorous intensity. To lose weight, most  people need to do a certain amount of moderate-intensity or vigorous-intensity exercise each week.  Moderate-intensity exercise    Moderate-intensity exercise is any activity that gets you moving enough to burn at least three times more energy (calories) than if you were sitting.  Examples of moderate exercise include:  · Walking a mile in 15 minutes.  · Doing light yard work.  · Biking at an easy pace.  Most people should get at least 150 minutes (2 hours and 30 minutes) a week of moderate-intensity exercise to maintain their body weight.  Vigorous-intensity exercise  Vigorous-intensity exercise is any activity that gets you moving enough to burn at least six times more calories than if you were sitting. When you exercise at this intensity, you should be working hard enough that you are not able to carry on a conversation.  Examples of vigorous exercise include:  · Running.  · Playing a team sport, such as football, basketball, and soccer.  · Jumping rope.  Most people should get at least 75 minutes (1 hour and 15 minutes) a week of vigorous-intensity exercise to maintain their body weight.  How can exercise affect me?  When you exercise enough to burn more calories than you eat, you lose weight. Exercise also reduces body fat and builds muscle. The more muscle you have, the more calories you burn. Exercise also:  · Improves mood.  · Reduces stress and tension.  · Improves your overall fitness, flexibility, and endurance.  · Increases bone strength.  The amount of exercise you need to lose weight depends on:  · Your age.  · The type of exercise.  · Any health conditions you have.  · Your overall physical ability.  Talk to your health care provider about how much exercise you need and what types of activities are safe for you.  What actions can I take to lose weight?  Nutrition    · Make changes to your diet as told by your health care provider or diet and nutrition specialist (dietitian). This may  include:  ? Eating fewer calories.  ? Eating more protein.  ? Eating less unhealthy fats.  ? Eating a diet that includes fresh fruits and vegetables, whole grains, low-fat dairy products, and lean protein.  ? Avoiding foods with added fat, salt, and sugar.  · Drink plenty of water while you exercise to prevent dehydration or heat stroke.  Activity  · Choose an activity that you enjoy and set realistic goals. Your health care provider can help you make an exercise plan that works for you.  · Exercise at a moderate or vigorous intensity most days of the week.  ? The intensity of exercise may vary from person to person. You can tell how intense a workout is for you by paying attention to your breathing and heartbeat. Most people will notice their breathing and heartbeat get faster with more intense exercise.  · Do resistance training twice each week, such as:  ? Push-ups.  ? Sit-ups.  ? Lifting weights.  ? Using resistance bands.  · Getting short amounts of exercise can be just as helpful as long structured periods of exercise. If you have trouble finding time to exercise, try to include exercise in your daily routine.  ? Get up, stretch, and walk around every 30 minutes throughout the day.  ? Go for a walk during your lunch break.  ? Park your car farther away from your destination.  ? If you take public transportation, get off one stop early and walk the rest of the way.  ? Make phone calls while standing up and walking around.  ? Take the stairs instead of elevators or escalators.  · Wear comfortable clothes and shoes with good support.  · Do not exercise so much that you hurt yourself, feel dizzy, or get very short of breath.  Where to find more information  · [place] Department of Health and Human Services: www.hhs.gov  · Centers for Disease Control and Prevention (CDC): www.cdc.gov  Contact a health care provider:  · Before starting a new exercise program.  · If you have questions or concerns about your  weight.  · If you have a medical problem that keeps you from exercising.  Get help right away if you have any of the following while exercising:  · Injury.  · Dizziness.  · Difficulty breathing or shortness of breath that does not go away when you stop exercising.  · Chest pain.  · Rapid heartbeat.  Summary  · Being overweight increases your risk of heart disease, stroke, diabetes, high blood pressure, and several types of cancer.  · Losing weight happens when you burn more calories than you eat.  · Reducing the amount of calories you eat in addition to getting regular moderate or vigorous exercise each week helps you lose weight.  This information is not intended to replace advice given to you by your health care provider. Make sure you discuss any questions you have with your health care provider.  Document Released: 01/20/2012 Document Revised: 12/31/2018 Document Reviewed: 12/31/2018  [employer] Interactive Patient Education © 2019 [employer]

## 2020-02-14 ENCOUNTER — TELEPHONE (OUTPATIENT)
Dept: INTERNAL MEDICINE | Facility: CLINIC | Age: 35
End: 2020-02-14

## 2020-02-14 NOTE — TELEPHONE ENCOUNTER
----- Message from KATRINA Acevedo sent at 2/14/2020  4:15 PM EST -----  Please let her know that her labs show that her cholesterol is elevated.  Since she has not been following a healthy diet or exercising routinely will have her work on this prior to initiating medications. Patient should consume lean meats, whole grains, vegetables, and fruits, while avoiding concentrated sweets, junk foods, and fast foods.  Should also be sure to consume plenty of water.  Patient should engage in a minimum of 150 minutes of moderate intensity exercise per week as well.  And recheck her lipids in 3 months if still elevated at that time will initiate medications.    Her other labs are in acceptable ranges.

## 2020-02-19 DIAGNOSIS — E78.2 MIXED HYPERLIPIDEMIA: Primary | ICD-10-CM

## 2020-02-19 RX ORDER — SIMVASTATIN 10 MG
10 TABLET ORAL NIGHTLY
Qty: 30 TABLET | Refills: 2 | Status: SHIPPED | OUTPATIENT
Start: 2020-02-19 | End: 2020-04-24

## 2020-02-19 NOTE — TELEPHONE ENCOUNTER
PN of results.  She says she used to be on Simvastatin and the doctor took her off-she does not know why.   She wants to know if she can go back on.

## 2020-03-06 RX ORDER — FLUCONAZOLE 150 MG/1
TABLET ORAL
Qty: 2 TABLET | Refills: 0 | OUTPATIENT
Start: 2020-03-06

## 2020-04-06 ENCOUNTER — TELEPHONE (OUTPATIENT)
Dept: INTERNAL MEDICINE | Facility: CLINIC | Age: 35
End: 2020-04-06

## 2020-04-06 DIAGNOSIS — N93.9 VAGINAL BLEEDING: Primary | ICD-10-CM

## 2020-04-06 RX ORDER — SANITARY PADS
1 EACH MISCELLANEOUS 4 TIMES DAILY PRN
Qty: 96 EACH | Refills: 11 | Status: SHIPPED | OUTPATIENT
Start: 2020-04-06

## 2020-04-06 NOTE — TELEPHONE ENCOUNTER
Pt states she usually gets 4 packs of 24 per month.  She uses 3-4 daily for her menstrual period ( I asked if this was for incontinence-she said no)  They are the [place] overnight heavy.  Pt says the phone number is [phone].  Fax number [phone].

## 2020-04-06 NOTE — TELEPHONE ENCOUNTER
Patient states that she needs a prescription for P[facility].  She does not have a Fax No.  She can be reached at [phone]

## 2020-04-10 DIAGNOSIS — E78.2 MIXED HYPERLIPIDEMIA: ICD-10-CM

## 2020-04-10 RX ORDER — SIMVASTATIN 10 MG
10 TABLET ORAL NIGHTLY
Qty: 28 TABLET | OUTPATIENT
Start: 2020-04-10

## 2020-04-15 ENCOUNTER — TELEPHONE (OUTPATIENT)
Dept: INTERNAL MEDICINE | Facility: CLINIC | Age: 35
End: 2020-04-15

## 2020-04-15 ENCOUNTER — TELEMEDICINE (OUTPATIENT)
Dept: INTERNAL MEDICINE | Facility: CLINIC | Age: 35
End: 2020-04-15

## 2020-04-15 DIAGNOSIS — T14.8XXA WOUND INFECTION: Primary | ICD-10-CM

## 2020-04-15 DIAGNOSIS — B37.31 YEAST VAGINITIS: ICD-10-CM

## 2020-04-15 DIAGNOSIS — L08.9 WOUND INFECTION: Primary | ICD-10-CM

## 2020-04-15 PROCEDURE — 99213 OFFICE O/P EST LOW 20 MIN: CPT | Performed by: NURSE PRACTITIONER

## 2020-04-15 RX ORDER — FLUCONAZOLE 150 MG/1
150 TABLET ORAL ONCE
Qty: 2 TABLET | Refills: 0 | Status: SHIPPED | OUTPATIENT
Start: 2020-04-15 | End: 2020-04-15

## 2020-04-15 RX ORDER — CEPHALEXIN 500 MG/1
500 CAPSULE ORAL 2 TIMES DAILY
Qty: 14 CAPSULE | Refills: 0 | Status: SHIPPED | OUTPATIENT
Start: 2020-04-15 | End: 2020-04-22

## 2020-04-15 NOTE — PROGRESS NOTES
Subjective   Marguerite Edwards is a 35 y.o. female.   This was an audio and video enabled telemedicine encounter.    Chief Complaint   Patient presents with   • Breast Problem       History of Present Illness     Right breast- had a biopsy completed about 5 weeks ago by Jaci Torres with  Dermatology. Area is not healing. Pt does not have biopsy results yet.  She reports that the area is about the size of a nickel and has some yellow drainage and some surrounding redness and warmth.  She has used mupirocin cream intermittenyly throughout the 5 weeks and has had no improvement.  She denies any fever, chills, unintentional weight loss, or general malaise.  Tolerating p.o. intake and   Voiding and BMs per normal routine.    The following portions of the patient's history were reviewed and updated as appropriate: allergies, current medications, past family history, past medical history, past social history, past surgical history and problem list.        Review of Systems   Constitutional: Negative for fatigue, fever and unexpected weight loss.   HENT: Negative.    Eyes: Negative for pain and visual disturbance.   Respiratory: Negative for cough and shortness of breath.    Cardiovascular: Negative for chest pain, palpitations and leg swelling.   Gastrointestinal: Negative for abdominal pain, constipation and diarrhea.   Genitourinary: Negative.  Negative for difficulty urinating.   Musculoskeletal: Negative.  Negative for arthralgias and myalgias.   Skin: Positive for color change and skin lesions. Negative for rash.   Neurological: Negative for dizziness, weakness and headache.   Hematological: Does not bruise/bleed easily.           Outpatient Medications Marked as Taking for the 4/15/20 encounter (Telemedicine) with Liliya Hodges APRN   Medication Sig Dispense Refill   • albuterol sulfate  (90 Base) MCG/ACT inhaler Inhale 2 puffs Every 4 (Four) Hours As Needed for Wheezing. 18 g 5   • busPIRone  (BUSPAR) 7.5 MG tablet Take 15 mg by mouth 3 (Three) Times a Day.     • escitalopram (LEXAPRO) 10 MG tablet Take 1 tablet by mouth Daily.     • Fluticasone Furoate-Vilanterol (BREO ELLIPTA) 100-25 MCG/INH inhaler Inhale 1 puff Daily. 1 inhalation once a day 1 each 11   • mirtazapine (REMERON) 30 MG tablet Take 30 mg by mouth every night at bedtime.     • montelukast (SINGULAIR) 10 MG tablet Take 1 tablet by mouth Every Night. 30 tablet 11   • pantoprazole (PROTONIX) 40 MG EC tablet Take 1 tablet by mouth Daily. 90 tablet 0   • Sanitary Napkins & Tampons (MAXI PAD OVERNIGHT) pads 1 pad 4 (Four) Times a Day As Needed (menses). 96 each 11   • simvastatin (ZOCOR) 10 MG tablet Take 1 tablet by mouth Every Night. 30 tablet 2   • TRI-SPRINTEC 0.18/0.215/0.25 MG-35 MCG per tablet Take 1 tablet by mouth Daily.  2   • Vitamin D, Cholecalciferol, 25 MCG (1000 UT) capsule Take 1 capsule by mouth Daily. 30 capsule 11         Objective   Physical Exam   Constitutional: She is oriented to person, place, and time. She appears well-developed and well-nourished. She is cooperative. She does not appear ill. No distress.   HENT:   Head: Normocephalic and atraumatic.   Mouth/Throat: Mucous membranes are normal.   Eyes: Lids are normal. Right conjunctiva is not injected. Left conjunctiva is not injected. Right pupil is round. Left pupil is round.   Neck: Normal range of motion.   Pulmonary/Chest: Effort normal. Right breast exhibits skin change.       Neurological: She is alert and oriented to person, place, and time. She has normal strength. No sensory deficit.   Psychiatric: She has a normal mood and affect. Her speech is normal and behavior is normal. Thought content normal. She is attentive.   Exam limited due to the video nature of the visit.  Portions of exam have been completed by the patient at the direction of the provider        Assessment/Plan   Marguerite was seen today for breast problem.    Diagnoses and all orders for this  visit:    Wound infection  -     cephalexin (Keflex) 500 MG capsule; Take 1 capsule by mouth 2 (Two) Times a Day for 7 days.    Yeast vaginitis  -     fluconazole (Diflucan) 150 MG tablet; Take 1 tablet by mouth 1 (One) Time for 1 dose. May repeat  In 1 week if needed         Patient has been unable to follow-up with dermatology due to the COVID 19 pandemic.  She has a post biopsy wound infection to the breast.  We will cover this with Keflex.  I have encouraged her to keep the area clean and dry.  She requests Diflucan to have on hand to use on a as needed basis as she typically gets yeast vaginitis with antibiotics.    She can follow-up with dermatology once they are back in office after this COVID-19 pandemic         Return in about 1 week (around 4/22/2020) for Recheck wound infection on breast.    I discussed my findings,recommendations, and plan of care was with the patient. They verbalized understanding and agreement.  Patient was encouraged to keep me informed of any acute changes, lack of improvement, or any new concerning symptoms.       * Please note that portions of this note were completed with a voice recognition program. Efforts were made to edit the dictation but occasionally words are erroneously transcribed.

## 2020-04-15 NOTE — TELEPHONE ENCOUNTER
RENATE.  Pt has 1:00 appointment. He [facility] is not set up-I was calling to help her get this started and to have her download the [employer] and [employer] azeem.

## 2020-04-20 ENCOUNTER — TELEPHONE (OUTPATIENT)
Dept: INTERNAL MEDICINE | Facility: CLINIC | Age: 35
End: 2020-04-20

## 2020-04-20 ENCOUNTER — TELEMEDICINE (OUTPATIENT)
Dept: INTERNAL MEDICINE | Facility: CLINIC | Age: 35
End: 2020-04-20

## 2020-04-20 DIAGNOSIS — T14.8XXA WOUND INFECTION: Primary | ICD-10-CM

## 2020-04-20 DIAGNOSIS — L08.9 WOUND INFECTION: Primary | ICD-10-CM

## 2020-04-20 DIAGNOSIS — T14.8XXA PAIN ASSOCIATED WITH WOUND: ICD-10-CM

## 2020-04-20 DIAGNOSIS — R52 PAIN ASSOCIATED WITH WOUND: ICD-10-CM

## 2020-04-20 PROCEDURE — 99213 OFFICE O/P EST LOW 20 MIN: CPT | Performed by: NURSE PRACTITIONER

## 2020-04-20 RX ORDER — NAPROXEN 500 MG/1
500 TABLET ORAL 2 TIMES DAILY PRN
Qty: 30 TABLET | Refills: 0 | Status: SHIPPED | OUTPATIENT
Start: 2020-04-20 | End: 2020-05-12

## 2020-04-20 NOTE — TELEPHONE ENCOUNTER
PT CALLED STATING THAT HER RIGHT BREST IS RED, LEAKING AND HURTING.  SHE IS CURRENTLY TAKING HER ANTIBIOTIC.     PT CONTACT [phone]     PLEASE ADVISE

## 2020-04-20 NOTE — PROGRESS NOTES
Subjective   Marguerite Edwards is a 35 y.o. female.     This was an audio and video enabled telemedicine encounter.    Chief Complaint   Patient presents with   • Wound Infection       History of Present Illness     Patient has a wound to the lower portion of her right breast.  This wound was initially noted after a breast biopsy was completed 5 weeks ago by Jaci Torres  At [facility].  Patient was seen via telehealth visit on 4/15/2020.  She was initially using mupirocin but had no improvement in symptoms.  It was felt to be infected so she was started on Keflex.  We also stopped the mupirocin and daily dressings due to retaining moisture at the area.     Since that visit she feels as though the area is getting worse because it is hurting more.  She is leaving the area open to air and is not wearing a bra.  Her breast and chest tissues have been rubbing together and staying moist with sweat.  She reports she is having drainage but that it is clear.  She reports the area is slightly smaller is now the size of a dime.  She has had tried Tylenol and ibuprofen without the relief of the pain..      The following portions of the patient's history were reviewed and updated as appropriate: allergies, current medications, past family history, past medical history, past social history, past surgical history and problem list.        Review of Systems   Constitutional: Negative for fatigue, fever and unexpected weight loss.   HENT: Negative.    Eyes: Negative for pain and visual disturbance.   Respiratory: Negative for cough and shortness of breath.    Cardiovascular: Negative for chest pain, palpitations and leg swelling.   Gastrointestinal: Negative for abdominal pain, constipation and diarrhea.   Genitourinary: Negative.  Negative for difficulty urinating.   Musculoskeletal: Negative.  Negative for arthralgias and myalgias.   Skin: Positive for color change and skin lesions. Negative for dry skin, pallor  and rash.   Neurological: Negative for dizziness, weakness and headache.   Hematological: Does not bruise/bleed easily.           No outpatient medications have been marked as taking for the 4/20/20 encounter (Telemedicine) with Liliya Hodges APRN.           Objective   Physical Exam   Constitutional: She is oriented to person, place, and time. She appears well-developed and well-nourished. She is cooperative.  Non-toxic appearance. She does not appear ill. No distress.   HENT:   Head: Normocephalic and atraumatic.   Right Ear: External ear normal.   Left Ear: External ear normal.   Nose: No nasal deformity.   Mouth/Throat: Mucous membranes are normal.   Eyes: Lids are normal. Right conjunctiva is not injected. Left conjunctiva is not injected. Right pupil is round. Left pupil is round.   Neck: Normal range of motion.   Pulmonary/Chest: Effort normal. She exhibits no tenderness. Right breast exhibits skin change and tenderness. No breast swelling or discharge.       Neurological: She is alert and oriented to person, place, and time. She has normal strength. No sensory deficit.   Psychiatric: She has a normal mood and affect. Her speech is normal and behavior is normal. Thought content normal. She is attentive.   Exam is limited due to the video nature of the visit.  Portions of exam have been   Completed/verbalized by the patient at direction of the provider.  There were no vitals filed for this visit.  There is no height or weight on file to calculate BMI.        Assessment/Plan   Marguerite was seen today for wound infection.    Diagnoses and all orders for this visit:    Wound infection    Pain associated with wound  -     naproxen (NAPROSYN) 500 MG tablet; Take 1 tablet by mouth 2 (Two) Times a Day As Needed for Moderate Pain .         Wound appears to be healing well.  Patient advised to finish her Keflex.  We will send some naproxen for her to use on a short-term basis for the discomfort.  She has been  instructed to keep the area clean and dry.  I have recommended that she wear her bra and place a dry gauze in her bra to absorb any drainage.  If drainage becomes purulent she should let me know.       Return in about 1 week (around 4/27/2020) for Recheck wound.    I discussed my findings,recommendations, and plan of care was with the patient. They verbalized understanding and agreement.  Patient was encouraged to keep me informed of any acute changes, lack of improvement, or any new concerning symptoms.       * Please note that portions of this note were completed with a voice recognition program. Efforts were made to edit the dictation but occasionally words are erroneously transcribed.

## 2020-04-24 DIAGNOSIS — E78.2 MIXED HYPERLIPIDEMIA: ICD-10-CM

## 2020-04-24 RX ORDER — SIMVASTATIN 10 MG
10 TABLET ORAL NIGHTLY
Qty: 28 TABLET | Refills: 0 | Status: SHIPPED | OUTPATIENT
Start: 2020-04-24 | End: 2020-05-12 | Stop reason: SDUPTHER

## 2020-05-12 ENCOUNTER — TELEMEDICINE (OUTPATIENT)
Dept: INTERNAL MEDICINE | Facility: CLINIC | Age: 35
End: 2020-05-12

## 2020-05-12 VITALS — HEART RATE: 98 BPM | DIASTOLIC BLOOD PRESSURE: 85 MMHG | SYSTOLIC BLOOD PRESSURE: 129 MMHG

## 2020-05-12 DIAGNOSIS — I10 ESSENTIAL HYPERTENSION: Primary | ICD-10-CM

## 2020-05-12 DIAGNOSIS — E55.9 VITAMIN D DEFICIENCY: ICD-10-CM

## 2020-05-12 DIAGNOSIS — K21.9 GASTROESOPHAGEAL REFLUX DISEASE, ESOPHAGITIS PRESENCE NOT SPECIFIED: ICD-10-CM

## 2020-05-12 DIAGNOSIS — T14.8XXA WOUND INFECTION: ICD-10-CM

## 2020-05-12 DIAGNOSIS — L08.9 WOUND INFECTION: ICD-10-CM

## 2020-05-12 DIAGNOSIS — Z59.9 FINANCIAL DIFFICULTIES: ICD-10-CM

## 2020-05-12 DIAGNOSIS — Z11.59 NEED FOR HEPATITIS C SCREENING TEST: ICD-10-CM

## 2020-05-12 DIAGNOSIS — E78.2 MIXED HYPERLIPIDEMIA: ICD-10-CM

## 2020-05-12 PROCEDURE — 99214 OFFICE O/P EST MOD 30 MIN: CPT | Performed by: NURSE PRACTITIONER

## 2020-05-12 RX ORDER — MIRTAZAPINE 45 MG/1
45 TABLET, FILM COATED ORAL NIGHTLY
COMMUNITY
Start: 2020-05-07 | End: 2022-09-26

## 2020-05-12 RX ORDER — PANTOPRAZOLE SODIUM 40 MG/1
40 TABLET, DELAYED RELEASE ORAL DAILY
Qty: 90 TABLET | Refills: 1 | Status: SHIPPED | OUTPATIENT
Start: 2020-05-12 | End: 2020-07-28 | Stop reason: ALTCHOICE

## 2020-05-12 RX ORDER — MELATONIN
1000 DAILY
COMMUNITY
Start: 2020-04-21 | End: 2022-01-17 | Stop reason: SDUPTHER

## 2020-05-12 RX ORDER — HYDROXYZINE PAMOATE 50 MG/1
50 CAPSULE ORAL EVERY 6 HOURS
COMMUNITY
Start: 2020-03-24

## 2020-05-12 RX ORDER — SIMVASTATIN 10 MG
10 TABLET ORAL NIGHTLY
Qty: 90 TABLET | Refills: 1 | Status: SHIPPED | OUTPATIENT
Start: 2020-05-12 | End: 2020-11-10 | Stop reason: SDUPTHER

## 2020-05-12 RX ORDER — BUSPIRONE HYDROCHLORIDE 15 MG/1
15 TABLET ORAL 3 TIMES DAILY
COMMUNITY
Start: 2020-05-07 | End: 2021-01-15

## 2020-05-12 RX ORDER — FAMOTIDINE 20 MG
1 TABLET ORAL DAILY
Qty: 30 CAPSULE | Refills: 11 | Status: SHIPPED | OUTPATIENT
Start: 2020-05-12 | End: 2021-06-08 | Stop reason: SDUPTHER

## 2020-05-12 RX ORDER — ARIPIPRAZOLE 5 MG/1
7.5 TABLET ORAL DAILY
COMMUNITY
Start: 2020-04-10 | End: 2022-01-17 | Stop reason: ALTCHOICE

## 2020-05-12 SDOH — ECONOMIC STABILITY - INCOME SECURITY: PROBLEM RELATED TO HOUSING AND ECONOMIC CIRCUMSTANCES, UNSPECIFIED: Z59.9

## 2020-05-12 NOTE — PROGRESS NOTES
You have chosen to receive care through a telehealth visit.  Do you consent to use a video/audio connection for your medical care today? Yes  This was an audio and video enabled telemedicine encounter.    Subjective   Marguerite Edwards is a 35 y.o. female.     Chief Complaint   Patient presents with   • Hypertension   • Hyperlipidemia   • Vitamin D Deficiency       History of Present Illness     Hypertension-chronic.  Well-controlled.  Blood pressure 129/85 at home today. Denies headaches, dizziness, visual disturbances, chest pain, dyspnea, TIA, leg pain/claudication symptoms, and edema.       Hyperlipidemia-chronic last lipids were elevated in February 2020.  She has worked harder on trying to eat leaner meats and whole grains.  She is not exercising.  She is currently on statin therapy.  Lab Results   Component Value Date    CHOL 242 (H) 02/10/2020    CHLPL 207 (H) 03/13/2018    TRIG 64 02/10/2020    HDL 55 02/10/2020     (H) 02/10/2020       Vitamin D deficiency-chronic.  Her last vitamin D level was collected in 2/2020 and was stable at that time.  She continues on vitamin D 1000 units daily.    Patient had a skin biopsy on her right breast recently per dermatology.  She ended up with a wound infection was treated with mupirocin and oral antibiotics.  Patient reports that wound has fully healed.     Pt has no way to weigh herself. Cannot afford scales.  She is overweight and her weight at her last visit was 191 pounds.  She feels as though this is probably about the same or maybe has gained a few pounds.    Staying nervous and will be seeing psychiatry today to discuss with them about possible medication adjustments.      The following portions of the patient's history were reviewed and updated as appropriate: allergies, current medications, past family history, past medical history, past social history, past surgical history and problem list.        Review of Systems   Constitutional: Negative for  fatigue, fever and unexpected weight loss.   HENT: Negative.    Eyes: Negative for pain and visual disturbance.   Respiratory: Negative for cough, chest tightness and shortness of breath.    Cardiovascular: Negative for chest pain, palpitations and leg swelling.   Gastrointestinal: Negative for abdominal pain, constipation and diarrhea.   Genitourinary: Negative.  Negative for difficulty urinating.   Musculoskeletal: Negative.  Negative for arthralgias and myalgias.   Skin: Negative for color change and rash.   Neurological: Positive for tremors ( Feeling nervous less shaky at times). Negative for dizziness, weakness, light-headedness, headache and confusion.   Hematological: Does not bruise/bleed easily.   Psychiatric/Behavioral: Positive for decreased concentration. Negative for depressed mood. The patient is nervous/anxious.            Outpatient Medications Marked as Taking for the 5/12/20 encounter (Telemedicine) with Liliya Hodges APRN   Medication Sig Dispense Refill   • albuterol sulfate  (90 Base) MCG/ACT inhaler Inhale 2 puffs Every 4 (Four) Hours As Needed for Wheezing. 18 g 5   • ARIPiprazole (Abilify) 5 MG tablet Take 7.5 mg by mouth Daily.     • busPIRone (BUSPAR) 15 MG tablet Take 15 mg by mouth 3 (Three) Times a Day.     • cholecalciferol (VITAMIN D3) 25 MCG (1000 UT) tablet Take 1,000 Units by mouth Daily.     • escitalopram (LEXAPRO) 10 MG tablet Take 1 tablet by mouth Daily.     • Fluticasone Furoate-Vilanterol (BREO ELLIPTA) 100-25 MCG/INH inhaler Inhale 1 puff Daily. 1 inhalation once a day 1 each 11   • hydrOXYzine pamoate (Vistaril) 50 MG capsule Take 50 mg by mouth Every 6 (Six) Hours. Every 6 hours  As needed and 2 pills at night     • mirtazapine (REMERON) 45 MG tablet Take 45 mg by mouth Every Night.     • montelukast (SINGULAIR) 10 MG tablet Take 1 tablet by mouth Every Night. 30 tablet 11   • mupirocin (BACTROBAN) 2 % ointment      • pantoprazole (PROTONIX) 40 MG EC tablet  Take 1 tablet by mouth Daily. 90 tablet 1   • Sanitary Napkins & Tampons (MAXI PAD OVERNIGHT) pads 1 pad 4 (Four) Times a Day As Needed (menses). 96 each 11   • simvastatin (ZOCOR) 10 MG tablet Take 1 tablet by mouth Every Night. 90 tablet 1   • TRI-SPRINTEC 0.18/0.215/0.25 MG-35 MCG per tablet Take 1 tablet by mouth Daily.  2   • Vitamin D, Cholecalciferol, 25 MCG (1000 UT) capsule Take 1 capsule by mouth Daily. 30 capsule 11   • [DISCONTINUED] busPIRone (BUSPAR) 7.5 MG tablet Take 15 mg by mouth 3 (Three) Times a Day.     • [DISCONTINUED] mirtazapine (REMERON) 30 MG tablet Take 30 mg by mouth every night at bedtime.     • [DISCONTINUED] pantoprazole (PROTONIX) 40 MG EC tablet Take 1 tablet by mouth Daily. 90 tablet 0   • [DISCONTINUED] simvastatin (ZOCOR) 10 MG tablet TAKE 1 TABLET BY MOUTH EVERY NIGHT 28 tablet 0   • [DISCONTINUED] Vitamin D, Cholecalciferol, 25 MCG (1000 UT) capsule Take 1 capsule by mouth Daily. 30 capsule 11       Objective   Physical Exam   Constitutional: She appears well-developed and well-nourished.   HENT:   Head: Normocephalic and atraumatic.   Eyes: Pupils are equal, round, and reactive to light. Right eye exhibits no discharge. Left eye exhibits no discharge. No scleral icterus.   Neck: Neck normal appearance.  Cardiovascular:   No conjunctival pallor noted.    Pulmonary/Chest: Effort normal.     .  Abdominal: Abdomen appears normal. Soft. She exhibits no distension.   Musculoskeletal: Normal range of motion.   Neurological: She is alert.   Skin: Skin is dry. No rash noted. No nail bed cyanosis or erythema.   Psychiatric: She mood appears normal. Her affect is normal. Her behavior is normal. Thought content is normal. She does not express abnormal judgement.   Vitals reviewed.      Vitals:    05/12/20 1242   BP: 129/85   Pulse: 98   Temp: Comment: denies fever     There is no height or weight on file to calculate BMI.        Assessment/Plan   Marguerite was seen today for hypertension,  hyperlipidemia and vitamin d deficiency.    Diagnoses and all orders for this visit:    Essential hypertension  Chronic and well controlled.  Encourage low-sodium diet.    Mixed hyperlipidemia  -     Lipid Panel; Future  -     Comprehensive Metabolic Panel; Future  -     simvastatin (ZOCOR) 10 MG tablet; Take 1 tablet by mouth Every Night.  Elevated on last lipid check.  Will recheck lipids.  Encouraged low-fat diet and routine physical activity.    Wound infection resolved    Gastroesophageal reflux disease, esophagitis presence not specified  -     pantoprazole (PROTONIX) 40 MG EC tablet; Take 1 tablet by mouth Daily.    Financial difficulties  -     Ambulatory Referral to Social Work    Vitamin D deficiency  -     Vitamin D, Cholecalciferol, 25 MCG (1000 UT) capsule; Take 1 capsule by mouth Daily.    Need for hepatitis C screening test  -     Hepatitis C Antibody; Future            Return in about 3 weeks (around 6/2/2020) for chronic condition follow up.    I discussed my findings,recommendations, and plan of care was with the patient. They verbalized understanding and agreement.  Patient was encouraged to keep me informed of any acute changes, lack of improvement, or any new concerning symptoms.       * Please note that portions of this note were completed with a voice recognition program. Efforts were made to edit the dictation but occasionally words are erroneously transcribed.

## 2020-05-13 ENCOUNTER — EPISODE CHANGES (OUTPATIENT)
Dept: CASE MANAGEMENT | Facility: OTHER | Age: 35
End: 2020-05-13

## 2020-05-15 ENCOUNTER — PATIENT OUTREACH (OUTPATIENT)
Dept: CASE MANAGEMENT | Facility: OTHER | Age: 35
End: 2020-05-15

## 2020-05-15 NOTE — OUTREACH NOTE
Care Coordination Note    RN-ACM called [employer] on a three way call with the pt to inquire about health and wellness programs and/or coverage for scale and thermometer as requested. Per [employer], it is recommended that PCP place order to DME company for the requested equipment. A PA would need be needed to be completed thereafter. [facility] states they participate with [facility], [facility], and [employer] DME companies in the local area.    Amanda Jason RN  Ambulatory     5/15/2020, 15:47

## 2020-05-15 NOTE — OUTREACH NOTE
Patient Outreach Note    RN-CARI spoke with pt after ending call with [employer]. Pt asked if PCP can proceed in ordering scale and thermometer with a local DME company. Pt states she has used [facility] in the past. Pt voiced appreciation for the assistance. Encounter routed to PCP.    Amanda Jason RN  Ambulatory     5/15/2020, 16:18

## 2020-05-15 NOTE — OUTREACH NOTE
Patient Outreach Note    RN-CARI rec'd SW referral for pt who is in need of a scale for monitoring weight at home.  Called pt who states she is interested in weight loss and wishes to monitor her weight at home.  She states she is unable to afford a scale and looking for options to obtain one at no cost. Pt also wanting to obtain a temporal thermometer.  She confirms she has an oral thermometer however would like the temporal option. Pt states she has food stamps and denies any needs for food. She uses wheels for transportation. Pt states she obtains medications for free and has no problem getting her medications filled.  Encouraged pt to contact her health insurance to inquire about benefits and health and wellness programs that might be available. Pt asked for assistance contacting her insurance. Provided pt with [facility] contact info to inquire about options for assistance as they may offer assistance with household items and financial counseling. Discussed healthy diet and exercise as methods for successful weight loss. Pt confirms has a smart phone, reviewed apps for the smartphone that she can download for free such as MyFitness Pal to track po intake, weight, goals, etc. Reviewed with pt option to visit PCP office for weight checks and/or local pharmacy for free weight checks if needed.  Discussed exercising in the home and outdoors for activity and exercise at no cost. Reviewed [facility] nutrition counseling / dietician services, discussed benefits of services, and explained PCP could place referral for her however pt declined this option.     Amanda Jason RN  Ambulatory     5/15/2020, 15:36

## 2020-05-16 NOTE — TELEPHONE ENCOUNTER
Patient requesting a refill on:  Vitamin D    [facility] - [place], [place]    Patient call back number is [phone].   no

## 2020-05-18 DIAGNOSIS — Z91.89 POOR SELF MONITORING: ICD-10-CM

## 2020-05-18 DIAGNOSIS — Z59.9 FINANCIAL DIFFICULTIES: Primary | ICD-10-CM

## 2020-05-18 RX ORDER — WEIGH SCALE
MISCELLANEOUS MISCELLANEOUS
Qty: 1 EACH | Refills: 0 | Status: SHIPPED | OUTPATIENT
Start: 2020-05-18 | End: 2021-06-08

## 2020-05-18 SDOH — ECONOMIC STABILITY - INCOME SECURITY: PROBLEM RELATED TO HOUSING AND ECONOMIC CIRCUMSTANCES, UNSPECIFIED: Z59.9

## 2020-05-18 NOTE — PROGRESS NOTES
Orders entered and printed/ Sitting on my desk for you. Please send to medical supply co ([facility]).

## 2020-05-20 ENCOUNTER — TELEPHONE (OUTPATIENT)
Dept: INTERNAL MEDICINE | Facility: CLINIC | Age: 35
End: 2020-05-20

## 2020-05-20 ENCOUNTER — TELEMEDICINE (OUTPATIENT)
Dept: INTERNAL MEDICINE | Facility: CLINIC | Age: 35
End: 2020-05-20

## 2020-05-20 DIAGNOSIS — M54.50 CHRONIC MIDLINE LOW BACK PAIN WITHOUT SCIATICA: Primary | ICD-10-CM

## 2020-05-20 DIAGNOSIS — G89.29 CHRONIC MIDLINE LOW BACK PAIN WITHOUT SCIATICA: Primary | ICD-10-CM

## 2020-05-20 PROCEDURE — 99213 OFFICE O/P EST LOW 20 MIN: CPT | Performed by: NURSE PRACTITIONER

## 2020-05-20 RX ORDER — DICLOFENAC SODIUM 75 MG/1
75 TABLET, DELAYED RELEASE ORAL 2 TIMES DAILY PRN
Qty: 60 TABLET | Refills: 1 | Status: SHIPPED | OUTPATIENT
Start: 2020-05-20 | End: 2020-10-20

## 2020-05-20 NOTE — OUTREACH NOTE
"Care Coordination Note    Pt LVM w/ RN-ACM requesting update on orders for scale / thermometer. Called [facility], they rec'd orders however both items are private pay only.  They are out of temporal thermometers. RN-ROSYM checked with other local DME companies [facility] and [employer], the requested items are private pay. [employer] has a \"talking scale\" on the retail side for $53.     Amanda Jason RN  Ambulatory     5/20/2020, 09:28      "

## 2020-05-20 NOTE — OUTREACH NOTE
Patient Outreach Note    RNMEGHA spoke with pt and provided update regarding orders for DME requested. Discussed less expensive options for private pay scales at local stores such as [employer] which have scales around $12. Pt states she is expecting stimulus check and will look at less expensive scale options at that time.  Reviewed with pt having weight checks at PCP office / pharmacy if needed.  Pt also states she contacted [facility] regarding obtaining a new bed. States she has a hospital bed that is uncomfortable and looking for a new bed. [facility] is able to assist with a new bed, pt will need to arrange for pickup. Pt does not think [employer] could assist in pickup.  Discussed reaching out to friends/family to assist however pt states they don't have anyone who can help with pickup. Discussed reaching out to local truck rental companies to inquire about discounted rates for short-term rental. Pt states she will look into options. Encouraged she reach back out to [facility] if unable to coordinate pickup and they may be able to coordinate with [employer] to assist.  She denied further needs at time of call and voiced appreciation for the assistance.    Amanda Jason, RN  Ambulatory     5/20/2020, 10:12

## 2020-05-20 NOTE — PROGRESS NOTES
You have chosen to receive care through a telehealth visit.  Do you consent to use a video/audio connection for your medical care today? Yes  This was an audio and video enabled telemedicine encounter.    Subjective   Marguerite Edwards is a 35 y.o. female.     Chief Complaint   Patient presents with   • Back Pain       Back Pain   This is a new problem. The current episode started more than 1 year ago. The problem occurs intermittently. The problem has been gradually worsening since onset. The pain is present in the lumbar spine. The quality of the pain is described as aching. The pain does not radiate. The pain is moderate. The pain is the same all the time. The symptoms are aggravated by bending, sitting, stress, twisting, standing, position and lying down. Pertinent negatives include no abdominal pain, bladder incontinence, bowel incontinence, chest pain, dysuria, fever, headaches, leg pain, numbness, paresis, paresthesias, pelvic pain, perianal numbness, tingling, weakness or weight loss. Risk factors include lack of exercise, poor posture, sedentary lifestyle and obesity. She has tried heat and NSAIDs for the symptoms. The treatment provided mild relief.      Tried naproxen without much relief.     Wants a referral to go to [facility] physical therapy.   Patient has a history of multiple back surgeries With discectomy for herniated disc x2.  Has some chronic pain and typically does well with physical therapy.  She would like to try some dry needling.    The following portions of the patient's history were reviewed and updated as appropriate: allergies, current medications, past family history, past medical history, past social history, past surgical history and problem list.        Review of Systems   Constitutional: Negative for chills, fever and unexpected weight loss.   HENT: Negative.    Eyes: Negative for pain and visual disturbance.   Respiratory: Negative for cough and shortness of breath.     Cardiovascular: Negative for chest pain, palpitations and leg swelling.   Gastrointestinal: Negative for abdominal pain, bowel incontinence, constipation and diarrhea.   Genitourinary: Negative.  Negative for difficulty urinating, dysuria, pelvic pain and urinary incontinence.   Musculoskeletal: Positive for back pain. Negative for arthralgias, gait problem, myalgias, neck pain and neck stiffness.   Skin: Negative for color change and rash.   Neurological: Negative for dizziness, tingling, weakness, numbness, headache and paresthesias.   Hematological: Does not bruise/bleed easily.           Outpatient Medications Marked as Taking for the 5/20/20 encounter (Telemedicine) with Liliya Hodges APRN   Medication Sig Dispense Refill   • albuterol sulfate  (90 Base) MCG/ACT inhaler Inhale 2 puffs Every 4 (Four) Hours As Needed for Wheezing. 18 g 5   • ARIPiprazole (Abilify) 5 MG tablet Take 7.5 mg by mouth Daily.     • busPIRone (BUSPAR) 15 MG tablet Take 15 mg by mouth 3 (Three) Times a Day.     • cholecalciferol (VITAMIN D3) 25 MCG (1000 UT) tablet Take 1,000 Units by mouth Daily.     • escitalopram (LEXAPRO) 10 MG tablet Take 1 tablet by mouth Daily.     • Fluticasone Furoate-Vilanterol (BREO ELLIPTA) 100-25 MCG/INH inhaler Inhale 1 puff Daily. 1 inhalation once a day 1 each 11   • hydrOXYzine pamoate (Vistaril) 50 MG capsule Take 50 mg by mouth Every 6 (Six) Hours. Every 6 hours  As needed and 2 pills at night     • mirtazapine (REMERON) 45 MG tablet Take 45 mg by mouth Every Night.     • Misc. Devices (PROFIT PRECISION SCALE) [facility] scales to weigh self weekly 1 each 0   • montelukast (SINGULAIR) 10 MG tablet Take 1 tablet by mouth Every Night. 30 tablet 11   • mupirocin (BACTROBAN) 2 % ointment      • pantoprazole (PROTONIX) 40 MG EC tablet Take 1 tablet by mouth Daily. 90 tablet 1   • Sanitary Napkins & Tampons (MAXI PAD OVERNIGHT) pads 1 pad 4 (Four) Times a Day As Needed (menses). 96 each 11   •  simvastatin (ZOCOR) 10 MG tablet Take 1 tablet by mouth Every Night. 90 tablet 1   • TRI-SPRINTEC 0.18/0.215/0.25 MG-35 MCG per tablet Take 1 tablet by mouth Daily.  2   • Vitamin D, Cholecalciferol, 25 MCG (1000 UT) capsule Take 1 capsule by mouth Daily. 30 capsule 11           Objective   Physical Exam   Constitutional: She appears well-developed and well-nourished.   HENT:   Head: Normocephalic and atraumatic.   Eyes: Conjunctivae and EOM are normal. No scleral icterus.   Neck: Neck normal appearance.  Cardiovascular:   No conjunctival pallor noted.    Pulmonary/Chest: Effort normal.   Abdominal: Abdomen appears normal.   Musculoskeletal:        Lumbar back: She exhibits pain. She exhibits normal range of motion, no tenderness, no bony tenderness, no swelling, no edema, no deformity, no laceration, no spasm and normal pulse.   Neurological: She is alert.   Skin: Skin is dry. No rash noted. No nail bed cyanosis or erythema.   Psychiatric: She has a normal mood and affect.   Vitals reviewed.  Exam limited due to the video nature of the visit.  Portions exam of been completed/verbalized by the patient.    There were no vitals filed for this visit.  There is no height or weight on file to calculate BMI.        Assessment/Plan   Marguerite was seen today for back pain.    Diagnoses and all orders for this visit:    Chronic midline low back pain without sciatica  -     Ambulatory Referral to Physical Therapy Evaluate and treat  -     diclofenac (VOLTAREN) 75 MG EC tablet; Take 1 tablet by mouth 2 (Two) Times a Day As Needed (pain).  -     Menthol (Icy Hot) 5 % patch; Apply 1 patch topically Daily As Needed (back pain).      Refer to physical therapy for evaluation and management of chronic low back pain.  Diclofenac as directed PRN for pain.  Adverse effects discussed.  Counseled on use with other medications.  Patient also requests a prescription for IcyHot patches as they have helped somewhat but she wants her insurance  to pay for them.  Prescription sent for low back daily as needed.           Return if symptoms worsen or fail to improve.    I discussed my findings,recommendations, and plan of care was with the patient. They verbalized understanding and agreement.  Patient was encouraged to keep me informed of any acute changes, lack of improvement, or any new concerning symptoms.       * Please note that portions of this note were completed with a voice recognition program. Efforts were made to edit the dictation but occasionally words are erroneously transcribed.

## 2020-05-21 ENCOUNTER — PRIOR AUTHORIZATION (OUTPATIENT)
Dept: INTERNAL MEDICINE | Facility: CLINIC | Age: 35
End: 2020-05-21

## 2020-05-21 ENCOUNTER — EPISODE CHANGES (OUTPATIENT)
Dept: CASE MANAGEMENT | Facility: OTHER | Age: 35
End: 2020-05-21

## 2020-05-28 ENCOUNTER — TELEPHONE (OUTPATIENT)
Dept: INTERNAL MEDICINE | Facility: CLINIC | Age: 35
End: 2020-05-28

## 2020-05-28 NOTE — TELEPHONE ENCOUNTER
PT IS REQUESTING ZI BE CALLED IN FOR HER     [facility] [id number] - [place], [place] - [address] [phone]  - [phone] FX

## 2020-05-28 NOTE — TELEPHONE ENCOUNTER
Spoke with pt, advised we would need to see her in the office before we could prescribe medication. She made an appt on 6/2/2020 with Liliya Hodges. She declined any other provider and strictly wants to see Liliya. She also said that her OBGYN would prescribe it and she is going to call to see if they will without seeing her.

## 2020-06-05 ENCOUNTER — LAB (OUTPATIENT)
Dept: LAB | Facility: HOSPITAL | Age: 35
End: 2020-06-05

## 2020-06-05 DIAGNOSIS — E78.2 MIXED HYPERLIPIDEMIA: ICD-10-CM

## 2020-06-05 DIAGNOSIS — Z11.59 NEED FOR HEPATITIS C SCREENING TEST: ICD-10-CM

## 2020-06-05 LAB
ALBUMIN SERPL-MCNC: 4.4 G/DL (ref 3.5–5.2)
ALBUMIN/GLOB SERPL: 1.5 G/DL
ALP SERPL-CCNC: 91 U/L (ref 39–117)
ALT SERPL W P-5'-P-CCNC: 13 U/L (ref 1–33)
ANION GAP SERPL CALCULATED.3IONS-SCNC: 11 MMOL/L (ref 5–15)
AST SERPL-CCNC: 14 U/L (ref 1–32)
BILIRUB SERPL-MCNC: <0.2 MG/DL (ref 0.2–1.2)
BUN BLD-MCNC: 18 MG/DL (ref 6–20)
BUN/CREAT SERPL: 24.3 (ref 7–25)
CALCIUM SPEC-SCNC: 9.8 MG/DL (ref 8.6–10.5)
CHLORIDE SERPL-SCNC: 102 MMOL/L (ref 98–107)
CHOLEST SERPL-MCNC: 160 MG/DL (ref 0–200)
CO2 SERPL-SCNC: 26 MMOL/L (ref 22–29)
CREAT BLD-MCNC: 0.74 MG/DL (ref 0.57–1)
GFR SERPL CREATININE-BSD FRML MDRD: 88 ML/MIN/1.73
GLOBULIN UR ELPH-MCNC: 3 GM/DL
GLUCOSE BLD-MCNC: 108 MG/DL (ref 65–99)
HCV AB SER DONR QL: NORMAL
HDLC SERPL-MCNC: 37 MG/DL (ref 40–60)
LDLC SERPL CALC-MCNC: 84 MG/DL (ref 0–100)
LDLC/HDLC SERPL: 2.27 {RATIO}
POTASSIUM BLD-SCNC: 5.1 MMOL/L (ref 3.5–5.2)
PROT SERPL-MCNC: 7.4 G/DL (ref 6–8.5)
SODIUM BLD-SCNC: 139 MMOL/L (ref 136–145)
TRIGL SERPL-MCNC: 195 MG/DL (ref 0–150)
VLDLC SERPL-MCNC: 39 MG/DL (ref 5–40)

## 2020-06-05 PROCEDURE — 80061 LIPID PANEL: CPT

## 2020-06-05 PROCEDURE — 80053 COMPREHEN METABOLIC PANEL: CPT

## 2020-06-05 PROCEDURE — 36415 COLL VENOUS BLD VENIPUNCTURE: CPT

## 2020-06-05 PROCEDURE — 86803 HEPATITIS C AB TEST: CPT

## 2020-06-08 ENCOUNTER — TELEPHONE (OUTPATIENT)
Dept: INTERNAL MEDICINE | Facility: CLINIC | Age: 35
End: 2020-06-08

## 2020-06-08 RX ORDER — LANCETS 28 GAUGE
1 EACH MISCELLANEOUS 3 TIMES DAILY
Qty: 100 EACH | Refills: 3 | Status: SHIPPED | OUTPATIENT
Start: 2020-06-08 | End: 2022-09-26

## 2020-06-08 NOTE — TELEPHONE ENCOUNTER
She has an appointment this Friday.  I planned to discuss them then.  But you can go ahead and let her know that her labs show that her triglycerides are increasing.  She needs to eat a diet that is lower in carbohydrates and sugar and be physically active daily.  Labs also show a slight elevation in her glucose this is something we will just watch at this time and the after mentioned diet can help with this.  Her hepatitis C screening was negative.

## 2020-06-08 NOTE — TELEPHONE ENCOUNTER
PATIENT IS REQUESTING   LANCETS AND TEST STRIPES TO BE CALLED IN   PLEASE CALL BACK BECAUSE THE PATIENT DID NOT KNOW THE KNOW OF THEM    PATIENT CALL BACK [phone]      [facility]

## 2020-06-12 ENCOUNTER — TELEMEDICINE (OUTPATIENT)
Dept: INTERNAL MEDICINE | Facility: CLINIC | Age: 35
End: 2020-06-12

## 2020-06-12 VITALS — SYSTOLIC BLOOD PRESSURE: 115 MMHG | HEART RATE: 90 BPM | DIASTOLIC BLOOD PRESSURE: 95 MMHG

## 2020-06-12 DIAGNOSIS — G89.29 CHRONIC MIDLINE LOW BACK PAIN WITHOUT SCIATICA: ICD-10-CM

## 2020-06-12 DIAGNOSIS — M54.50 CHRONIC MIDLINE LOW BACK PAIN WITHOUT SCIATICA: ICD-10-CM

## 2020-06-12 DIAGNOSIS — R73.09 ELEVATED GLUCOSE: Primary | ICD-10-CM

## 2020-06-12 DIAGNOSIS — K21.9 GASTROESOPHAGEAL REFLUX DISEASE, ESOPHAGITIS PRESENCE NOT SPECIFIED: ICD-10-CM

## 2020-06-12 DIAGNOSIS — E78.2 MIXED HYPERLIPIDEMIA: ICD-10-CM

## 2020-06-12 PROCEDURE — 99214 OFFICE O/P EST MOD 30 MIN: CPT | Performed by: NURSE PRACTITIONER

## 2020-06-12 RX ORDER — PEN NEEDLE, DIABETIC 31 GX5/16"
1 NEEDLE, DISPOSABLE MISCELLANEOUS DAILY
Qty: 100 EACH | Refills: 11 | Status: SHIPPED | OUTPATIENT
Start: 2020-06-12 | End: 2020-09-16 | Stop reason: SDUPTHER

## 2020-06-12 NOTE — PROGRESS NOTES
You have chosen to receive care through a telehealth visit.  Do you consent to use a video/audio connection for your medical care today? Yes  This was an audio and video enabled telemedicine encounter.    Subjective   Marguerite Edwards is a 35 y.o. female.     Chief Complaint   Patient presents with   • Hypertension   • Hyperlipidemia   • Heartburn   • Hyperglycemia       History of Present Illness     Elevated glucose-patient has slightly elevated glucose on labs 6/5/2020 at 108.  She was previously checked for diabetes in February 2019.  Her hemoglobin A1c was 5.6% at that time.  Diet-unhealthy  Exercise-none.    Lab Results   Component Value Date    HGBA1C 5.60 02/06/2019     Hyperlipidemia-chronic.  Last lipids were checked 6/5/2020 with triglycerides being elevated 195.  She is currently on statin therapy with simvastatin.  Compliant with dosing and denies any adverse effects.    Heartburn-chronic and well controlled.  She rarely has indigestion.      Patient is still experiencing low back pain.  She is doing physical therapy.  She reports this is helping somewhat.  She is also using diclofenac with moderate relief.  She will go back to rehab on Monday and Tuesday as well.    The following portions of the patient's history were reviewed and updated as appropriate: allergies, current medications, past family history, past medical history, past social history, past surgical history and problem list.        Review of Systems   Constitutional: Negative for appetite change, diaphoresis, fatigue, fever, unexpected weight gain and unexpected weight loss.   HENT: Negative.    Eyes: Negative for pain and visual disturbance.   Respiratory: Negative for cough, chest tightness, shortness of breath and wheezing.    Cardiovascular: Negative for chest pain, palpitations and leg swelling.   Gastrointestinal: Positive for indigestion (rare). Negative for abdominal distention, abdominal pain, constipation, diarrhea, nausea  and vomiting.   Endocrine: Negative for polydipsia, polyphagia and polyuria.   Genitourinary: Negative.  Negative for difficulty urinating.   Musculoskeletal: Positive for back pain. Negative for arthralgias and myalgias.   Skin: Negative for color change and rash.   Neurological: Negative for dizziness, facial asymmetry, weakness, light-headedness, numbness, headache and confusion.   Hematological: Does not bruise/bleed easily.   Psychiatric/Behavioral: Negative for sleep disturbance.           Outpatient Medications Marked as Taking for the 6/12/20 encounter (Telemedicine) with Liliya Hodges APRN   Medication Sig Dispense Refill   • albuterol sulfate  (90 Base) MCG/ACT inhaler Inhale 2 puffs Every 4 (Four) Hours As Needed for Wheezing. 18 g 5   • ARIPiprazole (Abilify) 5 MG tablet Take 7.5 mg by mouth Daily.     • busPIRone (BUSPAR) 15 MG tablet Take 15 mg by mouth 3 (Three) Times a Day.     • cholecalciferol (VITAMIN D3) 25 MCG (1000 UT) tablet Take 1,000 Units by mouth Daily.     • diclofenac (VOLTAREN) 75 MG EC tablet Take 1 tablet by mouth 2 (Two) Times a Day As Needed (pain). 60 tablet 1   • escitalopram (LEXAPRO) 10 MG tablet Take 1 tablet by mouth Daily.     • Fluticasone Furoate-Vilanterol (BREO ELLIPTA) 100-25 MCG/INH inhaler Inhale 1 puff Daily. 1 inhalation once a day 1 each 11   • glucose blood test strip Use as instructed 100 each 12   • hydrOXYzine pamoate (Vistaril) 50 MG capsule Take 50 mg by mouth Every 6 (Six) Hours. Every 6 hours  As needed and 2 pills at night     • Lancets Ultra Fine misc 1 each 3 (Three) Times a Day. 100 each 3   • Menthol (Icy Hot) 5 % patch Apply 1 patch topically Daily As Needed (back pain). 30 patch 3   • mirtazapine (REMERON) 45 MG tablet Take 45 mg by mouth Every Night.     • Misc. Devices (PROFIT PRECISION SCALE) [facility] scales to weigh self weekly 1 each 0   • montelukast (SINGULAIR) 10 MG tablet Take 1 tablet by mouth Every Night. 30 tablet 11   •  mupirocin (BACTROBAN) 2 % ointment      • pantoprazole (PROTONIX) 40 MG EC tablet Take 1 tablet by mouth Daily. 90 tablet 1   • Sanitary Napkins & Tampons (MAXI PAD OVERNIGHT) pads 1 pad 4 (Four) Times a Day As Needed (menses). 96 each 11   • simvastatin (ZOCOR) 10 MG tablet Take 1 tablet by mouth Every Night. 90 tablet 1   • TRI-SPRINTEC 0.18/0.215/0.25 MG-35 MCG per tablet Take 1 tablet by mouth Daily.  2   • Vitamin D, Cholecalciferol, 25 MCG (1000 UT) capsule Take 1 capsule by mouth Daily. 30 capsule 11           Objective   Physical Exam   Constitutional: She appears well-developed and well-nourished.   HENT:   Head: Normocephalic and atraumatic.   Eyes: Pupils are equal, round, and reactive to light. No scleral icterus.   Neck: Neck normal appearance.  Cardiovascular:   No conjunctival pallor noted.    Pulmonary/Chest: Effort normal.   Abdominal: Abdomen appears normal.   Neurological: She is alert.   Skin: Skin is dry. Capillary refill takes less than 2 seconds. No nail bed cyanosis or erythema.   Psychiatric: She has a normal mood and affect. She mood appears normal. Her affect is normal. Her behavior is normal. Thought content is normal. She does not express abnormal judgement.   Vitals reviewed.        Vitals:    06/12/20 1442   BP: 115/95   Pulse: 90   Temp: Comment: denies fever     There is no height or weight on file to calculate BMI.        Assessment/Plan   Marguerite was seen today for hypertension, hyperlipidemia, heartburn and hyperglycemia.    Diagnoses and all orders for this visit:    Elevated glucose  -     Alcohol Swabs (ALCOHOL PREP) pads; 1 pad Daily.  Patient to monitor glucose daily.  We will check an A1c with her next visit.    Gastroesophageal reflux disease, esophagitis presence not specified  Well-controlled.  Continue PPI.    Mixed hyperlipidemia  Last visit with elevated triglycerides.  Encouraged low-fat diet and to decrease carbohydrate and sugar intake.  Recheck lipids in 3 to 6  months.  Continue statin    chronic midline low back pain without sciatica  Continue PRN diclofenac and continue physical therapy.              Return in about 3 months (around 9/12/2020) for chronic condition follow up.    I discussed my findings,recommendations, and plan of care was with the patient. They verbalized understanding and agreement.  Patient was encouraged to keep me informed of any acute changes, lack of improvement, or any new concerning symptoms.       * Please note that portions of this note were completed with a voice recognition program. Efforts were made to edit the dictation but occasionally words are erroneously transcribed.

## 2020-06-19 ENCOUNTER — TELEPHONE (OUTPATIENT)
Dept: GASTROENTEROLOGY | Facility: CLINIC | Age: 35
End: 2020-06-19

## 2020-06-19 NOTE — TELEPHONE ENCOUNTER
Pt called stating she is experiencing loose stool and wanted to have Linzess samples. I explained to her Linzess is used normally for constipation and would not be a good idea to use the Linzess. She wanted to schedule a colonoscopy but then stated she sees Alma on Monday via Telehealth, she says she will hold off and s/w Alma for further suggestions.

## 2020-06-22 ENCOUNTER — TELEMEDICINE (OUTPATIENT)
Dept: GASTROENTEROLOGY | Facility: CLINIC | Age: 35
End: 2020-06-22

## 2020-06-22 ENCOUNTER — TELEPHONE (OUTPATIENT)
Dept: GASTROENTEROLOGY | Facility: CLINIC | Age: 35
End: 2020-06-22

## 2020-06-22 DIAGNOSIS — Z79.1 ENCOUNTER FOR LONG-TERM (CURRENT) USE OF NSAIDS: ICD-10-CM

## 2020-06-22 DIAGNOSIS — R19.4 CHANGE IN BOWEL HABITS: Primary | ICD-10-CM

## 2020-06-22 DIAGNOSIS — K21.9 CHRONIC GERD: ICD-10-CM

## 2020-06-22 DIAGNOSIS — R11.0 NAUSEA: ICD-10-CM

## 2020-06-22 DIAGNOSIS — Z12.11 SCREENING FOR COLON CANCER: Primary | ICD-10-CM

## 2020-06-22 DIAGNOSIS — R10.84 GENERALIZED ABDOMINAL PAIN: ICD-10-CM

## 2020-06-22 PROCEDURE — 99214 OFFICE O/P EST MOD 30 MIN: CPT | Performed by: NURSE PRACTITIONER

## 2020-06-22 RX ORDER — DICYCLOMINE HCL 20 MG
20 TABLET ORAL 4 TIMES DAILY PRN
Qty: 30 TABLET | Refills: 1 | Status: SHIPPED | OUTPATIENT
Start: 2020-06-22 | End: 2020-07-16 | Stop reason: SDUPTHER

## 2020-06-22 RX ORDER — ONDANSETRON 4 MG/1
4 TABLET, FILM COATED ORAL 4 TIMES DAILY PRN
Qty: 30 TABLET | Refills: 1 | Status: SHIPPED | OUTPATIENT
Start: 2020-06-22 | End: 2020-10-12 | Stop reason: SDUPTHER

## 2020-06-22 NOTE — TELEPHONE ENCOUNTER
PT CALLED LEFT MESSAGE WITH RENATO MANLEY ASKING FOR ME TO RETURN. RETURNED PT CALL; SHE IS REQUESTING TO HAVE EGD AND COLONOSCOPY SAME DAY. I ADVISED PT THAT I WOULD HAVE TO CONSULT WITH DR. HURLEY AND WE WOULD RETURN HER CALL.

## 2020-06-22 NOTE — TELEPHONE ENCOUNTER
PT CALLED IN REGARDS TO MEDICATION QUESTIONS FOR HER COLONOSCOPY. I RETURNED PT CALL, ADVISED SHE CAN TAKE MEDICATION, HOWEVER, SHE WOULD NEED TO TAKE ABOUT 3 HOURS PRIOR TO HER ARRIVAL TIME. PT STATES SHE MAY HOLD THE MEDICATION UNTIL AFTER PROCEDURE, I ADVISED THAT WOULD BE HER CHOICE. PT VOICED UNDERSTANDING WITH NO FURTHER QUESTIONS OR CONCERNS

## 2020-06-22 NOTE — TELEPHONE ENCOUNTER
Got the message. I am ok with egd.  Make sure she takes a 48 hour bowel prep with 2 days of clear liquids, mag citrate, and bowel prep. Thank you.

## 2020-06-22 NOTE — PROGRESS NOTES
GASTROENTEROLOGY OUTPATIENT ESTABLISHED PATIENT NOTE Video/Telemedicine Visit  Patient: CLAUDIA CISNEROS : 1985  Date of Service: 2020  CC: No chief complaint on file.    Assessment/Plan                                             ASSESSMENT & PLANS     Change in bowel habits like to be r/t IBS  Chronic diarrhea  -     Colonoscopy; Future    Generalized abdominal pain  -     Start dicyclomine (Bentyl) 20 MG tablet; Take 1 tablet by mouth 4 (Four) Times a Day As Needed (abd pain or diarrhea).    Nausea  -     Start ondansetron (ZOFRAN) 4 MG tablet; Take 1 tablet by mouth 4 (Four) Times a Day As Needed for Nausea or Vomiting.    Chronic GERD  Encounter for long-term (current) use of NSAIDs  · Continue Protonix once daily    Follow Up:  Pending results     DISCUSSION: The above plan was delineated in details with pt and all questions and concerns were answered.  Patient is also given contact information.  Patient is to return as scheduled or sooner if new problems arise.     Subjective                                                     SUBJECTIVE   History of Present Illness  Ms. Claudia Cisneros is a 35 y.o. female presents as a telemedicine visit via video for a f/u on chronic pain and constipation.  No longer on pain meds.  Constipation has resolved. Pt reports a change in bowel habit the last few months. Pt now has diarrhea the last 3 days, about 4-5 times a day. No bloody stools. Nausea w/o vomiting or unintentional wt loss. Intermittent abd cramping. Pt has taken Bentyl in the past w/ some relief.     Pt denies consuming any suspected food or unpurified water.  No one w/ whom pt has shared a meal has developed diarrhea or exposed to sick contact.    No frequent abx. Pt was not ill or hospitalized in the months leading up to diarrheal illness. No recent travel outside of the [place]. Pt has no family hx of inflammatory bowel disease or autoimmune disease.    Colonoscopy, done  about 5-7 yrs ago, WNL    Review of Systems   ROS:Review of Systems  Objective                                                           OBJECTIVE   Allergy: Pt is allergic to paxil [paroxetine hcl]; prozac [fluoxetine hcl]; and amitiza [lubiprostone].  MEDS: •  albuterol sulfate  (90 Base) MCG/ACT inhaler, Inhale 2 puffs Every 4 (Four) Hours As Needed for Wheezing., Disp: 18 g, Rfl: 5  •  ARIPiprazole (Abilify) 5 MG tablet, Take 7.5 mg by mouth Daily., Disp: , Rfl:   •  busPIRone (BUSPAR) 15 MG tablet, Take 15 mg by mouth 3 (Three) Times a Day., Disp: , Rfl:   •  cholecalciferol (VITAMIN D3) 25 MCG (1000 UT) tablet, Take 1,000 Units by mouth Daily., Disp: , Rfl:   •  diclofenac (VOLTAREN) 75 MG EC tablet, Take 1 tablet by mouth 2 (Two) Times a Day As Needed (pain)., Disp: 60 tablet, Rfl: 1  •  escitalopram (LEXAPRO) 10 MG tablet, Take 1 tablet by mouth Daily., Disp: , Rfl:   •  Fluticasone Furoate-Vilanterol (BREO ELLIPTA) 100-25 MCG/INH inhaler, Inhale 1 puff Daily. 1 inhalation once a day, Disp: 1 each, Rfl: 11  • hydrOXYzine pamoate (Vistaril) 50 MG capsule, Take 50 mg by mouth Every 6 (Six) Hours. Every 6 hours  As needed and 2 pills at night, Disp: , Rfl:   •  Menthol (Icy Hot) 5 % patch, Apply 1 patch topically Daily As Needed (back pain)., Disp: 30 patch, Rfl: 3  •  mirtazapine (REMERON) 45 MG tablet, Take 45 mg by mouth Every Night., Disp: , Rfl:   •  Misc. Devices (PROFIT PRECISION SCALE) misc, Home scales to weigh self weekly, Disp: 1 each, Rfl: 0  •  montelukast (SINGULAIR) 10 MG tablet, Take 1 tablet by mouth Every Night., Disp: 30 tablet, Rfl: 11  •  mupirocin (BACTROBAN) 2 % ointment, , Disp: , Rfl:   •  pantoprazole (PROTONIX) 40 MG EC tablet, Take 1 tablet by mouth Daily., Disp: 90 tablet, Rfl: 1  •  simvastatin (ZOCOR) 10 MG tablet, Take 1 tablet by mouth Every Night., Disp: 90 tablet, Rfl: 1  •  TRI-SPRINTEC 0.18/0.215/0.25 MG-35 MCG per tablet, Take 1 tablet by mouth Daily., Disp: , Rfl:  2  •  Vitamin D, Cholecalciferol, 25 MCG (1000 UT) capsule, Take 1 capsule by mouth Daily., Disp: 30 capsule, Rfl: 11    Medications reviewed w/ pt    Lab Results   Component Value Date    WBC 10.06 02/10/2020    WBC 6.96 02/06/2019    WBC 9.00 09/14/2018    EOSABS 0.05 02/06/2019    EOSABS 0.29 08/21/2018    EOSABS 0.11 07/25/2018    HGB 14.7 02/10/2020    HGB 12.9 02/06/2019    HGB 14.2 09/14/2018    HCT 43.5 02/10/2020    HCT 39.7 02/06/2019    HCT 44.3 (H) 09/14/2018    MCV 82.5 02/10/2020    MCV 83.4 02/06/2019    MCV 84.7 09/14/2018    MCHC 33.8 02/10/2020    MCHC 32.5 02/06/2019    MCHC 32.1 09/14/2018     02/10/2020     02/06/2019     09/14/2018     Lab Results   Component Value Date    RDW 13.8 02/10/2020    RDW 14.4 02/06/2019    RDW 13.2 09/14/2018    MCHC 33.8 02/10/2020    MCHC 32.5 02/06/2019    MCHC 32.1 09/14/2018     Lab Results   Component Value Date     06/05/2020    K 5.1 06/05/2020     06/05/2020    CO2 26.0 06/05/2020    BUN 18 06/05/2020    BUN 14 02/10/2020    BUN 9 02/06/2019    CREATININE 0.74 06/05/2020    CREATININE 0.80 02/10/2020    CREATININE 0.69 02/06/2019    EGFRIFNONA 88 06/05/2020    EGFRIFNONA 81 02/10/2020    EGFRIFNONA 97 02/06/2019    GLUCOSE 108 (H) 06/05/2020    CALCIUM 9.8 06/05/2020    ANIONGAP 11.0 06/05/2020     Lab Results   Component Value Date    AST 14 06/05/2020    AST 12 02/10/2020    AST 17 02/06/2019    ALT 13 06/05/2020    ALT 11 02/10/2020    ALT 20 02/06/2019    ALKPHOS 91 06/05/2020    ALKPHOS 96 02/10/2020    ALKPHOS 95 02/06/2019    BILITOT <0.2 (L) 06/05/2020    BILITOT 0.3 02/10/2020    BILITOT 0.2 (L) 02/06/2019    ALBUMIN 4.40 06/05/2020    ALBUMIN 4.50 02/10/2020    ALBUMIN 4.74 02/06/2019     Lab Results   Component Value Date    LIPASE 34 07/25/2018     Lab Results   Component Value Date    HGBA1C 5.60 02/06/2019    HGBA1C 5.60 09/14/2018    TSH 1.310 02/10/2020    TSH 1.526 02/06/2019    TSH 1.190 09/14/2018     No  results found for: INR  Lab Results   Component Value Date    HEPCVIRUSABY Non-Reactive 06/05/2020    CRP 3.14 (H) 07/25/2018     Lab Results   Component Value Date    THCURSCR Negative 04/02/2017    PCPUR Negative 04/02/2017    COCAINEUR Negative 04/02/2017    METAMPSCNUR Negative 04/02/2017    LABOPIASCN Positive (A) 04/02/2017    AMPHETSCREEN Negative 04/02/2017    LABBENZSCN Negative 04/02/2017    TRICYCLICSCN Positive (A) 04/02/2017    LABMETHSCN Negative 04/02/2017    BARBITSCNUR Negative 04/02/2017    OXYCODONESCN Negative 04/02/2017    PROPOXSCN Negative 04/02/2017    BUPRENORSCNU Negative 04/02/2017     Wt Readings from Last 5 Encounters:   02/10/20 86.9 kg (191 lb 9.6 oz)   12/26/19 83 kg (183 lb)   06/05/19 77.6 kg (171 lb)   05/06/19 77.7 kg (171 lb 6.4 oz)   04/15/19 79.4 kg (175 lb)   body mass index is unknown because there is no height or weight on file., , ,    Physical Exam    General Well developed; well nourished. NAD  Psych Oriented to time, place, and person.  Appropriate affect       Pt care team: Alma HERNDON & KATRINA Vigil  06/22/20 11:08 AM  [facility]--Gastroenterology  [phone]    CC: Liliya Brantley, APRN   [address] / [place] [address] FAX:[phone]

## 2020-06-23 ENCOUNTER — APPOINTMENT (OUTPATIENT)
Dept: PREADMISSION TESTING | Facility: HOSPITAL | Age: 35
End: 2020-06-23

## 2020-06-23 LAB
REF LAB TEST METHOD: NORMAL
SARS-COV-2 RNA RESP QL NAA+PROBE: NOT DETECTED

## 2020-06-23 PROCEDURE — C9803 HOPD COVID-19 SPEC COLLECT: HCPCS

## 2020-06-23 PROCEDURE — U0002 COVID-19 LAB TEST NON-CDC: HCPCS

## 2020-06-23 PROCEDURE — U0004 COV-19 TEST NON-CDC HGH THRU: HCPCS

## 2020-06-23 NOTE — TELEPHONE ENCOUNTER
RETURNED PT CALL; ADVISED SHE CAN TAKE THE PRESCRIBED MEDICATION PER DR. HURLEY. PT WILL ALSO COME BY TODAY TO  [place] TO START FOR HER FIRST PART OF 48 HOUR PREP.

## 2020-06-23 NOTE — TELEPHONE ENCOUNTER
I would prefer 1/2 to 1 sample bowel prep 48 hours prior to repeating bowel prep 24 hours rather than laxative pills due to dehydration risk

## 2020-06-23 NOTE — TELEPHONE ENCOUNTER
RETURNED PT CALL; PT HAD QUESTIONS OVER HER BIRTH CONTROL , SHE DID NOT TAKE IT TODAY AND WANTED TO KNOW IF SHE CAN CONTINUE TO TAKE THE BIRTH CONTROL TOMORROW. I ADVISED SHE NEEDS TO CONTINUE HER BIRTH CONTROL AS SCHEDULED. PT VOICED UNDERSTANDING WITH NO FURTHER QUESTIONS AT THIS TIME.

## 2020-06-23 NOTE — TELEPHONE ENCOUNTER
Nadia,  Patient called you back again. She has some additional questions about her medications. Thanks

## 2020-06-23 NOTE — TELEPHONE ENCOUNTER
Nadia,  Patient called back. She wants to know if she can take Diflucan with Scope coming up on 6/25/2020. Please call her back. Thanks

## 2020-06-25 ENCOUNTER — LAB REQUISITION (OUTPATIENT)
Dept: LAB | Facility: HOSPITAL | Age: 35
End: 2020-06-25

## 2020-06-25 ENCOUNTER — OUTSIDE FACILITY SERVICE (OUTPATIENT)
Dept: GASTROENTEROLOGY | Facility: CLINIC | Age: 35
End: 2020-06-25

## 2020-06-25 DIAGNOSIS — R10.10 UPPER ABDOMINAL PAIN, UNSPECIFIED: ICD-10-CM

## 2020-06-25 DIAGNOSIS — R19.7 DIARRHEA, UNSPECIFIED: ICD-10-CM

## 2020-06-25 DIAGNOSIS — R10.84 GENERALIZED ABDOMINAL PAIN: ICD-10-CM

## 2020-06-25 PROCEDURE — 45380 COLONOSCOPY AND BIOPSY: CPT | Performed by: INTERNAL MEDICINE

## 2020-06-25 PROCEDURE — 45385 COLONOSCOPY W/LESION REMOVAL: CPT | Performed by: INTERNAL MEDICINE

## 2020-06-25 PROCEDURE — 88305 TISSUE EXAM BY PATHOLOGIST: CPT | Performed by: INTERNAL MEDICINE

## 2020-06-25 PROCEDURE — 43239 EGD BIOPSY SINGLE/MULTIPLE: CPT | Performed by: INTERNAL MEDICINE

## 2020-06-26 ENCOUNTER — TELEPHONE (OUTPATIENT)
Dept: INTERNAL MEDICINE | Facility: CLINIC | Age: 35
End: 2020-06-26

## 2020-06-26 DIAGNOSIS — B37.31 YEAST VAGINITIS: Primary | ICD-10-CM

## 2020-06-26 LAB
CYTO UR: NORMAL
LAB AP CASE REPORT: NORMAL
LAB AP CLINICAL INFORMATION: NORMAL
LAB AP DIAGNOSIS COMMENT: NORMAL
PATH REPORT.FINAL DX SPEC: NORMAL
PATH REPORT.GROSS SPEC: NORMAL

## 2020-06-26 RX ORDER — FLUCONAZOLE 150 MG/1
150 TABLET ORAL ONCE
Qty: 1 TABLET | Refills: 0 | Status: SHIPPED | OUTPATIENT
Start: 2020-06-26 | End: 2020-06-26

## 2020-06-26 NOTE — TELEPHONE ENCOUNTER
Attempted to contact patient; No answer, LVM informing pt that requested medication had been sent to her pharmacy and if symptoms do not improve; pt will need to call back for appt; office number given.

## 2020-06-26 NOTE — TELEPHONE ENCOUNTER
Called pt to get more information. Stated symptoms started about 5- 6 days; Pt states she's having itching and white thick discharge and believes she has a yeast infection. Wanting diflucan if possible. Please advise, Thanks

## 2020-06-26 NOTE — TELEPHONE ENCOUNTER
Caller: Marguerite Edwards    Relationship: Self    Best call back number: [phone]     What details did the patient provide when requesting the medication:     PATIENT IS REQUESTING DIFLUCAN BE CALLED IN FOR WHAT SHE BELIEVES IS A YEAST INFECTION.  SYMPTOMS THICK, WHITE AND CREAMING DISCHARGE.    What is the patient's preferred pharmacy: [facility] [place] [address] [phone] [facility] [phone]

## 2020-06-29 ENCOUNTER — TELEPHONE (OUTPATIENT)
Dept: GASTROENTEROLOGY | Facility: CLINIC | Age: 35
End: 2020-06-29

## 2020-06-29 NOTE — TELEPHONE ENCOUNTER
Pt called LVM regarding nausea, acid reflux and decreased appetite. I returned call, s/w spouse in regards to pt issues, as she was asleep. Spouse would like to schedule appt. Advised pt I would transfer to justine to schedule a f/u either telemedicine or ov. Justine will take over phone call.

## 2020-07-01 ENCOUNTER — TELEPHONE (OUTPATIENT)
Dept: GASTROENTEROLOGY | Facility: CLINIC | Age: 35
End: 2020-07-01

## 2020-07-01 DIAGNOSIS — K59.00 CONSTIPATION, UNSPECIFIED CONSTIPATION TYPE: Primary | ICD-10-CM

## 2020-07-01 DIAGNOSIS — R19.4 CHANGE IN BOWEL HABITS: ICD-10-CM

## 2020-07-01 DIAGNOSIS — R11.0 NAUSEA: ICD-10-CM

## 2020-07-01 DIAGNOSIS — R10.84 GENERALIZED ABDOMINAL PAIN: ICD-10-CM

## 2020-07-01 NOTE — TELEPHONE ENCOUNTER
Dr Thomas,  Patient called and left a voice message that she is constipated again. Do you want her to start back on Linzess? Please advise. Thanks

## 2020-07-01 NOTE — TELEPHONE ENCOUNTER
Got the message. Yes i'd start back on linzess or miralax.  linzess 72 ug  72 ug po once daily  90    miralax is over the counter. 1 packet with 8 oz of water.

## 2020-07-02 NOTE — TELEPHONE ENCOUNTER
CALLED PT TO ADVISE OF DR. HURLEY RECOMMENDATION. NO ANSWER, BUSY SIGNAL. WILL SEND RX PER DR. HURLEY.

## 2020-07-02 NOTE — TELEPHONE ENCOUNTER
PT CALLED REGARDING CONSTIPATION. ADVISED OF DR. HURLEY RECOMMENDATION, I SPOKE TO SPOUSE, BOTH PT AND SPOUSE VOICED UNDERSTANDING WITH NO FURTHER QUESTIONS.

## 2020-07-15 ENCOUNTER — TELEPHONE (OUTPATIENT)
Dept: INTERNAL MEDICINE | Facility: CLINIC | Age: 35
End: 2020-07-15

## 2020-07-15 NOTE — TELEPHONE ENCOUNTER
HAS STRESS FRACTURE IN HER FOOT AND IS HOPING YOU COULD CALL IN SOME 800MG IBUPROFEN     PHARMACY  [facility] PH: [phone]

## 2020-07-16 ENCOUNTER — TELEPHONE (OUTPATIENT)
Dept: INTERNAL MEDICINE | Facility: CLINIC | Age: 35
End: 2020-07-16

## 2020-07-16 DIAGNOSIS — R10.84 GENERALIZED ABDOMINAL PAIN: ICD-10-CM

## 2020-07-16 RX ORDER — DICYCLOMINE HCL 20 MG
20 TABLET ORAL 4 TIMES DAILY PRN
Qty: 30 TABLET | Refills: 1 | Status: SHIPPED | OUTPATIENT
Start: 2020-07-16 | End: 2020-07-17 | Stop reason: SDUPTHER

## 2020-07-16 NOTE — TELEPHONE ENCOUNTER
PATIENT CALLED REQUESTING A CALL BACK REGARDING A RX FOR ADVIL    PHARMACY     - [facility] - [place], [place] - [id number] - [phone] [facility] [phone]        PATIENT CALL BACK    [phone]

## 2020-07-16 NOTE — TELEPHONE ENCOUNTER
Dr. William Edwards called and wants a refill of bentyl.Pt wishes to have refill sent to [facility].  Thank you  Nadia

## 2020-07-16 NOTE — TELEPHONE ENCOUNTER
PATIENT IS WANTING A MYCHART VISIT SO THAT SHE CAN GET SOME ADVIL PRESCRIBED FOR HER FOOT PAIN.  SHE WAS LOOKING FOR APPOINTMENTS AND I DIDN'T SEE ANYTHING FOR [place] TODAY OR TOMORROW AND I HAD BEEN ADVISED THAT SHE SHOULD SEE HER PRIMARY DUE TO THE SENSITIVE NATURE OF THE PATIENTS CONDITION.  SHE STATES THAT THEY ARE LOW ON MONEY AND SHE CAN'T GET THE ADVIL FOR HER PAIN UNLESS ITS PRESCRIBED BY SOMEONE... IF THERE IS ANYTHING THAT CAN BE DONE. SHE HAS A MRI FOR HER FOOT TOMORROW MORNING AT 9AM

## 2020-07-17 ENCOUNTER — TELEPHONE (OUTPATIENT)
Dept: GASTROENTEROLOGY | Facility: CLINIC | Age: 35
End: 2020-07-17

## 2020-07-17 DIAGNOSIS — K21.9 GASTROESOPHAGEAL REFLUX DISEASE, ESOPHAGITIS PRESENCE NOT SPECIFIED: Primary | ICD-10-CM

## 2020-07-17 DIAGNOSIS — R10.84 GENERALIZED ABDOMINAL PAIN: ICD-10-CM

## 2020-07-17 RX ORDER — DICYCLOMINE HCL 20 MG
20 TABLET ORAL 4 TIMES DAILY PRN
Qty: 30 TABLET | Refills: 1 | Status: SHIPPED | OUTPATIENT
Start: 2020-07-17 | End: 2020-07-24

## 2020-07-17 NOTE — TELEPHONE ENCOUNTER
Ms Nadia Jerry called and left voice message stating that her pharmacy hasn't received Bentyl prescription.

## 2020-07-17 NOTE — TELEPHONE ENCOUNTER
CALLED PT TO INFORM HER BENTYL WAS SENT TO [facility]. SHE STATED SHE WANTED TO MAKE HER F/U VISIT ON 7/24 A TELEMEDICINE VISIT. ALSO COMPLAINED OF HEARTBURN AND PANTOPRAZOLE MAY NOT BE WORKING FOR HER.  I ADVISED I WOULD INFORM DR. HURLEY AND WOULD RETURN CALL AS NEEDED.  PT VOICED UNDERSTANDING WITH NO FURTHER QUESTIONS OR CONCERNS.

## 2020-07-17 NOTE — TELEPHONE ENCOUNTER
I called  [facility]. Pharmacy tech stated that they didn't received Bentyl prescription. I will resend prescription

## 2020-07-20 ENCOUNTER — TELEPHONE (OUTPATIENT)
Dept: INTERNAL MEDICINE | Facility: CLINIC | Age: 35
End: 2020-07-20

## 2020-07-20 ENCOUNTER — TELEPHONE (OUTPATIENT)
Dept: GASTROENTEROLOGY | Facility: CLINIC | Age: 35
End: 2020-07-20

## 2020-07-20 NOTE — TELEPHONE ENCOUNTER
HER FOOT DOCTOR WOULD LIKE A COPY OF HER LAST LABS:    DR TRINO HERNANDEZ [facility]   [phone]

## 2020-07-20 NOTE — TELEPHONE ENCOUNTER
Marguerite Zuniga called wanted to come by on Wednesday to   Linzess samples. She would like for you to call her back; if this is okay with you.

## 2020-07-20 NOTE — TELEPHONE ENCOUNTER
Lets see if nexium can help her; also her  mentioned he was going to [place] for medical school. Are they leaving soon?    Recommend   nexium 40 mg  40 mg 30 mins before breakfast  90   3 refills

## 2020-07-21 RX ORDER — ESOMEPRAZOLE MAGNESIUM 40 MG/1
40 CAPSULE, DELAYED RELEASE ORAL
Qty: 90 CAPSULE | Refills: 3 | Status: SHIPPED | OUTPATIENT
Start: 2020-07-21 | End: 2020-07-28

## 2020-07-22 ENCOUNTER — TELEPHONE (OUTPATIENT)
Dept: GASTROENTEROLOGY | Facility: CLINIC | Age: 35
End: 2020-07-22

## 2020-07-22 NOTE — TELEPHONE ENCOUNTER
Pt called lvm in regards to mailing Linzess samples due to covid concerns, returned pt call, mailed 4- 4ct bottles of Linzess 72mcg on 7/22/2020. No answer. m

## 2020-07-23 NOTE — TELEPHONE ENCOUNTER
Pt called requested Miralax samples since she is in a financial strain, advised I would mail her some Miralax samples. 7/23/2020. Pt voiced understanding with no further questions at this time.

## 2020-07-24 ENCOUNTER — TELEMEDICINE (OUTPATIENT)
Dept: GASTROENTEROLOGY | Facility: CLINIC | Age: 35
End: 2020-07-24

## 2020-07-24 ENCOUNTER — TELEPHONE (OUTPATIENT)
Dept: GASTROENTEROLOGY | Facility: CLINIC | Age: 35
End: 2020-07-24

## 2020-07-24 DIAGNOSIS — R10.9 CHRONIC ABDOMINAL PAIN: Primary | ICD-10-CM

## 2020-07-24 DIAGNOSIS — G89.29 CHRONIC ABDOMINAL PAIN: Primary | ICD-10-CM

## 2020-07-24 DIAGNOSIS — R10.13 EPIGASTRIC PAIN: Primary | ICD-10-CM

## 2020-07-24 PROCEDURE — 99214 OFFICE O/P EST MOD 30 MIN: CPT | Performed by: INTERNAL MEDICINE

## 2020-07-24 RX ORDER — PROMETHAZINE HYDROCHLORIDE 25 MG/1
25 TABLET ORAL EVERY 6 HOURS PRN
Qty: 45 TABLET | Refills: 0 | Status: SHIPPED | OUTPATIENT
Start: 2020-07-24 | End: 2022-09-26

## 2020-07-24 RX ORDER — DICYCLOMINE HYDROCHLORIDE 10 MG/1
20 CAPSULE ORAL 3 TIMES DAILY PRN
Qty: 180 CAPSULE | Refills: 3 | Status: SHIPPED | OUTPATIENT
Start: 2020-07-24 | End: 2020-10-30 | Stop reason: SDUPTHER

## 2020-07-24 RX ORDER — DICYCLOMINE HYDROCHLORIDE 10 MG/1
10 CAPSULE ORAL 3 TIMES DAILY PRN
Qty: 180 CAPSULE | Refills: 3 | Status: SHIPPED | OUTPATIENT
Start: 2020-07-24 | End: 2020-07-24

## 2020-07-24 RX ORDER — DICYCLOMINE HYDROCHLORIDE 10 MG/1
20 CAPSULE ORAL 3 TIMES DAILY PRN
Qty: 180 CAPSULE | Refills: 3 | Status: SHIPPED | OUTPATIENT
Start: 2020-07-24 | End: 2020-07-24 | Stop reason: SDUPTHER
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
## 2020-07-28 ENCOUNTER — TELEPHONE (OUTPATIENT)
Dept: GASTROENTEROLOGY | Facility: CLINIC | Age: 35
End: 2020-07-28

## 2020-07-28 DIAGNOSIS — K21.9 GASTROESOPHAGEAL REFLUX DISEASE, ESOPHAGITIS PRESENCE NOT SPECIFIED: Primary | ICD-10-CM

## 2020-07-28 RX ORDER — ESOMEPRAZOLE MAGNESIUM 20 MG/1
20 TABLET, DELAYED RELEASE ORAL
Qty: 60 TABLET | Refills: 3 | Status: SHIPPED | OUTPATIENT
Start: 2020-07-28 | End: 2020-09-09 | Stop reason: ALTCHOICE

## 2020-08-03 ENCOUNTER — TELEPHONE (OUTPATIENT)
Dept: GASTROENTEROLOGY | Facility: CLINIC | Age: 35
End: 2020-08-03

## 2020-08-03 NOTE — TELEPHONE ENCOUNTER
PT CALLED LVM IN REGARDS TO APPEAL FORMS; RETURNED PT CALL IN REGARDS TO HER VOICEMAIL. ADVISED PT TO COMPLETE THE APPEALS FORM SO WE CAN GET HER LINZESS APPEALS SENT TO INSURANCE FOR THEIR DETERMINATION.  PT VOICED UNDERSTANDING WITH NO FURTHER QUESTIONS.

## 2020-08-06 ENCOUNTER — TELEPHONE (OUTPATIENT)
Dept: GASTROENTEROLOGY | Facility: CLINIC | Age: 35
End: 2020-08-06

## 2020-08-06 NOTE — TELEPHONE ENCOUNTER
Pt was informed we do not have samples at this time. Once we receive samples, I will mail her some. Pt voiced understanding with no further concerns.

## 2020-08-06 NOTE — TELEPHONE ENCOUNTER
Marguerite Zuniga called requesting some fiber samples. She stated that something came up in her account. She can't pay for medication until Sept 1. Please mail samples. Thanks

## 2020-08-10 ENCOUNTER — TELEPHONE (OUTPATIENT)
Dept: GASTROENTEROLOGY | Facility: CLINIC | Age: 35
End: 2020-08-10

## 2020-08-10 NOTE — TELEPHONE ENCOUNTER
PT CALLED M IN REGARDS TO FIBER CHEWIES AND IS REQUESTING SAMPLES TO BE MAILED. I RETURNED PT CALL TO INFORM HER WE DO NOT HAVE SAMPLES AT THIS TIME. I DID ADVISE HER ONCE WE RECEIVED THEM I WOULD LET HER KNOW. PT VOICED UNDERSTANDING WITH NO FURTHER QUESTIONS OR CONCERNS

## 2020-08-13 RX ORDER — ALBUTEROL SULFATE 2.5 MG/3ML
2.5 SOLUTION RESPIRATORY (INHALATION) 4 TIMES DAILY PRN
Qty: 125 VIAL | Refills: 3 | Status: SHIPPED | OUTPATIENT
Start: 2020-08-13 | End: 2023-01-11 | Stop reason: ALTCHOICE

## 2020-08-13 NOTE — TELEPHONE ENCOUNTER
Ordered Rx Albuterol Neb Sol per chart via fax to [facility] per pt's request due to having sob for over 3-4 days. Pt denies fever/chest pain/nausea. Pt advised if still experiencing sob contact the office and schedule an apt or ED/UC for further evaluation. Pt verbalized understanding.

## 2020-08-17 ENCOUNTER — TELEPHONE (OUTPATIENT)
Dept: GASTROENTEROLOGY | Facility: CLINIC | Age: 35
End: 2020-08-17

## 2020-08-17 NOTE — TELEPHONE ENCOUNTER
Called pt to inform she needs to sign her appeal so we can send in back to [facility] re: Linzess. Pt states she will be in on Wednesday to sign.  Pt had no further questions at this time.

## 2020-08-18 NOTE — TELEPHONE ENCOUNTER
CALLED PT TO INFORM HER THAT I HAVE FIBER CHOICE SAMPLES READY FOR HER TO  WHEN SHE COMES TO SIGN HER APPEAL FOR HER LINZESS. PT STATES SHE DOES NOT WISH TO PROCEED WITH APPEAL AS SHE IS HAVING NORMAL BM AND IS HAVING BM EVERYDAY. SHE ASKED IF THE FIBER CHOICE SAMPLES COULD BE MAILED. SINCE THESE ARE LARGE THAN NORMAL SIZE PACKAGE AND WE DO NOT HAVE THE PACKAGES FOR THEM. SHE STATES SHE WILL COME AND TRY TO PICK THEM UP SOMETIME THIS WEEK. I ADVISED I WILL LEAVE THEM AT THE . PT VOICED UNDERSTANDING WITH NO FURTHER QUESTIONS.

## 2020-08-20 NOTE — TELEPHONE ENCOUNTER
Called pt and s/w spouse (pt was asleep) in regards to a fax of Linzess request from [facility]. I advised the last conversation pt and I had, was she did not want to proceed with Linzess RX Appeal as she was feeling better and doing well at this time. Spouse states she is doing good and the Linzess is not necessary at this time as she is having regular BM and feeling good. I advised I would fax the Rx form back to pharmacy to make them aware of their decision. Understanding was voiced with no further questions at this time.

## 2020-08-24 ENCOUNTER — TELEPHONE (OUTPATIENT)
Dept: INTERNAL MEDICINE | Facility: CLINIC | Age: 35
End: 2020-08-24

## 2020-09-08 ENCOUNTER — TELEPHONE (OUTPATIENT)
Dept: GASTROENTEROLOGY | Facility: CLINIC | Age: 35
End: 2020-09-08

## 2020-09-08 NOTE — TELEPHONE ENCOUNTER
Dr Thomas,  Ms Refugio called and stated that her heartburn has worsened. The Nexium 20 mg OTC once daily isn't working. Patient has also tried Pantoprazole 40 mg once daily. Ms Huff is requesting to try Prevacid again if possible. Please advise.

## 2020-09-09 DIAGNOSIS — K21.00 GASTROESOPHAGEAL REFLUX DISEASE WITH ESOPHAGITIS: Primary | ICD-10-CM

## 2020-09-09 RX ORDER — LANSOPRAZOLE 30 MG/1
60 CAPSULE, DELAYED RELEASE ORAL 2 TIMES DAILY
Qty: 120 CAPSULE | Refills: 3 | Status: SHIPPED | OUTPATIENT
Start: 2020-09-09 | End: 2020-10-09

## 2020-09-09 RX ORDER — LANSOPRAZOLE 30 MG/1
30 CAPSULE, DELAYED RELEASE ORAL 2 TIMES DAILY
Qty: 90 CAPSULE | Refills: 3 | Status: SHIPPED | OUTPATIENT
Start: 2020-09-09 | End: 2020-09-09 | Stop reason: ALTCHOICE

## 2020-09-09 NOTE — TELEPHONE ENCOUNTER
I spoke with Ms Edwards. Informed patient that Lansoprazole 30 mg Bid has been sent to her preferred pharmacy.

## 2020-09-16 DIAGNOSIS — R73.09 ELEVATED GLUCOSE: ICD-10-CM

## 2020-09-16 NOTE — TELEPHONE ENCOUNTER
Caller: Marguerite Edwards    Relationship: Self    Best call back number: [phone] (H)    Medication needed:   Requested Prescriptions     Pending Prescriptions Disp Refills   • Alcohol Swabs (Alcohol Prep) pads 100 each 11     Si pad Daily.       When do you need the refill by: ASAP    What details did the patient provide when requesting the medication: PATIENT IS RUNNING LOW ON PADS    Does the patient have less than a 3 day supply:  [] Yes  [x] No    What is the patient's preferred pharmacy: [facility] - [place], [place] - [id number] - [phone]  - [phone] FX

## 2020-09-17 ENCOUNTER — CLINICAL SUPPORT (OUTPATIENT)
Dept: INTERNAL MEDICINE | Facility: CLINIC | Age: 35
End: 2020-09-17

## 2020-09-17 DIAGNOSIS — Z23 NEED FOR VACCINATION: Primary | ICD-10-CM

## 2020-09-17 PROCEDURE — 90732 PPSV23 VACC 2 YRS+ SUBQ/IM: CPT | Performed by: NURSE PRACTITIONER

## 2020-09-17 PROCEDURE — 90686 IIV4 VACC NO PRSV 0.5 ML IM: CPT | Performed by: NURSE PRACTITIONER

## 2020-09-17 PROCEDURE — 90471 IMMUNIZATION ADMIN: CPT | Performed by: NURSE PRACTITIONER

## 2020-09-17 PROCEDURE — 90472 IMMUNIZATION ADMIN EACH ADD: CPT | Performed by: NURSE PRACTITIONER

## 2020-09-17 RX ORDER — PEN NEEDLE, DIABETIC 31 GX5/16"
1 NEEDLE, DISPOSABLE MISCELLANEOUS DAILY
Qty: 100 EACH | Refills: 11 | Status: SHIPPED | OUTPATIENT
Start: 2020-09-17 | End: 2022-04-11

## 2020-09-24 ENCOUNTER — TELEPHONE (OUTPATIENT)
Dept: GASTROENTEROLOGY | Facility: CLINIC | Age: 35
End: 2020-09-24

## 2020-10-02 ENCOUNTER — TELEMEDICINE (OUTPATIENT)
Dept: GASTROENTEROLOGY | Facility: CLINIC | Age: 35
End: 2020-10-02

## 2020-10-02 DIAGNOSIS — R10.9 CHRONIC ABDOMINAL PAIN: Primary | ICD-10-CM

## 2020-10-02 DIAGNOSIS — G89.29 CHRONIC ABDOMINAL PAIN: Primary | ICD-10-CM

## 2020-10-02 DIAGNOSIS — R19.7 DIARRHEA, UNSPECIFIED TYPE: ICD-10-CM

## 2020-10-02 PROCEDURE — 99214 OFFICE O/P EST MOD 30 MIN: CPT | Performed by: INTERNAL MEDICINE

## 2020-10-02 RX ORDER — MONTELUKAST SODIUM 4 MG/1
2 TABLET, CHEWABLE ORAL 2 TIMES DAILY
Qty: 90 TABLET | Refills: 3 | Status: SHIPPED | OUTPATIENT
Start: 2020-10-02 | End: 2022-09-26
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
## 2020-10-06 ENCOUNTER — TELEPHONE (OUTPATIENT)
Dept: GASTROENTEROLOGY | Facility: CLINIC | Age: 35
End: 2020-10-06

## 2020-10-06 ENCOUNTER — TELEPHONE (OUTPATIENT)
Dept: INTERNAL MEDICINE | Facility: CLINIC | Age: 35
End: 2020-10-06

## 2020-10-06 DIAGNOSIS — R19.7 DIARRHEA, UNSPECIFIED TYPE: ICD-10-CM

## 2020-10-06 DIAGNOSIS — R11.0 NAUSEA: Primary | ICD-10-CM

## 2020-10-06 RX ORDER — PROMETHAZINE HYDROCHLORIDE 25 MG/1
25 TABLET ORAL EVERY 8 HOURS PRN
Qty: 30 TABLET | Refills: 0 | Status: CANCELLED | OUTPATIENT
Start: 2020-10-06

## 2020-10-06 NOTE — TELEPHONE ENCOUNTER
Caller: Marguerite Edwards    Relationship: Self    Best call back number: [phone]    Medication needed:   promethazine (PHENERGAN) 25 MG tablet    When do you need the refill by: 10/06/2020    What details did the patient provide when requesting the medication: Patient requested this medication due to her feeling nauseated. She states she is unsure of why she feels this way but believes this is due to an upset stomach.     Does the patient have less than a 3 day supply:  [x] Yes  [] No    What is the patient's preferred pharmacy: [facility] - [place], [place] - [id number] - [phone]  - [phone] FX

## 2020-10-06 NOTE — TELEPHONE ENCOUNTER
Dr. Thomas  Ms. Edwards would like a RX for 25 mg of Phenergan. Says she has been nauseous and that is the only medication that helps and she wants this dosage.  Thank you  Nadia

## 2020-10-06 NOTE — TELEPHONE ENCOUNTER
Tried to call pt to let her know that Dr. Thomas prescribes this for her so she will need to contact his office.  I also sent her a [facility] message

## 2020-10-06 NOTE — TELEPHONE ENCOUNTER
Pt had requested refill on promethazine.  I sent her a message to let her know that this is prescribed by Dr. Thomas.  She says that we have prescribed it also and would like the refill

## 2020-10-07 DIAGNOSIS — R11.0 NAUSEA: Primary | ICD-10-CM

## 2020-10-07 RX ORDER — PROMETHAZINE HYDROCHLORIDE 25 MG/1
12.5 TABLET ORAL EVERY 8 HOURS PRN
Qty: 45 TABLET | Refills: 1 | Status: CANCELLED | OUTPATIENT
Start: 2020-10-07

## 2020-10-12 ENCOUNTER — TELEPHONE (OUTPATIENT)
Dept: GASTROENTEROLOGY | Facility: CLINIC | Age: 35
End: 2020-10-12

## 2020-10-12 DIAGNOSIS — R11.0 NAUSEA: ICD-10-CM

## 2020-10-12 RX ORDER — ONDANSETRON 4 MG/1
4 TABLET, FILM COATED ORAL 4 TIMES DAILY PRN
Qty: 30 TABLET | Refills: 1 | Status: SHIPPED | OUTPATIENT
Start: 2020-10-12 | End: 2021-07-26 | Stop reason: SDUPTHER

## 2020-10-12 RX ORDER — CHOLESTYRAMINE 4 G/9G
4 POWDER, FOR SUSPENSION ORAL 2 TIMES DAILY
Qty: 378 G | Refills: 11 | Status: SHIPPED | OUTPATIENT
Start: 2020-10-12

## 2020-10-13 ENCOUNTER — TELEPHONE (OUTPATIENT)
Dept: INTERNAL MEDICINE | Facility: CLINIC | Age: 35
End: 2020-10-13

## 2020-10-13 NOTE — TELEPHONE ENCOUNTER
Dr Thomas,  Ms Edwards called back again this morning and left a voice message. Patient complains of having a sore stomach.

## 2020-10-13 NOTE — TELEPHONE ENCOUNTER
I spoke to her  per patient.  The electric bill needs an extension to November 3rd.  The electric company will be calling her to get information about her health.  They need info stating it was be harmful due to her medical conditions to go without electricity for 2 weeks.  They will not have the money to pay for it until the 3rd and it is due on the 24th.  He said they will also fax over a form.

## 2020-10-14 ENCOUNTER — TELEPHONE (OUTPATIENT)
Dept: INTERNAL MEDICINE | Facility: CLINIC | Age: 35
End: 2020-10-14

## 2020-10-14 DIAGNOSIS — I10 ESSENTIAL HYPERTENSION: Primary | ICD-10-CM

## 2020-10-14 NOTE — TELEPHONE ENCOUNTER
Patient called and stated that she has called previously regarding a fax being sent to her electric company. The patient states that she is needing an extension on her electric bill until November 3rd. She states she is needing the fax to say it would be harmful for her health to have the electric to be shut off. The patient states that she would like the call back to be to her . Please advise.     Patients  number [phone]

## 2020-10-14 NOTE — TELEPHONE ENCOUNTER
Patient called and stated that she is needing a new blood pressure machine. She states she had one previously but it has broke. She would like a order sent for a new one to [facility]. She states that her blood pressure machine she has currently is from [facility]. The patient would like to know where she needs to go to  the new machine as well. Please advise.     Patient call back [phone]

## 2020-10-15 NOTE — TELEPHONE ENCOUNTER
Ok to draft a letter. I will be out of office until Friday.  I placed order for the BP  Cuff. Please print out for her and help her with obtaining it.

## 2020-10-16 NOTE — TELEPHONE ENCOUNTER
Pt states that the BP machine has to go to [facility]-Faxed to # provided by pt.  [phone].    Pt  says the letter for utilities has already been sent by his dr. But he did thank us.

## 2020-10-20 ENCOUNTER — TELEMEDICINE (OUTPATIENT)
Dept: INTERNAL MEDICINE | Facility: CLINIC | Age: 35
End: 2020-10-20

## 2020-10-20 ENCOUNTER — TELEPHONE (OUTPATIENT)
Dept: INTERNAL MEDICINE | Facility: CLINIC | Age: 35
End: 2020-10-20

## 2020-10-20 DIAGNOSIS — M79.671 RIGHT FOOT PAIN: ICD-10-CM

## 2020-10-20 DIAGNOSIS — G89.29 CHRONIC MIDLINE LOW BACK PAIN WITHOUT SCIATICA: Primary | ICD-10-CM

## 2020-10-20 DIAGNOSIS — M54.50 CHRONIC MIDLINE LOW BACK PAIN WITHOUT SCIATICA: Primary | ICD-10-CM

## 2020-10-20 PROCEDURE — 99213 OFFICE O/P EST LOW 20 MIN: CPT | Performed by: NURSE PRACTITIONER

## 2020-10-20 NOTE — TELEPHONE ENCOUNTER
PATIENT CALLED AND STATED THE MEDICATION THAT SHE WAS USING IS BIOFREEZE.  SHE'S NOT SURE IF IT'S COVERED BY HER INSURANCE.  PHARMACY IS [facility]    PLEASE CONTACT PATIENT TO ADVISE.    CALLBACK:  [phone]

## 2020-10-20 NOTE — TELEPHONE ENCOUNTER
Patient following up on the request for a prescription for Biofreeze.     Send to:   [facility] - [place], [place] - [id number] - [phone] [facility] [phone] FX     Patient's call back number is [phone]

## 2020-10-20 NOTE — PROGRESS NOTES
You have chosen to receive care through a telehealth visit.  Do you consent to use a video/audio connection for your medical care today? Yes  This was an audio and video enabled telemedicine encounter.    Subjective   Marguerite Edwards is a 35 y.o. female.     Chief Complaint   Patient presents with   • Foot Pain     right   • Back Pain       Back Pain  This is a chronic problem. The current episode started more than 1 month ago. The problem occurs constantly. The problem has been gradually worsening since onset. The pain is present in the lumbar spine. The quality of the pain is described as aching. The pain does not radiate. The pain is moderate. The symptoms are aggravated by bending, twisting and standing. Pertinent negatives include no abdominal pain, bladder incontinence, bowel incontinence, chest pain, dysuria, fever, headaches, leg pain, numbness, paresis, paresthesias, pelvic pain, perianal numbness, tingling, weakness or weight loss. Risk factors include sedentary lifestyle and obesity. She has tried NSAIDs for the symptoms. The treatment provided mild relief.    she wants a prescription for a back brace. She spoke with her  at [employer] whotold her they would pay for one with a prescription      She is having right foot pain. She follows with Dr. Calvillo with podiatry.  She reports that she had a fracture on her right foot and was also told she had osteoarthritis and is now in a boot.  She has tried meloxicam, ibuprofen, and naproxen and reports this is not helping her pain.  She is wanting to start Enbrel and see a arthritis doctor.  I have no records available to me immediately from Dr. Calvillo regarding this foot pain.  She reports that the pain is moderate and ongoing and she has difficulty walking or doing anything because of the pain in the boot.  She also wants to start a cream medication from [facility] for her foot but merchant not recall the name.       The following portions of the patient's  history were reviewed and updated as appropriate: allergies, current medications, past family history, past medical history, past social history, past surgical history and problem list.        Review of Systems   Constitutional: Negative for fatigue, fever and unexpected weight loss.   Eyes: Negative for blurred vision, double vision and visual disturbance.   Respiratory: Negative for cough, shortness of breath and wheezing.    Cardiovascular: Negative for chest pain, palpitations and leg swelling.   Gastrointestinal: Negative for abdominal pain, bowel incontinence, constipation, diarrhea, nausea and vomiting.   Genitourinary: Negative for difficulty urinating, dysuria, frequency, pelvic pain, urgency and urinary incontinence.   Musculoskeletal: Positive for arthralgias, back pain and gait problem. Negative for joint swelling and myalgias.   Skin: Negative for color change and rash.   Neurological: Negative for dizziness, tingling, weakness, numbness, headache and paresthesias.   Hematological: Negative for adenopathy. Does not bruise/bleed easily.           Outpatient Medications Marked as Taking for the 10/20/20 encounter (Telemedicine) with Liliya Hodges APRN   Medication Sig Dispense Refill   • albuterol (PROVENTIL) (2.5 MG/3ML) 0.083% nebulizer solution Take 2.5 mg by nebulization 4 (Four) Times a Day As Needed for Wheezing. 125 vial 3   • albuterol sulfate  (90 Base) MCG/ACT inhaler Inhale 2 puffs Every 4 (Four) Hours As Needed for Wheezing. 18 g 5   • Alcohol Swabs (Alcohol Prep) pads 1 pad Daily. 100 each 11   • ARIPiprazole (Abilify) 5 MG tablet Take 7.5 mg by mouth Daily.     • busPIRone (BUSPAR) 15 MG tablet Take 15 mg by mouth 3 (Three) Times a Day.     • cholecalciferol (VITAMIN D3) 25 MCG (1000 UT) tablet Take 1,000 Units by mouth Daily.     • cholestyramine (QUESTRAN) 4 GM/DOSE powder Take 1 packet by mouth 2 (Two) Times a Day. mixed with a liquid 378 g 11   • colestipol (COLESTID) 1 g  tablet Take 2 tablets by mouth 2 (Two) Times a Day. 90 tablet 3   • dicyclomine (Bentyl) 10 MG capsule Take 2 capsules by mouth 3 (Three) Times a Day As Needed (pain). Indications: Irritable Bowel Syndrome 180 capsule 3   • escitalopram (LEXAPRO) 10 MG tablet Take 1 tablet by mouth Daily.     • Fluticasone Furoate-Vilanterol (BREO ELLIPTA) 100-25 MCG/INH inhaler Inhale 1 puff Daily. 1 inhalation once a day 1 each 11   • glucose blood test strip Use as instructed 100 each 12   • hydrOXYzine pamoate (Vistaril) 50 MG capsule Take 50 mg by mouth Every 6 (Six) Hours. Every 6 hours  As needed and 2 pills at night     • Lancets Ultra Fine misc 1 each 3 (Three) Times a Day. 100 each 3   • linaclotide (LINZESS) 72 MCG capsule capsule Take 1 capsule by mouth Every Morning Before Breakfast. Indications: Chronic Constipation of Unknown Cause 90 capsule 0   • Menthol (Icy Hot) 5 % patch Apply 1 patch topically Daily As Needed (back pain). 30 patch 3   • mirtazapine (REMERON) 45 MG tablet Take 45 mg by mouth Every Night.     • Misc. Devices (PROFIT PRECISION SCALE) [facility] scales to weigh self weekly 1 each 0   • montelukast (SINGULAIR) 10 MG tablet Take 1 tablet by mouth Every Night. 30 tablet 11   • mupirocin (BACTROBAN) 2 % ointment      • norelgestromin-ethinyl estradiol (ORTHO EVRA) 150-35 MCG/24HR Place 1 patch on the skin as directed by provider Every 7 (Seven) Days for 3 weeks, THEN skip 7 days. 3 patch 9   • ondansetron (ZOFRAN) 4 MG tablet Take 1 tablet by mouth 4 (Four) Times a Day As Needed for Nausea or Vomiting. 30 tablet 1   • promethazine (PHENERGAN) 25 MG tablet Take 1 tablet by mouth Every 6 (Six) Hours As Needed for Nausea or Vomiting. 45 tablet 0   • Sanitary Napkins & Tampons (MAXI PAD OVERNIGHT) pads 1 pad 4 (Four) Times a Day As Needed (menses). 96 each 11   • simvastatin (ZOCOR) 10 MG tablet Take 1 tablet by mouth Every Night. 90 tablet 1   • Sod Picosulfate-Mag Ox-Cit Acd 10-3.5-12 MG-GM -GM/160ML  solution Take 1 kit by mouth Take As Directed. Follow instructions that were mailed to your home. If you didn't receive these call [phone]. 2 bottle 0   • terconazole (TERAZOL 7) 0.4 % vaginal cream Insert 1 applicatorful into the vagina every night at bedtime for 7 days. 45 g 0   • TRI-SPRINTEC 0.18/0.215/0.25 MG-35 MCG per tablet Take 1 tablet by mouth Daily.  2   • Vitamin D, Cholecalciferol, 25 MCG (1000 UT) capsule Take 1 capsule by mouth Daily. 30 capsule 11     Allergies   Allergen Reactions   • Paxil [Paroxetine Hcl] Mental Status Change   • Prozac [Fluoxetine Hcl] Mental Status Change   • Amitiza [Lubiprostone] Palpitations and Dizziness           Objective   Physical Exam   Constitutional: She appears well-developed and well-nourished.   HENT:   Head: Atraumatic.   Neck: Neck normal appearance.  Cardiovascular:   No conjunctival pallor noted.    Pulmonary/Chest: Effort normal.  No respiratory distress.  Abdominal: Abdomen appears normal.   Musculoskeletal:      Lumbar back: She exhibits pain. She exhibits normal range of motion, no swelling, no deformity and no laceration.   Feet:    Overall foot comments: Right foot in walking boot  Neurological: She is alert.   Skin: Skin is dry. No nail bed cyanosis or erythema.   Psychiatric: Her speech is normal and behavior is normal. She mood appears anxious. Her affect is not angry, not blunt and not flattened. Thought content is not paranoid and not delusional. She expresses impulsivity. She does not exhibit a depressed mood. She expresses no homicidal and no suicidal ideation. She exhibits loose associations.         There were no vitals filed for this visit.  There is no height or weight on file to calculate BMI.        Assessment/Plan   Diagnoses and all orders for this visit:    1. Chronic midline low back pain without sciatica (Primary)  -     XR Spine Lumbar 2 or 3 View; Future    2. Right foot pain       will request records from Dr. Calvillo.  Encouraged her to continue to follow with Dr. Calvillo for now.     Will check XR of her back. Rec PT but she declined for now due to cost.   Warm compress or heating pad to affected area, 3 times daily prn x 20 minutes with barrier between heat source and skin       Return if symptoms worsen or fail to improve, for will call with results.    I discussed my findings,recommendations, and plan of care was with the patient. They verbalized understanding and agreement.  Patient was encouraged to keep me informed of any acute changes, lack of improvement, or any new concerning symptoms.       * Please note that portions of this note were completed with a voice recognition program. Efforts were made to edit the dictation but occasionally words are erroneously transcribed.

## 2020-10-20 NOTE — TELEPHONE ENCOUNTER
----- Message from KATRINA Acevedo sent at 10/20/2020 12:44 PM EDT -----  Can you get records from Dr. Calvillo about her foot? She had a fracture? Need office note and imaging please.

## 2020-10-21 ENCOUNTER — TELEPHONE (OUTPATIENT)
Dept: INTERNAL MEDICINE | Facility: CLINIC | Age: 35
End: 2020-10-21

## 2020-10-21 NOTE — TELEPHONE ENCOUNTER
Pt called today to ask if biofreze can be sent to her pharm.  She also asked if MB knows where pt will be sent for her foot.  We are still waitimg on the record from Dr. Calvillo's office

## 2020-10-21 NOTE — TELEPHONE ENCOUNTER
I am not comfortable sending anything in for her foot at this time as I have not received records from Dr. Calvillo and I have not rosemarie able to fully evaluate the foot since visit was video. At this time she should ask Dr. Calvillo to change her medications since he has been treating the foot.

## 2020-10-23 ENCOUNTER — TELEPHONE (OUTPATIENT)
Dept: INTERNAL MEDICINE | Facility: CLINIC | Age: 35
End: 2020-10-23

## 2020-10-23 NOTE — TELEPHONE ENCOUNTER
PATIENT CALLED AND WANTED TO KNOW THE RESULTS OF HER MRI OF HER FOOT; PLEASE ADVISE    CLAUDIA: [phone]    PATIENT WANTED TO KNOW IF ITS ALSO OK TO DO HER XRAY ON HER BACK TOMORROW?

## 2020-10-23 NOTE — TELEPHONE ENCOUNTER
Pt  notified that we have not heard back from Dr. Calvillo's office about the MRI. I let him know that the order for the xray is on the computer so she an get that done.  He stated understanding.  He will try to contact Dr. Calvillo's office to get the MRI results

## 2020-10-24 ENCOUNTER — HOSPITAL ENCOUNTER (OUTPATIENT)
Dept: GENERAL RADIOLOGY | Facility: HOSPITAL | Age: 35
Discharge: HOME | End: 2020-10-24

## 2020-10-24 ENCOUNTER — HOSPITAL ENCOUNTER (OUTPATIENT)
Dept: CT IMAGING | Facility: HOSPITAL | Age: 35
Discharge: HOME | End: 2020-10-24

## 2020-10-24 DIAGNOSIS — R19.7 DIARRHEA, UNSPECIFIED TYPE: ICD-10-CM

## 2020-10-24 DIAGNOSIS — R10.9 CHRONIC ABDOMINAL PAIN: ICD-10-CM

## 2020-10-24 DIAGNOSIS — G89.29 CHRONIC ABDOMINAL PAIN: ICD-10-CM

## 2020-10-24 DIAGNOSIS — G89.29 CHRONIC MIDLINE LOW BACK PAIN WITHOUT SCIATICA: ICD-10-CM

## 2020-10-24 DIAGNOSIS — M54.50 CHRONIC MIDLINE LOW BACK PAIN WITHOUT SCIATICA: ICD-10-CM

## 2020-10-24 PROCEDURE — 72100 X-RAY EXAM L-S SPINE 2/3 VWS: CPT

## 2020-10-24 PROCEDURE — 25010000002 IOPAMIDOL 61 % SOLUTION: Performed by: INTERNAL MEDICINE

## 2020-10-24 PROCEDURE — 74177 CT ABD & PELVIS W/CONTRAST: CPT

## 2020-10-24 RX ADMIN — IOPAMIDOL 85 ML: 612 INJECTION, SOLUTION INTRAVENOUS at 14:48

## 2020-10-26 ENCOUNTER — TELEPHONE (OUTPATIENT)
Dept: INTERNAL MEDICINE | Facility: CLINIC | Age: 35
End: 2020-10-26

## 2020-10-26 ENCOUNTER — TELEPHONE (OUTPATIENT)
Dept: GASTROENTEROLOGY | Facility: CLINIC | Age: 35
End: 2020-10-26

## 2020-10-26 DIAGNOSIS — K59.00 CONSTIPATION, UNSPECIFIED CONSTIPATION TYPE: Primary | ICD-10-CM

## 2020-10-26 NOTE — TELEPHONE ENCOUNTER
Patient had an MRI done on 10/24/20 and was wondering what the results are.  Patient would like a call back in regards to the results.  Please advise      Marguerite Edwards call back, [phone]

## 2020-10-26 NOTE — TELEPHONE ENCOUNTER
Patient requested a call back.     Patient stated she had an MRI done in July 2020. This was ordered by Dr. Ezekiel Calvillo from the [facility]. Patient wants to make sure this was received by TAD Hodges.    Patient stated she had an x-ray done of her back on 10/24/20 and would like the results from this as soon as possible.    Please call and advise. Patient call back [phone]

## 2020-10-26 NOTE — TELEPHONE ENCOUNTER
I don't see an MRI in chart, but there is an xray of her back that shows some arthritis changes in her spine. Nothing emergent, so we can see what Liliya wants her to do when she returns    There is a CT abdomen in chart too that Dr Thomas ordered, so his office will let her know the results of that

## 2020-10-26 NOTE — TELEPHONE ENCOUNTER
DR HURLEY,  I SPOKE WITH CLAUDIA CT SCAN RESULTS GIVEN. PATIENT'S  AGREED TO  GOLYTELY AND MIRALAX AND SENNA OTC. PATIENT WILL STOP COLESTIPOL.   PATIENT WANTS TO KNOW IF YOU GOING TO ORDER COLONOSCOPY.

## 2020-10-27 ENCOUNTER — TELEPHONE (OUTPATIENT)
Dept: GASTROENTEROLOGY | Facility: CLINIC | Age: 35
End: 2020-10-27

## 2020-10-27 NOTE — TELEPHONE ENCOUNTER
"Marguerite, your CT scan shows a lot of stool in the colon which means the diarrhea you are having is likely a result from \"stool overflow\".  This means liquid stool goes around a blockage and makes you think you have a diarrheal illness.  I am sending you a bowel prep to take. Also, please  miralax and senna and take (1 capful of miralax and 1 tablet of senna, both of these are over the counter) daily.  This is good news!     Called patient and gave her the message. Patient gave verbal understanding. She would like to know if you can refill her Phenergan as the bowel prep makes her sick.  "

## 2020-10-27 NOTE — TELEPHONE ENCOUNTER
"Spoke with pt and she states that she sees a podiatrist and has had a boot on her foot for weeks now and wanted to know when she could take it off. Advised that she would have to speak with her podiatrist to get that answer. She then stated that she was told a few different things that she had from the MRI of her foot, she was told a stress fracture and/or arthritis. She was not sure which one. Advised if they were the ones to give her the results and information she may want to contact their office. She states that maybe she needed a second opinion and wanted a referral to a podiatrist or orthopedic.   I then advised pt that since she has many questions that she may need an appt with Liliya again just to have all her questions answered. She then started speaking with her , I said \"I'm sorry, what did you say?\" she then raised her voice and said, \"I AM TALKING TO MY , MA'AM\". I then apologized because I did not know. After this her  spoke with me over the phone. I explained the same advise that was given to pt. He then stated that at this moment he was too busy with other telehealth visits and video conferences  he has and would not set up an appt at this time. Advised  that I would still send this msg to Liliya Carroll and he verbalized she would not be in the office until next week.   "

## 2020-10-27 NOTE — TELEPHONE ENCOUNTER
Dr Thomas recommend holding off on repeat Colonoscopy for now. Message sent to patient through [employer].

## 2020-10-27 NOTE — TELEPHONE ENCOUNTER
I TOLD HER ZOFRAN WOULD PROBABLY BE THE MEDICATION, SHE JUST SAID YOU DID PHENERGAN LAST TIME FOR HER. SHE ALREADY HAS AN RX FOR ZOFRAN SO I WILL NOT ADD ANOTHER RX.

## 2020-10-28 ENCOUNTER — TELEPHONE (OUTPATIENT)
Dept: INTERNAL MEDICINE | Facility: CLINIC | Age: 35
End: 2020-10-28

## 2020-10-28 DIAGNOSIS — M54.50 CHRONIC MIDLINE LOW BACK PAIN WITHOUT SCIATICA: ICD-10-CM

## 2020-10-28 DIAGNOSIS — M47.9 SPONDYLOSIS: Primary | ICD-10-CM

## 2020-10-28 DIAGNOSIS — G89.29 CHRONIC MIDLINE LOW BACK PAIN WITHOUT SCIATICA: ICD-10-CM

## 2020-10-28 NOTE — TELEPHONE ENCOUNTER
Spoke with patient and she is upset d/t the time its taken to get an order for the back brace.  I explained to her that Liliya recommended that she see a specialist, like a neurosurgeon to evaluate the changes noted on her XR.  Patient is not interested in surgery nor a referral to see a specialist, she has used a back brace before and would like to try one again.   I asked what type or the name of the brace and she could not remember as she was given it after her surgery years ago.   I explained to her that insurance is very particular and may need her to be evaluated by a specialist so that we can properly fit her for what is best in her situation.  Patient asked that I call a [employer] company ([employer]) to ask specifics.  We three way called and [employer] informed us that they don't even except insurance for back braces and recommended we call [facility].  We three way called them and spoke to Aleyda and she explained that a patient would need to have an order and documentation that matched the reasons why a patient needs the back brace.  Patient was given the option to come in there and be evaluated and properly fit but declined.   We hung up the three way call and patient still verbalized wanting a back brace.   I reiterated that seeing a specialist would be beneficial for that reason.  Patient and  (in the background) then verbalized they don't want to get out and go to another office.  I told the patient I would ask Liliya of any other options and get back with her.  Patient verbalized she was ok with that and appreciated the time we spent today.

## 2020-10-28 NOTE — TELEPHONE ENCOUNTER
She will need to follow with podiatry about her foot. She needs to call them/ set up appt with them.  I sent you a results message about her XR.

## 2020-10-28 NOTE — TELEPHONE ENCOUNTER
PT CALLED STATING THAT SHE WOULD LIKE TO GO TO [facility] FOR HER BACK.    PT CONTACT [phone]

## 2020-10-28 NOTE — TELEPHONE ENCOUNTER
----- Message from KATRINA Acevedo sent at 10/28/2020  8:36 AM EDT -----  Please let her know that her back XR shows no acute fractures but does show spondylosis or arthritic type changes. I can refer to specialist to have them take a look at her back and determine next steps or need for a brace (she keeps requesting brace)

## 2020-10-28 NOTE — TELEPHONE ENCOUNTER
PATIENT WOULD LIKE TO TALK TO THE NURSE ASAP IN REGARDS TO BACK BRACE. PATIENT IS VERY UPSET.    NEEDS TO SPEAK TO SOMEONE TODAY.    [phone]

## 2020-10-29 ENCOUNTER — TELEPHONE (OUTPATIENT)
Dept: INTERNAL MEDICINE | Facility: CLINIC | Age: 35
End: 2020-10-29

## 2020-10-30 DIAGNOSIS — R10.13 EPIGASTRIC PAIN: ICD-10-CM

## 2020-10-30 NOTE — TELEPHONE ENCOUNTER
I think she should go back down to just 10-20 mg as needed for pain rather than high dose as bentyl can contribute to constipation which is a problem for ms. jiang.    Bentyl  10 mg  10 mg po tid prn pain  Qs 120  3 refills

## 2020-10-30 NOTE — TELEPHONE ENCOUNTER
Marguerite called states she needs a refill Bentyl. Patient states she is out of medication due to you increasing her from 2 TID to 4 TID?

## 2020-11-02 ENCOUNTER — TELEPHONE (OUTPATIENT)
Dept: GASTROENTEROLOGY | Facility: CLINIC | Age: 35
End: 2020-11-02

## 2020-11-02 RX ORDER — DICYCLOMINE HYDROCHLORIDE 10 MG/1
10 CAPSULE ORAL 3 TIMES DAILY PRN
Qty: 120 CAPSULE | Refills: 3 | Status: SHIPPED | OUTPATIENT
Start: 2020-11-02 | End: 2021-09-24 | Stop reason: SDUPTHER

## 2020-11-02 NOTE — TELEPHONE ENCOUNTER
I do not believe that a back brace is a medical necessity for her at this point. If she want to be evaluated further for a brace, she will need to see the specialist.  I can refer her to PT for her back pain as well if she would like.

## 2020-11-02 NOTE — TELEPHONE ENCOUNTER
Dr. William Edwards called in regards to her scheduling her colonoscopy, when did you want her scheduled and did you want to do a double?  Please Advise. Thank you.  Nadia

## 2020-11-03 ENCOUNTER — TELEPHONE (OUTPATIENT)
Dept: INTERNAL MEDICINE | Facility: CLINIC | Age: 35
End: 2020-11-03

## 2020-11-03 NOTE — TELEPHONE ENCOUNTER
MANINDER JEREZ WITH [facility] CALLED REGARDING THE PATIENT NEEDING SOME MEDICAL EQUIPMENT AND WOULD LIKE TO SPEAK TO SOMEONE REGARDING THIS NEED.       PLEASE CALL AT: [phone]

## 2020-11-05 ENCOUNTER — TELEPHONE (OUTPATIENT)
Dept: GASTROENTEROLOGY | Facility: CLINIC | Age: 35
End: 2020-11-05

## 2020-11-05 NOTE — TELEPHONE ENCOUNTER
Patient reports Dr. Thomas tapered high-dose (40mg TID) Bentyl to 10 mg TID. She wants to increase the dose for cramping symptoms. Instructed she may take two 10 mg capsule TID PRN. She thinks her colonoscopy should be scheduled sooner.

## 2020-11-05 NOTE — TELEPHONE ENCOUNTER
This is been addressed and other messages.  If she wants a new boot she will have to get this from her podiatrist who is treating her foot.  I am not treating her foot.    I am not ordering a back brace at this time either.  I have referred her to a specialist or even offered physical therapy evaluation as I do not feel a back brace is a medical necessity at this point.  I do feel as though she needs to be evaluated by a specialist given her history of back surgery.

## 2020-11-10 ENCOUNTER — TELEMEDICINE (OUTPATIENT)
Dept: INTERNAL MEDICINE | Facility: CLINIC | Age: 35
End: 2020-11-10

## 2020-11-10 DIAGNOSIS — M54.50 CHRONIC MIDLINE LOW BACK PAIN WITHOUT SCIATICA: Primary | ICD-10-CM

## 2020-11-10 DIAGNOSIS — M47.9 SPONDYLOSIS: ICD-10-CM

## 2020-11-10 DIAGNOSIS — G89.29 CHRONIC MIDLINE LOW BACK PAIN WITHOUT SCIATICA: Primary | ICD-10-CM

## 2020-11-10 DIAGNOSIS — M79.671 RIGHT FOOT PAIN: ICD-10-CM

## 2020-11-10 DIAGNOSIS — E78.2 MIXED HYPERLIPIDEMIA: ICD-10-CM

## 2020-11-10 PROCEDURE — 99214 OFFICE O/P EST MOD 30 MIN: CPT | Performed by: NURSE PRACTITIONER

## 2020-11-10 RX ORDER — SIMVASTATIN 10 MG
10 TABLET ORAL NIGHTLY
Qty: 90 TABLET | Refills: 1 | Status: SHIPPED | OUTPATIENT
Start: 2020-11-10 | End: 2021-02-26 | Stop reason: SDUPTHER

## 2020-11-10 NOTE — PROGRESS NOTES
"You have chosen to receive care through a telehealth visit.  Do you consent to use a video/audio connection for your medical care today? Yes  This was an audio and video enabled telemedicine encounter.    Subjective   Marguerite Edwards is a 35 y.o. female.     Chief Complaint   Patient presents with   • Back Pain     waiting on neurosurgery appt   • Foot Pain     seeing podiatry   • Hyperlipidemia       History of Present Illness   Marguerite is a 35-year-old female who is being seen by video visit for a request for a referral to rheumatology as well as a request for a back brace and a new boot for her foot.  She has been followed by Dr. Calvillo with podiatry for her right foot pain.  She had an MRI of the right foot on 7/17/2020 which showed capsulitis at the MTP joints.  Dorsal, lateral swelling distal fifth metatarsal and no stress injury, stress fracture or mass.  She does have some degenerative changes as well at the third metatarsal.  She reports that she wants to see a \"arthritis doctor\" and that she is already talked with them at [facility] who just need a referral for her to be seen.  At her last visit she was asking to be put on Enbrel for her pain.  We discussed that this was not appropriate given the little information and at the time related to her foot.  Now today she is requesting Xeljanz.  She reports she saw a commercial on TV and it said it was good for arthritis and now she wants to start it.    I Spoke with Oswaldo at Dr. Calvillo's office related to patients treatment course as well as Dr Calvillo:  she has had right foot and ankle pain for > 3 months, She has tried at least 9 unaboot compression dressings and been transitioned from a walking boot over to an ankle brace for better stabilization.  She was seen yesterday and received steroid injection.  We discussed upcoming follow-up.  He plans on seeing her back in office in about 1 month and reevaluating at that time.  He has advised her continue her " NSAID therapy for pain relief.  He reports that she does not need a different boot that the one she has is appropriate and that he does not feel as though she needs to see rheumatology.      She is also having back pain.  This is a chronic problem that started more than a month ago.  Pain is constant and has been gradually worsening.  Pain is in the lumbar area has a constant aching without radiation.  Symptoms are worse with bending twisting and standing.  She has no bowel or bladder involvement.  She has tried NSAIDs which provide mild to moderate relief.  She has a history of back surgery about 7 years ago for herniated disc. Was in some sort of brace but she is not sure what kind.  She had an x-ray of the lumbar spine 10/24/2020 which showed L5 limbus vertebrae noted L4-5 and L5-S1 spondylosis was present.  There is no fracture or subluxation.    She has agreed to see neurosurgery -she was referred her last visit and is awaiting scheduling.  It looks as though her records from Dr. Arrieta to have been requested and patient cannot be scheduled until those records are received and reviewed by Dr. Hughes.    She has hyperlipidemia.  She is currently on statin therapy.  She is compliant with dosing and denies any adverse effects.  Her last lipids were stable in 6/2020 with a slight elevation in triglycerides at 195  Lab Results   Component Value Date    CHOL 160 06/05/2020    CHLPL 207 (H) 03/13/2018    TRIG 195 (H) 06/05/2020    HDL 37 (L) 06/05/2020    LDL 84 06/05/2020         The following portions of the patient's history were reviewed and updated as appropriate: allergies, current medications, past family history, past medical history, past social history, past surgical history and problem list.        Review of Systems   Constitutional: Negative for fatigue, fever and unexpected weight loss.   Eyes: Negative for blurred vision, double vision and visual disturbance.   Respiratory: Negative for cough,  shortness of breath and wheezing.    Cardiovascular: Negative for chest pain, palpitations and leg swelling.   Gastrointestinal: Negative for abdominal pain, constipation, diarrhea, nausea and vomiting.   Genitourinary: Negative for difficulty urinating, dysuria, frequency, pelvic pain, urgency and urinary incontinence.   Musculoskeletal: Positive for arthralgias, back pain and gait problem. Negative for joint swelling and myalgias.   Skin: Negative for color change and rash.   Neurological: Negative for dizziness, weakness, numbness and headache.   Hematological: Negative for adenopathy. Does not bruise/bleed easily.           Outpatient Medications Marked as Taking for the 11/10/20 encounter (Telemedicine) with Liliya Hodges APRN   Medication Sig Dispense Refill   • albuterol sulfate  (90 Base) MCG/ACT inhaler Inhale 2 puffs Every 4 (Four) Hours As Needed for Wheezing. 18 g 5   • ARIPiprazole (Abilify) 5 MG tablet Take 7.5 mg by mouth Daily.     • busPIRone (BUSPAR) 15 MG tablet Take 15 mg by mouth 3 (Three) Times a Day.     • cholecalciferol (VITAMIN D3) 25 MCG (1000 UT) tablet Take 1,000 Units by mouth Daily.     • cholestyramine (QUESTRAN) 4 GM/DOSE powder Take 1 packet by mouth 2 (Two) Times a Day. mixed with a liquid 378 g 11   • escitalopram (LEXAPRO) 10 MG tablet Take 1 tablet by mouth Daily.     • Fluticasone Furoate-Vilanterol (BREO ELLIPTA) 100-25 MCG/INH inhaler Inhale 1 puff Daily. 1 inhalation once a day 1 each 11   • hydrOXYzine pamoate (Vistaril) 50 MG capsule Take 50 mg by mouth Every 6 (Six) Hours. Every 6 hours  As needed and 2 pills at night     • linaclotide (LINZESS) 72 MCG capsule capsule Take 1 capsule by mouth Every Morning Before Breakfast. Indications: Chronic Constipation of Unknown Cause 90 capsule 0   • Menthol (Icy Hot) 5 % patch Apply 1 patch topically Daily As Needed (back pain). 30 patch 3   • mirtazapine (REMERON) 45 MG tablet Take 45 mg by mouth Every Night.     •  montelukast (SINGULAIR) 10 MG tablet Take 1 tablet by mouth Every Night. 30 tablet 11   • mupirocin (BACTROBAN) 2 % ointment      • norelgestromin-ethinyl estradiol (ORTHO EVRA) 150-35 MCG/24HR Place 1 patch on the skin as directed by provider Every 7 (Seven) Days for 3 weeks, THEN skip 7 days. 3 patch 9   • simvastatin (ZOCOR) 10 MG tablet Take 1 tablet by mouth Every Night. 90 tablet 1   • Vitamin D, Cholecalciferol, 25 MCG (1000 UT) capsule Take 1 capsule by mouth Daily. 30 capsule 11     Allergies   Allergen Reactions   • Paxil [Paroxetine Hcl] Mental Status Change   • Prozac [Fluoxetine Hcl] Mental Status Change   • Amitiza [Lubiprostone] Palpitations and Dizziness           Objective   Physical Exam   Constitutional: She appears well-developed and well-nourished. She does not have a sickly appearance.   HENT:   Head: Normocephalic and atraumatic.   Eyes: Conjunctivae are normal. No scleral icterus.   Neck: Neck normal appearance.  Cardiovascular:   No conjunctival pallor noted.    Pulmonary/Chest: Effort normal.  No respiratory distress.  Abdominal: Abdomen appears normal.   Musculoskeletal:      Lumbar back: She exhibits pain. She exhibits normal range of motion, no swelling, no deformity and no laceration.   Neurological: She is alert.   Skin: Skin is dry. No nail bed cyanosis or erythema. No pallor.   Psychiatric: Her speech is normal and behavior is normal. She mood appears anxious. Her affect is not angry, not blunt and not flattened. Thought content is not paranoid and not delusional. She expresses impulsivity. She does not exhibit a depressed mood. She expresses no homicidal and no suicidal ideation. She exhibits loose associations ( unable to have patietn focus on onediscussion topic at a time. ).         There were no vitals filed for this visit.  There is no height or weight on file to calculate BMI.        Assessment/Plan   Diagnoses and all orders for this visit:    1. Chronic midline low back pain  without sciatica (Primary)/2. Spondylosis  X-ray results have been reviewed with the patient and her spouse.  She should continue her meloxicam.  She was referred to neurosurgery.  Scheduling with Dr. Hughes is pending receiving records from Dr. Arrieta who did her prior surgery in 2013/14.  3. Right foot pain  I called and discussed this patient at length with Dr. Calvillo, her podiatrist.  Patient will follow up with him in 1 month.  She should continue her meloxicam and continue her ankle brace/boot per his recommendations.  She did just receive a steroid injection yesterday and I explained to her that she may exhibit a little more discomfort for a few days before injection begins to help.  Patient verbalizes understanding.  She understands that I am not referring her to rheumatology at this point and that she should continue to follow with Dr. Calvillo for management of her foot pain/arthritis.  His pain persists despite steroid injection and follow-ups with Dr. Calvillo we can consider a referral to rheumatology but I do feel as though this would just complicate the situation adding another provider in at this point.  4. Mixed hyperlipidemia  -     simvastatin (ZOCOR) 10 MG tablet; Take 1 tablet by mouth Every Night.  Dispense: 90 tablet; Refill: 1  Continue statin therapy.        Return for Annual due in Feb 2021.    I discussed my findings,recommendations, and plan of care was with the patient. They verbalized understanding and agreement.  Patient was encouraged to keep me informed of any acute changes, lack of improvement, or any new concerning symptoms.       * Please note that portions of this note were completed with a voice recognition program. Efforts were made to edit the dictation but occasionally words are erroneously transcribed.

## 2020-11-13 ENCOUNTER — TELEPHONE (OUTPATIENT)
Dept: INTERNAL MEDICINE | Facility: CLINIC | Age: 35
End: 2020-11-13

## 2020-11-13 NOTE — TELEPHONE ENCOUNTER
Patient requested a call back. Patient stated she was referred to Dr. Garber by TAD Hodges. Patient stated Dr. Garber's office informed her they need  a copy of her x-ray from 10/24/20. Patient would like to know if she needs to come to the office to pick this up or if this could be sent directly to Dr. Garber.    Please call and advise. Patient call back [phone]

## 2020-11-27 ENCOUNTER — TELEPHONE (OUTPATIENT)
Dept: GASTROENTEROLOGY | Facility: CLINIC | Age: 35
End: 2020-11-27

## 2020-11-27 NOTE — TELEPHONE ENCOUNTER
Danielito pak stating wife has increased constipation.  Advised to take MOM.  She is scheduled for colonoscopy 12/5/20

## 2020-12-01 ENCOUNTER — APPOINTMENT (OUTPATIENT)
Dept: PREADMISSION TESTING | Facility: HOSPITAL | Age: 35
End: 2020-12-01

## 2020-12-04 ENCOUNTER — TELEPHONE (OUTPATIENT)
Dept: GASTROENTEROLOGY | Facility: CLINIC | Age: 35
End: 2020-12-04

## 2020-12-04 NOTE — TELEPHONE ENCOUNTER
PT CALLED REQUESTING PHENERGAN; SHE REQUEST 25 MG AND QTY ENOUGH TO HAVE TID. DR. HURLEY WILL NOT RX BUT I CAN DO A GENERAL RX OF ZOFRAN FOR BOWEL PREP.  CALLED PT TO ADVISE , NO ANSWER. LVM

## 2020-12-06 ENCOUNTER — APPOINTMENT (OUTPATIENT)
Dept: PREADMISSION TESTING | Facility: HOSPITAL | Age: 35
End: 2020-12-06

## 2020-12-06 LAB — SARS-COV-2 RNA RESP QL NAA+PROBE: NOT DETECTED

## 2020-12-06 PROCEDURE — U0004 COV-19 TEST NON-CDC HGH THRU: HCPCS

## 2020-12-06 PROCEDURE — C9803 HOPD COVID-19 SPEC COLLECT: HCPCS

## 2020-12-08 ENCOUNTER — TELEPHONE (OUTPATIENT)
Dept: GASTROENTEROLOGY | Facility: CLINIC | Age: 35
End: 2020-12-08

## 2020-12-08 NOTE — TELEPHONE ENCOUNTER
"Pt and spouse has called multiple times in re: to pt and her colonoscopy. I have advised many times today along with Cherry AMOR MA and Jennifer Lirianor of [facility] visitor policy. Ms. Edwards called again today in re: to her spouse not being able to go to Pre OP and Post Op rooms with her. I explained this is a [facility] policy and this is set in place for the safety and health of all patients and staff. In the back ground spouse was very angry and rudely stating \"we will cancel the \" f@@$ing\" thing\" I advised if she choose to cancel, she needed to call us by 4pm today. Pt was not happy with how the policy is set in place with visitor restrictions and spouse is not happy with not being able to go to the waiting room, pre op and post op with Ms. Edwards. Pt states she will call if not keeping appt. Understanding was voiced with no further questions or concerns at this time.  "

## 2020-12-10 ENCOUNTER — TELEPHONE (OUTPATIENT)
Dept: GASTROENTEROLOGY | Facility: CLINIC | Age: 35
End: 2020-12-10

## 2020-12-10 NOTE — TELEPHONE ENCOUNTER
I dont like fiber gummies or probiotics for severe constipation.  Would hold on taking these agents

## 2020-12-10 NOTE — TELEPHONE ENCOUNTER
Pt called LVM requesting a return call. Returned pt call; she was inquiring about a probiotic and fiber gummies that she can purchase OTC. I advised I would inform Dr. Thomas and would return call once I received a reply. Understanding was voiced with no further questions.

## 2020-12-11 NOTE — TELEPHONE ENCOUNTER
I called pt to advise to hold per Dr. Thomas of her probiotic or fiber gummies. No answer. No voicemail available.

## 2020-12-14 ENCOUNTER — TELEPHONE (OUTPATIENT)
Dept: INTERNAL MEDICINE | Facility: CLINIC | Age: 35
End: 2020-12-14

## 2020-12-14 NOTE — TELEPHONE ENCOUNTER
Pt is not wanting to see [facility] anymore. She wants to see someone else. They are not refilling any medication that she takes through them. Patient would like to know if you can send in temporary supply of her Breo and Montelukast and she will schedule appointment with our office in order to obtain new referral.

## 2020-12-14 NOTE — TELEPHONE ENCOUNTER
Pt called to inform she had a regular bowel movement today and she has been taking her fiber chews from our office everyday. She said she will take them every other day once I told her Dr. Thomas would like to stop them if she constipated. States she is not constipated but has had loose stool.

## 2020-12-14 NOTE — TELEPHONE ENCOUNTER
PATIENT WOULD LIKE A REFERRAL TO PULMONOLOGY FOR ASTHMA.     PLEASE CALL [phone]

## 2020-12-14 NOTE — TELEPHONE ENCOUNTER
It looks like she is due for an appt with pulmonary so that may be why she is having trouble with a refill from them.    Needs appt before I will address.

## 2020-12-18 ENCOUNTER — TELEMEDICINE (OUTPATIENT)
Dept: INTERNAL MEDICINE | Facility: CLINIC | Age: 35
End: 2020-12-18

## 2020-12-18 VITALS — TEMPERATURE: 99.9 F

## 2020-12-18 DIAGNOSIS — Z91.09 ENVIRONMENTAL ALLERGIES: ICD-10-CM

## 2020-12-18 DIAGNOSIS — J01.90 ACUTE BACTERIAL SINUSITIS: ICD-10-CM

## 2020-12-18 DIAGNOSIS — J45.20 MILD INTERMITTENT ASTHMA WITHOUT COMPLICATION: Primary | ICD-10-CM

## 2020-12-18 DIAGNOSIS — B96.89 ACUTE BACTERIAL SINUSITIS: ICD-10-CM

## 2020-12-18 PROBLEM — Z28.21 INFLUENZA VACCINATION DECLINED: Status: RESOLVED | Noted: 2019-02-06 | Resolved: 2020-12-18

## 2020-12-18 PROBLEM — R51.9 CHRONIC INTRACTABLE HEADACHE: Status: RESOLVED | Noted: 2019-02-06 | Resolved: 2020-12-18

## 2020-12-18 PROBLEM — G89.29 CHRONIC INTRACTABLE HEADACHE: Status: RESOLVED | Noted: 2019-02-06 | Resolved: 2020-12-18

## 2020-12-18 PROBLEM — I10 ESSENTIAL HYPERTENSION: Status: RESOLVED | Noted: 2019-04-15 | Resolved: 2020-12-18

## 2020-12-18 PROCEDURE — 99214 OFFICE O/P EST MOD 30 MIN: CPT | Performed by: NURSE PRACTITIONER

## 2020-12-18 RX ORDER — BROMPHENIRAMINE MALEATE, PSEUDOEPHEDRINE HYDROCHLORIDE, AND DEXTROMETHORPHAN HYDROBROMIDE 2; 30; 10 MG/5ML; MG/5ML; MG/5ML
10 SYRUP ORAL 4 TIMES DAILY PRN
Qty: 240 ML | Refills: 0 | Status: SHIPPED | OUTPATIENT
Start: 2020-12-18 | End: 2021-01-15

## 2020-12-18 RX ORDER — AMOXICILLIN AND CLAVULANATE POTASSIUM 875; 125 MG/1; MG/1
1 TABLET, FILM COATED ORAL 2 TIMES DAILY
Qty: 20 TABLET | Refills: 0 | Status: SHIPPED | OUTPATIENT
Start: 2020-12-18 | End: 2021-01-15

## 2020-12-18 RX ORDER — MONTELUKAST SODIUM 10 MG/1
10 TABLET ORAL NIGHTLY
Qty: 30 TABLET | Refills: 6 | Status: SHIPPED | OUTPATIENT
Start: 2020-12-18 | End: 2021-02-26 | Stop reason: SDUPTHER

## 2020-12-18 RX ORDER — ESCITALOPRAM OXALATE 20 MG/1
20 TABLET ORAL DAILY
COMMUNITY
Start: 2020-11-23 | End: 2022-09-26

## 2020-12-18 RX ORDER — ALBUTEROL SULFATE 90 UG/1
2 AEROSOL, METERED RESPIRATORY (INHALATION) EVERY 4 HOURS PRN
Qty: 18 G | Refills: 6 | Status: SHIPPED | OUTPATIENT
Start: 2020-12-18 | End: 2023-03-28 | Stop reason: SDUPTHER

## 2020-12-18 NOTE — PROGRESS NOTES
You have chosen to receive care through a telehealth visit.  Do you consent to use a video/audio connection for your medical care today? Yes  This was an audio and video enabled telemedicine encounter.    Subjective   Marguerite Edwards is a 35 y.o. female.     Chief Complaint   Patient presents with   • Asthma   • Sinusitis       Asthma  She complains of shortness of breath (rare) and wheezing (RARE). There is no chest tightness, cough, difficulty breathing, frequent throat clearing, hemoptysis, hoarse voice or sputum production. This is a chronic problem. The current episode started more than 1 year ago. The problem occurs intermittently. The problem has been unchanged (stable). Associated symptoms include a fever (tmax 99.9), malaise/fatigue, nasal congestion, postnasal drip, rhinorrhea and sneezing. Pertinent negatives include no appetite change, chest pain, dyspnea on exertion, ear congestion, ear pain, headaches, heartburn, myalgias, orthopnea, PND, sore throat, sweats, trouble swallowing or weight loss. Her symptoms are aggravated by nothing. Her symptoms are alleviated by beta-agonist. Risk factors for lung disease include animal exposure. Her past medical history is significant for asthma.   Sinus Problem  This is a new problem. The current episode started 1 to 4 weeks ago. The problem has been gradually worsening since onset. Maximum temperature: 99.9 tmax. The fever has been present for 1 to 2 days. Associated symptoms include chills, congestion, shortness of breath (rare), sinus pressure and sneezing. Pertinent negatives include no coughing, diaphoresis, ear pain, headaches, hoarse voice, neck pain, sore throat or swollen glands. Treatments tried: decongestants. The treatment provided no relief.   Has chronic allergies.  She is on Singulair For this.  Has not needed antihistamine or nasal sprays.  Reports symptoms are well controlled.     Denies covid exposure  no loss of taste or smell.   She was previously  followed by pulmonary for her asthma.  She and her  report that they have been having some problems with scheduling an appointment.  She has not been seen by pulmonary in greater than 1 year.  Reports asthma symptoms are stable.          The following portions of the patient's history were reviewed and updated as appropriate: allergies, current medications, past family history, past medical history, past social history, past surgical history and problem list.        Review of Systems   Constitutional: Positive for chills, fever (tmax 99.9) and malaise/fatigue. Negative for appetite change, diaphoresis, fatigue and unexpected weight loss.   HENT: Positive for congestion, postnasal drip, rhinorrhea, sinus pressure and sneezing. Negative for ear pain, hoarse voice, sore throat, swollen glands and trouble swallowing.    Eyes: Negative for blurred vision, double vision and visual disturbance.   Respiratory: Positive for shortness of breath (rare) and wheezing (RARE). Negative for cough, hemoptysis and sputum production.    Cardiovascular: Negative for chest pain, dyspnea on exertion, palpitations, leg swelling and PND.   Gastrointestinal: Negative for abdominal pain, constipation, diarrhea, nausea and vomiting.   Genitourinary: Negative for difficulty urinating, frequency and urgency.   Musculoskeletal: Negative for arthralgias, myalgias and neck pain.   Skin: Negative for color change and rash.   Neurological: Negative for dizziness, weakness and headache.   Hematological: Negative for adenopathy. Does not bruise/bleed easily.           Outpatient Medications Marked as Taking for the 12/18/20 encounter (Telemedicine) with Liliya Hodges APRN   Medication Sig Dispense Refill   • albuterol (PROVENTIL) (2.5 MG/3ML) 0.083% nebulizer solution Take 2.5 mg by nebulization 4 (Four) Times a Day As Needed for Wheezing. 125 vial 3   • Alcohol Swabs (Alcohol Prep) pads 1 pad Daily. 100 each 11   • ARIPiprazole (Abilify) 5  MG tablet Take 7.5 mg by mouth Daily.     • busPIRone (BUSPAR) 15 MG tablet Take 15 mg by mouth 3 (Three) Times a Day.     • cholecalciferol (VITAMIN D3) 25 MCG (1000 UT) tablet Take 1,000 Units by mouth Daily.     • cholestyramine (QUESTRAN) 4 GM/DOSE powder Take 1 packet by mouth 2 (Two) Times a Day. mixed with a liquid 378 g 11   • colestipol (COLESTID) 1 g tablet Take 2 tablets by mouth 2 (Two) Times a Day. 90 tablet 3   • dicyclomine (Bentyl) 10 MG capsule Take 1 capsule by mouth 3 (Three) Times a Day As Needed (pain). Indications: Irritable Bowel Syndrome 120 capsule 3   • escitalopram (LEXAPRO) 20 MG tablet Take 20 mg by mouth Daily.     • glucose blood test strip Use as instructed 100 each 12   • hydrOXYzine pamoate (Vistaril) 50 MG capsule Take 50 mg by mouth Every 6 (Six) Hours. Every 6 hours  As needed and 2 pills at night     • Lancets Ultra Fine misc 1 each 3 (Three) Times a Day. 100 each 3   • linaclotide (LINZESS) 72 MCG capsule capsule Take 1 capsule by mouth Every Morning Before Breakfast. Indications: Chronic Constipation of Unknown Cause 90 capsule 0   • Menthol (Icy Hot) 5 % patch Apply 1 patch topically Daily As Needed (back pain). 30 patch 3   • mirtazapine (REMERON) 45 MG tablet Take 45 mg by mouth Every Night.     • Misc. Devices (PROFIT PRECISION SCALE) [facility] scales to weigh self weekly 1 each 0   • montelukast (Singulair) 10 MG tablet Take 1 tablet by mouth Every Night. 30 tablet 6   • mupirocin (BACTROBAN) 2 % ointment      • norelgestromin-ethinyl estradiol (ORTHO EVRA) 150-35 MCG/24HR Place 1 patch on the skin as directed by provider Every 7 (Seven) Days for 3 weeks, THEN skip 7 days. 3 patch 9   • ondansetron (ZOFRAN) 4 MG tablet Take 1 tablet by mouth 4 (Four) Times a Day As Needed for Nausea or Vomiting. 30 tablet 1   • promethazine (PHENERGAN) 25 MG tablet Take 1 tablet by mouth Every 6 (Six) Hours As Needed for Nausea or Vomiting. 45 tablet 0   • Sanitary Napkins & Tampons (MAXI  PAD OVERNIGHT) pads 1 pad 4 (Four) Times a Day As Needed (menses). 96 each 11   • simvastatin (ZOCOR) 10 MG tablet Take 1 tablet by mouth Every Night. 90 tablet 1   • Sod Picosulfate-Mag Ox-Cit Acd 10-3.5-12 MG-GM -GM/160ML solution Take 1 kit by mouth Take As Directed. Follow instructions that were mailed to your home. If you didn't receive these call [phone]. 2 bottle 0   • terconazole (TERAZOL 7) 0.4 % vaginal cream Insert 1 applicatorful into the vagina every night at bedtime for 7 days. 45 g 0   • TRI-SPRINTEC 0.18/0.215/0.25 MG-35 MCG per tablet Take 1 tablet by mouth Daily.  2   • Vitamin D, Cholecalciferol, 25 MCG (1000 UT) capsule Take 1 capsule by mouth Daily. 30 capsule 11   • [DISCONTINUED] albuterol sulfate  (90 Base) MCG/ACT inhaler Inhale 2 puffs Every 4 (Four) Hours As Needed for Wheezing. 18 g 5   • [DISCONTINUED] Fluticasone Furoate-Vilanterol (BREO ELLIPTA) 100-25 MCG/INH inhaler Inhale 1 puff Daily. 1 inhalation once a day 1 each 11   • [DISCONTINUED] montelukast (SINGULAIR) 10 MG tablet Take 1 tablet by mouth Every Night. 30 tablet 11     Allergies   Allergen Reactions   • Paxil [Paroxetine Hcl] Mental Status Change   • Prozac [Fluoxetine Hcl] Mental Status Change   • Amitiza [Lubiprostone] Palpitations and Dizziness           Objective   Physical Exam   Constitutional: She appears well-developed and well-nourished. She does not have a sickly appearance. She does not appear ill. No distress.   HENT:   Head: Normocephalic and atraumatic.   Nose: Right sinus exhibits maxillary sinus tenderness and frontal sinus tenderness. Left sinus exhibits maxillary sinus tenderness and frontal sinus tenderness.   Eyes: No scleral icterus.   Neck: Neck normal appearance.  Cardiovascular:   No conjunctival pallor noted.    Pulmonary/Chest: Effort normal.  No respiratory distress.  Abdominal: Abdomen appears normal.   Skin: Skin is dry. No erythema. No pallor.   Psychiatric: She has a normal mood  and affect.         There were no vitals filed for this visit.  There is no height or weight on file to calculate BMI.        Assessment/Plan   Diagnoses and all orders for this visit:    1. Mild intermittent asthma without complication (Primary)  Comments:  Symptoms are well controlled.  We will plan to continue her Breo and her as needed albuterol inhaler.  She can also continue Singulair.  Orders:  -     albuterol sulfate  (90 Base) MCG/ACT inhaler; Inhale 2 puffs Every 4 (Four) Hours As Needed for Wheezing.  Dispense: 18 g; Refill: 6  -     Fluticasone Furoate-Vilanterol (BREO ELLIPTA) 100-25 MCG/INH inhaler; Inhale 1 puff Daily. 1 inhalation once a day  Dispense: 1 each; Refill: 6  -     montelukast (Singulair) 10 MG tablet; Take 1 tablet by mouth Every Night.  Dispense: 30 tablet; Refill: 6    2. Acute bacterial sinusitis  Comments:  Symptoms consistent with acute sinusitis.  Will cover with Augmentin.  Bromfed-DM for symptomatic management.    Orders:  -     brompheniramine-pseudoephedrine-DM 30-2-10 MG/5ML syrup; Take 10 mL by mouth 4 (Four) Times a Day As Needed for Congestion or Cough.  Dispense: 240 mL; Refill: 0  -     amoxicillin-clavulanate (AUGMENTIN) 875-125 MG per tablet; Take 1 tablet by mouth 2 (Two) Times a Day.  Dispense: 20 tablet; Refill: 0    3. Environmental allergies  -     montelukast (Singulair) 10 MG tablet; Take 1 tablet by mouth Every Night.  Dispense: 30 tablet; Refill: 6              Return for Next scheduled follow up.    I discussed my findings,recommendations, and plan of care was with the patient. They verbalized understanding and agreement.  Patient was encouraged to keep me informed of any acute changes, lack of improvement, or any new concerning symptoms.       * Please note that portions of this note were completed with a voice recognition program. Efforts were made to edit the dictation but occasionally words are erroneously transcribed.

## 2020-12-23 ENCOUNTER — TELEPHONE (OUTPATIENT)
Dept: INTERNAL MEDICINE | Facility: CLINIC | Age: 35
End: 2020-12-23

## 2020-12-23 NOTE — TELEPHONE ENCOUNTER
PN to continue taking her med.  She was prescribed 10 days of antibiotics on 12/18 along with cough med, inhalers and montelukast.  She will continue meds.  If not better after meds are complete she will let us know

## 2020-12-23 NOTE — TELEPHONE ENCOUNTER
PATIENT STATES THAT SHE HAS HAD A COLD AND IT HAS NOT GOTTEN ANY BETTER. PATIENT STATES THAT SHE WANTS TO KNOW IF SHE NEEDS TO CONTINUE TAKING HER ANTIBIOTIC OR IF SHE NEEDS MORE MEDICATION.    PATIENT CALL BACK [phone]

## 2020-12-28 DIAGNOSIS — R12 HEARTBURN: Primary | ICD-10-CM

## 2020-12-28 RX ORDER — LANSOPRAZOLE 30 MG/1
CAPSULE, DELAYED RELEASE ORAL
Qty: 90 CAPSULE | Refills: 3 | Status: SHIPPED | OUTPATIENT
Start: 2020-12-28 | End: 2021-01-15

## 2020-12-28 NOTE — TELEPHONE ENCOUNTER
Dr Thomas,   Patient called Lansoprazole 30 mg refills.  Patient stated she takes  2 tablets in the morning and 2 tablets at night.

## 2020-12-30 ENCOUNTER — TELEPHONE (OUTPATIENT)
Dept: GASTROENTEROLOGY | Facility: CLINIC | Age: 35
End: 2020-12-30

## 2020-12-30 DIAGNOSIS — Z12.11 SCREENING FOR COLON CANCER: Primary | ICD-10-CM

## 2021-01-04 ENCOUNTER — TELEPHONE (OUTPATIENT)
Dept: GASTROENTEROLOGY | Facility: CLINIC | Age: 36
End: 2021-01-04

## 2021-01-04 ENCOUNTER — TELEPHONE (OUTPATIENT)
Dept: NEUROSURGERY | Facility: CLINIC | Age: 36
End: 2021-01-04

## 2021-01-04 NOTE — TELEPHONE ENCOUNTER
PT'S HUSB (TRINO) CALLED REGARDING THE NEED TO ATTEND HIS WIFE'S APPT DUE TO HER ABILITY TO COMPREHEND THE DETAILS APPT/SHE HAS A LEARNING DISABILITY AND DOES NOT COMPREHEND. PT IS ALSO IN A WHEEL CHAIR/THERE IS A NOTE ON THE APPT REGARDING THE REQUEST/PT AND HUSB ARE REQUESTING A CALL BACK TODAY TO SEE IF HE IS ALLOWED TO ATTEND THE APPT OR THEY WILL HAVE TO RESCHEDULE.    PLEASE CALL PT OR HUSB AT: [phone].    THANK YOU!

## 2021-01-04 NOTE — TELEPHONE ENCOUNTER
I tried to call Ms Edwards back. No answer; left voice message. Dr Thomas  Hasn't sign off on bowel prep script yet. Once the provider sign script it will be sent electronically to [facility] pharmacy.

## 2021-01-05 ENCOUNTER — OFFICE VISIT (OUTPATIENT)
Dept: NEUROSURGERY | Facility: CLINIC | Age: 36
End: 2021-01-05

## 2021-01-05 VITALS
BODY MASS INDEX: 39.93 KG/M2 | DIASTOLIC BLOOD PRESSURE: 72 MMHG | TEMPERATURE: 97.8 F | WEIGHT: 217 LBS | SYSTOLIC BLOOD PRESSURE: 120 MMHG | HEIGHT: 62 IN

## 2021-01-05 DIAGNOSIS — M19.90 ARTHRITIS: ICD-10-CM

## 2021-01-05 DIAGNOSIS — E66.01 MORBIDLY OBESE (HCC): ICD-10-CM

## 2021-01-05 DIAGNOSIS — M51.36 DDD (DEGENERATIVE DISC DISEASE), LUMBAR: ICD-10-CM

## 2021-01-05 DIAGNOSIS — M51.36 DISC DEGENERATION, LUMBAR: Primary | ICD-10-CM

## 2021-01-05 DIAGNOSIS — F41.9 ANXIETY: ICD-10-CM

## 2021-01-05 DIAGNOSIS — Z12.11 SCREENING FOR COLON CANCER: Primary | ICD-10-CM

## 2021-01-05 DIAGNOSIS — G89.29 CHRONIC BILATERAL LOW BACK PAIN, UNSPECIFIED WHETHER SCIATICA PRESENT: ICD-10-CM

## 2021-01-05 DIAGNOSIS — F20.9 SCHIZOPHRENIA, UNSPECIFIED TYPE (HCC): ICD-10-CM

## 2021-01-05 DIAGNOSIS — M54.50 CHRONIC BILATERAL LOW BACK PAIN, UNSPECIFIED WHETHER SCIATICA PRESENT: ICD-10-CM

## 2021-01-05 PROCEDURE — 99214 OFFICE O/P EST MOD 30 MIN: CPT | Performed by: PHYSICIAN ASSISTANT

## 2021-01-05 RX ORDER — MELOXICAM 15 MG/1
15 TABLET ORAL DAILY
Qty: 30 TABLET | Refills: 1 | Status: SHIPPED | OUTPATIENT
Start: 2021-01-05 | End: 2021-04-13 | Stop reason: SDUPTHER

## 2021-01-05 RX ORDER — ONDANSETRON 4 MG/1
TABLET, FILM COATED ORAL
Qty: 6 TABLET | Refills: 0 | Status: CANCELLED | OUTPATIENT
Start: 2021-01-05

## 2021-01-05 NOTE — TELEPHONE ENCOUNTER
Got the message. Clear liquid diet x 24 hours. Make sure having regular bms and keeping up with dental appointments. Also f/u with me via telemedicine appt within 3 months.

## 2021-01-05 NOTE — PATIENT INSTRUCTIONS
Acute Back Pain, Adult  Acute back pain is sudden and usually short-lived. It is often caused by an injury to the muscles and tissues in the back. The injury may result from:  · A muscle or ligament getting overstretched or torn (strained). Ligaments are tissues that connect bones to each other. Lifting something improperly can cause a back strain.  · Wear and tear (degeneration) of the spinal disks. Spinal disks are circular tissue that provides cushioning between the bones of the spine (vertebrae).  · Twisting motions, such as while playing sports or doing yard work.  · A hit to the back.  · Arthritis.  You may have a physical exam, lab tests, and imaging tests to find the cause of your pain. Acute back pain usually goes away with rest and home care.  Follow these instructions at home:  Managing pain, stiffness, and swelling  · Take over-the-counter and prescription medicines only as told by your health care provider.  · Your health care provider may recommend applying ice during the first 24-48 hours after your pain starts. To do this:  ? Put ice in a plastic bag.  ? Place a towel between your skin and the bag.  ? Leave the ice on for 20 minutes, 2-3 times a day.  · If directed, apply heat to the affected area as often as told by your health care provider. Use the heat source that your health care provider recommends, such as a moist heat pack or a heating pad.  ? Place a towel between your skin and the heat source.  ? Leave the heat on for 20-30 minutes.  ? Remove the heat if your skin turns bright red. This is especially important if you are unable to feel pain, heat, or cold. You have a greater risk of getting burned.  Activity    · Do not stay in bed. Staying in bed for more than 1-2 days can delay your recovery.  · Sit up and stand up straight. Avoid leaning forward when you sit, or hunching over when you stand.  ? If you work at a desk, sit close to it so you do not need to lean over. Keep your chin tucked  "in. Keep your neck drawn back, and keep your elbows bent at a right angle. Your arms should look like the letter \"L.\"  ? Sit high and close to the steering wheel when you drive. Add lower back (lumbar) support to your car seat, if needed.  · Take short walks on even surfaces as soon as you are able. Try to increase the length of time you walk each day.  · Do not sit, drive, or  one place for more than 30 minutes at a time. Sitting or standing for long periods of time can put stress on your back.  · Do not drive or use heavy machinery while taking prescription pain medicine.  · Use proper lifting techniques. When you bend and lift, use positions that put less stress on your back:  ? Bend your knees.  ? Keep the load close to your body.  ? Avoid twisting.  · Exercise regularly as told by your health care provider. Exercising helps your back heal faster and helps prevent back injuries by keeping muscles strong and flexible.  · Work with a physical therapist to make a safe exercise program, as recommended by your health care provider. Do any exercises as told by your physical therapist.  Lifestyle  · Maintain a healthy weight. Extra weight puts stress on your back and makes it difficult to have good posture.  · Avoid activities or situations that make you feel anxious or stressed. Stress and anxiety increase muscle tension and can make back pain worse. Learn ways to manage anxiety and stress, such as through exercise.  General instructions  · Sleep on a firm mattress in a comfortable position. Try lying on your side with your knees slightly bent. If you lie on your back, put a pillow under your knees.  · Follow your treatment plan as told by your health care provider. This may include:  ? Cognitive or behavioral therapy.  ? Acupuncture or massage therapy.  ? Meditation or yoga.  Contact a health care provider if:  · You have pain that is not relieved with rest or medicine.  · You have increasing pain going down " into your legs or buttocks.  · Your pain does not improve after 2 weeks.  · You have pain at night.  · You lose weight without trying.  · You have a fever or chills.  Get help right away if:  · You develop new bowel or bladder control problems.  · You have unusual weakness or numbness in your arms or legs.  · You develop nausea or vomiting.  · You develop abdominal pain.  · You feel faint.  Summary  · Acute back pain is sudden and usually short-lived.  · Use proper lifting techniques. When you bend and lift, use positions that put less stress on your back.  · Take over-the-counter and prescription medicines and apply heat or ice as directed by your health care provider.  This information is not intended to replace advice given to you by your health care provider. Make sure you discuss any questions you have with your health care provider.  Document Revised: 04/07/2020 Document Reviewed: 08/01/2018  Elsevier Patient Education © 2020 [employer]  Healthy Eating  Following a healthy eating pattern may help you to achieve and maintain a healthy body weight, reduce the risk of chronic disease, and live a long and productive life. It is important to follow a healthy eating pattern at an appropriate calorie level for your body. Your nutritional needs should be met primarily through food by choosing a variety of nutrient-rich foods.  What are tips for following this plan?  Reading food labels  · Read labels and choose the following:  ? Reduced or low sodium.  ? Juices with 100% fruit juice.  ? Foods with low saturated fats and high polyunsaturated and monounsaturated fats.  ? Foods with whole grains, such as whole wheat, cracked wheat, brown rice, and wild rice.  ? Whole grains that are fortified with folic acid. This is recommended for women who are pregnant or who want to become pregnant.  · Read labels and avoid the following:  ? Foods with a lot of added sugars. These include foods that contain brown sugar, corn  "sweetener, corn syrup, dextrose, fructose, glucose, high-fructose corn syrup, honey, invert sugar, lactose, malt syrup, maltose, molasses, raw sugar, sucrose, trehalose, or turbinado sugar.  § Do not eat more than the following amounts of added sugar per day:  § 6 teaspoons (25 g) for women.  § 9 teaspoons (38 g) for men.  ? Foods that contain processed or refined starches and grains.  ? Refined grain products, such as white flour, degermed cornmeal, white bread, and white rice.  Shopping  · Choose nutrient-rich snacks, such as vegetables, whole fruits, and nuts. Avoid high-calorie and high-sugar snacks, such as potato chips, fruit snacks, and candy.  · Use oil-based dressings and spreads on foods instead of solid fats such as butter, stick margarine, or cream cheese.  · Limit pre-made sauces, mixes, and \"instant\" products such as flavored rice, instant noodles, and ready-made pasta.  · Try more plant-protein sources, such as tofu, tempeh, black beans, edamame, lentils, nuts, and seeds.  · Explore eating plans such as the Mediterranean diet or vegetarian diet.  Cooking  · Use oil to sauté or stir-silver foods instead of solid fats such as butter, stick margarine, or lard.  · Try baking, boiling, grilling, or broiling instead of frying.  · Remove the fatty part of meats before cooking.  · Steam vegetables in water or broth.  Meal planning    · At meals, imagine dividing your plate into fourths:  ? One-half of your plate is fruits and vegetables.  ? One-fourth of your plate is whole grains.  ? One-fourth of your plate is protein, especially lean meats, poultry, eggs, tofu, beans, or nuts.  · Include low-fat dairy as part of your daily diet.  Lifestyle  · Choose healthy options in all settings, including home, work, school, restaurants, or stores.  · Prepare your food safely:  ? Wash your hands after handling raw meats.  ? Keep food preparation surfaces clean by regularly washing with hot, soapy water.  ? Keep raw meats " separate from ready-to-eat foods, such as fruits and vegetables.  ? , meat, poultry, and eggs to the recommended internal temperature.  ? Store foods at safe temperatures. In general:  § Keep cold foods at 40°F (4.4°C) or below.  § Keep hot foods at 140°F (60°C) or above.  § Keep your freezer at 0°F (-17.8°C) or below.  § Foods are no longer safe to eat when they have been between the temperatures of 40°-140°F (4.4-60°C) for more than 2 hours.  What foods should I eat?  Fruits  Aim to eat 2 cup-equivalents of fresh, canned (in natural juice), or frozen fruits each day. Examples of 1 cup-equivalent of fruit include 1 small apple, 8 large strawberries, 1 cup canned fruit, ½ cup dried fruit, or 1 cup 100% juice.  Vegetables  Aim to eat 2½-3 cup-equivalents of fresh and frozen vegetables each day, including different varieties and colors. Examples of 1 cup-equivalent of vegetables include 2 medium carrots, 2 cups raw, leafy greens, 1 cup chopped vegetable (raw or cooked), or 1 medium baked potato.  Grains  Aim to eat 6 ounce-equivalents of whole grains each day. Examples of 1 ounce-equivalent of grains include 1 slice of bread, 1 cup ready-to-eat cereal, 3 cups popcorn, or ½ cup cooked rice, pasta, or cereal.  Meats and other proteins  Aim to eat 5-6 ounce-equivalents of protein each day. Examples of 1 ounce-equivalent of protein include 1 egg, 1/2 cup nuts or seeds, or 1 tablespoon (16 g) peanut butter. A cut of meat or fish that is the size of a deck of cards is about 3-4 ounce-equivalents.  · Of the protein you eat each week, try to have at least 8 ounces come from seafood. This includes salmon, trout, herring, and anchovies.  Dairy  Aim to eat 3 cup-equivalents of fat-free or low-fat dairy each day. Examples of 1 cup-equivalent of dairy include 1 cup (240 mL) milk, 8 ounces (250 g) yogurt, 1½ ounces (44 g) natural cheese, or 1 cup (240 mL) fortified soy milk.  Fats and oils  · Aim for about 5 teaspoons  (21 g) per day. Choose monounsaturated fats, such as canola and olive oils, avocados, peanut butter, and most nuts, or polyunsaturated fats, such as sunflower, corn, and soybean oils, walnuts, pine nuts, sesame seeds, sunflower seeds, and flaxseed.  Beverages  · Aim for six 8-oz glasses of water per day. Limit coffee to three to five 8-oz cups per day.  · Limit caffeinated beverages that have added calories, such as soda and energy drinks.  · Limit alcohol intake to no more than 1 drink a day for nonpregnant women and 2 drinks a day for men. One drink equals 12 oz of beer (355 mL), 5 oz of wine (148 mL), or 1½ oz of hard liquor (44 mL).  Seasoning and other foods  · Avoid adding excess amounts of salt to your foods. Try flavoring foods with herbs and spices instead of salt.  · Avoid adding sugar to foods.  · Try using oil-based dressings, sauces, and spreads instead of solid fats.  This information is based on general [place] nutrition guidelines. For more information, visit choosemyplate.gov. Exact amounts may vary based on your nutrition needs.  Summary  · A healthy eating plan may help you to maintain a healthy weight, reduce the risk of chronic diseases, and stay active throughout your life.  · Plan your meals. Make sure you eat the right portions of a variety of nutrient-rich foods.  · Try baking, boiling, grilling, or broiling instead of frying.  · Choose healthy options in all settings, including home, work, school, restaurants, or stores.  This information is not intended to replace advice given to you by your health care provider. Make sure you discuss any questions you have with your health care provider.  Document Revised: 04/01/2019 Document Reviewed: 04/01/2019  Elsevier Patient Education © 2020 [employer]  Obesity, Adult  Obesity is the condition of having too much total body fat. Being overweight or obese means that your weight is greater than what is considered healthy for your body size. Obesity is  determined by a measurement called BMI. BMI is an estimate of body fat and is calculated from height and weight. For adults, a BMI of 30 or higher is considered obese.  Obesity can lead to other health concerns and major illnesses, including:  · Stroke.  · Coronary artery disease (CAD).  · Type 2 diabetes.  · Some types of cancer, including cancers of the colon, breast, uterus, and gallbladder.  · Osteoarthritis.  · High blood pressure (hypertension).  · High cholesterol.  · Sleep apnea.  · Gallbladder stones.  · Infertility problems.  What are the causes?  Common causes of this condition include:  · Eating daily meals that are high in calories, sugar, and fat.  · Being born with genes that may make you more likely to become obese.  · Having a medical condition that causes obesity, including:  ? Hypothyroidism.  ? Polycystic ovarian syndrome (PCOS).  ? Binge-eating disorder.  ? Cushing syndrome.  · Taking certain medicines, such as steroids, antidepressants, and seizure medicines.  · Not being physically active (sedentary lifestyle).  · Not getting enough sleep.  · Drinking high amounts of sugar-sweetened beverages, such as soft drinks.  What increases the risk?  The following factors may make you more likely to develop this condition:  · Having a family history of obesity.  · Being a woman of  descent.  · Being a man of  descent.  · Living in an area with limited access to:  ? [place], recreation centers, or sidewalks.  ? Healthy food choices, such as grocery stores and [employer].  What are the signs or symptoms?  The main sign of this condition is having too much body fat.  How is this diagnosed?  This condition is diagnosed based on:  · Your BMI. If you are an adult with a BMI of 30 or higher, you are considered obese.  · Your waist circumference. This measures the distance around your waistline.  · Your skinfold thickness. Your health care provider may gently pinch a fold of your  skin and measure it.  You may have other tests to check for underlying conditions.  How is this treated?  Treatment for this condition often includes changing your lifestyle. Treatment may include some or all of the following:  · Dietary changes. This may include developing a healthy meal plan.  · Regular physical activity. This may include activity that causes your heart to beat faster (aerobic exercise) and strength training. Work with your health care provider to design an exercise program that works for you.  · Medicine to help you lose weight if you are unable to lose 1 pound a week after 6 weeks of healthy eating and more physical activity.  · Treating conditions that cause the obesity (underlying conditions).  · Surgery. Surgical options may include gastric banding and gastric bypass. Surgery may be done if:  ? Other treatments have not helped to improve your condition.  ? You have a BMI of 40 or higher.  ? You have life-threatening health problems related to obesity.  Follow these instructions at home:  Eating and drinking    · Follow recommendations from your health care provider about what you eat and drink. Your health care provider may advise you to:  ? Limit fast food, sweets, and processed snack foods.  ? Choose low-fat options, such as low-fat milk instead of whole milk.  ? Eat 5 or more servings of fruits or vegetables every day.  ? Eat at home more often. This gives you more control over what you eat.  ? Choose healthy foods when you eat out.  ? Learn to read food labels. This will help you understand how much food is considered 1 serving.  ? Learn what a healthy serving size is.  ? Keep low-fat snacks available.  ? Limit sugary drinks, such as soda, fruit juice, sweetened iced tea, and flavored milk.  · Drink enough water to keep your urine pale yellow.  · Do not follow a fad diet. Fad diets can be unhealthy and even dangerous.  Physical activity  · Exercise regularly, as told by your health care  provider.  ? Most adults should get up to 150 minutes of moderate-intensity exercise every week.  ? Ask your health care provider what types of exercise are safe for you and how often you should exercise.  · Warm up and stretch before being active.  · Cool down and stretch after being active.  · Rest between periods of activity.  Lifestyle  · Work with your health care provider and a dietitian to set a weight-loss goal that is healthy and reasonable for you.  · Limit your screen time.  · Find ways to reward yourself that do not involve food.  · Do not drink alcohol if:  ? Your health care provider tells you not to drink.  ? You are pregnant, may be pregnant, or are planning to become pregnant.  · If you drink alcohol:  ? Limit how much you use to:  § 0-1 drink a day for women.  § 0-2 drinks a day for men.  ? Be aware of how much alcohol is in your drink. In the [place] one drink equals one 12 oz bottle of beer (355 mL), one 5 oz glass of wine (148 mL), or one 1½ oz glass of hard liquor (44 mL).  General instructions  · Keep a weight-loss journal to keep track of the food you eat and how much exercise you get.  · Take over-the-counter and prescription medicines only as told by your health care provider.  · Take vitamins and supplements only as told by your health care provider.  · Consider joining a support group. Your health care provider may be able to recommend a support group.  · Keep all follow-up visits as told by your health care provider. This is important.  Contact a health care provider if:  · You are unable to meet your weight loss goal after 6 weeks of dietary and lifestyle changes.  Get help right away if you are having:  · Trouble breathing.  · Suicidal thoughts or behaviors.  Summary  · Obesity is the condition of having too much total body fat.  · Being overweight or obese means that your weight is greater than what is considered healthy for your body size.  · Work with your health care provider and a  dietitian to set a weight-loss goal that is healthy and reasonable for you.  · Exercise regularly, as told by your health care provider. Ask your health care provider what types of exercise are safe for you and how often you should exercise.  This information is not intended to replace advice given to you by your health care provider. Make sure you discuss any questions you have with your health care provider.  Document Revised: 08/22/2019 Document Reviewed: 08/22/2019  Elsevier Patient Education © 2020 [employer]vier Inc.

## 2021-01-05 NOTE — PROGRESS NOTES
"Chief Complaint  Back Pain    Subjective          Marguerite Edwards presents to [facility]UR[facility] for   History of Present Illness  This is a 35-year-old white female with low back pain that started approximately 2011.  She was found to have a herniated disc and underwent surgery in 2012, 6 months later she had a separate disc herniation and again underwent surgery at an outside facility.  The patient has been in pain management here with Dr. Mccormick as well as in [place].  Had a breakdown and was hospitalized and treated for schizophrenia.  She has not had any narcotics in approximately a year nor has she had a lumbar injection.  She has continued to complain of low back pain and a more recent exacerbation of about 2 to 3 months, she was in physical therapy at [facility] but suffered a fracture in her foot and has just been out of her boot for 2 days.  She did not feel that she made any improvements with physical therapy.  She has a new x-ray of the lumbar spine and she is referred for neurosurgical evaluation for chronic and exacerbation of low back pain.    Objective   Vital Signs:   /72   Temp 97.8 °F (36.6 °C)   Ht 157.5 cm (62\")   Wt 98.4 kg (217 lb)   BMI 39.69 kg/m²     Physical Exam  Vitals signs reviewed. Exam conducted with a chaperone present.   Constitutional:       Appearance: She is obese.   HENT:      Head: Normocephalic.   Eyes:      Conjunctiva/sclera: Conjunctivae normal.   Neck:      Musculoskeletal: Normal range of motion.   Cardiovascular:      Rate and Rhythm: Normal rate.   Pulmonary:      Effort: Pulmonary effort is normal.   Musculoskeletal:         General: Tenderness present.   Skin:     General: Skin is warm and dry.   Neurological:      General: No focal deficit present.      Mental Status: She is alert. Mental status is at baseline.      Motor: Motor function is intact.      Coordination: Coordination is intact.      Gait: Gait is intact.     "  Deep Tendon Reflexes: Reflexes are normal and symmetric.   Psychiatric:         Mood and Affect: Mood normal.     Cranial nerves II through XII are intact.    Result Review :       Data reviewed: Radiologic studies I have personally reviewed the x-rays of the lumbar spine, there is mild degenerative changes at L4-5 and moderate changes at L5-S1.  There is narrowing of the disc space at L4-5.  There has been prior surgical intervention.          Assessment and Plan    Problem List Items Addressed This Visit        Endocrine and Metabolic    Morbidly obese (CMS/[facility])       Mental Health    Anxiety (Chronic)    Schizophrenia (CMS/[facility]) (Chronic)       Musculoskeletal and Injuries    Chronic back pain (Chronic)    Relevant Orders    Ambulatory Referral to Pain Management (Completed)    MRI Lumbar Spine With & Without Contrast    Miscellaneous DME    Arthritis (Chronic)       Neuro    DDD (degenerative disc disease), lumbar    Relevant Orders    Ambulatory Referral to Pain Management (Completed)    MRI Lumbar Spine With & Without Contrast    Miscellaneous DME      Other Visit Diagnoses     Disc degeneration, lumbar    -  Primary        I spent 45 minutes caring for Marguerite on this date of service. This time includes time spent by me in the following activities:preparing for the visit, reviewing tests, obtaining and/or reviewing a separately obtained history, performing a medically appropriate examination and/or evaluation , counseling and educating the patient/family/caregiver, ordering medications, tests, or procedures, documenting information in the medical record, independently interpreting results and communicating that information with the patient/family/caregiver, care coordination and Making appropriate referrals.  Follow Up   Return in about 2 weeks (around 1/19/2021).  Patient was given instructions and counseling regarding her condition or for health maintenance advice. Please see specific information pulled into  the AVS if appropriate.     Radha Casillas PA-C
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
## 2021-01-06 ENCOUNTER — TELEPHONE (OUTPATIENT)
Dept: NEUROSURGERY | Facility: CLINIC | Age: 36
End: 2021-01-06

## 2021-01-06 RX ORDER — ONDANSETRON 4 MG/1
4 TABLET, FILM COATED ORAL EVERY 6 HOURS PRN
Qty: 6 TABLET | Refills: 0 | Status: SHIPPED | OUTPATIENT
Start: 2021-01-06 | End: 2021-01-15

## 2021-01-06 RX ORDER — PEG-3350, SODIUM SULFATE, SODIUM CHLORIDE, POTASSIUM CHLORIDE, SODIUM ASCORBATE AND ASCORBIC ACID 7.5-2.691G
KIT ORAL
Qty: 1 EACH | Refills: 0 | Status: SHIPPED | OUTPATIENT
Start: 2021-01-06 | End: 2021-01-15

## 2021-01-06 NOTE — TELEPHONE ENCOUNTER
KEREN WITH [facility] ([phone]) CALLED AND STATES THEY DID NOT RECEIVE ANY RECORDS WITH REFERRAL/REQUESTING ANY PROGRESS NOTES AND IMAGING FAX TO: [phone].    THANK YOU!

## 2021-01-07 ENCOUNTER — TELEPHONE (OUTPATIENT)
Dept: NEUROSURGERY | Facility: CLINIC | Age: 36
End: 2021-01-07

## 2021-01-07 DIAGNOSIS — F20.9 SCHIZOPHRENIA, UNSPECIFIED TYPE: ICD-10-CM

## 2021-01-07 DIAGNOSIS — F41.9 ANXIETY: ICD-10-CM

## 2021-01-07 DIAGNOSIS — M54.50 CHRONIC BILATERAL LOW BACK PAIN, UNSPECIFIED WHETHER SCIATICA PRESENT: Primary | ICD-10-CM

## 2021-01-07 DIAGNOSIS — M51.36 DDD (DEGENERATIVE DISC DISEASE), LUMBAR: ICD-10-CM

## 2021-01-07 DIAGNOSIS — M19.90 ARTHRITIS: ICD-10-CM

## 2021-01-07 DIAGNOSIS — G89.29 CHRONIC BILATERAL LOW BACK PAIN, UNSPECIFIED WHETHER SCIATICA PRESENT: Primary | ICD-10-CM

## 2021-01-07 NOTE — TELEPHONE ENCOUNTER
Caller: PT    Relationship: SELF    Best call back number: [phone]    What orders are you requesting (i.e. lab or imaging): PAIN MANAGEMENT    In what timeframe would the patient need to come in: ASAP    Where will you receive your lab/imaging services:     Additional notes: THE PAIN MANAGEMENT THAT SHE HAS BEEN GOING TO WILL NO LONGER SEE HER DUE TO MISSING APPOINTMENTS AND SHE NEEDS TO BE REFERRED TO A NEW PAIN CLINIC.

## 2021-01-07 NOTE — TELEPHONE ENCOUNTER
"Apparently IT has not fixed my \"orders only\" tab as they are going to a practice cue only.    Patient request new referral for pain management.    "

## 2021-01-11 ENCOUNTER — APPOINTMENT (OUTPATIENT)
Dept: MRI IMAGING | Facility: HOSPITAL | Age: 36
End: 2021-01-11

## 2021-01-12 ENCOUNTER — APPOINTMENT (OUTPATIENT)
Dept: PREADMISSION TESTING | Facility: HOSPITAL | Age: 36
End: 2021-01-12

## 2021-01-12 DIAGNOSIS — Z00.8 PRE-SURGICAL PSYCHOLOGICAL ASSESSMENT, ENCOUNTER FOR: Primary | ICD-10-CM

## 2021-01-12 DIAGNOSIS — Z12.11 SCREENING FOR COLON CANCER: Primary | ICD-10-CM

## 2021-01-12 PROCEDURE — U0004 COV-19 TEST NON-CDC HGH THRU: HCPCS

## 2021-01-12 PROCEDURE — C9803 HOPD COVID-19 SPEC COLLECT: HCPCS

## 2021-01-12 RX ORDER — POLYETHYLENE GLYCOL-3350 AND ELECTROLYTES 236; 6.74; 5.86; 2.97; 22.74 G/274.31G; G/274.31G; G/274.31G; G/274.31G; G/274.31G
4 POWDER, FOR SOLUTION ORAL TAKE AS DIRECTED
Qty: 4000 ML | Refills: 0 | Status: SHIPPED | OUTPATIENT
Start: 2021-01-12 | End: 2021-01-15

## 2021-01-12 NOTE — TELEPHONE ENCOUNTER
I called Ms Edwards back. Went over 2 days prep instructions with patient. Patient is worried about if she can't get cleaned out or not. I explained to her that she has to push plenty of fluids and make sure she doesn't eat anything; just a liquid diet. Patient voiced understanding.

## 2021-01-13 ENCOUNTER — TELEPHONE (OUTPATIENT)
Dept: PAIN MEDICINE | Facility: CLINIC | Age: 36
End: 2021-01-13

## 2021-01-13 LAB — SARS-COV-2 RNA RESP QL NAA+PROBE: DETECTED

## 2021-01-13 RX ORDER — PROMETHAZINE HYDROCHLORIDE 25 MG/1
25 TABLET ORAL EVERY 6 HOURS PRN
Qty: 30 TABLET | Refills: 0 | Status: SHIPPED | OUTPATIENT
Start: 2021-01-13 | End: 2021-01-15

## 2021-01-13 NOTE — PAT
positive COVID, Received word from Noa/William's office.  Dr. Thomas calling patient.  Procedure cancelled at [facility] for 1/14.  Patient had no s/s and temp was 97.2.  TL

## 2021-01-13 NOTE — TELEPHONE ENCOUNTER
Called patient to inform her of her referral to . She is aware and expecting the call from him to schedule appt.

## 2021-01-14 ENCOUNTER — TELEPHONE (OUTPATIENT)
Dept: GASTROENTEROLOGY | Facility: CLINIC | Age: 36
End: 2021-01-14

## 2021-01-14 NOTE — TELEPHONE ENCOUNTER
Ms Edwards called and stated that [facility] pharmacy didn't received her Phenergan 35 mg tablets. I explained to her that Dr Thomas sent medication to [facility] on [address]. Patient stated that will be fine. She will pick medication up there.

## 2021-01-15 ENCOUNTER — APPOINTMENT (OUTPATIENT)
Dept: GENERAL RADIOLOGY | Facility: HOSPITAL | Age: 36
End: 2021-01-15

## 2021-01-15 ENCOUNTER — TELEPHONE (OUTPATIENT)
Dept: NEUROSURGERY | Facility: CLINIC | Age: 36
End: 2021-01-15

## 2021-01-15 ENCOUNTER — HOSPITAL ENCOUNTER (EMERGENCY)
Facility: HOSPITAL | Age: 36
Discharge: HOME | End: 2021-01-16
Attending: EMERGENCY MEDICINE | Admitting: EMERGENCY MEDICINE

## 2021-01-15 ENCOUNTER — TELEMEDICINE (OUTPATIENT)
Dept: INTERNAL MEDICINE | Facility: CLINIC | Age: 36
End: 2021-01-15

## 2021-01-15 VITALS
DIASTOLIC BLOOD PRESSURE: 85 MMHG | BODY MASS INDEX: 39.93 KG/M2 | SYSTOLIC BLOOD PRESSURE: 115 MMHG | WEIGHT: 217 LBS | HEIGHT: 62 IN

## 2021-01-15 DIAGNOSIS — R05.9 COUGH: Primary | ICD-10-CM

## 2021-01-15 DIAGNOSIS — U07.1 COVID-19 VIRUS INFECTION: ICD-10-CM

## 2021-01-15 DIAGNOSIS — J45.21 MILD INTERMITTENT ASTHMA WITH EXACERBATION: Primary | ICD-10-CM

## 2021-01-15 DIAGNOSIS — U07.1 COVID-19: ICD-10-CM

## 2021-01-15 LAB
BASOPHILS # BLD AUTO: 0.05 10*3/MM3 (ref 0–0.2)
BASOPHILS NFR BLD AUTO: 0.5 % (ref 0–1.5)
D DIMER PPP FEU-MCNC: 0.53 MCGFEU/ML (ref 0–0.56)
DEPRECATED RDW RBC AUTO: 41.6 FL (ref 37–54)
EOSINOPHIL # BLD AUTO: 0.13 10*3/MM3 (ref 0–0.4)
EOSINOPHIL NFR BLD AUTO: 1.4 % (ref 0.3–6.2)
ERYTHROCYTE [DISTWIDTH] IN BLOOD BY AUTOMATED COUNT: 14.3 % (ref 12.3–15.4)
HCT VFR BLD AUTO: 44.7 % (ref 34–46.6)
HGB BLD-MCNC: 13.5 G/DL (ref 12–15.9)
IMM GRANULOCYTES # BLD AUTO: 0.05 10*3/MM3 (ref 0–0.05)
IMM GRANULOCYTES NFR BLD AUTO: 0.5 % (ref 0–0.5)
LYMPHOCYTES # BLD AUTO: 4.17 10*3/MM3 (ref 0.7–3.1)
LYMPHOCYTES NFR BLD AUTO: 43.4 % (ref 19.6–45.3)
MCH RBC QN AUTO: 24.4 PG (ref 26.6–33)
MCHC RBC AUTO-ENTMCNC: 30.2 G/DL (ref 31.5–35.7)
MCV RBC AUTO: 80.8 FL (ref 79–97)
MONOCYTES # BLD AUTO: 0.71 10*3/MM3 (ref 0.1–0.9)
MONOCYTES NFR BLD AUTO: 7.4 % (ref 5–12)
NEUTROPHILS NFR BLD AUTO: 4.49 10*3/MM3 (ref 1.7–7)
NEUTROPHILS NFR BLD AUTO: 46.8 % (ref 42.7–76)
NRBC BLD AUTO-RTO: 0 /100 WBC (ref 0–0.2)
PLATELET # BLD AUTO: 290 10*3/MM3 (ref 140–450)
PMV BLD AUTO: 11.3 FL (ref 6–12)
RBC # BLD AUTO: 5.53 10*6/MM3 (ref 3.77–5.28)
WBC # BLD AUTO: 9.6 10*3/MM3 (ref 3.4–10.8)

## 2021-01-15 PROCEDURE — 71045 X-RAY EXAM CHEST 1 VIEW: CPT

## 2021-01-15 PROCEDURE — 99284 EMERGENCY DEPT VISIT MOD MDM: CPT

## 2021-01-15 PROCEDURE — 85379 FIBRIN DEGRADATION QUANT: CPT | Performed by: EMERGENCY MEDICINE

## 2021-01-15 PROCEDURE — 80053 COMPREHEN METABOLIC PANEL: CPT | Performed by: EMERGENCY MEDICINE

## 2021-01-15 PROCEDURE — 99213 OFFICE O/P EST LOW 20 MIN: CPT | Performed by: NURSE PRACTITIONER

## 2021-01-15 PROCEDURE — 85025 COMPLETE CBC W/AUTO DIFF WBC: CPT | Performed by: EMERGENCY MEDICINE

## 2021-01-15 RX ORDER — BROMPHENIRAMINE MALEATE, PSEUDOEPHEDRINE HYDROCHLORIDE, AND DEXTROMETHORPHAN HYDROBROMIDE 2; 30; 10 MG/5ML; MG/5ML; MG/5ML
10 SYRUP ORAL 4 TIMES DAILY PRN
Qty: 480 ML | Refills: 0 | Status: SHIPPED | OUTPATIENT
Start: 2021-01-15 | End: 2021-02-26

## 2021-01-15 RX ADMIN — SODIUM CHLORIDE 1000 ML: 9 INJECTION, SOLUTION INTRAVENOUS at 23:19

## 2021-01-15 NOTE — TELEPHONE ENCOUNTER
Provider:  belkis  Caller:   Time of call:   2:44 pm  Phone #:  [phone]  Surgery:  ----  Surgery Date: ----   Last visit:   01/05/21  Next visit: 01/21/21    KATIE:     ----    Reason for call:       Pt has tested positive for Covid19 and needs to reschedule MRI and a next appointment. She will call back to reschedule when she is allowed to leave the house and is well again.

## 2021-01-15 NOTE — PROGRESS NOTES
You have chosen to receive care through a telehealth visit.  Do you consent to use a video/audio connection for your medical care today? Yes  This was an audio and video enabled telemedicine encounter.    Subjective   Marguerite Edwards is a 35 y.o. female.     Chief Complaint   Patient presents with   • positive Covid     nausea, diarhea, difficulty sleeping, body aches, fatigue       History of Present Illness     Diagnosed with covid on 1/12/2020.  Patient reports she had no symptoms initially but is now having diarrhea, nausea, fatigue, sleeping more, cough, mild SOA, and headaches.  Denies:loss of taste or smell, or fever.  She is tolerating p.o. intake and voiding and having bowel movements per normal routine.  She lives with her  who is helping with her care as much as possible.  They are quarantining together.  She is being followed by the health department daily.       The following portions of the patient's history were reviewed and updated as appropriate: allergies, current medications, past family history, past medical history, past social history, past surgical history and problem list.      Review of Systems   Constitutional: Positive for fatigue. Negative for activity change, appetite change, chills, diaphoresis, fever, unexpected weight gain and unexpected weight loss.   HENT: Negative for ear pain.    Respiratory: Positive for cough and shortness of breath. Negative for wheezing.    Cardiovascular: Negative for chest pain and palpitations.   Gastrointestinal: Positive for diarrhea and nausea.   Musculoskeletal: Positive for myalgias.   Neurological: Positive for headache. Negative for dizziness, weakness and confusion.   Psychiatric/Behavioral: Positive for sleep disturbance ( More).           Outpatient Medications Marked as Taking for the 1/15/21 encounter (Telemedicine) with Liliya Hodges APRN   Medication Sig Dispense Refill   • albuterol (PROVENTIL) (2.5 MG/3ML) 0.083% nebulizer  solution Take 2.5 mg by nebulization 4 (Four) Times a Day As Needed for Wheezing. 125 vial 3   • albuterol sulfate  (90 Base) MCG/ACT inhaler Inhale 2 puffs Every 4 (Four) Hours As Needed for Wheezing. 18 g 6   • Alcohol Swabs (Alcohol Prep) pads 1 pad Daily. 100 each 11   • ARIPiprazole (Abilify) 5 MG tablet Take 7.5 mg by mouth Daily.     • buPROPion XL (WELLBUTRIN XL) 300 MG 24 hr tablet      • busPIRone (BUSPAR) 10 MG tablet 10 mg.     • cholecalciferol (VITAMIN D3) 25 MCG (1000 UT) tablet Take 1,000 Units by mouth Daily.     • cholestyramine (QUESTRAN) 4 GM/DOSE powder Take 1 packet by mouth 2 (Two) Times a Day. mixed with a liquid 378 g 11   • colestipol (COLESTID) 1 g tablet Take 2 tablets by mouth 2 (Two) Times a Day. 90 tablet 3   • dicyclomine (Bentyl) 10 MG capsule Take 1 capsule by mouth 3 (Three) Times a Day As Needed (pain). Indications: Irritable Bowel Syndrome 120 capsule 3   • escitalopram (LEXAPRO) 20 MG tablet Take 20 mg by mouth Daily.     • Fluticasone Furoate-Vilanterol (BREO ELLIPTA) 100-25 MCG/INH inhaler Inhale 1 puff Daily. 1 inhalation once a day 1 each 6   • glucose blood test strip Use as instructed 100 each 12   • hydrOXYzine pamoate (Vistaril) 50 MG capsule Take 50 mg by mouth Every 6 (Six) Hours. Every 6 hours  As needed and 2 pills at night     • Lancets Ultra Fine misc 1 each 3 (Three) Times a Day. 100 each 3   • meloxicam (MOBIC) 15 MG tablet Take 1 tablet by mouth Daily. 30 tablet 1   • Menthol (Icy Hot) 5 % patch Apply 1 patch topically Daily As Needed (back pain). 30 patch 3   • mirtazapine (REMERON) 45 MG tablet Take 45 mg by mouth Every Night.     • Misc. Devices (PROFIT PRECISION SCALE) [facility] Home scales to weigh self weekly 1 each 0   • montelukast (Singulair) 10 MG tablet Take 1 tablet by mouth Every Night. 30 tablet 6   • norelgestromin-ethinyl estradiol (ORTHO EVRA) 150-35 MCG/24HR Place 1 patch on the skin as directed by provider Every 7 (Seven) Days for 3 weeks,  THEN skip 7 days. 3 patch 9   • OLANZapine (zyPREXA) 5 MG tablet      • ondansetron (ZOFRAN) 4 MG tablet Take 1 tablet by mouth 4 (Four) Times a Day As Needed for Nausea or Vomiting. 30 tablet 1   • promethazine (PHENERGAN) 25 MG tablet Take 1 tablet by mouth Every 6 (Six) Hours As Needed for Nausea or Vomiting. 45 tablet 0   • Sanitary Napkins & Tampons (MAXI PAD OVERNIGHT) pads 1 pad 4 (Four) Times a Day As Needed (menses). 96 each 11   • simvastatin (ZOCOR) 10 MG tablet Take 1 tablet by mouth Every Night. 90 tablet 1   • terconazole (TERAZOL 7) 0.4 % vaginal cream Insert 1 applicatorful into the vagina every night at bedtime for 7 days. 45 g 0   • TRI-SPRINTEC 0.18/0.215/0.25 MG-35 MCG per tablet Take 1 tablet by mouth Daily.  2   • Vitamin D, Cholecalciferol, 25 MCG (1000 UT) capsule Take 1 capsule by mouth Daily. 30 capsule 11   • [DISCONTINUED] cholestyramine (QUESTRAN) 4 g packet      • [DISCONTINUED] lansoprazole (PREVACID) 30 MG capsule Take 2 tablets in the morning and 2 tablets at night. 90 capsule 3     Allergies   Allergen Reactions   • Paxil [Paroxetine Hcl] Mental Status Change   • Prozac [Fluoxetine Hcl] Mental Status Change   • Amitiza [Lubiprostone] Palpitations and Dizziness           Objective   Physical Exam   Constitutional: She appears well-developed and well-nourished. She does not have a sickly appearance. She does not appear ill. No distress.   Neck: Neck normal appearance.  Cardiovascular: Normal pulse (patient directed exam).  No conjunctival pallor noted.    Pulmonary/Chest: Effort normal. No stridor.  No respiratory distress.  Abdominal: Abdomen appears normal. She exhibits no distension.   Skin: Skin is dry. No erythema. No pallor.   Psychiatric: Her speech is normal and behavior is normal. She mood appears anxious. She expresses impulsivity. She exhibits loose associations.         Vitals:    01/15/21 1558   BP: 115/85   Weight: 98.4 kg (217 lb)  Comment: per pt   Height: 157.5 cm  "(62\")     Body mass index is 39.69 kg/m².        Assessment/Plan   Diagnoses and all orders for this visit:    1. Cough (Primary)  -     brompheniramine-pseudoephedrine-DM 30-2-10 MG/5ML syrup; Take 10 mL by mouth 4 (Four) Times a Day As Needed for Congestion or Cough.  Dispense: 480 mL; Refill: 0    2. COVID-19  -     Misc. Devices (Pulse Oximeter) misc; 1 Device Daily.  Dispense: 1 each; Refill: 0      Continue to quarantine until the health department.  To ER if develops significant shortness of breath or oxygen saturations drop below 90%.  Would like to see if oxygen saturations maintain greater than 94% routinely.  We will also treat the cough with some Bromfed-DM.  Clear liquids for 24 hours then progress today encouraged brat diet since she has been having some loose stools.  Advised to stay well-hydrated as well.         Return for Next scheduled follow up.  Or sooner if needed for worsening of symptoms.    I discussed my findings,recommendations, and plan of care was with the patient. They verbalized understanding and agreement.  Patient was encouraged to keep me informed of any acute changes, lack of improvement, or any new concerning symptoms.       * Please note that portions of this note were completed with a voice recognition program. Efforts were made to edit the dictation but occasionally words are erroneously transcribed.   "

## 2021-01-15 NOTE — TELEPHONE ENCOUNTER
Caller: JESSICA WITH [facility]     Relationship to patient: N/A    Best call back number: [phone]    Patient is needing: JESSICA SAID A REFERRAL WAS RECEIVED FOR THE PATIENT TO [facility], BUT THEY WEREN'T SURE WHAT THE REFERRAL WAS FOR.

## 2021-01-16 VITALS
HEIGHT: 63 IN | HEART RATE: 98 BPM | BODY MASS INDEX: 38.09 KG/M2 | TEMPERATURE: 98.3 F | OXYGEN SATURATION: 97 % | SYSTOLIC BLOOD PRESSURE: 110 MMHG | DIASTOLIC BLOOD PRESSURE: 77 MMHG | WEIGHT: 215 LBS | RESPIRATION RATE: 20 BRPM

## 2021-01-16 LAB
ALBUMIN SERPL-MCNC: 3.7 G/DL (ref 3.5–5.2)
ALBUMIN/GLOB SERPL: 0.9 G/DL
ALP SERPL-CCNC: 117 U/L (ref 39–117)
ALT SERPL W P-5'-P-CCNC: 13 U/L (ref 1–33)
ANION GAP SERPL CALCULATED.3IONS-SCNC: 14 MMOL/L (ref 5–15)
AST SERPL-CCNC: 20 U/L (ref 1–32)
BILIRUB SERPL-MCNC: <0.2 MG/DL (ref 0–1.2)
BUN SERPL-MCNC: 11 MG/DL (ref 6–20)
BUN/CREAT SERPL: 14.3 (ref 7–25)
CALCIUM SPEC-SCNC: 9.2 MG/DL (ref 8.6–10.5)
CHLORIDE SERPL-SCNC: 101 MMOL/L (ref 98–107)
CO2 SERPL-SCNC: 21 MMOL/L (ref 22–29)
CREAT SERPL-MCNC: 0.77 MG/DL (ref 0.57–1)
GFR SERPL CREATININE-BSD FRML MDRD: 84 ML/MIN/1.73
GLOBULIN UR ELPH-MCNC: 4 GM/DL
GLUCOSE SERPL-MCNC: 118 MG/DL (ref 65–99)
POTASSIUM SERPL-SCNC: 3.7 MMOL/L (ref 3.5–5.2)
PROT SERPL-MCNC: 7.7 G/DL (ref 6–8.5)
SODIUM SERPL-SCNC: 136 MMOL/L (ref 136–145)

## 2021-01-16 PROCEDURE — 25010000002 ONDANSETRON PER 1 MG: Performed by: EMERGENCY MEDICINE

## 2021-01-16 PROCEDURE — 96374 THER/PROPH/DIAG INJ IV PUSH: CPT

## 2021-01-16 PROCEDURE — 25010000002 METHYLPREDNISOLONE PER 40 MG: Performed by: EMERGENCY MEDICINE

## 2021-01-16 PROCEDURE — 96375 TX/PRO/DX INJ NEW DRUG ADDON: CPT

## 2021-01-16 RX ORDER — LIDOCAINE HYDROCHLORIDE 20 MG/ML
15 SOLUTION OROPHARYNGEAL ONCE
Status: COMPLETED | OUTPATIENT
Start: 2021-01-16 | End: 2021-01-16

## 2021-01-16 RX ORDER — METHYLPREDNISOLONE SODIUM SUCCINATE 40 MG/ML
80 INJECTION, POWDER, LYOPHILIZED, FOR SOLUTION INTRAMUSCULAR; INTRAVENOUS ONCE
Status: COMPLETED | OUTPATIENT
Start: 2021-01-16 | End: 2021-01-16

## 2021-01-16 RX ORDER — PREDNISONE 20 MG/1
20 TABLET ORAL
Status: DISCONTINUED | OUTPATIENT
Start: 2021-01-16 | End: 2021-01-16 | Stop reason: HOSPADM

## 2021-01-16 RX ORDER — ONDANSETRON 2 MG/ML
4 INJECTION INTRAMUSCULAR; INTRAVENOUS ONCE
Status: COMPLETED | OUTPATIENT
Start: 2021-01-16 | End: 2021-01-16

## 2021-01-16 RX ORDER — ALUMINA, MAGNESIA, AND SIMETHICONE 2400; 2400; 240 MG/30ML; MG/30ML; MG/30ML
15 SUSPENSION ORAL ONCE
Status: COMPLETED | OUTPATIENT
Start: 2021-01-16 | End: 2021-01-16

## 2021-01-16 RX ORDER — ONDANSETRON 2 MG/ML
4 INJECTION INTRAMUSCULAR; INTRAVENOUS ONCE
Status: DISCONTINUED | OUTPATIENT
Start: 2021-01-16 | End: 2021-01-16 | Stop reason: HOSPADM

## 2021-01-16 RX ADMIN — METHYLPREDNISOLONE SODIUM SUCCINATE 80 MG: 40 INJECTION, POWDER, FOR SOLUTION INTRAMUSCULAR; INTRAVENOUS at 02:29

## 2021-01-16 RX ADMIN — ALUMINUM HYDROXIDE, MAGNESIUM HYDROXIDE, AND DIMETHICONE 15 ML: 400; 400; 40 SUSPENSION ORAL at 00:18

## 2021-01-16 RX ADMIN — LIDOCAINE HYDROCHLORIDE 15 ML: 20 SOLUTION ORAL; TOPICAL at 00:18

## 2021-01-16 RX ADMIN — ONDANSETRON 4 MG: 2 INJECTION INTRAMUSCULAR; INTRAVENOUS at 00:15

## 2021-01-16 NOTE — DISCHARGE INSTRUCTIONS
Take prednisone once per day for the next 4 days.  We have given you the full prescription for this.      Utilize your home nebulizer as needed.      Return if worse.

## 2021-01-18 ENCOUNTER — TELEPHONE (OUTPATIENT)
Dept: INTERNAL MEDICINE | Facility: CLINIC | Age: 36
End: 2021-01-18

## 2021-01-18 DIAGNOSIS — F20.9 SCHIZOPHRENIA, UNSPECIFIED TYPE (HCC): ICD-10-CM

## 2021-01-18 DIAGNOSIS — G89.29 CHRONIC BILATERAL LOW BACK PAIN WITHOUT SCIATICA: Primary | ICD-10-CM

## 2021-01-18 DIAGNOSIS — M54.50 CHRONIC BILATERAL LOW BACK PAIN WITHOUT SCIATICA: Primary | ICD-10-CM

## 2021-01-18 DIAGNOSIS — M51.36 DDD (DEGENERATIVE DISC DISEASE), LUMBAR: Chronic | ICD-10-CM

## 2021-01-18 NOTE — TELEPHONE ENCOUNTER
HUB;  OK TO GIVE PT MESSSAGE    I called [employer] and they said ins will not cover this.  She will need to pay out of pocket.  It would be cheaper to get it from Pharmacy because the cost is marked up though [facility].  Tried to call pt to let her know.  No answer   (2) soft/nontender

## 2021-01-18 NOTE — TELEPHONE ENCOUNTER
PATIENT STATES THAT SOULEYMANE COLLIER WANTED TO ORDER HER AN OXYGEN METER THAT GOES ON HER FINGER. PATIENT STATES THAT SHE TRIED TO GET THE DEVICE THROUGH [facility] BUT [facility] TOLD THE PATIENT THAT [facility] WOULD NEED AN AUTHORIZATION OR PRESCRIPTION BEFORE THE DEVICE CAN BE PAID FOR. PLEASE CALL [facility] TO SUBMIT AUTHORIZATION.       PATIENT CALL [phone]

## 2021-01-19 ENCOUNTER — APPOINTMENT (OUTPATIENT)
Dept: MRI IMAGING | Facility: HOSPITAL | Age: 36
End: 2021-01-19

## 2021-01-19 ENCOUNTER — TELEPHONE (OUTPATIENT)
Dept: INTERNAL MEDICINE | Facility: CLINIC | Age: 36
End: 2021-01-19

## 2021-01-19 NOTE — TELEPHONE ENCOUNTER
HUB RELAYED PREVIOUS MESSAGE AND PATIENT STATED THAT THERE HAS TO BE A PA SENT TO [facility] STATING WHY IT IS NEEDED. PATIENT RETURN NUMBER [phone]

## 2021-01-20 ENCOUNTER — TELEMEDICINE (OUTPATIENT)
Dept: INTERNAL MEDICINE | Facility: CLINIC | Age: 36
End: 2021-01-20

## 2021-01-20 DIAGNOSIS — R19.7 DIARRHEA IN ADULT PATIENT: Primary | ICD-10-CM

## 2021-01-20 PROCEDURE — 99213 OFFICE O/P EST LOW 20 MIN: CPT | Performed by: NURSE PRACTITIONER

## 2021-01-20 RX ORDER — LOPERAMIDE HYDROCHLORIDE 2 MG/1
2 TABLET ORAL 4 TIMES DAILY PRN
Qty: 24 TABLET | Refills: 1 | Status: SHIPPED | OUTPATIENT
Start: 2021-01-20 | End: 2021-02-26

## 2021-01-20 NOTE — PATIENT INSTRUCTIONS
Diarrhea, Adult  Diarrhea is frequent loose and watery bowel movements. Diarrhea can make you feel weak and cause you to become dehydrated. Dehydration can make you tired and thirsty, cause you to have a dry mouth, and decrease how often you urinate.  Diarrhea typically lasts 2-3 days. However, it can last longer if it is a sign of something more serious. It is important to treat your diarrhea as told by your health care provider.  Follow these instructions at home:  Eating and drinking         Follow these recommendations as told by your health care provider:  · Take an oral rehydration solution (ORS). This is an over-the-counter medicine that helps return your body to its normal balance of nutrients and water. It is found at pharmacies and retail stores.  · Drink plenty of fluids, such as water, ice chips, diluted fruit juice, and low-calorie sports drinks. You can drink milk also, if desired.  · Avoid drinking fluids that contain a lot of sugar or caffeine, such as energy drinks, sports drinks, and soda.  · Eat bland, easy-to-digest foods in small amounts as you are able. These foods include bananas, applesauce, rice, lean meats, toast, and crackers.  · Avoid alcohol.  · Avoid spicy or fatty foods.    Medicines  · Take over-the-counter and prescription medicines only as told by your health care provider.  · If you were prescribed an antibiotic medicine, take it as told by your health care provider. Do not stop using the antibiotic even if you start to feel better.  General instructions    · Wash your hands often using soap and water. If soap and water are not available, use a hand . Others in the household should wash their hands as well. Hands should be washed:  ? After using the toilet or changing a diaper.  ? Before preparing, cooking, or serving food.  ? While caring for a sick person or while visiting someone in a hospital.  · Drink enough fluid to keep your urine pale yellow.  · Rest at home while  you recover.  · Watch your condition for any changes.  · Take a warm bath to relieve any burning or pain from frequent diarrhea episodes.  · Keep all follow-up visits as told by your health care provider. This is important.  Contact a health care provider if:  · You have a fever.  · Your diarrhea gets worse.  · You have new symptoms.  · You cannot keep fluids down.  · You feel light-headed or dizzy.  · You have a headache.  · You have muscle cramps.  Get help right away if:  · You have chest pain.  · You feel extremely weak or you faint.  · You have bloody or black stools or stools that look like tar.  · You have severe pain, cramping, or bloating in your abdomen.  · You have trouble breathing or you are breathing very quickly.  · Your heart is beating very quickly.  · Your skin feels cold and clammy.  · You feel confused.  · You have signs of dehydration, such as:  ? Dark urine, very little urine, or no urine.  ? Cracked lips.  ? Dry mouth.  ? Sunken eyes.  ? Sleepiness.  ? Weakness.  Summary  · Diarrhea is frequent loose and watery bowel movements. Diarrhea can make you feel weak and cause you to become dehydrated.  · Drink enough fluids to keep your urine pale yellow.  · Make sure that you wash your hands after using the toilet. If soap and water are not available, use hand .  · Contact a health care provider if your diarrhea gets worse or you have new symptoms.  · Get help right away if you have signs of dehydration.  This information is not intended to replace advice given to you by your health care provider. Make sure you discuss any questions you have with your health care provider.  Document Revised: 05/05/2020 Document Reviewed: 05/24/2019  Elsevier Patient Education © 2020 [employer]    Food Choices to Help Relieve Diarrhea, Adult  When you have diarrhea, the foods you eat and your eating habits are very important. Choosing the right foods and drinks can help:  · Relieve diarrhea.  · Replace  lost fluids and nutrients.  · Prevent dehydration.  What general guidelines should I follow?    Relieving diarrhea  · Choose foods with less than 2 g or .07 oz. of fiber per serving.  · Limit fats to less than 8 tsp (38 g or 1.34 oz.) a day.  · Avoid the following:  ? Foods and beverages sweetened with high-fructose corn syrup, honey, or sugar alcohols such as xylitol, sorbitol, and mannitol.  ? Foods that contain a lot of fat or sugar.  ? Fried, greasy, or spicy foods.  ? High-fiber grains, breads, and cereals.  ? Raw fruits and vegetables.  · Eat foods that are rich in probiotics. These foods include dairy products such as yogurt and fermented milk products. They help increase healthy bacteria in the stomach and intestines (gastrointestinal tract, or GI tract).  · If you have lactose intolerance, avoid dairy products. These may make your diarrhea worse.  · Take medicine to help stop diarrhea (antidiarrheal medicine) only as told by your health care provider.  Replacing nutrients  · Eat small meals or snacks every 3-4 hours.  · Eat bland foods, such as white rice, toast, or baked potato, until your diarrhea starts to get better. Gradually reintroduce nutrient-rich foods as tolerated or as told by your health care provider. This includes:  ? Well-cooked protein foods.  ? Peeled, seeded, and soft-cooked fruits and vegetables.  ? Low-fat dairy products.  · Take vitamin and mineral supplements as told by your health care provider.  Preventing dehydration  · Start by sipping water or a special solution to prevent dehydration (oral rehydration solution, ORS). Urine that is clear or pale yellow means that you are getting enough fluid.  · Try to drink at least 8-10 cups of fluid each day to help replace lost fluids.  · You may add other liquids in addition to water, such as clear juice or decaffeinated sports drinks, as tolerated or as told by your health care provider.  · Avoid drinks with caffeine, such as coffee, tea,  or soft drinks.  · Avoid alcohol.  What foods are recommended?         The items listed may not be a complete list. Talk with your health care provider about what dietary choices are best for you.  Grains  White rice. White, Romanian, or christiano breads (fresh or toasted), including plain rolls, buns, or bagels. White pasta. Saltine, soda, or robert crackers. Pretzels. Low-fiber cereal. Cooked cereals made with water (such as cornmeal, farina, or cream cereals). Plain muffins. Matzo. [place] toast. Zwieback.  Vegetables  Potatoes (without the skin). Most well-cooked and canned vegetables without skins or seeds. Tender lettuce.  Fruits  Apple sauce. Fruits canned in juice. Cooked apricots, cherries, grapefruit, peaches, pears, or plums. Fresh bananas and cantaloupe.  Meats and other protein foods  Baked or boiled chicken. Eggs. Tofu. Fish. Seafood. Smooth nut butters. Ground or well-cooked tender beef, ham, veal, lamb, pork, or poultry.  Dairy  Plain yogurt, kefir, and unsweetened liquid yogurt. Lactose-free milk, buttermilk, skim milk, or soy milk. Low-fat or nonfat hard cheese.  Beverages  Water. Low-calorie sports drinks. Fruit juices without pulp. Strained tomato and vegetable juices. Decaffeinated teas. Sugar-free beverages not sweetened with sugar alcohols. Oral rehydration solutions, if approved by your health care provider.  Seasoning and other foods  Bouillon, broth, or soups made from recommended foods.  What foods are not recommended?  The items listed may not be a complete list. Talk with your health care provider about what dietary choices are best for you.  Grains  Whole grain, whole wheat, bran, or rye breads, rolls, pastas, and crackers. Wild or brown rice. Whole grain or bran cereals. Barley. Oats and oatmeal. Corn tortillas or taco shells. Granola. Popcorn.  Vegetables  Raw vegetables. Fried vegetables. Cabbage, broccoli, [place] sprouts, artichokes, baked beans, beet greens, corn, kale, legumes, peas,  sweet potatoes, and yams. Potato skins. Cooked spinach and cabbage.  Fruits  Dried fruit, including raisins and dates. Raw fruits. Stewed or dried prunes. Canned fruits with syrup.  Meat and other protein foods  Fried or fatty meats. Deli meats. [place] nut butters. Nuts and seeds. Beans and lentils. Bautista. Hot dogs. Sausage.  Dairy  High-fat cheeses. Whole milk, chocolate milk, and beverages made with milk, such as milk shakes. Half-and-half. Cream. sour cream. Ice cream.  Beverages  Caffeinated beverages (such as coffee, tea, soda, or energy drinks). Alcoholic beverages. Fruit juices with pulp. Prune juice. Soft drinks sweetened with high-fructose corn syrup or sugar alcohols. High-calorie sports drinks.  Fats and oils  Butter. Cream sauces. Margarine. Salad oils. Plain salad dressings. Olives. Avocados. Mayonnaise.  Sweets and desserts  Sweet rolls, doughnuts, and sweet breads. Sugar-free desserts sweetened with sugar alcohols such as xylitol and sorbitol.  Seasoning and other foods  Honey. Hot sauce. Chili powder. Gravy. Cream-based or milk-based soups. Pancakes and waffles.  Summary  · When you have diarrhea, the foods you eat and your eating habits are very important.  · Make sure you get at least 8-10 cups of fluid each day, or enough to keep your urine clear or pale yellow.  · Eat bland foods and gradually reintroduce healthy, nutrient-rich foods as tolerated, or as told by your health care provider.  · Avoid high-fiber, fried, greasy, or spicy foods.  This information is not intended to replace advice given to you by your health care provider. Make sure you discuss any questions you have with your health care provider.  Document Revised: 04/09/2020 Document Reviewed: 12/15/2017  Elsevier Patient Education © 2020 [employer] Inc.

## 2021-01-20 NOTE — PROGRESS NOTES
You have chosen to receive care through a telehealth visit.  Do you consent to use a video/audio connection for your medical care today? Yes  This was an audio and video enabled telemedicine encounter.    Subjective   Marguerite Edwards is a 35 y.o. female.     Chief Complaint   Patient presents with   • Diarrhea         History of Present Illness   Marguerite was diagnosed with Covid on 1/12/2020 21.  She was initially asymptomatic but this progressed to nausea, fatigue, sleepiness, cough, mild shortness of failure, headaches, and some ongoing diarrhea.  She is mainly concerned today with the diarrhea.  She reports she is having loose watery stools about 3 times a day with some associated abdominal cramping and nausea.  Her shortness of breath is mild and has not been worsening.  She did present to the emergency department on 1/15/2020 where she was treated with 4 days of prednisone for a possible asthma exacerbation versus Covid related shortness of breath.  She feels as though she is much better with relation to the shortness of breath at this point.  She has not taken anything for the diarrhea.  She has been consuming lots of dairy and salads since I last saw her.  She has taken her Zofran for the nausea but does not get much relief with it.  She has Phenergan at home that she has not tried yet.      The following portions of the patient's history were reviewed and updated as appropriate: allergies, current medications, past family history, past medical history, past social history, past surgical history and problem list.      Review of Systems  As noted in the HPI.      Outpatient Medications Marked as Taking for the 1/20/21 encounter (Telemedicine) with Liliya Hodges APRN   Medication Sig Dispense Refill   • albuterol (PROVENTIL) (2.5 MG/3ML) 0.083% nebulizer solution Take 2.5 mg by nebulization 4 (Four) Times a Day As Needed for Wheezing. 125 vial 3   • albuterol sulfate  (90 Base) MCG/ACT inhaler Inhale  2 puffs Every 4 (Four) Hours As Needed for Wheezing. 18 g 6   • Alcohol Swabs (Alcohol Prep) pads 1 pad Daily. 100 each 11   • ARIPiprazole (Abilify) 5 MG tablet Take 7.5 mg by mouth Daily.     • brompheniramine-pseudoephedrine-DM 30-2-10 MG/5ML syrup Take 10 mL by mouth 4 (Four) Times a Day As Needed for Congestion or Cough. 480 mL 0   • buPROPion XL (WELLBUTRIN XL) 300 MG 24 hr tablet      • busPIRone (BUSPAR) 10 MG tablet 10 mg.     • cholecalciferol (VITAMIN D3) 25 MCG (1000 UT) tablet Take 1,000 Units by mouth Daily.     • cholestyramine (QUESTRAN) 4 GM/DOSE powder Take 1 packet by mouth 2 (Two) Times a Day. mixed with a liquid 378 g 11   • colestipol (COLESTID) 1 g tablet Take 2 tablets by mouth 2 (Two) Times a Day. 90 tablet 3   • dicyclomine (Bentyl) 10 MG capsule Take 1 capsule by mouth 3 (Three) Times a Day As Needed (pain). Indications: Irritable Bowel Syndrome 120 capsule 3   • escitalopram (LEXAPRO) 20 MG tablet Take 20 mg by mouth Daily.     • Fluticasone Furoate-Vilanterol (BREO ELLIPTA) 100-25 MCG/INH inhaler Inhale 1 puff Daily. 1 inhalation once a day 1 each 6   • glucose blood test strip Use as instructed 100 each 12   • hydrOXYzine pamoate (Vistaril) 50 MG capsule Take 50 mg by mouth Every 6 (Six) Hours. Every 6 hours  As needed and 2 pills at night     • Lancets Ultra Fine misc 1 each 3 (Three) Times a Day. 100 each 3   • meloxicam (MOBIC) 15 MG tablet Take 1 tablet by mouth Daily. 30 tablet 1   • Menthol (Icy Hot) 5 % patch Apply 1 patch topically Daily As Needed (back pain). 30 patch 3   • mirtazapine (REMERON) 45 MG tablet Take 45 mg by mouth Every Night.     • Misc. Devices (PROFIT PRECISION SCALE) [facility] scales to weigh self weekly 1 each 0   • Misc. Devices (Pulse Oximeter) misc 1 Device Daily. 1 each 0   • montelukast (Singulair) 10 MG tablet Take 1 tablet by mouth Every Night. 30 tablet 6   • mupirocin (BACTROBAN) 2 % ointment      • norelgestromin-ethinyl estradiol (ORTHO EVRA)  150-35 MCG/24HR Place 1 patch on the skin as directed by provider Every 7 (Seven) Days for 3 weeks, THEN skip 7 days. 3 patch 9   • OLANZapine (zyPREXA) 5 MG tablet      • ondansetron (ZOFRAN) 4 MG tablet Take 1 tablet by mouth 4 (Four) Times a Day As Needed for Nausea or Vomiting. 30 tablet 1   • promethazine (PHENERGAN) 25 MG tablet Take 1 tablet by mouth Every 6 (Six) Hours As Needed for Nausea or Vomiting. 45 tablet 0   • Sanitary Napkins & Tampons (MAXI PAD OVERNIGHT) pads 1 pad 4 (Four) Times a Day As Needed (menses). 96 each 11   • simvastatin (ZOCOR) 10 MG tablet Take 1 tablet by mouth Every Night. 90 tablet 1   • terconazole (TERAZOL 7) 0.4 % vaginal cream Insert 1 applicatorful into the vagina every night at bedtime for 7 days. 45 g 0   • TRI-SPRINTEC 0.18/0.215/0.25 MG-35 MCG per tablet Take 1 tablet by mouth Daily.  2   • Vitamin D, Cholecalciferol, 25 MCG (1000 UT) capsule Take 1 capsule by mouth Daily. 30 capsule 11     Allergies   Allergen Reactions   • Paxil [Paroxetine Hcl] Mental Status Change   • Prozac [Fluoxetine Hcl] Mental Status Change   • Amitiza [Lubiprostone] Palpitations and Dizziness           Objective   Physical Exam   Constitutional: She appears well-developed and well-nourished. She does not have a sickly appearance. She does not appear ill. No distress. She appears overweight.  HENT:   Head: Normocephalic and atraumatic.   Mouth/Throat: Mouth/Lips are normal.Uvula is midline, oropharynx is clear and moist and mucous membranes are normal.   Eyes: No scleral icterus.   Cardiovascular: Normal pulse (patient directed exam).      Pulmonary/Chest: No stridor.  No respiratory distress.  Abdominal: She exhibits no distension. There is generalized abdominal tenderness (mild).   Neurological: She is alert. She is not disoriented.   Psychiatric: Her speech is normal and behavior is normal. She mood appears anxious. She expresses impulsivity. She exhibits loose associations.         There were  no vitals filed for this visit.  There is no height or weight on file to calculate BMI.        Assessment/Plan   Diagnoses and all orders for this visit:    1. Diarrhea in adult patient (Primary)  -     loperamide (Imodium A-D) 2 MG tablet; Take 1 tablet by mouth 4 (Four) Times a Day As Needed for Diarrhea.  Dispense: 24 tablet; Refill: 1      Marguerite appears well and in no acute distress.  We will send in a prescription for Imodium for her to take up to 4 times a day if needed for diarrhea.  I have also encouraged her to follow the brat diet (bananas, rice, applesauce, and toast) as she has been consuming a lot of dairy and salads.  She can drink ginger ale, Gatorade, or Sprite as well to avoid dehydration.  I have also included information on her after visit summary that she can access via [employer] related to diarrhea and food choices to prevent diarrhea       Return if symptoms worsen or fail to improve.    I discussed my findings,recommendations, and plan of care was with the patient. They verbalized understanding and agreement.  Patient was encouraged to keep me informed of any acute changes, lack of improvement, or any new concerning symptoms.       * Please note that portions of this note were completed with a voice recognition program. Efforts were made to edit the dictation but occasionally words are erroneously transcribed.

## 2021-01-21 ENCOUNTER — TELEPHONE (OUTPATIENT)
Dept: INTERNAL MEDICINE | Facility: CLINIC | Age: 36
End: 2021-01-21

## 2021-01-21 NOTE — TELEPHONE ENCOUNTER
Pt was needing a Pulse Ox.  Order was faxed to [facility] ([place]).  They said it was not covered by her ins.  They said it would need to be cheaper to get it from pharmacy.    Pt want s PA done for Pulse Ox.  She will contact insurance company to have them fax the paper worked needed to start the PA.

## 2021-01-23 ENCOUNTER — HOSPITAL ENCOUNTER (EMERGENCY)
Facility: HOSPITAL | Age: 36
Discharge: HOME | End: 2021-01-23
Attending: EMERGENCY MEDICINE | Admitting: EMERGENCY MEDICINE

## 2021-01-23 ENCOUNTER — APPOINTMENT (OUTPATIENT)
Dept: GENERAL RADIOLOGY | Facility: HOSPITAL | Age: 36
End: 2021-01-23

## 2021-01-23 VITALS
HEART RATE: 98 BPM | TEMPERATURE: 99.5 F | WEIGHT: 215 LBS | DIASTOLIC BLOOD PRESSURE: 70 MMHG | HEIGHT: 63 IN | RESPIRATION RATE: 22 BRPM | SYSTOLIC BLOOD PRESSURE: 138 MMHG | BODY MASS INDEX: 38.09 KG/M2 | OXYGEN SATURATION: 97 %

## 2021-01-23 DIAGNOSIS — R42 DIZZINESS: ICD-10-CM

## 2021-01-23 DIAGNOSIS — U07.1 COVID-19: Primary | ICD-10-CM

## 2021-01-23 DIAGNOSIS — D72.829 LEUKOCYTOSIS, UNSPECIFIED TYPE: ICD-10-CM

## 2021-01-23 LAB
ALBUMIN SERPL-MCNC: 3.8 G/DL (ref 3.5–5.2)
ALBUMIN/GLOB SERPL: 1.1 G/DL
ALP SERPL-CCNC: 167 U/L (ref 39–117)
ALT SERPL W P-5'-P-CCNC: 15 U/L (ref 1–33)
ANION GAP SERPL CALCULATED.3IONS-SCNC: 9 MMOL/L (ref 5–15)
AST SERPL-CCNC: 31 U/L (ref 1–32)
BASOPHILS # BLD AUTO: 0.07 10*3/MM3 (ref 0–0.2)
BASOPHILS NFR BLD AUTO: 0.5 % (ref 0–1.5)
BILIRUB SERPL-MCNC: <0.2 MG/DL (ref 0–1.2)
BILIRUB UR QL STRIP: NEGATIVE
BUN SERPL-MCNC: 10 MG/DL (ref 6–20)
BUN/CREAT SERPL: 13 (ref 7–25)
CALCIUM SPEC-SCNC: 9.5 MG/DL (ref 8.6–10.5)
CHLORIDE SERPL-SCNC: 98 MMOL/L (ref 98–107)
CLARITY UR: CLEAR
CO2 SERPL-SCNC: 27 MMOL/L (ref 22–29)
COLOR UR: YELLOW
CREAT SERPL-MCNC: 0.77 MG/DL (ref 0.57–1)
DEPRECATED RDW RBC AUTO: 40.5 FL (ref 37–54)
EOSINOPHIL # BLD AUTO: 0.14 10*3/MM3 (ref 0–0.4)
EOSINOPHIL NFR BLD AUTO: 1 % (ref 0.3–6.2)
ERYTHROCYTE [DISTWIDTH] IN BLOOD BY AUTOMATED COUNT: 14.1 % (ref 12.3–15.4)
GFR SERPL CREATININE-BSD FRML MDRD: 84 ML/MIN/1.73
GLOBULIN UR ELPH-MCNC: 3.5 GM/DL
GLUCOSE SERPL-MCNC: 112 MG/DL (ref 65–99)
GLUCOSE UR STRIP-MCNC: NEGATIVE MG/DL
HCT VFR BLD AUTO: 42.8 % (ref 34–46.6)
HGB BLD-MCNC: 13.1 G/DL (ref 12–15.9)
HGB UR QL STRIP.AUTO: NEGATIVE
HOLD SPECIMEN: NORMAL
HOLD SPECIMEN: NORMAL
IMM GRANULOCYTES # BLD AUTO: 0.09 10*3/MM3 (ref 0–0.05)
IMM GRANULOCYTES NFR BLD AUTO: 0.7 % (ref 0–0.5)
KETONES UR QL STRIP: NEGATIVE
LEUKOCYTE ESTERASE UR QL STRIP.AUTO: NEGATIVE
LYMPHOCYTES # BLD AUTO: 4.81 10*3/MM3 (ref 0.7–3.1)
LYMPHOCYTES NFR BLD AUTO: 35.1 % (ref 19.6–45.3)
MAGNESIUM SERPL-MCNC: 1.7 MG/DL (ref 1.6–2.6)
MCH RBC QN AUTO: 24.5 PG (ref 26.6–33)
MCHC RBC AUTO-ENTMCNC: 30.6 G/DL (ref 31.5–35.7)
MCV RBC AUTO: 80.1 FL (ref 79–97)
MONOCYTES # BLD AUTO: 1.06 10*3/MM3 (ref 0.1–0.9)
MONOCYTES NFR BLD AUTO: 7.7 % (ref 5–12)
NEUTROPHILS NFR BLD AUTO: 55 % (ref 42.7–76)
NEUTROPHILS NFR BLD AUTO: 7.52 10*3/MM3 (ref 1.7–7)
NITRITE UR QL STRIP: NEGATIVE
NRBC BLD AUTO-RTO: 0 /100 WBC (ref 0–0.2)
PH UR STRIP.AUTO: 6 [PH] (ref 5–8)
PLATELET # BLD AUTO: 280 10*3/MM3 (ref 140–450)
PMV BLD AUTO: 10.9 FL (ref 6–12)
POTASSIUM SERPL-SCNC: 4.7 MMOL/L (ref 3.5–5.2)
PROT SERPL-MCNC: 7.3 G/DL (ref 6–8.5)
PROT UR QL STRIP: NEGATIVE
QT INTERVAL: 308 MS
QTC INTERVAL: 442 MS
RBC # BLD AUTO: 5.34 10*6/MM3 (ref 3.77–5.28)
SODIUM SERPL-SCNC: 134 MMOL/L (ref 136–145)
SP GR UR STRIP: 1.02 (ref 1–1.03)
T4 FREE SERPL-MCNC: 1.4 NG/DL (ref 0.93–1.7)
TROPONIN T SERPL-MCNC: <0.01 NG/ML (ref 0–0.03)
TSH SERPL DL<=0.05 MIU/L-ACNC: 3.04 UIU/ML (ref 0.27–4.2)
UROBILINOGEN UR QL STRIP: NORMAL
WBC # BLD AUTO: 13.69 10*3/MM3 (ref 3.4–10.8)
WHOLE BLOOD HOLD SPECIMEN: NORMAL
WHOLE BLOOD HOLD SPECIMEN: NORMAL

## 2021-01-23 PROCEDURE — 84484 ASSAY OF TROPONIN QUANT: CPT | Performed by: EMERGENCY MEDICINE

## 2021-01-23 PROCEDURE — 93005 ELECTROCARDIOGRAM TRACING: CPT | Performed by: EMERGENCY MEDICINE

## 2021-01-23 PROCEDURE — 25010000002 ONDANSETRON PER 1 MG: Performed by: EMERGENCY MEDICINE

## 2021-01-23 PROCEDURE — 99284 EMERGENCY DEPT VISIT MOD MDM: CPT

## 2021-01-23 PROCEDURE — 96376 TX/PRO/DX INJ SAME DRUG ADON: CPT

## 2021-01-23 PROCEDURE — 83735 ASSAY OF MAGNESIUM: CPT | Performed by: EMERGENCY MEDICINE

## 2021-01-23 PROCEDURE — 96374 THER/PROPH/DIAG INJ IV PUSH: CPT

## 2021-01-23 PROCEDURE — 71045 X-RAY EXAM CHEST 1 VIEW: CPT

## 2021-01-23 PROCEDURE — 80050 GENERAL HEALTH PANEL: CPT | Performed by: EMERGENCY MEDICINE

## 2021-01-23 PROCEDURE — 84439 ASSAY OF FREE THYROXINE: CPT | Performed by: EMERGENCY MEDICINE

## 2021-01-23 PROCEDURE — 81003 URINALYSIS AUTO W/O SCOPE: CPT | Performed by: EMERGENCY MEDICINE

## 2021-01-23 RX ORDER — SODIUM CHLORIDE 0.9 % (FLUSH) 0.9 %
10 SYRINGE (ML) INJECTION AS NEEDED
Status: DISCONTINUED | OUTPATIENT
Start: 2021-01-23 | End: 2021-01-23 | Stop reason: HOSPADM

## 2021-01-23 RX ORDER — ONDANSETRON 2 MG/ML
4 INJECTION INTRAMUSCULAR; INTRAVENOUS ONCE
Status: COMPLETED | OUTPATIENT
Start: 2021-01-23 | End: 2021-01-23

## 2021-01-23 RX ORDER — ALUMINA, MAGNESIA, AND SIMETHICONE 2400; 2400; 240 MG/30ML; MG/30ML; MG/30ML
15 SUSPENSION ORAL ONCE
Status: COMPLETED | OUTPATIENT
Start: 2021-01-23 | End: 2021-01-23

## 2021-01-23 RX ORDER — LIDOCAINE HYDROCHLORIDE 20 MG/ML
15 SOLUTION OROPHARYNGEAL ONCE
Status: COMPLETED | OUTPATIENT
Start: 2021-01-23 | End: 2021-01-23

## 2021-01-23 RX ORDER — ACETAMINOPHEN 500 MG
1000 TABLET ORAL ONCE
Status: COMPLETED | OUTPATIENT
Start: 2021-01-23 | End: 2021-01-23

## 2021-01-23 RX ADMIN — ACETAMINOPHEN 1000 MG: 500 TABLET ORAL at 15:20

## 2021-01-23 RX ADMIN — LIDOCAINE HYDROCHLORIDE 15 ML: 20 SOLUTION ORAL; TOPICAL at 18:38

## 2021-01-23 RX ADMIN — ONDANSETRON 4 MG: 2 INJECTION INTRAMUSCULAR; INTRAVENOUS at 15:22

## 2021-01-23 RX ADMIN — SODIUM CHLORIDE 1000 ML: 9 INJECTION, SOLUTION INTRAVENOUS at 15:20

## 2021-01-23 RX ADMIN — ALUMINUM HYDROXIDE, MAGNESIUM HYDROXIDE, AND DIMETHICONE 15 ML: 400; 400; 40 SUSPENSION ORAL at 18:38

## 2021-01-23 RX ADMIN — ONDANSETRON 4 MG: 2 INJECTION INTRAMUSCULAR; INTRAVENOUS at 16:47
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
## 2021-01-25 ENCOUNTER — TELEMEDICINE (OUTPATIENT)
Dept: INTERNAL MEDICINE | Facility: CLINIC | Age: 36
End: 2021-01-25

## 2021-01-25 ENCOUNTER — TELEPHONE (OUTPATIENT)
Dept: NEUROSURGERY | Facility: CLINIC | Age: 36
End: 2021-01-25

## 2021-01-25 DIAGNOSIS — U07.1 COVID-19: Primary | ICD-10-CM

## 2021-01-25 DIAGNOSIS — R19.7 DIARRHEA IN ADULT PATIENT: ICD-10-CM

## 2021-01-25 PROCEDURE — 99214 OFFICE O/P EST MOD 30 MIN: CPT | Performed by: NURSE PRACTITIONER

## 2021-01-25 NOTE — PROGRESS NOTES
You have chosen to receive care through a telehealth visit.  Do you consent to use a video/audio connection for your medical care today?     This was an audio and video enabled telemedicine encounter.    Subjective   Marguerite Edwards is a 35 y.o. female.     Chief Complaint   Patient presents with   • Covid-19 Home Monitoring Video Visit     went to ER over weekend       History of Present Illness   Marguerite is a 35-year-old female who is here via video visit for follow-up from an ER trip over the weekend related to her COVID-19 diagnosis from 1 week ago.  She has been having ongoing nausea, diarrhea, dizziness and myalgias.  When she went to the emergency department on 1/23/2021 she was having dizziness that was worse to the point that she felt confused and that she was going to pass out.  She did not pass out.  EMS was called and she was taken to the ER for evaluation.  Her blood sugar was normal in route.  In the emergency department she was noted to be mildly tachycardic.  She had an EKG that showed sinus tachycardia with no ST segment changes.  Her labs were unremarkable except for a mild leukocytosis.  She had a negative chest x-ray.  Her oxygen saturations remained in the upper 90s throughout her ER visit.    Today she endorses feeling much better.  She has no further diarrhea but is now not had a bowel movement in 2/almost 3 days, no fever, nausea is improved.  She does admit to feeling anxious about her diagnosis of Covid.  She is still having some occasional dizziness.  She has been taking Phenergan 25 mg 3 times a day over the weekend.  Not eating a lot due to decreased appetite and ongoing mild nausea but able to keep up with her fluid intake.  Voiding clear yellow urine per normal amount/routine.  She denies any cough, shortness of breath, fever, or chills.  Does have some mild body aches but are steadily improving.      The following portions of the patient's history were reviewed and updated as  appropriate: allergies, current medications, past family history, past medical history, past social history, past surgical history and problem list.        Review of Systems  As noted in the HPI      No outpatient medications have been marked as taking for the 1/25/21 encounter (Telemedicine) with Liliya Hodges APRN.     Allergies   Allergen Reactions   • Paxil [Paroxetine Hcl] Mental Status Change   • Prozac [Fluoxetine Hcl] Mental Status Change   • Amitiza [Lubiprostone] Palpitations and Dizziness           Objective   Physical Exam   Constitutional: She appears well-developed and well-nourished. She does not have a sickly appearance. She does not appear ill. No distress. She appears overweight.  HENT:   Head: Normocephalic and atraumatic.   Mouth/Throat: Oropharynx is clear and moist.   Eyes: No scleral icterus.   Neck: Neck normal appearance.  Cardiovascular: Normal pulse (patient directed exam).      Pulmonary/Chest: Effort normal. No accessory muscle usage or stridor. No tachypnea and no bradypnea.  No respiratory distress.  Abdominal: Abdomen appears normal. She exhibits no distension. There is no abdominal tenderness.   Neurological: She is alert. She is not disoriented.   Skin: Skin is dry. No erythema. No pallor.   Psychiatric: She has a normal mood and affect. Her speech is normal and behavior is normal. She expresses impulsivity. She exhibits loose associations.         There were no vitals filed for this visit.  There is no height or weight on file to calculate BMI.        Assessment/Plan   Diagnoses and all orders for this visit:    1. COVID-19 (Primary)    2. Diarrhea in adult patient-resolved      Patient appears stable, nontoxic, and in no acute distress on video today.  She does have somewhat anxious appearance (which is fairly typical for her with any new diagnosis) related to the Covid diagnosis.  I have asked her to decrease her use of the Phenergan as I suspect this is playing a role in  some of her dizziness.  I have also encouraged her to maintain adequate hydration and follow the brat diet that she can advance as tolerated.  She is feeling somewhat constipated so she can take some of her leftover Linzess for 1 to 2 days if needed.  In the long-term she should follow-up with her gastroenterologist as she continues to have constipation and diarrhea as she has tended to have both of these intermittently historically.       I have spent over 40 minutes with this patient.  Greater than 25 minutes were spent answering questions related to her medication regimen, symptoms, and expectations related to her diagnoses.    Return for Next scheduled follow up or sooner if need arises based off of worsening of symptoms..    I discussed my findings,recommendations, and plan of care was with the patient. They verbalized understanding and agreement.  Patient was encouraged to keep me informed of any acute changes, lack of improvement, or any new concerning symptoms.       * Please note that portions of this note were completed with a voice recognition program. Efforts were made to edit the dictation but occasionally words are erroneously transcribed.

## 2021-01-28 ENCOUNTER — TELEPHONE (OUTPATIENT)
Dept: GASTROENTEROLOGY | Facility: CLINIC | Age: 36
End: 2021-01-28

## 2021-01-28 NOTE — TELEPHONE ENCOUNTER
CALLED PATIENT FOR WELL CHECK AFTER HER CALLING  ON CALL. PATIENT STATES HER BOWELS DID MOVE YESTERDAY AND SHE IS FINE NOW.

## 2021-02-03 ENCOUNTER — TELEPHONE (OUTPATIENT)
Dept: INTERNAL MEDICINE | Facility: CLINIC | Age: 36
End: 2021-02-03

## 2021-02-03 ENCOUNTER — TELEPHONE (OUTPATIENT)
Dept: GASTROENTEROLOGY | Facility: CLINIC | Age: 36
End: 2021-02-03

## 2021-02-03 NOTE — TELEPHONE ENCOUNTER
Evelia Shelley left a voice message wondering when she can reschedule her Colonoscopy. Can you check on this and call patient to reschedule? Thanks

## 2021-02-03 NOTE — TELEPHONE ENCOUNTER
"New order for [facility] requested.  Ivon Garber PA-C wanted pt to have a \"simple velcro lumbar support wrap\".  Bringing order to be signed by PA since Ivon is in the OR.        "

## 2021-02-03 NOTE — TELEPHONE ENCOUNTER
Caller: Marguerite Edwards    Relationship: Self    Best call back number: [phone]     What medication are you requesting: HARD BACK BRACE    SYMPTOMS ARE BACK PAIN       Have you had these symptoms before:    [x] Yes  [] No    Have you been treated for these symptoms before:   [x] Yes  [] No    If a prescription is needed, what is your preferred pharmacy and phone number:  [facility] [phone]    Additional notes:  NEEDS IT SENT TO [facility] PLEASE. PLEASE RETURN CALL TO PATIENT WITH QUESTIONS

## 2021-02-04 NOTE — TELEPHONE ENCOUNTER
[facility] DOES NOT CARRY THIS TYPE OF BRACE.  PATIENT WOULD LIKE TO KNOW WHERE SHE CAN GET THE BRACE FROM.  SHE DOES NOT KNOW ANY OTHER DME COMPANYS. PLEASE ADVISE

## 2021-02-04 NOTE — TELEPHONE ENCOUNTER
"Returned patient's call and explained to her numerous times that she will need to call the 1-800 number on the back of her card to see who is available to get the brace for her.  Patient states that [facility] does not handle this, and patient requested that I call to see why not.  I reiterated that she needs to speak with her insurance company and she kept accusing me of being hateful.     I told her I was being as kind as I could, but I did not believe that she understood what I was relaying.  She put her  on the phone and I explained the same thing to him.  After explaining once again, she asked if I would speak with her .  She said the call was \"dropped.\"  She proceeded to accuse me of being hateful and after 10 min of these accusations, I explained to the patient that she just needs to contact the number on the back of her insurance call, and that I was hanging up now.  Patient started screaming \"She hanging up on me!\"    All efforts were made to be helpful, however patient was very combative.  "

## 2021-02-04 NOTE — TELEPHONE ENCOUNTER
Someone else please call her tomorrow and tell her that when she was in the office I told her to call to patient aids, she is looking for an elastic lumbar wrap with Velcro.  This is something that could also be purchased at some of the [place] drugstores that carry crutches.  She will need an extra-large so that is why I suggested she call patient [facility].

## 2021-02-04 NOTE — TELEPHONE ENCOUNTER
PATIENT CALLED BACK AND STATED THAT SHE NEEDS A FIRMER BRACE TO SUPPORT HER BACK.  HER BACK HAS GOTTEN WORSE.  SHE SAID SHE NEEDS ONE THAT IS CUSTOM FITTED FOR HER THAT [facility]PPLIES. SHE NEEDS INS TO COVER IT.  PLEASE ADVISE.    [phone] LEAVE MESS

## 2021-02-05 NOTE — TELEPHONE ENCOUNTER
"Contacted patient, relayed Ivon's message. Patient said \"insurance doesn't cover it.  I then advised that maybe [facility] would carry, and she replied \"we just don't have the money\".  I told the patient I would pass the message back however I wasn't sure what else we could do.     "

## 2021-02-05 NOTE — TELEPHONE ENCOUNTER
Please call the patient and tell her there is nothing else to do. It is not an emergency that she has this wrap, it was just a suggestion. dc

## 2021-02-08 NOTE — TELEPHONE ENCOUNTER
Called and left message for patient of the Elastic Lumbar Wrap with Velcro,   She wanted to know what it was called.

## 2021-02-08 NOTE — TELEPHONE ENCOUNTER
Caller: Marguerite Edwards    Relationship to patient: Self    Best call back number: [phone]    Patient is needing: THE PATIENT WANTED TO KNOW EXACTLY WHAT TYPE OF BACK BRACE WAS ORDERED FOR HER. SHE ALSO ASKED THAT THE ORDER BE CALLED IN TO [facility]: [phone]

## 2021-02-09 DIAGNOSIS — Z12.11 SCREENING FOR COLON CANCER: Primary | ICD-10-CM

## 2021-02-09 NOTE — TELEPHONE ENCOUNTER
Caller: Marguerite Edwards    Relationship: Self    Best call back number: [phone]    What form or medical record are you requesting: PT REQUESTS BACK BRACE RX BE FAXED TO [facility]    Who is requesting this form or medical record from you: [facility]    How would you like to receive the form or medical records (pick-up, mail, fax): FAX  If fax, what is the fax number: PT DOES NOT HAVE THEIR FAX NUMBER    Timeframe paperwork needed: ASAP    Additional notes: PT STATES [facility] HAS THE PRODUCT SHE NEEDS BUT THEY ARE REQUIRING A COPY OF THE PRESCRIPTION TO FULFILL THE ORDER. THEY CAN BE REACHED AT  [phone]    PT WILL CALL BACK WITH THEIR FAX NUMBER.     THANK YOU.

## 2021-02-09 NOTE — TELEPHONE ENCOUNTER
Provider: JOHNNY PRADO    Caller: CLAUDIA CISNEROS  Relationship to Patient: SELF  Phone Number: [phone]    Reason for Call: PT IS CALLING BACK WITH THE FAX # [phone]     SHE SAID THEY HAVE THE BRACE. THEY HAVE TO MEASURE HER

## 2021-02-11 ENCOUNTER — TELEPHONE (OUTPATIENT)
Dept: INTERNAL MEDICINE | Facility: CLINIC | Age: 36
End: 2021-02-11

## 2021-02-11 NOTE — TELEPHONE ENCOUNTER
Caller: CLAUDIA CISNEROS    Relationship to patient: SELF    Best call back number: [phone]    Concerns or Questions if Applicable: PATIENT CALLED AND SAID SHE HAD THE COCKTAIL FOR THE VIRUS BACK ON THE 18TH AND 22ND OF January AND WANTED TO KNOW IF IT WAS OK TO GET THE VACCINE?

## 2021-02-12 ENCOUNTER — TELEPHONE (OUTPATIENT)
Dept: GASTROENTEROLOGY | Facility: CLINIC | Age: 36
End: 2021-02-12

## 2021-02-12 NOTE — TELEPHONE ENCOUNTER
Hanna has schizophrenia and anxiety that benefits greatly from her  being with her.  I would be supportive of her  being in preop bay while she is getting an IV placed and awaiting to be taken to endoscopy.  If this is not possible, I am ok with that too.

## 2021-02-12 NOTE — TELEPHONE ENCOUNTER
PN.  She and her  stated understanding.  They will also ask when they get scheduled because he was told that she would need to wait 90 days.

## 2021-02-12 NOTE — TELEPHONE ENCOUNTER
Patient wants Dr Thomas to call her she wouldn't disclose what it was about. Tried to see if it was something I could help with -  Has a scope schld for Thursday and wants to talk to him before then.

## 2021-02-17 ENCOUNTER — TELEPHONE (OUTPATIENT)
Dept: GASTROENTEROLOGY | Facility: CLINIC | Age: 36
End: 2021-02-17

## 2021-02-18 ENCOUNTER — OUTSIDE FACILITY SERVICE (OUTPATIENT)
Dept: GASTROENTEROLOGY | Facility: CLINIC | Age: 36
End: 2021-02-18

## 2021-02-18 PROCEDURE — 88313 SPECIAL STAINS GROUP 2: CPT | Performed by: INTERNAL MEDICINE

## 2021-02-18 PROCEDURE — 43239 EGD BIOPSY SINGLE/MULTIPLE: CPT | Performed by: INTERNAL MEDICINE

## 2021-02-18 PROCEDURE — 45380 COLONOSCOPY AND BIOPSY: CPT | Performed by: INTERNAL MEDICINE

## 2021-02-18 PROCEDURE — 45385 COLONOSCOPY W/LESION REMOVAL: CPT | Performed by: INTERNAL MEDICINE

## 2021-02-18 PROCEDURE — 88305 TISSUE EXAM BY PATHOLOGIST: CPT | Performed by: INTERNAL MEDICINE

## 2021-02-19 ENCOUNTER — LAB REQUISITION (OUTPATIENT)
Dept: LAB | Facility: HOSPITAL | Age: 36
End: 2021-02-19

## 2021-02-19 DIAGNOSIS — K21.9 GASTRO-ESOPHAGEAL REFLUX DISEASE WITHOUT ESOPHAGITIS: ICD-10-CM

## 2021-02-19 DIAGNOSIS — R10.84 GENERALIZED ABDOMINAL PAIN: ICD-10-CM

## 2021-02-19 DIAGNOSIS — R10.10 UPPER ABDOMINAL PAIN, UNSPECIFIED: ICD-10-CM

## 2021-02-19 DIAGNOSIS — K59.00 CONSTIPATION, UNSPECIFIED: ICD-10-CM

## 2021-02-19 DIAGNOSIS — K59.00 CONSTIPATION, UNSPECIFIED CONSTIPATION TYPE: Primary | ICD-10-CM

## 2021-02-22 LAB
CYTO UR: NORMAL
LAB AP CASE REPORT: NORMAL
LAB AP CLINICAL INFORMATION: NORMAL
PATH REPORT.FINAL DX SPEC: NORMAL
PATH REPORT.GROSS SPEC: NORMAL

## 2021-02-22 NOTE — TELEPHONE ENCOUNTER
I have called and left voice message. She is scheduled for her MRI which I must see prior to being able to understand what we are treating. I will call her after her MRI.     dc

## 2021-02-22 NOTE — TELEPHONE ENCOUNTER
Provider: JOHAN  Caller: PATIENT  Reason for Call: PATIENT/CAREGIVER CALLED REQUESTING MEDICAL ADVICE/APPOINTMENT DUE TO CONTINUOUS SYMPTOMS WORSENING SINCE LAST VISIT. SYMPTOMS ARE DESCRIBED AS PAIN IN LOWER PAIN LIMITING PATIENT FROM WALKING. PATIENT STATES HER BACK FEELS LIKE IT WANTS TO POP. THERE IS NO OTHER RECENT IMAGING IN CHART AT TIME OF CALL. PER LAST OFFICE NOTE, PATIENT WAS TO RETURN 2 WEEKS SINCE LAST VISIT & WAS RECOMMENDED PAIN MGMT & MRI. Office Visit with Vanessa Garber PA-C (01/05/2021) PATIENT STATES STILL IN PROCESS OF GETTING BRACE BUT [facility] REQUIRES CLINICAL NOTES. PATIENT HAS NOT BEEN SCHEDULE WITH PAIN MGMT YET DUE TO AUTH & PATIENT IN PROCESS OF GETTING MRI SCHEDULED. MRI ORDER IN CHART AT TIME OF CALL. PATIENT INQUIRING INTO ANY MEDS TO HELP WITH INTENSE PAIN IN THE MEANTIME. PLEASE ADVISE HOW PROVIDER WOULD LIKE TO PROCEED?    When was the patient last seen: 01/05/21  When did it start: SINCE LAST VISIT  Where is it located: LOWER BACK  Characteristics of symptom/severity: PAIN  Timing- Is it constant or intermittent: CONSTANT  What makes it worse: WALKING  What therapies/medications have you tried: N/A    PATIENT CAN BE CONTACTED [phone] WITH FURTHER INSTRUCTIONS

## 2021-02-24 ENCOUNTER — TELEPHONE (OUTPATIENT)
Dept: NEUROSURGERY | Facility: CLINIC | Age: 36
End: 2021-02-24

## 2021-02-24 NOTE — TELEPHONE ENCOUNTER
Advised pt that no referral is necessary for chiropractor.  She wants to know if there is anyone we recommend.      Pt is scheduled for an MRI this Saturday.  She was transferred to [facility] to have her f/u with Ivon scheduled.

## 2021-02-24 NOTE — TELEPHONE ENCOUNTER
No.  I will call her next Monday so make the visit a telemed visit. We have already discussed this plan over the phone.  roxana

## 2021-02-24 NOTE — TELEPHONE ENCOUNTER
Caller: Marguerite Edwards    Relationship to patient: Self    Best call back number: [phone]    Patient is needing: PATIENT IS REQUESTING JOHNNY PRADO PA-C DISCUSS SENDING THE PATIENT TO A CHIROPRACTOR WITH HER  SHIVA. PHONE # [phone].

## 2021-02-24 NOTE — TELEPHONE ENCOUNTER
Referral is not needed to see a chiropractor. Pt was supposed to get an MRI and f/u with Ivon, do we know the status of this?

## 2021-02-24 NOTE — TELEPHONE ENCOUNTER
Provider:  Ivon Garber PA-C  Caller: Danny  Time of call:   11:09am  Phone #:  [phone]  Surgery:  N/A  Surgery Date: N/A    Last visit:   01/05/2021  Next visit: N/A    KATIE:         Reason for call:  Patient called wanting to know if she could be referred to a Chiropractor for her back, or if someone could mail her stretches she needed to be doing for her back.

## 2021-02-25 ENCOUNTER — TELEPHONE (OUTPATIENT)
Dept: NEUROSURGERY | Facility: CLINIC | Age: 36
End: 2021-02-25

## 2021-02-25 ENCOUNTER — TELEPHONE (OUTPATIENT)
Dept: PAIN MEDICINE | Facility: CLINIC | Age: 36
End: 2021-02-25

## 2021-02-25 ENCOUNTER — TELEPHONE (OUTPATIENT)
Dept: GASTROENTEROLOGY | Facility: CLINIC | Age: 36
End: 2021-02-25

## 2021-02-25 NOTE — TELEPHONE ENCOUNTER
Provider:  Caller: PATIENT'S     Relationship to Patient:     PhONE- [phone]     Reason for Call: PT. STATES THAT SHE WAS REFERRED BY HER PCP  IN January. PLEASE SEE REFERRAL NUMBER [id number].   PT'S CALLED, THEN PUT  ON THE PHONE.   PT'S  STATES THAT PT. DID HER EVAL WITH HER PSYCHIATRIST ABOUT 3 WEEKS AGO. IS CHECKING TO SEE IF DR. TRAN HAS REVIEWED YET.

## 2021-02-25 NOTE — TELEPHONE ENCOUNTER
Patient called states the docusate is causing her to have diarrhea. She wants to know if she can take it every other day instead?

## 2021-02-25 NOTE — TELEPHONE ENCOUNTER
Caller: CLADUIA CISNEROS    Relationship: PT    Best call back number: [phone] (PT'S  TRINO)    What form or medical record are you requesting: CLINICAL NOTES REGARDING PT'S NEED FOR BACK BRACE    Who is requesting this form or medical record from you: [facility]    How would you like to receive the form or medical records (pick-up, mail, fax): FAX  If fax, what is the fax number: [phone]    Timeframe paperwork needed: ASAP    Additional notes: PT STATES SHE HAS BEEN FITTED FOR THE BACK BRACE GODWIN PRADO PRESCRIBED AND [facility] NEEDS THE CLINICAL NOTES PRIOR TO FULFILLING SO THEY CAN BILL PT'S INSURANCE.     PT IS ALSO REQUESTING A CALL BACK FROM GODWIN PRADO REGARDING WHETHER SHE THINKS A CHIROPRACTOR WOULD BE A GOOD IDEA FOR THE PT. PT STATES BEST CALL BACK IS HER  TRINO NUMBER LISTED ABOVE.    PLEASE CALL PT WHEN NOTES FAXED.    THANK YOU.

## 2021-02-26 ENCOUNTER — LAB (OUTPATIENT)
Dept: LAB | Facility: HOSPITAL | Age: 36
End: 2021-02-26

## 2021-02-26 ENCOUNTER — TELEPHONE (OUTPATIENT)
Dept: INTERNAL MEDICINE | Facility: CLINIC | Age: 36
End: 2021-02-26

## 2021-02-26 ENCOUNTER — OFFICE VISIT (OUTPATIENT)
Dept: INTERNAL MEDICINE | Facility: CLINIC | Age: 36
End: 2021-02-26

## 2021-02-26 VITALS
HEART RATE: 104 BPM | SYSTOLIC BLOOD PRESSURE: 110 MMHG | DIASTOLIC BLOOD PRESSURE: 70 MMHG | WEIGHT: 226 LBS | HEIGHT: 63 IN | BODY MASS INDEX: 40.04 KG/M2 | OXYGEN SATURATION: 98 % | TEMPERATURE: 96.2 F | RESPIRATION RATE: 18 BRPM

## 2021-02-26 DIAGNOSIS — N89.8 ITCHING IN THE VAGINAL AREA: ICD-10-CM

## 2021-02-26 DIAGNOSIS — R53.83 FATIGUE, UNSPECIFIED TYPE: ICD-10-CM

## 2021-02-26 DIAGNOSIS — E78.2 MIXED HYPERLIPIDEMIA: ICD-10-CM

## 2021-02-26 DIAGNOSIS — Z00.00 ANNUAL PHYSICAL EXAM: Primary | ICD-10-CM

## 2021-02-26 DIAGNOSIS — Z23 NEED FOR PROPHYLACTIC VACCINATION WITH TETANUS-DIPHTHERIA (TD): ICD-10-CM

## 2021-02-26 DIAGNOSIS — B37.2 YEAST DERMATITIS: ICD-10-CM

## 2021-02-26 DIAGNOSIS — Z00.00 ANNUAL PHYSICAL EXAM: ICD-10-CM

## 2021-02-26 DIAGNOSIS — Z91.09 ENVIRONMENTAL ALLERGIES: ICD-10-CM

## 2021-02-26 DIAGNOSIS — J45.20 MILD INTERMITTENT ASTHMA WITHOUT COMPLICATION: ICD-10-CM

## 2021-02-26 DIAGNOSIS — E66.01 MORBIDLY OBESE (HCC): ICD-10-CM

## 2021-02-26 LAB
25(OH)D3 SERPL-MCNC: 51.5 NG/ML
ALBUMIN SERPL-MCNC: 4.1 G/DL (ref 3.5–5.2)
ALBUMIN/GLOB SERPL: 1.2 G/DL
ALP SERPL-CCNC: 100 U/L (ref 39–117)
ALT SERPL W P-5'-P-CCNC: 7 U/L (ref 1–33)
ANION GAP SERPL CALCULATED.3IONS-SCNC: 14.1 MMOL/L (ref 5–15)
AST SERPL-CCNC: 11 U/L (ref 1–32)
BILIRUB BLD-MCNC: NEGATIVE MG/DL
BILIRUB SERPL-MCNC: <0.2 MG/DL (ref 0–1.2)
BUN SERPL-MCNC: 11 MG/DL (ref 6–20)
BUN/CREAT SERPL: 13.9 (ref 7–25)
CALCIUM SPEC-SCNC: 9.4 MG/DL (ref 8.6–10.5)
CHLORIDE SERPL-SCNC: 100 MMOL/L (ref 98–107)
CHOLEST SERPL-MCNC: 171 MG/DL (ref 0–200)
CLARITY, POC: CLEAR
CO2 SERPL-SCNC: 22.9 MMOL/L (ref 22–29)
COLOR UR: YELLOW
CREAT SERPL-MCNC: 0.79 MG/DL (ref 0.57–1)
DEPRECATED RDW RBC AUTO: 40.4 FL (ref 37–54)
ERYTHROCYTE [DISTWIDTH] IN BLOOD BY AUTOMATED COUNT: 13.9 % (ref 12.3–15.4)
GFR SERPL CREATININE-BSD FRML MDRD: 82 ML/MIN/1.73
GLOBULIN UR ELPH-MCNC: 3.4 GM/DL
GLUCOSE SERPL-MCNC: 90 MG/DL (ref 65–99)
GLUCOSE UR STRIP-MCNC: NEGATIVE MG/DL
HBA1C MFR BLD: 5.91 % (ref 4.8–5.6)
HCT VFR BLD AUTO: 40.9 % (ref 34–46.6)
HDLC SERPL-MCNC: 48 MG/DL (ref 40–60)
HGB BLD-MCNC: 13 G/DL (ref 12–15.9)
KETONES UR QL: NEGATIVE
LDLC SERPL CALC-MCNC: 91 MG/DL (ref 0–100)
LDLC/HDLC SERPL: 1.78 {RATIO}
LEUKOCYTE EST, POC: NEGATIVE
MCH RBC QN AUTO: 25.5 PG (ref 26.6–33)
MCHC RBC AUTO-ENTMCNC: 31.8 G/DL (ref 31.5–35.7)
MCV RBC AUTO: 80.4 FL (ref 79–97)
NITRITE UR-MCNC: NEGATIVE MG/ML
PH UR: 6 [PH] (ref 5–8)
PLATELET # BLD AUTO: 298 10*3/MM3 (ref 140–450)
PMV BLD AUTO: 11.7 FL (ref 6–12)
POTASSIUM SERPL-SCNC: 4.6 MMOL/L (ref 3.5–5.2)
PROT SERPL-MCNC: 7.5 G/DL (ref 6–8.5)
PROT UR STRIP-MCNC: NEGATIVE MG/DL
RBC # BLD AUTO: 5.09 10*6/MM3 (ref 3.77–5.28)
RBC # UR STRIP: NEGATIVE /UL
SODIUM SERPL-SCNC: 137 MMOL/L (ref 136–145)
SP GR UR: 1.02 (ref 1–1.03)
TRIGL SERPL-MCNC: 188 MG/DL (ref 0–150)
UROBILINOGEN UR QL: NORMAL
VIT B12 BLD-MCNC: 313 PG/ML (ref 211–946)
VLDLC SERPL-MCNC: 32 MG/DL (ref 5–40)
WBC # BLD AUTO: 14.77 10*3/MM3 (ref 3.4–10.8)

## 2021-02-26 PROCEDURE — 80061 LIPID PANEL: CPT | Performed by: NURSE PRACTITIONER

## 2021-02-26 PROCEDURE — 90471 IMMUNIZATION ADMIN: CPT | Performed by: NURSE PRACTITIONER

## 2021-02-26 PROCEDURE — 90714 TD VACC NO PRESV 7 YRS+ IM: CPT | Performed by: NURSE PRACTITIONER

## 2021-02-26 PROCEDURE — 85027 COMPLETE CBC AUTOMATED: CPT | Performed by: NURSE PRACTITIONER

## 2021-02-26 PROCEDURE — 99213 OFFICE O/P EST LOW 20 MIN: CPT | Performed by: NURSE PRACTITIONER

## 2021-02-26 PROCEDURE — 82607 VITAMIN B-12: CPT | Performed by: NURSE PRACTITIONER

## 2021-02-26 PROCEDURE — 80053 COMPREHEN METABOLIC PANEL: CPT | Performed by: NURSE PRACTITIONER

## 2021-02-26 PROCEDURE — 82306 VITAMIN D 25 HYDROXY: CPT | Performed by: NURSE PRACTITIONER

## 2021-02-26 PROCEDURE — 83036 HEMOGLOBIN GLYCOSYLATED A1C: CPT | Performed by: NURSE PRACTITIONER

## 2021-02-26 RX ORDER — NYSTATIN 100000 [USP'U]/G
POWDER TOPICAL 3 TIMES DAILY
Qty: 60 G | Refills: 1 | Status: SHIPPED | OUTPATIENT
Start: 2021-02-26 | End: 2021-11-03 | Stop reason: SDUPTHER

## 2021-02-26 RX ORDER — MONTELUKAST SODIUM 10 MG/1
10 TABLET ORAL NIGHTLY
Qty: 30 TABLET | Refills: 6 | Status: SHIPPED | OUTPATIENT
Start: 2021-02-26 | End: 2022-03-07 | Stop reason: SDUPTHER

## 2021-02-26 RX ORDER — SIMVASTATIN 10 MG
10 TABLET ORAL NIGHTLY
Qty: 90 TABLET | Refills: 1 | Status: SHIPPED | OUTPATIENT
Start: 2021-02-26 | End: 2021-06-08 | Stop reason: SDUPTHER

## 2021-02-26 NOTE — TELEPHONE ENCOUNTER
Greg Garcia with [facility] [employer] needs dictation supporting necessity for brace ordered on 1/18/21.  He received the order but needs to submit documentation for insurance to cover faxed to [phone]

## 2021-02-26 NOTE — TELEPHONE ENCOUNTER
Please fax [facility] this note:    Prefabricated rigid LSO was ordered for pain control and assistance with patient doing activities while out of bed.  Patient will use this in the coming months for management of pain and stabilization.           As far as the chiropractor goes Dr. Ivon Garber discussed this with her before that she should not be going to the chiropractor at this time.  This was discussed on 2/25/2021.

## 2021-03-01 ENCOUNTER — DOCUMENTATION (OUTPATIENT)
Dept: NEUROSURGERY | Facility: CLINIC | Age: 36
End: 2021-03-01

## 2021-03-01 ENCOUNTER — TELEPHONE (OUTPATIENT)
Dept: NEUROSURGERY | Facility: CLINIC | Age: 36
End: 2021-03-01

## 2021-03-01 NOTE — TELEPHONE ENCOUNTER
Caller: CLAUDIA CISNEROS    Relationship to patient: PT    Best call back number: [phone]    Chief complaint: PT REQUESTS 3/15/21 TELEPHONE FOLLOW UP BE CHANGED TO MY CHART VIDEO VISIT    Type of visit: MY CHART    Requested date: 3/15/21 11:30 AM    If rescheduling, when is the original appointment: 3/15/21 11:30 AM    Additional notes: PLEASE CALL PT AT NUMBER ABOVE TO ADVISE IF CHANGE TO MY CHART IS POSSIBLE.    THANK YOU.

## 2021-03-02 ENCOUNTER — TELEPHONE (OUTPATIENT)
Dept: INTERNAL MEDICINE | Facility: CLINIC | Age: 36
End: 2021-03-02

## 2021-03-02 PROBLEM — E78.2 MIXED HYPERLIPIDEMIA: Chronic | Status: ACTIVE | Noted: 2019-03-12

## 2021-03-02 PROBLEM — N91.1 SECONDARY AMENORRHEA: Chronic | Status: ACTIVE | Noted: 2018-08-27

## 2021-03-02 PROBLEM — R53.82 CHRONIC FATIGUE: Chronic | Status: ACTIVE | Noted: 2019-02-06

## 2021-03-02 PROBLEM — Z91.09 ENVIRONMENTAL ALLERGIES: Status: ACTIVE | Noted: 2018-08-29

## 2021-03-02 PROBLEM — M51.36 DDD (DEGENERATIVE DISC DISEASE), LUMBAR: Status: ACTIVE | Noted: 2021-01-05

## 2021-03-02 PROBLEM — M54.9 CHRONIC BACK PAIN: Chronic | Status: ACTIVE | Noted: 2018-09-07

## 2021-03-02 PROBLEM — G89.29 CHRONIC BACK PAIN: Chronic | Status: ACTIVE | Noted: 2018-09-07

## 2021-03-02 PROBLEM — J30.89 ENVIRONMENTAL AND SEASONAL ALLERGIES: Chronic | Status: ACTIVE | Noted: 2018-08-29

## 2021-03-02 PROBLEM — J45.909 ASTHMA: Chronic | Status: ACTIVE | Noted: 2018-05-01

## 2021-03-02 PROBLEM — F51.05 INSOMNIA DUE TO MENTAL DISORDER: Chronic | Status: ACTIVE | Noted: 2019-02-06

## 2021-03-02 PROBLEM — K21.9 GERD (GASTROESOPHAGEAL REFLUX DISEASE): Chronic | Status: ACTIVE | Noted: 2018-05-01

## 2021-03-02 PROBLEM — E66.01 MORBIDLY OBESE: Chronic | Status: ACTIVE | Noted: 2021-01-05

## 2021-03-02 PROBLEM — F20.9 SCHIZOPHRENIA (HCC): Chronic | Status: ACTIVE | Noted: 2018-02-27

## 2021-03-02 PROBLEM — M19.90 ARTHRITIS: Chronic | Status: ACTIVE | Noted: 2019-04-15

## 2021-03-02 PROBLEM — B37.2 YEAST DERMATITIS: Status: ACTIVE | Noted: 2021-03-02

## 2021-03-02 NOTE — ASSESSMENT & PLAN NOTE
Patient's (Body mass index is 40.35 kg/m².) indicates that they are morbidly obese (BMI > 40 or > 35 with obesity - related health condition). Obesity is worsening. BMI is is above average; BMI management plan is completed. We discussed low calorie, low carb based diet program, portion control and increasing exercise   .

## 2021-03-02 NOTE — TELEPHONE ENCOUNTER
----- Message from KATRINA Acevedo sent at 3/1/2021  5:19 PM EST -----  Please let her know that her labs are stable

## 2021-03-02 NOTE — ASSESSMENT & PLAN NOTE
Lipid abnormalities are unchanged.  Nutritional counseling was provided.  Lipids will be reassessed with labs today

## 2021-03-02 NOTE — ASSESSMENT & PLAN NOTE
Asthma is improving with treatment.  The patient is experiencing no daytime asthma symptoms. She is experiencing no nighttime asthma symptoms.  Discussed monitoring symptoms and use of quick-relief medications and contacting us early in the course of exacerbations.  Warning signs of respiratory distress were reviewed with the patient.   Continue current medications

## 2021-03-05 ENCOUNTER — TELEPHONE (OUTPATIENT)
Dept: NEUROSURGERY | Facility: CLINIC | Age: 36
End: 2021-03-05

## 2021-03-05 DIAGNOSIS — Z12.11 SCREENING FOR COLON CANCER: Primary | ICD-10-CM

## 2021-03-05 NOTE — TELEPHONE ENCOUNTER
Caller: PT    Relationship: SELF    Best call back number: [phone]    What orders are you requesting (i.e. lab or imaging): BACK BRACE AND MRI    In what timeframe would the patient need to come in: ASAP    Where will you receive your lab/imaging services: ASAP    Additional notes: PT IS UPSET BECAUSE HER INSURANCE HAS NOT APPROVED HER BACK BRACE OR HER MRI. PT WANTS TO TALK TO GODWIN PRADO.  PT STATES THAT [facility] IS STATING THAT THEY DO NOT HAVE THE APPROPRIATE PAPERWORK STATING THE MEDICAL NECESSITY TO COVER THE BRACE.  HER MRI IS SCHEDULE FOR 03/06/21 AND HER INSURANCE COMPANY STILL HAS NOT AUTHORIZED THIS.  SHE IS VERY UPSET AND STATES IF SHE HAS TO PAY FOR THE BACK BRACE THAT SHE WILL NOT BE BACK TO OUR OFFICE.     PLEASE ADVISE  THANK YOU

## 2021-03-05 NOTE — TELEPHONE ENCOUNTER
I SPOKE WITH BERRY'S . HE STATED THAT HE HAS LANOPRAZOLE. HE MISSED PLACED DEXILANT SAMPLES.I EXPLAINED TO TRINO THAT WE DON'T HAVE ANY MORE DEXILANT SAMPLES AT THIS TIME.  I ADVISED BERRY TO TAKE LANSOPRAZOLE UNTIL DEXILANT IS REFILLED ON Monday. DR HURLEY  HAS LEFT FOR TODAY. ALSO TAKE COLACE OR MIRALAX TO HELP WITH  CONSTIPTION.  VOICED UNDERSTANDING.

## 2021-03-06 ENCOUNTER — APPOINTMENT (OUTPATIENT)
Dept: MRI IMAGING | Facility: HOSPITAL | Age: 36
End: 2021-03-06

## 2021-03-08 RX ORDER — DEXLANSOPRAZOLE 60 MG/1
60 CAPSULE, DELAYED RELEASE ORAL DAILY
Qty: 30 CAPSULE | Refills: 0 | Status: SHIPPED | OUTPATIENT
Start: 2021-03-08 | End: 2021-03-22 | Stop reason: SDUPTHER

## 2021-03-08 NOTE — TELEPHONE ENCOUNTER
Caller: CLAUDIA CISNEROS    Relationship to patient: SELF    Best call back number: [phone]    Patient is needing: THE PATIENT WAS UPSET THAT SHE HASN'T BEEN CALLED BACK YET ABOUT OBTAINING AUTHORIZATION FOR HER BACK BRACE. SHE SAID SHE WANTS TO SPEAK WITH JOHNNY PRADO DIRECTLY AND ASKED THAT SHE CALL HER BACK TOMORROW.

## 2021-03-09 ENCOUNTER — DOCUMENTATION (OUTPATIENT)
Dept: NEUROSURGERY | Facility: CLINIC | Age: 36
End: 2021-03-09

## 2021-03-09 NOTE — PROGRESS NOTES
Called and spoke with Olegario with [facility] here in [place], [place]. Sent over the Pts office note, outlined for all requirements needed for them to complete the PA for pt. Brace. Aleyda said they did not need anything else and was submitting the pre cert now. Called and notified patient that all documents were sent for completion of PA for their brace.

## 2021-03-09 NOTE — TELEPHONE ENCOUNTER
"Called to speak with pt. She states her  Ezekiel handles her medical things and told me to call him.     I called her insurance company to figure out what all needed to be done for pt to  her brace. Her insurance covers all braces without a PA that are under $500. Her brace is $830.92 which [facility] completes the PA for. I called [facility] to figure out what needed to be done so that pt could  brace. Bhargavi at [facility] stated that our office note diagnosis doesn't qualify as a diagnosis that covers the patient/insurance to get the brace. Diagnosis states \"Low back pain\" but there are additional diagnosis following. I told Bhargavi pt has a diagnosis in the note of DDD, she said because it isn't listed as a primary diagnosis on the front page and that it doesn't match the prescription for the brace, our office note isn't sufficient until DDD is on the front page and the prescription.     Please advise.       "

## 2021-03-09 NOTE — TELEPHONE ENCOUNTER
Caller: CLAUDIA CISNEROS    Relationship to patient: PT    Best call back number: REQUESTS CALL  TRINO [phone]    Patient is needing: PT IS CALLING TO CHECK THE STATUS OF HER BRACE AUTHORIZATION. PT STATES SHE SPOKE WITH [employer] [facility] TODAY AND THEY DID NOT YET HAVE WHAT THEY NEED FOR INSURANCE TO COVER IT. ADVISED OFFICE IS IN THE PROCESS OF SUBMITTING PAPERWORK NEEDED. PT REQUESTS OFFICE CALL HER  TRINO WHEN AUTHORIZATION INFORMATION IS FAXED TO [facility].    THANK YOU.

## 2021-03-10 ENCOUNTER — TELEPHONE (OUTPATIENT)
Dept: PAIN MEDICINE | Facility: CLINIC | Age: 36
End: 2021-03-10

## 2021-03-10 ENCOUNTER — TELEPHONE (OUTPATIENT)
Dept: NEUROSURGERY | Facility: CLINIC | Age: 36
End: 2021-03-10

## 2021-03-10 NOTE — TELEPHONE ENCOUNTER
Chula, can you please talk to the patient's  in regard to what you know about the back brace.  Please let me know if they need to talk to provider, you can forward it to me and I will be happy to discuss with them.

## 2021-03-10 NOTE — TELEPHONE ENCOUNTER
Caller: TRINO CISNEROS    Relationship:     Best call back number: [phone]    What orders are you requesting (i.e. lab or imaging): PAIN MANAGEMENT    In what timeframe would the patient need to come in: ASAP    Where will you receive your lab/imaging services:  IS REQUESTING A FOLLOW UP CALL REGARDING PAIN MANAGEMENT REFERRAL DUE TO CALL THAT HE MADE TODAY.  STATES HE ALSO NEEDS TO SPEAK TO SALTY REGARDING CALL WITH COMPANY FOR BACK BRACE.        Additional notes: PLEASE CONTACT TO PT OR  REGARDING THIS MATTER, QUESTIONS OR CONCERNS.    THANK YOU!

## 2021-03-10 NOTE — TELEPHONE ENCOUNTER
Spoke with patient and . They are asking why she hasn't been scheduled. Advised that she would have to see Dr. Rodriguez prior to being seen in our office, and the referral had been routed back to referring as such.   Understanding verbalized after  explained that he is a medical student for psych and that none of this makes sense.

## 2021-03-15 ENCOUNTER — TELEPHONE (OUTPATIENT)
Dept: NEUROSURGERY | Facility: CLINIC | Age: 36
End: 2021-03-15

## 2021-03-15 NOTE — TELEPHONE ENCOUNTER
Called and spoke with Marguerite and her  to set up her [employer] video appt.   Pt was able to get her brace last week. Had a referral to to pain management but they are requesting a referral to someone else because Radu has D/C them due to too many no shows. They also asked to reschedule todays [employer] video to another day.

## 2021-03-19 ENCOUNTER — TELEPHONE (OUTPATIENT)
Dept: NEUROSURGERY | Facility: CLINIC | Age: 36
End: 2021-03-19

## 2021-03-19 NOTE — TELEPHONE ENCOUNTER
Caller: Marguerite Edwards    Relationship to patient: Self    Best call back number: [phone]    Chief complaint:     Type of visit: FOLLOW UP EXTENDED    Requested date: VIDEO APPOINTMENT    If rescheduling, when is the original appointment: 03/23/21    Additional notes:PT CAN NOT COME IN FOR THE APPOINTMENT ON 03/23/21.  SHE SAID THAT SHE WAS TOLD THAT THIS COULD BE A TELEPHONE APPOINTMENT.  SHE WANTS THIS CHANGED TO THE WEEK OF 29TH AND MADE AS A MY CHART VIDEO APPOINTMENT.   PLEASE ADVISE  THANK YOU

## 2021-03-20 ENCOUNTER — HOSPITAL ENCOUNTER (OUTPATIENT)
Dept: MRI IMAGING | Facility: HOSPITAL | Age: 36
Discharge: HOME | End: 2021-03-20
Admitting: PHYSICIAN ASSISTANT

## 2021-03-20 PROCEDURE — A9577 INJ MULTIHANCE: HCPCS | Performed by: PHYSICIAN ASSISTANT

## 2021-03-20 PROCEDURE — 72158 MRI LUMBAR SPINE W/O & W/DYE: CPT

## 2021-03-20 PROCEDURE — 0 GADOBENATE DIMEGLUMINE 529 MG/ML SOLUTION: Performed by: PHYSICIAN ASSISTANT

## 2021-03-20 RX ADMIN — GADOBENATE DIMEGLUMINE 20 ML: 529 INJECTION, SOLUTION INTRAVENOUS at 10:35

## 2021-03-22 DIAGNOSIS — Z12.11 SCREENING FOR COLON CANCER: ICD-10-CM

## 2021-03-24 RX ORDER — DEXLANSOPRAZOLE 60 MG/1
60 CAPSULE, DELAYED RELEASE ORAL DAILY
Qty: 30 CAPSULE | Refills: 12 | Status: SHIPPED | OUTPATIENT
Start: 2021-03-24 | End: 2021-04-23

## 2021-03-26 ENCOUNTER — LAB (OUTPATIENT)
Dept: LAB | Facility: HOSPITAL | Age: 36
End: 2021-03-26

## 2021-03-26 ENCOUNTER — OFFICE VISIT (OUTPATIENT)
Dept: INTERNAL MEDICINE | Facility: CLINIC | Age: 36
End: 2021-03-26

## 2021-03-26 VITALS
WEIGHT: 235 LBS | RESPIRATION RATE: 16 BRPM | HEIGHT: 63 IN | TEMPERATURE: 97.8 F | OXYGEN SATURATION: 98 % | BODY MASS INDEX: 41.64 KG/M2 | SYSTOLIC BLOOD PRESSURE: 110 MMHG | DIASTOLIC BLOOD PRESSURE: 76 MMHG | HEART RATE: 96 BPM

## 2021-03-26 DIAGNOSIS — R31.9 HEMATURIA, UNSPECIFIED TYPE: Primary | ICD-10-CM

## 2021-03-26 DIAGNOSIS — R31.9 HEMATURIA, UNSPECIFIED TYPE: ICD-10-CM

## 2021-03-26 DIAGNOSIS — R82.90 ABNORMAL URINALYSIS: ICD-10-CM

## 2021-03-26 DIAGNOSIS — N90.89 LABIAL IRRITATION: ICD-10-CM

## 2021-03-26 LAB
BILIRUB BLD-MCNC: NEGATIVE MG/DL
CLARITY, POC: CLEAR
COLOR UR: YELLOW
GLUCOSE UR STRIP-MCNC: NEGATIVE MG/DL
KETONES UR QL: NEGATIVE
LEUKOCYTE EST, POC: NEGATIVE
NITRITE UR-MCNC: NEGATIVE MG/ML
PH UR: 5.5 [PH] (ref 5–8)
PROT UR STRIP-MCNC: ABNORMAL MG/DL
RBC # UR STRIP: ABNORMAL /UL
SP GR UR: 1.03 (ref 1–1.03)
UROBILINOGEN UR QL: NORMAL

## 2021-03-26 PROCEDURE — 87086 URINE CULTURE/COLONY COUNT: CPT | Performed by: NURSE PRACTITIONER

## 2021-03-26 PROCEDURE — 87088 URINE BACTERIA CULTURE: CPT | Performed by: NURSE PRACTITIONER

## 2021-03-26 PROCEDURE — 99213 OFFICE O/P EST LOW 20 MIN: CPT | Performed by: NURSE PRACTITIONER

## 2021-03-26 PROCEDURE — 87186 SC STD MICRODIL/AGAR DIL: CPT | Performed by: NURSE PRACTITIONER

## 2021-03-26 RX ORDER — NITROFURANTOIN 25; 75 MG/1; MG/1
100 CAPSULE ORAL 2 TIMES DAILY
Qty: 7 CAPSULE | Refills: 0 | Status: SHIPPED | OUTPATIENT
Start: 2021-03-26 | End: 2021-04-02

## 2021-03-26 NOTE — PROGRESS NOTES
Chief Complaint  Blood in Urine (started yesterday. painful urination and urgency.  Redness near vaginal area.)    Subjective          Marguerite Edwards presents to [facility] for   Blood in Urine  This is a new problem. The current episode started yesterday. The problem is unchanged. She describes the hematuria as microscopic hematuria. The hematuria occurs throughout her entire urinary stream. She reports no clotting in her urine stream. The pain is moderate. She describes her urine color as light pink. Irritative symptoms include frequency and urgency. Irritative symptoms do not include nocturia. Obstructive symptoms include dribbling. Obstructive symptoms do not include incomplete emptying, an intermittent stream, a slower stream, straining or a weak stream. Pertinent negatives include no abdominal pain, chills, dysuria, facial swelling, fever, flank pain, genital pain, hesitancy, inability to urinate, nausea or vomiting. She is sexually active. Her past medical history is significant for kidney stones.   Urinary Tract Infection   This is a new problem. The current episode started yesterday. The problem has been unchanged. The quality of the pain is described as burning. The pain is moderate. There has been no fever. She is sexually active. There is no history of pyelonephritis. Associated symptoms include frequency, hematuria and urgency. Pertinent negatives include no chills, flank pain, hesitancy, nausea or vomiting. Her past medical history is significant for kidney stones.     Patient's last menstrual period was 03/09/2021 (exact date).    Her  has been catheterizing her 2 times a day.   She tells me she saw urology in the past.   She is having some pain in the vaginal area.   Urinary urgency.           Allergies   Allergen Reactions   • Paxil [Paroxetine Hcl] Mental Status Change   • Prozac [Fluoxetine Hcl] Mental Status Change   • Amitiza [Lubiprostone] Palpitations  and Dizziness     Current Outpatient Medications on File Prior to Visit   Medication Sig Dispense Refill   • albuterol (PROVENTIL) (2.5 MG/3ML) 0.083% nebulizer solution Take 2.5 mg by nebulization 4 (Four) Times a Day As Needed for Wheezing. 125 vial 3   • albuterol sulfate  (90 Base) MCG/ACT inhaler Inhale 2 puffs Every 4 (Four) Hours As Needed for Wheezing. 18 g 6   • Alcohol Swabs (Alcohol Prep) pads 1 pad Daily. 100 each 11   • ARIPiprazole (Abilify) 5 MG tablet Take 7.5 mg by mouth Daily.     • buPROPion XL (WELLBUTRIN XL) 300 MG 24 hr tablet Take  by mouth Every Morning.     • busPIRone (BUSPAR) 10 MG tablet 10 mg 3 (Three) Times a Day.     • cholecalciferol (VITAMIN D3) 25 MCG (1000 UT) tablet Take 1,000 Units by mouth Daily.     • cholestyramine (QUESTRAN) 4 GM/DOSE powder Take 1 packet by mouth 2 (Two) Times a Day. mixed with a liquid 378 g 11   • colestipol (COLESTID) 1 g tablet Take 2 tablets by mouth 2 (Two) Times a Day. 90 tablet 3   • dexlansoprazole (Dexilant) 60 MG capsule Take 1 capsule by mouth Daily for 30 days. 30 capsule 12   • dicyclomine (Bentyl) 10 MG capsule Take 1 capsule by mouth 3 (Three) Times a Day As Needed (pain). Indications: Irritable Bowel Syndrome 120 capsule 3   • docusate (COLACE) 50 MG/5ML liquid Take 5 mL by mouth Daily. 237 mL 11   • escitalopram (LEXAPRO) 20 MG tablet Take 20 mg by mouth Daily.     • Fluticasone Furoate-Vilanterol (BREO ELLIPTA) 100-25 MCG/INH inhaler Inhale 1 puff Daily. 1 inhalation once a day 1 each 6   • glucose blood test strip Use as instructed 100 each 12   • hydrOXYzine pamoate (Vistaril) 50 MG capsule Take 50 mg by mouth Every 6 (Six) Hours. Every 6 hours  As needed and 2 pills at night     • Lancets Ultra Fine misc 1 each 3 (Three) Times a Day. 100 each 3   • meloxicam (MOBIC) 15 MG tablet Take 1 tablet by mouth Daily. 30 tablet 1   • Menthol (Icy Hot) 5 % patch Apply 1 patch topically Daily As Needed (back pain). 30 patch 3   • mirtazapine  "(REMERON) 45 MG tablet Take 45 mg by mouth Every Night.     • Misc. Devices (PROFIT PRECISION SCALE) [facility] scales to weigh self weekly 1 each 0   • Misc. Devices (Pulse Oximeter) misc 1 Device Daily. 1 each 0   • montelukast (Singulair) 10 MG tablet Take 1 tablet by mouth Every Night. 30 tablet 6   • norelgestromin-ethinyl estradiol (ORTHO EVRA) 150-35 MCG/24HR Place 1 patch on the skin as directed by provider Every 7 (Seven) Days for 3 weeks, THEN skip 7 days. 3 patch 9   • nystatin (MYCOSTATIN) 011367 UNIT/GM powder Apply  topically to the appropriate area as directed 3 (Three) Times a Day. 60 g 1   • OLANZapine (zyPREXA) 5 MG tablet      • ondansetron (ZOFRAN) 4 MG tablet Take 1 tablet by mouth 4 (Four) Times a Day As Needed for Nausea or Vomiting. 30 tablet 1   • promethazine (PHENERGAN) 25 MG tablet Take 1 tablet by mouth Every 6 (Six) Hours As Needed for Nausea or Vomiting. 45 tablet 0   • Sanitary Napkins & Tampons (MAXI PAD OVERNIGHT) pads 1 pad 4 (Four) Times a Day As Needed (menses). 96 each 11   • simvastatin (ZOCOR) 10 MG tablet Take 1 tablet by mouth Every Night. 90 tablet 1   • terconazole (TERAZOL 7) 0.4 % vaginal cream Insert 1 applicatorful into the vagina every night at bedtime for 7 days. 45 g 0   • TRI-SPRINTEC 0.18/0.215/0.25 MG-35 MCG per tablet Take 1 tablet by mouth Daily.  2   • Vitamin D, Cholecalciferol, 25 MCG (1000 UT) capsule Take 1 capsule by mouth Daily. 30 capsule 11     No current facility-administered medications on file prior to visit.         The following portions of the patient's history were reviewed and updated as appropriate: allergies, current medications, past family history, past medical history, past social history, past surgical history and problem list and are available for review within electronic record    Objective     Vital Signs:   /76   Pulse 96   Temp 97.8 °F (36.6 °C)   Resp 16   Ht 159.4 cm (62.75\")   Wt 107 kg (235 lb)   SpO2 98%   BMI 41.96 " kg/m²         Physical Exam  Vitals and nursing note reviewed. Exam conducted with a chaperone present.   Constitutional:       Appearance: Normal appearance. She is well-developed.   HENT:      Head: Normocephalic and atraumatic.   Eyes:      Conjunctiva/sclera: Conjunctivae normal.      Pupils: Pupils are equal, round, and reactive to light.   Cardiovascular:      Rate and Rhythm: Normal rate and regular rhythm.      Heart sounds: Normal heart sounds.   Pulmonary:      Effort: Pulmonary effort is normal. No respiratory distress.      Breath sounds: Normal breath sounds.   Abdominal:      General: Bowel sounds are normal. There is no distension.      Palpations: Abdomen is soft. Abdomen is not rigid. There is no mass.      Tenderness: There is no abdominal tenderness. There is no guarding or rebound.      Hernia: No hernia is present. There is no hernia in the left inguinal area or right inguinal area.   Genitourinary:     Exam position: Supine.      Labia:         Right: Rash and tenderness present. No lesion or injury.         Left: Rash and tenderness present. No lesion or injury.       Comments: Mild erythema noted to labia minora and majora.  No swelling or lesions noted  Musculoskeletal:         General: Normal range of motion.      Cervical back: Normal range of motion.   Lymphadenopathy:      Lower Body: No right inguinal adenopathy. No left inguinal adenopathy.   Skin:     General: Skin is warm and dry.   Neurological:      Mental Status: She is alert and oriented to person, place, and time.   Psychiatric:         Behavior: Behavior normal.         Thought Content: Thought content normal.         Judgment: Judgment normal.          Result Review :     The following data was reviewed by: KATRINA Gore on 03/26/2021:  Brief Urine Lab Results  (Last result in the past 365 days)      Color   Clarity   Blood   Leuk Est   Nitrite   Protein   CREAT   Urine HCG        03/26/21 1350 Yellow Clear  3+  Comment:  200 Koko/uL Negative Negative Trace  Comment:  0.15 g/L                                 Assessment and Plan      Diagnoses and all orders for this visit:    1. Hematuria, unspecified type (Primary)  -     POCT urinalysis dipstick, automated  -     nitrofurantoin, macrocrystal-monohydrate, (Macrobid) 100 MG capsule; Take 1 capsule by mouth 2 (Two) Times a Day.  Dispense: 7 capsule; Refill: 0  -     Urine Culture - , Urine, Clean Catch; Future    2. Abnormal urinalysis  -     nitrofurantoin, macrocrystal-monohydrate, (Macrobid) 100 MG capsule; Take 1 capsule by mouth 2 (Two) Times a Day.  Dispense: 7 capsule; Refill: 0  -     Urine Culture - , Urine, Clean Catch; Future    3. Labial irritation      Urine dip positive for protein and blood.  No leukoesterase or nitrites noted.  Suspect UTI based off of symptoms.  We will go ahead and start her on Macrobid and send a culture to ensure appropriate antibiotic choice and confirm diagnosis. encourage sitz bath for comfort due to the labial region.  Can take over-the-counter Tylenol or ibuprofen as needed for back instructions as well.      Follow Up     Patient was given instructions and counseling regarding her condition or for health maintenance advice. Please see specific information pulled into the AVS if appropriate.   Any new medication's adverse effects have been discussed in detail with patient  Patient was encouraged to keep me informed of any acute changes, lack of improvement, or any new concerning symptoms.    Return if symptoms worsen or fail to improve.    * Please note that portions of this note were completed with a voice recognition program. Efforts were made to edit the dictation but occasionally words are erroneously transcribed.

## 2021-03-28 LAB — BACTERIA SPEC AEROBE CULT: ABNORMAL

## 2021-03-29 ENCOUNTER — TELEPHONE (OUTPATIENT)
Dept: INTERNAL MEDICINE | Facility: CLINIC | Age: 36
End: 2021-03-29

## 2021-03-29 NOTE — TELEPHONE ENCOUNTER
----- Message from KATRINA Acevedo sent at 3/28/2021 12:05 PM EDT -----  Please let her know that her urine culture did grow E. coli.  The Macrobid that I put her on for this should take care of it.

## 2021-04-01 ENCOUNTER — TELEPHONE (OUTPATIENT)
Dept: INTERNAL MEDICINE | Facility: CLINIC | Age: 36
End: 2021-04-01

## 2021-04-01 DIAGNOSIS — B96.20 E-COLI UTI: Primary | ICD-10-CM

## 2021-04-01 DIAGNOSIS — N39.0 E-COLI UTI: Primary | ICD-10-CM

## 2021-04-01 NOTE — TELEPHONE ENCOUNTER
Provider: AMINATA     Caller: CLAUDIA CISNEROS    Relationship to Patient: PATIENT     Pharmacy:  [facility]     Phone Number: [phone]    Reason for Call: PATIENT STATES THAT SHE IS STILL FEELING URGENCY AND SEEING BLOOD IN HER URINE.    When was the patient last seen: 3/26/21

## 2021-04-01 NOTE — TELEPHONE ENCOUNTER
PATIENT CALLED BACK CHECKING STATUS OF MEDICINE FOR HER UTI AND WANTS TO KNOW IF SOMETHING ELSE CAN BE CALLED IN OTHER THAN HER PREVIOUS PRESCRIPTION; SHE SAID IT MADE HER STOMACH HURT; PLEASE CALL TO ADVISE    CLAUDIA: [phone]    [facility]

## 2021-04-01 NOTE — TELEPHONE ENCOUNTER
Patient states she is hurting again, blood in urine and urgency to go.  She finished all of the antibiotic but is still having symptoms.

## 2021-04-02 DIAGNOSIS — E78.2 MIXED HYPERLIPIDEMIA: ICD-10-CM

## 2021-04-02 RX ORDER — SIMVASTATIN 10 MG
10 TABLET ORAL NIGHTLY
Qty: 90 TABLET | Refills: 1 | OUTPATIENT
Start: 2021-04-02

## 2021-04-02 RX ORDER — SULFAMETHOXAZOLE AND TRIMETHOPRIM 800; 160 MG/1; MG/1
1 TABLET ORAL 2 TIMES DAILY
Qty: 10 TABLET | Refills: 0 | Status: SHIPPED | OUTPATIENT
Start: 2021-04-02 | End: 2021-04-07

## 2021-04-02 NOTE — TELEPHONE ENCOUNTER
Caller: Marguerite Edwards    Relationship: Self    Best call back number: [phone]  [phone]    Medication needed:   Requested Prescriptions     Pending Prescriptions Disp Refills   • simvastatin (ZOCOR) 10 MG tablet 90 tablet 1     Sig: Take 1 tablet by mouth Every Night.       When do you need the refill by: TODAY    What additional details did the patient provide when requesting the medication: PATIENT IS OUT OF MEDICATION    Does the patient have less than a 3 day supply:  [x] Yes  [] No    What is the patient's preferred pharmacy: [facility] - [place], [place] - [id number] - [phone]  - [phone] FX

## 2021-04-13 ENCOUNTER — TELEPHONE (OUTPATIENT)
Dept: NEUROSURGERY | Facility: CLINIC | Age: 36
End: 2021-04-13

## 2021-04-13 ENCOUNTER — TELEMEDICINE (OUTPATIENT)
Dept: NEUROSURGERY | Facility: CLINIC | Age: 36
End: 2021-04-13

## 2021-04-13 ENCOUNTER — TELEPHONE (OUTPATIENT)
Dept: GASTROENTEROLOGY | Facility: OTHER | Age: 36
End: 2021-04-13

## 2021-04-13 DIAGNOSIS — R53.81 PHYSICAL DECONDITIONING: ICD-10-CM

## 2021-04-13 DIAGNOSIS — M54.50 CHRONIC BILATERAL LOW BACK PAIN WITHOUT SCIATICA: Primary | Chronic | ICD-10-CM

## 2021-04-13 DIAGNOSIS — G89.29 CHRONIC BILATERAL LOW BACK PAIN WITHOUT SCIATICA: Primary | Chronic | ICD-10-CM

## 2021-04-13 DIAGNOSIS — E66.01 MORBIDLY OBESE (HCC): ICD-10-CM

## 2021-04-13 DIAGNOSIS — M51.36 DDD (DEGENERATIVE DISC DISEASE), LUMBAR: ICD-10-CM

## 2021-04-13 DIAGNOSIS — F20.9 SCHIZOPHRENIA, UNSPECIFIED TYPE (HCC): Chronic | ICD-10-CM

## 2021-04-13 DIAGNOSIS — M19.90 ARTHRITIS: Chronic | ICD-10-CM

## 2021-04-13 PROCEDURE — 99214 OFFICE O/P EST MOD 30 MIN: CPT | Performed by: PHYSICIAN ASSISTANT

## 2021-04-13 RX ORDER — MELOXICAM 15 MG/1
15 TABLET ORAL DAILY
Qty: 30 TABLET | Refills: 5 | Status: SHIPPED | OUTPATIENT
Start: 2021-04-13 | End: 2021-05-03 | Stop reason: SDUPTHER

## 2021-04-13 RX ORDER — METAXALONE 800 MG/1
800 TABLET ORAL 3 TIMES DAILY
Qty: 90 TABLET | Refills: 0 | Status: SHIPPED | OUTPATIENT
Start: 2021-04-13 | End: 2021-04-15 | Stop reason: ALTCHOICE

## 2021-04-13 NOTE — TELEPHONE ENCOUNTER
Caller: Marguerite Edwards    Relationship: Self    Best call back number:[phone]    Medication needed: PT CALLED TO FIND OUT IF HER MUSCLE RELAXER HAS BEEN SENT TO THE PHARMACY.    SHE ALSO WANTED TO KNOW IF SHE CAN GET A WALKER THAT HAS A SIT ON IT THROUGH HER INSURANCE.     PLEASE CALL PT AND ADVISE       What is the patient's preferred pharmacy:      [facility] - [place], [place] - [id number] - [phone] [facility] [phone]   [phone]          THANK  YOU

## 2021-04-13 NOTE — TELEPHONE ENCOUNTER
Patient complains of nausea and abdominal cramping for 4 days, started Dexilant about 9 days ago. Had associated loose stools twice a day. Feels that Dexilant is not improving her heartburn, and may be causing cramping.    Instructed to contact Dr. Thomas tomorrow.
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
## 2021-04-13 NOTE — TELEPHONE ENCOUNTER
I called and spoke with the patient she said she forgot to ask about a walker with a seat on it because she thinks  this would help her.  She also called about her medicine I told her that they haven't been called in but they was working on it. I told her that we could work on getting her walker. Thank you.

## 2021-04-13 NOTE — TELEPHONE ENCOUNTER
I put in an order for a DME, that can be mailed to the patient and she can use that to get a Rollator

## 2021-04-14 NOTE — TELEPHONE ENCOUNTER
Message pended is not associated with existing phone call.    However I have spoken with patient and we have received the PA request for her muscle relaxer and will process it this week.    DME order along with office notes have been faxed to [employer] [facility] which is now [facility].  [phone]

## 2021-04-14 NOTE — TELEPHONE ENCOUNTER
Pt called: Her insurance is not approving the generic muscle relaxer for Skelaxin that was sent to the pharmacy. Please advise!    Pt contact # [phone]  Best time to call: anytime

## 2021-04-14 NOTE — TELEPHONE ENCOUNTER
Patient and her  Ezekiel called back today regarding the stomach cramps they are thinking it is either the Dexilant that she has been on since the end of March is causing it or that needs potassium. Patient says she thought it could even be from the foods she has been eating but it has not been anything spicy.

## 2021-04-15 ENCOUNTER — TELEPHONE (OUTPATIENT)
Dept: NEUROSURGERY | Facility: CLINIC | Age: 36
End: 2021-04-15

## 2021-04-15 RX ORDER — METHOCARBAMOL 500 MG/1
500 TABLET, FILM COATED ORAL 4 TIMES DAILY
Qty: 40 TABLET | Refills: 3 | Status: SHIPPED | OUTPATIENT
Start: 2021-04-15 | End: 2021-05-03 | Stop reason: SDUPTHER

## 2021-04-15 NOTE — TELEPHONE ENCOUNTER
Provider:  Ivon VANEGAS  Caller: fax  Time of call:  12:21   Phone #:  [phone]  Surgery:    Surgery Date:    Last visit:     Next visit:     KATIE:         Reason for call:     PA was submitted  And denied for patient's Metaxalone.      Patient has to try and fail 2 preferred alternatives:  Baclofen, Methocarbamol, or Tizanidine.

## 2021-04-16 ENCOUNTER — TELEPHONE (OUTPATIENT)
Dept: NEUROSURGERY | Facility: CLINIC | Age: 36
End: 2021-04-16

## 2021-04-16 RX ORDER — TIZANIDINE HYDROCHLORIDE 2 MG/1
4 CAPSULE, GELATIN COATED ORAL 3 TIMES DAILY PRN
Qty: 30 CAPSULE | Refills: 1 | Status: SHIPPED | OUTPATIENT
Start: 2021-04-16 | End: 2021-06-04 | Stop reason: SDUPTHER

## 2021-04-16 NOTE — TELEPHONE ENCOUNTER
She should take it just at night so the sleepiness does not bother her during the day and she needs to keep trying it for 2 to 3 weeks.  When she does to see the pain management doctors she can discuss it with them and then maybe they have a much better medication to try, they are the specialist.

## 2021-04-16 NOTE — TELEPHONE ENCOUNTER
Please let patient know that it is not uncommon to feel sleepy/drowsy after taking Robaxin.  It should work within the first hour or so after taking.

## 2021-04-16 NOTE — TELEPHONE ENCOUNTER
Per the prior encounter, patient has to try baclofen, methocarbamol or tizanidine prior to filling Skelaxin.  Therefore, I have placed a order for tizanidine.

## 2021-04-16 NOTE — TELEPHONE ENCOUNTER
Ivon VANEGAS stated she wants her to stay on this muscle relaxer until her apt with pain management.  Patient notified and verbalized understanding. After speaking with patient again, she states he does help a little, so she would like to continue.    Her  said one of her psych medications was increased and this causes drowsiness as well.   Patient would like to continue taking the muscle relaxer tid.    Patient's  would like me to reach out to pain management on Monday.  They are having issues with scheduling due to the fact that patient does not have have a current ID.  I told them I would call and discuss to see if there are any issues.

## 2021-04-16 NOTE — TELEPHONE ENCOUNTER
Patient wants to know if she can try a different muscle relaxer, since she hasn't noticed any difference other than the drossiness.

## 2021-04-16 NOTE — TELEPHONE ENCOUNTER
DOCUMENTATION ONLY    [facility] has been contacted and will put imaging on a disc.  I can pick it up on Monday and forward to [facility]. (BRYANT BEDOLLA see 04/15/21 note)    Patient picked up Methocarbamol last night and took it then and today.  She said it has made her drowsy.  She wants to know if it takes some time to be effective?    She got her walker already and was very thankful.

## 2021-04-19 NOTE — TELEPHONE ENCOUNTER
I called patient to let her know to decrease the muscle relaxer if it is making her sleepy. She said someone had already spoke to her but was thankful for the call back.

## 2021-04-19 NOTE — TELEPHONE ENCOUNTER
Please tell the  to decrease the muscle relaxant to bedtime or twice a day if she is getting sleepy. dc

## 2021-04-22 NOTE — TELEPHONE ENCOUNTER
Disc was made at [facility], [place], operated by [facility] radiology and mailed to [facility] along with reports per Ivon VANEGAS.

## 2021-04-28 ENCOUNTER — TELEPHONE (OUTPATIENT)
Dept: NEUROSURGERY | Facility: CLINIC | Age: 36
End: 2021-04-28

## 2021-04-28 NOTE — TELEPHONE ENCOUNTER
Caller: TRINO CISNEROS    Relationship: Emergency Contact    Best call back number:[phone]    What are your concerns: PT AND HER  CALLED TO LET US KNOW THEY ARE UNABLE TO GO TO PAIN MANAGMENT UNTIL SHE HAS AN UPDATED ID, THEY WANTED TO KNOW IF THERE WAS OTHER OPTIONS FOR MEDICATION UNTIL THEY CAN GET HER ID UPDATED.     PLEASE CALL PT AND OR  AND ADVISE     THANK YOU

## 2021-05-03 ENCOUNTER — TELEPHONE (OUTPATIENT)
Dept: NEUROSURGERY | Facility: CLINIC | Age: 36
End: 2021-05-03

## 2021-05-03 RX ORDER — METHOCARBAMOL 500 MG/1
500 TABLET, FILM COATED ORAL 4 TIMES DAILY
Qty: 40 TABLET | Refills: 3 | Status: SHIPPED | OUTPATIENT
Start: 2021-05-03 | End: 2021-05-11

## 2021-05-03 RX ORDER — MELOXICAM 15 MG/1
15 TABLET ORAL DAILY
Qty: 30 TABLET | Refills: 5 | Status: SHIPPED | OUTPATIENT
Start: 2021-05-03 | End: 2021-08-18 | Stop reason: SDUPTHER

## 2021-05-03 NOTE — TELEPHONE ENCOUNTER
I called Marguerite and talked with her and she is requesting a refill on her medications which I sent electronically.  I talked with her  Ezekiel on the phone and he explained that what he was in need of was a referral to pain management when she gets her new ID in June.  I told him that if he needed a referral again I would be happy to take care of that for them.  They may be calling back in mid June for a referral back to pain management.    Vanessa Garber PA-C

## 2021-05-03 NOTE — TELEPHONE ENCOUNTER
PATIENT WAS REFERRED TO A PAIN CLINIC AND PER  NO ONE WILL TAKE HER UNTIL SHE GETS A CURRENT ID.  THEY CAN'T RENEW THE ID'S UNTIL June DUE TO COVID.  SHE NEEDS OTHER OPTIONS.  SHE DOES NOT WANT PAIN MEDS.    [phone] TRINO

## 2021-05-03 NOTE — TELEPHONE ENCOUNTER
I called the patient back to let her know what Ivon said the  got on the phone and said that he was in shock that we couldn't help anymore. When I tried to explain to him he hung up.

## 2021-05-03 NOTE — PROGRESS NOTES
Spoke with the patient and spouse, when they called the office today about getting help today I think there was some confusion. I am not sure about her ID which they will get in June and then they may need a referral back to pain management which I told him I would be happy to do.  Marguerite asked for refill of her medications and those were sent electronically.  I think everything is understood now with her medications, her ID which she will get in June and then if she needs another referral to pain management I will be happy to put that in.    Vanessa Garber PA-C

## 2021-05-03 NOTE — TELEPHONE ENCOUNTER
Please see encounter from Lovely. Patient is unable to make an appointment with PM because of her  ID. Patient's  would like to know what the patient's other options are.

## 2021-05-03 NOTE — TELEPHONE ENCOUNTER
PTS  CALLED BACK AND STATED THAT HE WILL CALL THE APPROPRIATE PEOPLE TO DEAL WITH THIS. PTS  STATED THAT WE ARE NOT WILLING TO HELP THE PT ANYMORE. I ADVISED PER THE LAST NOTE AND ADVISED PT THAT MAYBE GETTING THE UPDATED ID WOULD HELP THE PROGRESS OF TAKING CARE OF THE PT. THE PTS  HUNG UP AFTER SUGGESTION.

## 2021-05-06 ENCOUNTER — TELEPHONE (OUTPATIENT)
Dept: NEUROSURGERY | Facility: CLINIC | Age: 36
End: 2021-05-06

## 2021-05-06 NOTE — TELEPHONE ENCOUNTER
PATIENT WOULD LIKE FOR JOHNNY PRADO TO CALL HER.  THE MEDICINE SHE PRESCRIBED IS NOT COVERED UNDER HER INSURANCE.  SHE DID NOT KNOW WHICH ONE IT WAS.  PLEASE ADVISE    [phone]

## 2021-05-07 NOTE — TELEPHONE ENCOUNTER
I called the pharmacy and spoke with Winston he said there was two that needed PA's they were, Metaxalone 800mg and the other was Tizanidine.

## 2021-05-10 RX ORDER — TIZANIDINE HYDROCHLORIDE 2 MG/1
4 CAPSULE, GELATIN COATED ORAL 3 TIMES DAILY PRN
Qty: 30 CAPSULE | Refills: 1 | Status: CANCELLED | OUTPATIENT
Start: 2021-05-10

## 2021-05-10 NOTE — TELEPHONE ENCOUNTER
Caller: CLAUDIA CISNEROS    Relationship: SELF    Best call back number: [phone]    Medication needed:   Requested Prescriptions     Pending Prescriptions Disp Refills   • TiZANidine (Zanaflex) 2 MG capsule 30 capsule 1     Sig: Take 2 capsules by mouth 3 (Three) Times a Day As Needed for Muscle Spasms.      METAXALONE 800 MG    When do you need the refill by: AS SOON AS POSSIBLE    What additional details did the patient provide when requesting the medication: PATIENT/CAREGIVER CALLED CHECKING THE STATUS OF PREV. REFILL REQUEST FOR MED(S ) CURRENTLY OUT. CONFIRMED PHARMACY INDICATED AS PREFERRED CHOICE. PATIENT PREFERS TO SPEAK TO [facility] DIRECTLY REGARDING MED QUESTIONS.    PATIENT CAN BE CONTACTED WITH AN UPDATE.    Does the patient have less than a 3 day supply:  [] Yes  [] No    What is the patient's preferred pharmacy: [facility] - [place], [place] - [email or web address] - [phone]  - [phone] FX

## 2021-05-10 NOTE — TELEPHONE ENCOUNTER
I spoke with patient and she said she has tried Methocarbamol, Tizanidine and Flexeril with no success.    Patient want to know if she can have Skelaxin.  (If approved this will need a PA)

## 2021-05-11 RX ORDER — METAXALONE 800 MG/1
800 TABLET ORAL 3 TIMES DAILY
Qty: 60 TABLET | Refills: 3 | Status: SHIPPED | OUTPATIENT
Start: 2021-05-11 | End: 2021-06-11

## 2021-05-11 NOTE — TELEPHONE ENCOUNTER
I did a PA, however Metaxalone is excluded from her plan.  Even with her trying and failing other muscle relaxer's they will not honor this one.    I spoke with patient's  and he said she is doing well with Methocarbamol.  Wanted to let you know they have a new home and a new puppy. :)

## 2021-05-13 ENCOUNTER — TELEPHONE (OUTPATIENT)
Dept: NEUROSURGERY | Facility: CLINIC | Age: 36
End: 2021-05-13

## 2021-05-13 DIAGNOSIS — F41.9 ANXIETY: ICD-10-CM

## 2021-05-13 DIAGNOSIS — G89.29 CHRONIC BILATERAL LOW BACK PAIN WITHOUT SCIATICA: ICD-10-CM

## 2021-05-13 DIAGNOSIS — M51.36 DISC DEGENERATION, LUMBAR: ICD-10-CM

## 2021-05-13 DIAGNOSIS — E66.01 MORBIDLY OBESE (HCC): ICD-10-CM

## 2021-05-13 DIAGNOSIS — F20.9 SCHIZOPHRENIA, UNSPECIFIED TYPE (HCC): ICD-10-CM

## 2021-05-13 DIAGNOSIS — M19.90 ARTHRITIS: ICD-10-CM

## 2021-05-13 DIAGNOSIS — M54.50 CHRONIC BILATERAL LOW BACK PAIN WITHOUT SCIATICA: ICD-10-CM

## 2021-05-13 DIAGNOSIS — M51.36 DDD (DEGENERATIVE DISC DISEASE), LUMBAR: ICD-10-CM

## 2021-05-13 DIAGNOSIS — M54.40 CHRONIC BILATERAL LOW BACK PAIN WITH SCIATICA, SCIATICA LATERALITY UNSPECIFIED: Primary | ICD-10-CM

## 2021-05-13 DIAGNOSIS — R53.81 PHYSICAL DECONDITIONING: ICD-10-CM

## 2021-05-13 DIAGNOSIS — G89.29 CHRONIC BILATERAL LOW BACK PAIN WITH SCIATICA, SCIATICA LATERALITY UNSPECIFIED: Primary | ICD-10-CM

## 2021-05-13 NOTE — TELEPHONE ENCOUNTER
"Provider:  Jcarlos  Caller: Patient  Time of call:   9:41am to [facility]  Phone #:  [phone]  Surgery:  n/a  Surgery Date:  n/a  Last visit:  04/13/21 televisit   Next visit: BETTINA WILSON:         Reason for call:   I called and talked to the patient. Patient stated she needs an order for a shower chair. Patient stated she is unable to  the shower due to her \"sciatica pain\".             "

## 2021-05-13 NOTE — TELEPHONE ENCOUNTER
Caller: CLAUDIA CISNEROS    Relationship: PT/SELF    Best call back number: [phone] or [phone]    What orders are you requesting (i.e. lab or imaging): PT IS REQUESTING AND ORDER FOR A SHOWER CHAIR BECAUSE SHE CAN'T  THE SHOWER.    In what timeframe would the patient need to come in: ASAP    Where will you receive your lab/imaging services: [facility] PH: [phone]    Additional notes: PLEASE CONTACT PT REGARDING THIS MATTER,QUESTIONS OR CONCERNS.    THANK YOU!

## 2021-05-20 ENCOUNTER — TELEPHONE (OUTPATIENT)
Dept: GASTROENTEROLOGY | Facility: CLINIC | Age: 36
End: 2021-05-20

## 2021-05-25 ENCOUNTER — TELEPHONE (OUTPATIENT)
Dept: NEUROSURGERY | Facility: CLINIC | Age: 36
End: 2021-05-25

## 2021-05-25 NOTE — TELEPHONE ENCOUNTER
Left detailed message for Mr. & Mrs Edwards, as Ivon VANEGAS have discussed her muscle relaxer's numerous times.  She is doing well on Tizanidine per patient's , so patient was notified she did not need Skelaxin.  I was on the phone with Mr. Edwards when he asked his wife not to call again about muscle relaxer's as she has one that is working well for her. (Tizanidine)     If a PA feels it is appropriate to do both we will let him know.

## 2021-05-25 NOTE — TELEPHONE ENCOUNTER
Caller: Marguerite Edwards    Relationship: Self/PT'S  (TRINO)    Best call back number: [phone]    What medications are you currently taking:   Current Outpatient Medications on File Prior to Visit   Medication Sig Dispense Refill   • albuterol (PROVENTIL) (2.5 MG/3ML) 0.083% nebulizer solution Take 2.5 mg by nebulization 4 (Four) Times a Day As Needed for Wheezing. 125 vial 3   • albuterol sulfate  (90 Base) MCG/ACT inhaler Inhale 2 puffs Every 4 (Four) Hours As Needed for Wheezing. 18 g 6   • Alcohol Swabs (Alcohol Prep) pads 1 pad Daily. 100 each 11   • ARIPiprazole (Abilify) 5 MG tablet Take 7.5 mg by mouth Daily.     • buPROPion XL (WELLBUTRIN XL) 300 MG 24 hr tablet Take  by mouth Every Morning.     • busPIRone (BUSPAR) 10 MG tablet 10 mg 3 (Three) Times a Day.     • cholecalciferol (VITAMIN D3) 25 MCG (1000 UT) tablet Take 1,000 Units by mouth Daily.     • cholestyramine (QUESTRAN) 4 GM/DOSE powder Take 1 packet by mouth 2 (Two) Times a Day. mixed with a liquid 378 g 11   • colestipol (COLESTID) 1 g tablet Take 2 tablets by mouth 2 (Two) Times a Day. 90 tablet 3   • dicyclomine (Bentyl) 10 MG capsule Take 1 capsule by mouth 3 (Three) Times a Day As Needed (pain). Indications: Irritable Bowel Syndrome 120 capsule 3   • docusate (COLACE) 50 MG/5ML liquid Take 5 mL by mouth Daily. 237 mL 11   • escitalopram (LEXAPRO) 20 MG tablet Take 20 mg by mouth Daily.     • Fluticasone Furoate-Vilanterol (BREO ELLIPTA) 100-25 MCG/INH inhaler Inhale 1 puff Daily. 1 inhalation once a day 1 each 6   • glucose blood test strip Use as instructed 100 each 12   • hydrOXYzine pamoate (Vistaril) 50 MG capsule Take 50 mg by mouth Every 6 (Six) Hours. Every 6 hours  As needed and 2 pills at night     • Lancets Ultra Fine misc 1 each 3 (Three) Times a Day. 100 each 3   • meloxicam (MOBIC) 15 MG tablet Take 1 tablet by mouth Daily. 30 tablet 5   • Menthol (Icy Hot) 5 % patch Apply 1 patch topically Daily As  Needed (back pain). 30 patch 3   • mirtazapine (REMERON) 45 MG tablet Take 45 mg by mouth Every Night.     • Misc. Devices (PROFIT PRECISION SCALE) [facility] scales to weigh self weekly 1 each 0   • Misc. Devices (Pulse Oximeter) misc 1 Device Daily. 1 each 0   • montelukast (Singulair) 10 MG tablet Take 1 tablet by mouth Every Night. 30 tablet 6   • norelgestromin-ethinyl estradiol (ORTHO EVRA) 150-35 MCG/24HR Place 1 patch on the skin as directed by provider Every 7 (Seven) Days for 3 weeks, THEN skip 7 days. 3 patch 9   • nystatin (MYCOSTATIN) 035797 UNIT/GM powder Apply  topically to the appropriate area as directed 3 (Three) Times a Day. 60 g 1   • OLANZapine (zyPREXA) 5 MG tablet      • ondansetron (ZOFRAN) 4 MG tablet Take 1 tablet by mouth 4 (Four) Times a Day As Needed for Nausea or Vomiting. 30 tablet 1   • promethazine (PHENERGAN) 25 MG tablet Take 1 tablet by mouth Every 6 (Six) Hours As Needed for Nausea or Vomiting. 45 tablet 0   • Sanitary Napkins & Tampons (MAXI PAD OVERNIGHT) pads 1 pad 4 (Four) Times a Day As Needed (menses). 96 each 11   • simvastatin (ZOCOR) 10 MG tablet Take 1 tablet by mouth Every Night. 90 tablet 1   • terconazole (TERAZOL 7) 0.4 % vaginal cream Insert 1 applicatorful into the vagina every night at bedtime for 7 days. 45 g 0   • TiZANidine (Zanaflex) 2 MG capsule Take 2 capsules by mouth 3 (Three) Times a Day As Needed for Muscle Spasms. 30 capsule 1   • TRI-SPRINTEC 0.18/0.215/0.25 MG-35 MCG per tablet Take 1 tablet by mouth Daily.  2   • Vitamin D, Cholecalciferol, 25 MCG (1000 UT) capsule Take 1 capsule by mouth Daily. 30 capsule 11     No current facility-administered medications on file prior to visit.   PT'S  CALLED CONCERN THE THAT THE PT RECEIVED TIZANIDINE IN THE MAIL AND STATES HE IS WANTING TO KNOW IF PT IS TO STOP HER ROBAXIN.    When did you start taking these medications: PT HAS NOT STARTED MEDICATION UNTIL THIS IS CONFIRMED    Which medication are you  concerned about:     Who prescribed you this medication: JOHAN    What are your concerns: PT'S  STATES HE IS UNDER THE IMPRESSION THAT THE TWO MEDICATIONS CAN'T BE TAKEN TOGETHER. PT'S  STATES HE CONTACTED THE PHARMACY AND THEY WERE NOT ABLE TO PROVIDE HIM WITH ANY ANSWERS.    How long have you been taking these medications: PT STARTED THE ROBAXIN DEC/JAN    How long have you had these concerns: PT IS HOLDING TO GIVE PT MEDICATION UNTIL IN THE MORNING AFTER CONFIRMING MEDICATION INTERACTION.        PLEASE CONTACT PT/PT'S  REGARDING THIS MATTER, QUESTIONS OR CONCERNS.

## 2021-05-25 NOTE — TELEPHONE ENCOUNTER
Please call and get a list of every medication is taking. I wouldn't think she is supposed to be taking 2 muscle relaxers at one time.

## 2021-05-25 NOTE — TELEPHONE ENCOUNTER
Provider: Jcarlos  Caller: Patient's Ezekiel  Phone #:  [phone]  Surgery:  NA  Surgery Date: NA   Last visit:  01/05/2021   Next visit: NA    Reason for call: Patient's  wants to know if it is safe if the patient takes Tizanidine and Robaxin together?

## 2021-06-01 ENCOUNTER — TELEPHONE (OUTPATIENT)
Dept: INTERNAL MEDICINE | Facility: CLINIC | Age: 36
End: 2021-06-01

## 2021-06-01 NOTE — TELEPHONE ENCOUNTER
Called and spoke with Ezekiel, emergency contact. Informed him that all the fasting labs were stable when collected in February so she would not need to be fasting for appointment.

## 2021-06-01 NOTE — TELEPHONE ENCOUNTER
Caller: TRINO CISNEROS    Relationship: Emergency Contact    Best call back number: [phone]    What is the best time to reach you: ANYTIME    Who are you requesting to speak with (clinical staff, provider,  specific staff member): CLINICAL STAFF    What was the call regarding: TRINO WOULD LIKE TO KNOW IF PATIENT NEEDS TO FAST FOR HER APPOINTMENT ON 6/8    Do you require a callback: YES

## 2021-06-02 ENCOUNTER — TELEPHONE (OUTPATIENT)
Dept: NEUROSURGERY | Facility: CLINIC | Age: 36
End: 2021-06-02

## 2021-06-02 NOTE — TELEPHONE ENCOUNTER
Provider:  Ivon VANEGAS  Caller: Pharmacy  Time of call:     Phone #:    Surgery:    Surgery Date:    Last visit:   01/05/21  Next visit: None    KATIE:         Reason for call: Pharmacy only needed a Physician's name since they were not able to process her tizanidine under the PA.    Pharmacy states they will contact patient.  Issue has been resolved.

## 2021-06-02 NOTE — TELEPHONE ENCOUNTER
PATIENT CALLED STATING THE PHARMACY NEEDS GODWIN CROUCHER TO CALL THEM ABOUT THE TIZANIDINE ORDERED.  UNTIL THEN,  WANTS TO KNOW IF HE SHOULD PUT PATIENT BACK ON ROBAXIN UNTIL THE SITUATION WITH TIZANIDINE IS SOLVED.  PLEASE ADVISE     [phone]    [facility] [phone]

## 2021-06-04 DIAGNOSIS — G89.29 CHRONIC BILATERAL LOW BACK PAIN, UNSPECIFIED WHETHER SCIATICA PRESENT: ICD-10-CM

## 2021-06-04 DIAGNOSIS — M19.90 ARTHRITIS: ICD-10-CM

## 2021-06-04 DIAGNOSIS — M54.50 CHRONIC BILATERAL LOW BACK PAIN WITHOUT SCIATICA: Primary | ICD-10-CM

## 2021-06-04 DIAGNOSIS — M54.40 CHRONIC BILATERAL LOW BACK PAIN WITH SCIATICA, SCIATICA LATERALITY UNSPECIFIED: ICD-10-CM

## 2021-06-04 DIAGNOSIS — G89.29 CHRONIC BILATERAL LOW BACK PAIN WITHOUT SCIATICA: Primary | ICD-10-CM

## 2021-06-04 DIAGNOSIS — G89.29 CHRONIC BILATERAL LOW BACK PAIN WITH SCIATICA, SCIATICA LATERALITY UNSPECIFIED: ICD-10-CM

## 2021-06-04 DIAGNOSIS — M54.50 CHRONIC BILATERAL LOW BACK PAIN, UNSPECIFIED WHETHER SCIATICA PRESENT: ICD-10-CM

## 2021-06-04 DIAGNOSIS — M51.36 DISC DEGENERATION, LUMBAR: ICD-10-CM

## 2021-06-04 RX ORDER — TIZANIDINE HYDROCHLORIDE 2 MG/1
4 CAPSULE, GELATIN COATED ORAL 3 TIMES DAILY PRN
Qty: 30 CAPSULE | Refills: 1 | Status: SHIPPED | OUTPATIENT
Start: 2021-06-04 | End: 2021-06-11

## 2021-06-04 NOTE — TELEPHONE ENCOUNTER
Provider:  Ivon VANEGAS  Caller:   Time of call:   1:01  Phone #:  [phone]  Surgery:    Surgery Date:    Last visit:   01/05/21  Next visit:     KATIE:         Reason for call:     Patient's  called and would like a refill on Marguerite's Tizanidine so she does not run out.

## 2021-06-04 NOTE — TELEPHONE ENCOUNTER
PT STATES THAT SHE NEEDS SOME ADDITIONAL REFILLS CALLED IN FOR THE TIZANDINE SO THAT SHE DOES NOT RUN OUT OF THE MEDICATION.  STATES THAT PT DOES HAVE A 10D SUPPLY AND LAST REFILLED ON 06/02/2021.     PLEASE CONTACT PT'S HUSB REGARDING THIS MATTER, QUESTION, OR CONCERNS -[phone].

## 2021-06-08 ENCOUNTER — LAB (OUTPATIENT)
Dept: LAB | Facility: HOSPITAL | Age: 36
End: 2021-06-08

## 2021-06-08 ENCOUNTER — OFFICE VISIT (OUTPATIENT)
Dept: INTERNAL MEDICINE | Facility: CLINIC | Age: 36
End: 2021-06-08

## 2021-06-08 VITALS
RESPIRATION RATE: 18 BRPM | WEIGHT: 239 LBS | TEMPERATURE: 97.1 F | BODY MASS INDEX: 42.35 KG/M2 | DIASTOLIC BLOOD PRESSURE: 84 MMHG | OXYGEN SATURATION: 98 % | HEIGHT: 63 IN | HEART RATE: 110 BPM | SYSTOLIC BLOOD PRESSURE: 122 MMHG

## 2021-06-08 DIAGNOSIS — E55.9 VITAMIN D DEFICIENCY: ICD-10-CM

## 2021-06-08 DIAGNOSIS — R82.90 ABNORMAL URINALYSIS: ICD-10-CM

## 2021-06-08 DIAGNOSIS — R35.0 URINARY FREQUENCY: ICD-10-CM

## 2021-06-08 DIAGNOSIS — E78.2 MIXED HYPERLIPIDEMIA: ICD-10-CM

## 2021-06-08 DIAGNOSIS — Z91.09 ENVIRONMENTAL ALLERGIES: Primary | ICD-10-CM

## 2021-06-08 LAB
BILIRUB BLD-MCNC: NEGATIVE MG/DL
CLARITY, POC: CLEAR
COLOR UR: YELLOW
GLUCOSE UR STRIP-MCNC: NEGATIVE MG/DL
KETONES UR QL: NEGATIVE
LEUKOCYTE EST, POC: ABNORMAL
NITRITE UR-MCNC: NEGATIVE MG/ML
PH UR: 6 [PH] (ref 5–8)
PROT UR STRIP-MCNC: NEGATIVE MG/DL
RBC # UR STRIP: NEGATIVE /UL
SP GR UR: 1.03 (ref 1–1.03)
UROBILINOGEN UR QL: NORMAL

## 2021-06-08 PROCEDURE — 87186 SC STD MICRODIL/AGAR DIL: CPT | Performed by: NURSE PRACTITIONER

## 2021-06-08 PROCEDURE — 87086 URINE CULTURE/COLONY COUNT: CPT | Performed by: NURSE PRACTITIONER

## 2021-06-08 PROCEDURE — 87088 URINE BACTERIA CULTURE: CPT | Performed by: NURSE PRACTITIONER

## 2021-06-08 PROCEDURE — 99214 OFFICE O/P EST MOD 30 MIN: CPT | Performed by: NURSE PRACTITIONER

## 2021-06-08 RX ORDER — FAMOTIDINE 20 MG
1 TABLET ORAL DAILY
Qty: 30 CAPSULE | Refills: 11 | Status: SHIPPED | OUTPATIENT
Start: 2021-06-08 | End: 2022-01-17 | Stop reason: SDUPTHER

## 2021-06-08 RX ORDER — LORATADINE 10 MG/1
10 TABLET ORAL DAILY
Qty: 30 TABLET | Refills: 11 | Status: SHIPPED | OUTPATIENT
Start: 2021-06-08 | End: 2022-03-07 | Stop reason: SDUPTHER

## 2021-06-08 RX ORDER — DEXLANSOPRAZOLE 60 MG/1
CAPSULE, DELAYED RELEASE ORAL
COMMUNITY
Start: 2021-05-22 | End: 2021-11-18 | Stop reason: SDUPTHER

## 2021-06-08 RX ORDER — SIMVASTATIN 10 MG
10 TABLET ORAL NIGHTLY
Qty: 90 TABLET | Refills: 1 | Status: SHIPPED | OUTPATIENT
Start: 2021-06-08 | End: 2021-11-29 | Stop reason: SDUPTHER

## 2021-06-08 RX ORDER — FLUTICASONE PROPIONATE 50 MCG
2 SPRAY, SUSPENSION (ML) NASAL DAILY
Qty: 16 G | Refills: 11 | Status: SHIPPED | OUTPATIENT
Start: 2021-06-08 | End: 2022-03-07 | Stop reason: SDUPTHER

## 2021-06-08 NOTE — PROGRESS NOTES
Marguerite Edwards presents to [facility] for     Chief Complaint  Hyperlipidemia, Asthma, and Allergies (eyes itching and watery, sneezing.  Discuss allergy shots)    Subjective        History of Present Illness    Hyperlipidemia-chronic.  Her last lipids in February 2021 were stable except for a mild elevation in her triglycerides.  She is on statin therapy.  Compliant with dosing and denies adverse effects.  Diet unhealthy  Exercise-minimal.      She has asthma and allergies.  She reports she has been allergy symptoms with itchy watery eyes runny nose, and sneezing recently.  She wonders whether or not she should have allergy shots.  She is not currently taking any allergy medications.  He denies any wheezing, shortness of breath, or cough.  No activity limitations due to shortness of breath either.    She is having urinary frequency.  She denies urgency, dysuria, or flank pain.  No fever no chills.    She has chronic vitamin D deficiency and is on low-dose vitamin D over-the-counter.  She did refill her prescription for the other insurance pay for the prescribed.      Allergies   Allergen Reactions   • Lamictal [Lamotrigine] Other (See Comments)     bruising   • Paxil [Paroxetine Hcl] Mental Status Change   • Prozac [Fluoxetine Hcl] Mental Status Change   • Chlorhexidine Gluconate Unknown - Low Severity   • Fluoxetine Unknown - Low Severity   • Lubiprostone Palpitations, Dizziness and Unknown - Low Severity   • Paroxetine Unknown - Low Severity     Current Outpatient Medications on File Prior to Visit   Medication Sig Dispense Refill   • albuterol (PROVENTIL) (2.5 MG/3ML) 0.083% nebulizer solution Take 2.5 mg by nebulization 4 (Four) Times a Day As Needed for Wheezing. 125 vial 3   • albuterol sulfate  (90 Base) MCG/ACT inhaler Inhale 2 puffs Every 4 (Four) Hours As Needed for Wheezing. 18 g 6   • Alcohol Swabs (Alcohol Prep) pads 1 pad Daily. 100 each 11   • ARIPiprazole  (Abilify) 5 MG tablet Take 7.5 mg by mouth Daily.     • buPROPion XL (WELLBUTRIN XL) 300 MG 24 hr tablet Take  by mouth Every Morning.     • busPIRone (BUSPAR) 10 MG tablet 10 mg 3 (Three) Times a Day.     • cholecalciferol (VITAMIN D3) 25 MCG (1000 UT) tablet Take 1,000 Units by mouth Daily.     • cholestyramine (QUESTRAN) 4 GM/DOSE powder Take 1 packet by mouth 2 (Two) Times a Day. mixed with a liquid 378 g 11   • colestipol (COLESTID) 1 g tablet Take 2 tablets by mouth 2 (Two) Times a Day. 90 tablet 3   • Dexilant 60 MG capsule      • dicyclomine (Bentyl) 10 MG capsule Take 1 capsule by mouth 3 (Three) Times a Day As Needed (pain). Indications: Irritable Bowel Syndrome 120 capsule 3   • escitalopram (LEXAPRO) 20 MG tablet Take 20 mg by mouth Daily.     • Fluticasone Furoate-Vilanterol (BREO ELLIPTA) 100-25 MCG/INH inhaler Inhale 1 puff Daily. 1 inhalation once a day 1 each 6   • glucose blood test strip Use as instructed 100 each 12   • hydrOXYzine pamoate (Vistaril) 50 MG capsule Take 50 mg by mouth Every 6 (Six) Hours. Every 6 hours  As needed and 2 pills at night     • Lancets Ultra Fine misc 1 each 3 (Three) Times a Day. 100 each 3   • meloxicam (MOBIC) 15 MG tablet Take 1 tablet by mouth Daily. 30 tablet 5   • Menthol (Icy Hot) 5 % patch Apply 1 patch topically Daily As Needed (back pain). 30 patch 3   • mirtazapine (REMERON) 45 MG tablet Take 45 mg by mouth Every Night.     • montelukast (Singulair) 10 MG tablet Take 1 tablet by mouth Every Night. 30 tablet 6   • norelgestromin-ethinyl estradiol (ORTHO EVRA) 150-35 MCG/24HR Place 1 patch on the skin as directed by provider Every 7 (Seven) Days for 3 weeks, THEN skip 7 days. 3 patch 9   • nystatin (MYCOSTATIN) 348597 UNIT/GM powder Apply  topically to the appropriate area as directed 3 (Three) Times a Day. 60 g 1   • OLANZapine (zyPREXA) 5 MG tablet      • ondansetron (ZOFRAN) 4 MG tablet Take 1 tablet by mouth 4 (Four) Times a Day As Needed for Nausea or  "Vomiting. 30 tablet 1   • promethazine (PHENERGAN) 25 MG tablet Take 1 tablet by mouth Every 6 (Six) Hours As Needed for Nausea or Vomiting. 45 tablet 0   • Sanitary Napkins & Tampons (MAXI PAD OVERNIGHT) pads 1 pad 4 (Four) Times a Day As Needed (menses). 96 each 11     No current facility-administered medications on file prior to visit.         The following portions of the patient's history were reviewed and updated as appropriate: allergies, current medications, past family history, past medical history, past social history, past surgical history and problem list and are available for review within electronic record    Objective     Vital Signs:   /84   Pulse 110   Temp 97.1 °F (36.2 °C)   Resp 18   Ht 159.4 cm (62.75\")   Wt 108 kg (239 lb)   SpO2 98%   BMI 42.67 kg/m²         Physical Exam  Constitutional:       Appearance: Normal appearance. She is well-developed and well-groomed. She is morbidly obese. She is not ill-appearing or toxic-appearing.   HENT:      Head: Normocephalic and atraumatic.      Right Ear: Tympanic membrane normal.      Left Ear: Tympanic membrane normal.      Nose: Nose normal.      Mouth/Throat:      Pharynx: Oropharynx is clear.   Eyes:      Conjunctiva/sclera: Conjunctivae normal.      Pupils: Pupils are equal, round, and reactive to light.   Cardiovascular:      Rate and Rhythm: Normal rate and regular rhythm.      Heart sounds: Normal heart sounds.   Pulmonary:      Effort: Pulmonary effort is normal.      Breath sounds: Normal breath sounds.   Abdominal:      General: Bowel sounds are normal.      Palpations: Abdomen is soft.      Tenderness: There is no abdominal tenderness.   Musculoskeletal:         General: Normal range of motion.      Cervical back: Normal range of motion.   Skin:     General: Skin is warm and dry.   Neurological:      Mental Status: She is alert and oriented to person, place, and time.   Psychiatric:         Behavior: Behavior normal.         " Thought Content: Thought content normal.         Judgment: Judgment normal.          Result Review :{ Labs         Assessment and Plan      Diagnoses and all orders for this visit:    1. Environmental allergies (Primary)  -     loratadine (Claritin) 10 MG tablet; Take 1 tablet by mouth Daily.  Dispense: 30 tablet; Refill: 11  -     fluticasone (Flonase) 50 MCG/ACT nasal spray; 2 sprays into the nostril(s) as directed by provider Daily.  Dispense: 16 g; Refill: 11  Allergy symptoms are uncontrolled.  We will start her on Flonase and Claritin and reevaluate at her next routine follow-up.  2. Vitamin D deficiency  -     Vitamin D, Cholecalciferol, 25 MCG (1000 UT) capsule; Take 1 capsule by mouth Daily.  Dispense: 30 capsule; Refill: 11  Continue vitamin D supplementation.  3. Mixed hyperlipidemia  -     simvastatin (ZOCOR) 10 MG tablet; Take 1 tablet by mouth Every Night.  Dispense: 90 tablet; Refill: 1  Continue simvastatin.  Should follow a low-fat low-cholesterol diet and get routine physical activity.  4. Urinary frequency  -     POC Urinalysis Dipstick, Automated  #4/5-urinalysis shows trace leukoesterase.  We will go ahead and send this for culture to further evaluate.  If UTI detected we will send in antibiotic at that time.  Increase fluids and empty bladder frequently.  5. Abnormal urinalysis  -     Urine Culture - , Urine, Clean Catch; Future    Brief Urine Lab Results  (Last result in the past 365 days)      Color   Clarity   Blood   Leuk Est   Nitrite   Protein   CREAT   Urine HCG        06/08/21 1604 Yellow Clear Negative Trace  Comment:  15 Sharri/uL Negative Negative                     Follow Up     Patient was given instructions and counseling regarding her condition or for health maintenance advice. Please see specific information pulled into the AVS if appropriate.   Any new medication's adverse effects have been discussed in detail with patient  Patient was encouraged to keep me informed of any acute  changes, lack of improvement, or any new concerning symptoms.    Return in about 6 weeks (around 7/20/2021).    * Please note that portions of this note were completed with a voice recognition program. Efforts were made to edit the dictation but occasionally words are erroneously transcribed.

## 2021-06-10 ENCOUNTER — TELEPHONE (OUTPATIENT)
Dept: INTERNAL MEDICINE | Facility: CLINIC | Age: 36
End: 2021-06-10

## 2021-06-10 LAB
BACTERIA SPEC AEROBE CULT: ABNORMAL
BACTERIA SPEC AEROBE CULT: ABNORMAL

## 2021-06-10 NOTE — TELEPHONE ENCOUNTER
Called and spoke with Ezekiel, informed him that Liliya wasn't in office today and that's why he hasn't had a call back. He verbalized understanding and is ok with awaiting a response on all of these concerns when Liliya returns tomorrow.

## 2021-06-10 NOTE — TELEPHONE ENCOUNTER
Caller: TRINO    Relationship:      Best call back number: [phone]     Caller requesting test results: PATIENT     What test was performed: URINE CULTURE     When was the test performed: 6-8-21    Where was the test performed: IN OFFICE    Additional notes: PLEASE CALL

## 2021-06-10 NOTE — TELEPHONE ENCOUNTER
Would you want to wait until her appt on 7/12/21 to discuss home health? I will be able to provide numbers for dermatology so they can make the appt at their convenience. Please advise

## 2021-06-10 NOTE — TELEPHONE ENCOUNTER
Caller: Marguerite Edwards    Relationship: Self    Best call back number:     [phone]     What is the medical concern/diagnosis:     PATIENT REQUESTED A REFERRAL FOR IN HOME HEALTH CARE SINCE HER  IS HAVING DIFFICULTY CARING FOR PATIENT ALONE    What specialty or service is being requested:     PATIENT ALSO REQUESTED A REFERRAL FOR A DERMATOLOGIST    What is the provider:    PATIENT STATED SHE DOESN'T HAVE A PROVIDER IN MIND FOR EITHER THE IN HOME HEALTH CARE OR DERMATOLOGIST    What is the office location:    PATIENT REQUESTED A REFERRAL FOR A DERMATOLOGIST LOCATED IN [place]    What is the office phone number:     N/A    Any additional details:    N/A    SOULEYMANE COATES, APRN

## 2021-06-11 ENCOUNTER — TELEPHONE (OUTPATIENT)
Dept: INTERNAL MEDICINE | Facility: CLINIC | Age: 36
End: 2021-06-11

## 2021-06-11 ENCOUNTER — TELEPHONE (OUTPATIENT)
Dept: NEUROSURGERY | Facility: CLINIC | Age: 36
End: 2021-06-11

## 2021-06-11 DIAGNOSIS — B96.20 E-COLI UTI: Primary | ICD-10-CM

## 2021-06-11 DIAGNOSIS — N39.0 E-COLI UTI: Primary | ICD-10-CM

## 2021-06-11 RX ORDER — NITROFURANTOIN 25; 75 MG/1; MG/1
100 CAPSULE ORAL 2 TIMES DAILY
Qty: 10 CAPSULE | Refills: 0 | Status: SHIPPED | OUTPATIENT
Start: 2021-06-11 | End: 2021-06-30

## 2021-06-11 RX ORDER — TIZANIDINE HYDROCHLORIDE 4 MG/1
4 CAPSULE ORAL 2 TIMES DAILY
Qty: 60 CAPSULE | Refills: 1 | Status: SHIPPED | OUTPATIENT
Start: 2021-06-11 | End: 2021-07-21 | Stop reason: SDUPTHER

## 2021-06-11 NOTE — TELEPHONE ENCOUNTER
Results have been reviewed and results message has been sent.  Antibiotic was sent in for her for an E. coli UTI.  We can discuss any home health at her upcoming visit.  She can self refer to dermatology in the meantime or we can discuss then.

## 2021-06-11 NOTE — TELEPHONE ENCOUNTER
Provider:  Jcarlos  Caller: Marguerite  Time of call:   11:06a  Phone #:  Surgery: NA  Last visit:   4/13/21 (Telemed)  Next visit: PRN       Reason for call:       I am not sure that we have ever set up HH for patient? Should this come from PCP?

## 2021-06-11 NOTE — TELEPHONE ENCOUNTER
Spoke with Ezekiel, , advised on results and Liliya wanted to wait until her appt in July to discuss home health. Pt's  verbalized understanding

## 2021-06-11 NOTE — TELEPHONE ENCOUNTER
She continues with LBP, severe pain with standing. She has requested through her PCP a home health Aide to assist her with ADL's.   She is not a good surgical candidate. She hopes to get her identification card next week and hopefully will be able to go to the pain clinic.    She did request Zanaflex and I call this into the [place] pharmacy 4 mg twice daily.    Vanessa Garber PA-C

## 2021-06-11 NOTE — TELEPHONE ENCOUNTER
----- Message from KATRINA Acevedo sent at 6/11/2021  7:37 AM EDT -----  Please let her know her her urine shows ecoli UTI. I sent in macrobid for this

## 2021-06-11 NOTE — TELEPHONE ENCOUNTER
Caller: Marguerite Edwards    Relationship: Self    Best call back number: [phone]    What orders are you requesting (i.e. lab or imaging): HOME HEALTH NURSE    In what timeframe would the patient need to come in: ASAP    Where will you receive your lab/imaging services: ANY COMPANY    Additional notes: PT NEEDS HOME HEALTH NURSE TO COME IN TO CHECK HER CATHETER AND HELP GIVE HER A BATH.  PLEASE ADVISE  THANK YOU

## 2021-06-17 ENCOUNTER — TELEPHONE (OUTPATIENT)
Dept: NEUROSURGERY | Facility: CLINIC | Age: 36
End: 2021-06-17

## 2021-06-17 DIAGNOSIS — G89.29 CHRONIC BILATERAL LOW BACK PAIN, UNSPECIFIED WHETHER SCIATICA PRESENT: ICD-10-CM

## 2021-06-17 DIAGNOSIS — M54.50 CHRONIC BILATERAL LOW BACK PAIN WITHOUT SCIATICA: Primary | ICD-10-CM

## 2021-06-17 DIAGNOSIS — M54.40 CHRONIC BILATERAL LOW BACK PAIN WITH SCIATICA, SCIATICA LATERALITY UNSPECIFIED: ICD-10-CM

## 2021-06-17 DIAGNOSIS — M54.50 CHRONIC BILATERAL LOW BACK PAIN, UNSPECIFIED WHETHER SCIATICA PRESENT: ICD-10-CM

## 2021-06-17 DIAGNOSIS — G89.29 CHRONIC BILATERAL LOW BACK PAIN WITH SCIATICA, SCIATICA LATERALITY UNSPECIFIED: ICD-10-CM

## 2021-06-17 DIAGNOSIS — G89.29 CHRONIC BILATERAL LOW BACK PAIN WITHOUT SCIATICA: Primary | ICD-10-CM

## 2021-06-17 DIAGNOSIS — M51.36 DISC DEGENERATION, LUMBAR: ICD-10-CM

## 2021-06-17 NOTE — TELEPHONE ENCOUNTER
PATIENT IS CALLING TO GET ANOTHER PAIN MGMT REFERRAL SENT TO [facility].  SHE DID HAVE A CURRENT ID BEFORE AND NOW SHE HAS IT UPDATED BUT THE EXISTING REFERRAL HAS .  PLEASE ADVISE    [facility] PHONE # [phone]    ANY QUESTIONS PLEASE CALL PATIENT.    [phone]

## 2021-06-17 NOTE — TELEPHONE ENCOUNTER
Called patient to clearify if she needed a PM referral or PT referral due to the previous note. Ezekiel- Her SO, stated that she won't be able to get her ID updated until August, but needed a new referral for PM at [place].     Okay to place referral?

## 2021-06-30 ENCOUNTER — HOME HEALTH ADMISSION (OUTPATIENT)
Dept: HOME HEALTH SERVICES | Facility: HOME HEALTHCARE | Age: 36
End: 2021-06-30

## 2021-06-30 ENCOUNTER — TELEMEDICINE (OUTPATIENT)
Dept: INTERNAL MEDICINE | Facility: CLINIC | Age: 36
End: 2021-06-30

## 2021-06-30 VITALS
DIASTOLIC BLOOD PRESSURE: 70 MMHG | BODY MASS INDEX: 42.35 KG/M2 | HEART RATE: 92 BPM | WEIGHT: 239 LBS | HEIGHT: 63 IN | SYSTOLIC BLOOD PRESSURE: 130 MMHG

## 2021-06-30 DIAGNOSIS — Z91.09 ENVIRONMENTAL ALLERGIES: ICD-10-CM

## 2021-06-30 DIAGNOSIS — M51.36 DDD (DEGENERATIVE DISC DISEASE), LUMBAR: ICD-10-CM

## 2021-06-30 DIAGNOSIS — R39.198 DIFFICULTY VOIDING: ICD-10-CM

## 2021-06-30 DIAGNOSIS — G89.29 CHRONIC BILATERAL LOW BACK PAIN WITHOUT SCIATICA: ICD-10-CM

## 2021-06-30 DIAGNOSIS — F20.9 SCHIZOPHRENIA, UNSPECIFIED TYPE (HCC): Primary | ICD-10-CM

## 2021-06-30 DIAGNOSIS — M54.50 CHRONIC BILATERAL LOW BACK PAIN WITHOUT SCIATICA: ICD-10-CM

## 2021-06-30 DIAGNOSIS — Z78.9 SELF-CARE DEFICIT: ICD-10-CM

## 2021-06-30 PROCEDURE — 99213 OFFICE O/P EST LOW 20 MIN: CPT | Performed by: NURSE PRACTITIONER

## 2021-07-02 ENCOUNTER — TELEPHONE (OUTPATIENT)
Dept: INTERNAL MEDICINE | Facility: CLINIC | Age: 36
End: 2021-07-02

## 2021-07-02 NOTE — TELEPHONE ENCOUNTER
Provider: AMINATA     Caller: CLAUDIA CISNEROS    Phone Number: [phone]    Reason for Call: PATIENT THINKS SHE HAS ANOTHER INFECTION IN HER PRIVATE AREA.  PATIENT ASKED IF SHE CAN SIT IN THE BATHTUB WITH WATER?

## 2021-07-06 ENCOUNTER — TELEPHONE (OUTPATIENT)
Dept: INTERNAL MEDICINE | Facility: CLINIC | Age: 36
End: 2021-07-06

## 2021-07-06 ENCOUNTER — TELEMEDICINE (OUTPATIENT)
Dept: INTERNAL MEDICINE | Facility: CLINIC | Age: 36
End: 2021-07-06

## 2021-07-06 DIAGNOSIS — M25.562 ACUTE PAIN OF BOTH KNEES: Primary | ICD-10-CM

## 2021-07-06 DIAGNOSIS — M25.561 ACUTE PAIN OF BOTH KNEES: Primary | ICD-10-CM

## 2021-07-06 PROCEDURE — 99213 OFFICE O/P EST LOW 20 MIN: CPT | Performed by: NURSE PRACTITIONER

## 2021-07-06 NOTE — PROGRESS NOTES
You have chosen to receive care through a telehealth visit.  Do you consent to use a video/audio connection for your medical care today? Yes  This was an audio and video enabled telemedicine encounter.    Subjective   Mraguerite Edwards is a 36 y.o. female.     Chief Complaint   Patient presents with   • Knee Pain       Knee Pain   The incident occurred more than 1 week ago. There was no injury mechanism. The pain is present in the left knee and right knee. The quality of the pain is described as aching. The pain is moderate. The pain has been fluctuating since onset. Associated symptoms include an inability to bear weight (painful to walk). Pertinent negatives include no loss of motion, loss of sensation, muscle weakness, numbness or tingling. She reports no foreign bodies present. The symptoms are aggravated by weight bearing. She has tried immobilization and rest for the symptoms. The treatment provided significant relief.        She saw her chiropractor- Dr. Cruz. She tells me he wants her to have knee XR  He did some adjustments and she feels like this helped.   Pain has been ongoing for several weeks.   She has been doing leg lifts and feels like this may have contributed to the pain.    she has been having some trouble getting up and down steps.   Wants new knee brace. Has been wearing supportive unhinged braces bilaterally   She tells me she wants new brace because her insurance will pay for it.     She see's neurosurgery for chronic back pain and has a back brace. She does not want to do back surgery .       The following portions of the patient's history were reviewed and updated as appropriate: allergies, current medications, past family history, past medical history, past social history, past surgical history and problem list.      Review of Systems   Constitutional: Positive for unexpected weight gain. Negative for fatigue, fever and unexpected weight loss.   Eyes: Negative for blurred vision, double  vision and visual disturbance.   Respiratory: Negative for cough, shortness of breath and wheezing.    Cardiovascular: Negative for chest pain, palpitations and leg swelling.   Gastrointestinal: Negative for abdominal pain, constipation, diarrhea, nausea and vomiting.   Musculoskeletal: Positive for arthralgias and gait problem. Negative for myalgias.   Skin: Negative for color change and rash.   Neurological: Negative for dizziness, tingling, weakness, numbness and headache.   Hematological: Negative for adenopathy. Does not bruise/bleed easily.         Outpatient Medications Marked as Taking for the 7/6/21 encounter (Telemedicine) with Liliya Hodges APRN   Medication Sig Dispense Refill   • albuterol (PROVENTIL) (2.5 MG/3ML) 0.083% nebulizer solution Take 2.5 mg by nebulization 4 (Four) Times a Day As Needed for Wheezing. 125 vial 3   • albuterol sulfate  (90 Base) MCG/ACT inhaler Inhale 2 puffs Every 4 (Four) Hours As Needed for Wheezing. 18 g 6   • Alcohol Swabs (Alcohol Prep) pads 1 pad Daily. 100 each 11   • ARIPiprazole (Abilify) 5 MG tablet Take 7.5 mg by mouth Daily.     • buPROPion XL (WELLBUTRIN XL) 300 MG 24 hr tablet Take  by mouth Every Morning.     • busPIRone (BUSPAR) 10 MG tablet 10 mg 3 (Three) Times a Day.     • cholecalciferol (VITAMIN D3) 25 MCG (1000 UT) tablet Take 1,000 Units by mouth Daily.     • cholestyramine (QUESTRAN) 4 GM/DOSE powder Take 1 packet by mouth 2 (Two) Times a Day. mixed with a liquid 378 g 11   • colestipol (COLESTID) 1 g tablet Take 2 tablets by mouth 2 (Two) Times a Day. 90 tablet 3   • Dexilant 60 MG capsule      • dicyclomine (Bentyl) 10 MG capsule Take 1 capsule by mouth 3 (Three) Times a Day As Needed (pain). Indications: Irritable Bowel Syndrome 120 capsule 3   • escitalopram (LEXAPRO) 20 MG tablet Take 20 mg by mouth Daily.     • fluticasone (Flonase) 50 MCG/ACT nasal spray 2 sprays into the nostril(s) as directed by provider Daily. 16 g 11   •  Fluticasone Furoate-Vilanterol (BREO ELLIPTA) 100-25 MCG/INH inhaler Inhale 1 puff Daily. 1 inhalation once a day 1 each 6   • glucose blood test strip Use as instructed 100 each 12   • hydrOXYzine pamoate (Vistaril) 50 MG capsule Take 50 mg by mouth Every 6 (Six) Hours. Every 6 hours  As needed and 2 pills at night     • Lancets Ultra Fine misc 1 each 3 (Three) Times a Day. 100 each 3   • loratadine (Claritin) 10 MG tablet Take 1 tablet by mouth Daily. 30 tablet 11   • meloxicam (MOBIC) 15 MG tablet Take 1 tablet by mouth Daily. 30 tablet 5   • Menthol (Icy Hot) 5 % patch Apply 1 patch topically Daily As Needed (back pain). 30 patch 3   • mirtazapine (REMERON) 45 MG tablet Take 45 mg by mouth Every Night.     • montelukast (Singulair) 10 MG tablet Take 1 tablet by mouth Every Night. 30 tablet 6   • norelgestromin-ethinyl estradiol (ORTHO EVRA) 150-35 MCG/24HR Place 1 patch on the skin as directed by provider Every 7 (Seven) Days for 3 weeks, THEN skip 7 days. 3 patch 9   • nystatin (MYCOSTATIN) 731695 UNIT/GM powder Apply  topically to the appropriate area as directed 3 (Three) Times a Day. 60 g 1   • OLANZapine (zyPREXA) 5 MG tablet      • ondansetron (ZOFRAN) 4 MG tablet Take 1 tablet by mouth 4 (Four) Times a Day As Needed for Nausea or Vomiting. 30 tablet 1   • promethazine (PHENERGAN) 25 MG tablet Take 1 tablet by mouth Every 6 (Six) Hours As Needed for Nausea or Vomiting. 45 tablet 0   • Sanitary Napkins & Tampons (MAXI PAD OVERNIGHT) pads 1 pad 4 (Four) Times a Day As Needed (menses). 96 each 11   • simvastatin (ZOCOR) 10 MG tablet Take 1 tablet by mouth Every Night. 90 tablet 1   • Vitamin D, Cholecalciferol, 25 MCG (1000 UT) capsule Take 1 capsule by mouth Daily. 30 capsule 11   • Zanaflex 4 MG capsule Take 1 capsule by mouth 2 (two) times a day. 60 capsule 1     Allergies   Allergen Reactions   • Lamotrigine Other (See Comments)     bruising  bruising   • Paxil [Paroxetine Hcl] Mental Status Change   •  Prozac [Fluoxetine Hcl] Mental Status Change   • Chlorhexidine Gluconate Unknown - Low Severity   • Fluoxetine Unknown - Low Severity   • Lubiprostone Palpitations, Dizziness and Unknown - Low Severity   • Paroxetine Unknown - Low Severity           Objective   Physical Exam   Constitutional: She appears well-developed and well-nourished. No distress.   Eyes: Right eye exhibits no discharge. Left eye exhibits no discharge. No scleral icterus.   Cardiovascular:   No conjunctival pallor noted.    Pulmonary/Chest: Effort normal.  No respiratory distress.  Musculoskeletal:      Right knee: She exhibits normal range of motion, no swelling, no effusion, no ecchymosis, no deformity, no laceration, no erythema and no bony tenderness. No tenderness found.      Left knee: She exhibits normal range of motion, no swelling, no effusion, no ecchymosis, no deformity, no laceration, no erythema and no bony tenderness. No tenderness found.   Skin: She is not diaphoretic. No nail bed cyanosis.   Psychiatric: She has a normal mood and affect. Her speech is normal and behavior is normal. She expresses impulsivity. She is attentive.   Exam limited due to the video nature of the visit.  Portion of exam completed by patient at direction of provider      There were no vitals filed for this visit.  There is no height or weight on file to calculate BMI.        Assessment/Plan   Diagnoses and all orders for this visit:    1. Acute pain of both knees (Primary)  -     XR Knee 1 or 2 View Bilateral; Future    Patient may use over-the-counter Tylenol 650 mg every 6 hours as needed pain  Will check XR of bilateral knees. Ok to use current brace for now.        Return if symptoms worsen or fail to improve, for will call with results.    I have reviewed the limitations of a telehealth visit (such as lack of a physical exam and unable to obtain vital signs) and advised the patient that they may need to follow up for an office visit should their  symptoms or concerns persist, worsen, or change.  Patient was encouraged to keep me informed of any acute changes, lack of improvement, or any new concerning symptoms.   I discussed my findings,recommendations, and plan of care was with the patient. They verbalized understanding and agreement.        * Please note that portions of this note were completed with a voice recognition program. Efforts were made to edit the dictation but occasionally words are erroneously transcribed.

## 2021-07-06 NOTE — PATIENT INSTRUCTIONS
Acute Knee Pain, Adult  Many things can cause knee pain. Sometimes, knee pain is sudden (acute) and may be caused by damage, swelling, or irritation of the muscles and tissues that support your knee.  The pain often goes away on its own with time and rest. If the pain does not go away, tests may be done to find out what is causing the pain.  Follow these instructions at home:  Pay attention to any changes in your symptoms. Take these actions to relieve your pain.  If you have a knee sleeve or brace:    · Wear the sleeve or brace as told by your doctor. Remove it only as told by your doctor.  · Loosen the sleeve or brace if your toes:  ? Tingle.  ? Become numb.  ? Turn cold and blue.  · Keep the sleeve or brace clean.  · If the sleeve or brace is not waterproof:  ? Do not let it get wet.  ? Cover it with a watertight covering when you take a bath or shower.  Activity  · Rest your knee.  · Do not do things that cause pain.  · Avoid activities where both feet leave the ground at the same time (high-impact activities). Examples are running, jumping rope, and doing jumping jacks.  · Work with a physical therapist to make a safe exercise program, as told by your doctor.  Managing pain, stiffness, and swelling    · If told, put ice on the knee:  ? Put ice in a plastic bag.  ? Place a towel between your skin and the bag.  ? Leave the ice on for 20 minutes, 2-3 times a day.  · If told, put pressure (compression) on your injured knee to control swelling, give support, and help with discomfort. Compression may be done with an elastic bandage.  General instructions  · Take all medicines only as told by your doctor.  · Raise (elevate) your knee while you are sitting or lying down. Make sure your knee is higher than your heart.  · Sleep with a pillow under your knee.  · Do not use any products that contain nicotine or tobacco. These include cigarettes, e-cigarettes, and chewing tobacco. These products may slow down healing. If  you need help quitting, ask your doctor.  · If you are overweight, work with your doctor and a food expert (dietitian) to set goals to lose weight. Being overweight can make your knee hurt more.  · Keep all follow-up visits as told by your doctor. This is important.  Contact a doctor if:  · The knee pain does not stop.  · The knee pain changes or gets worse.  · You have a fever along with knee pain.  · Your knee feels warm when you touch it.  · Your knee gives out or locks up.  Get help right away if:  · Your knee swells, and the swelling gets worse.  · You cannot move your knee.  · You have very bad knee pain.  Summary  · Many things can cause knee pain. The pain often goes away on its own with time and rest.  · Your doctor may do tests to find out the cause of the pain.  · Pay attention to any changes in your symptoms. Relieve your pain with rest, medicines, light activity, and use of ice.  · Get help right away if you cannot move your knee or your knee pain is very bad.  This information is not intended to replace advice given to you by your health care provider. Make sure you discuss any questions you have with your health care provider.  Document Revised: 05/30/2019 Document Reviewed: 05/30/2019  [employer] Patient Education © 2021 [employer]

## 2021-07-06 NOTE — TELEPHONE ENCOUNTER
Caller: Marguerite Edwards    Relationship to patient: Self    Best call back number: [phone]    Patient is needing: PATIENT CALLED TO FOLLOW UP ON REFERRAL FOR HOME HEALTH NURSE AND IS REQUESTING TO CHECK ON STATUS.

## 2021-07-08 ENCOUNTER — TELEPHONE (OUTPATIENT)
Dept: INTERNAL MEDICINE | Facility: CLINIC | Age: 36
End: 2021-07-08

## 2021-07-08 NOTE — TELEPHONE ENCOUNTER
Clemencia has been notified on VM that Liliya is out of the office today and will return tomorrow.  Ias asked if she could fax the form that needs to be filled out and I will see if Liliya can fill this out

## 2021-07-08 NOTE — TELEPHONE ENCOUNTER
Caller: [employer]     Relationship:      Best call back number: [phone]    What is the best time to reach you: WILL BE UNABLE AFTER TWELVE TODAY BUT WILL BE BACK IN TOMORROW    Who are you requesting to speak with (clinical staff, provider,  specific staff member): CLINICAL STAFF     What was the call regarding: MARCO STATES THAT SHE SPOKE WITH THE PATIENT WHO TOLD HER THAT SHE NEEDED TRANSPORTATION. SHE SAYS THAT SHE HAS BEGAN FILLING OUT THE APPLICATION BUT THERE IS A PORTION THAT THE PROVIDER IS NEEDING TO FILL. MARCO IS ASKING THAT THE PRACTICE COMPLETE THIS APPLICATION AND HAS PROVIDED THE LOG IN INFORMATION IN ORDER TO DO SO.     Giferent   LOGIN: [id number]   PASSWORD: [id number] (ALL CAPS)    Do you require a callback: NO

## 2021-07-09 ENCOUNTER — TELEPHONE (OUTPATIENT)
Dept: INTERNAL MEDICINE | Facility: CLINIC | Age: 36
End: 2021-07-09

## 2021-07-09 DIAGNOSIS — Z78.9 SELF-CARE DEFICIT: ICD-10-CM

## 2021-07-09 DIAGNOSIS — M51.36 DDD (DEGENERATIVE DISC DISEASE), LUMBAR: ICD-10-CM

## 2021-07-09 DIAGNOSIS — F20.9 SCHIZOPHRENIA, UNSPECIFIED TYPE (HCC): Primary | ICD-10-CM

## 2021-07-09 DIAGNOSIS — M54.50 CHRONIC BILATERAL LOW BACK PAIN WITHOUT SCIATICA: ICD-10-CM

## 2021-07-09 DIAGNOSIS — M25.562 ACUTE PAIN OF BOTH KNEES: ICD-10-CM

## 2021-07-09 DIAGNOSIS — G89.29 CHRONIC BILATERAL LOW BACK PAIN WITHOUT SCIATICA: ICD-10-CM

## 2021-07-09 DIAGNOSIS — R39.198 DIFFICULTY VOIDING: ICD-10-CM

## 2021-07-09 DIAGNOSIS — Z91.09 ENVIRONMENTAL ALLERGIES: ICD-10-CM

## 2021-07-09 DIAGNOSIS — M25.561 ACUTE PAIN OF BOTH KNEES: ICD-10-CM

## 2021-07-09 NOTE — TELEPHONE ENCOUNTER
It looks like [facility] has declined her referral due to staffing issues. I entered a new referral for her. Please follow up on this new referral next week to be sure it is arranged as well.

## 2021-07-09 NOTE — TELEPHONE ENCOUNTER
Caller: Marguerite Edwards    Relationship: Self    Best call back number: [phone]    What is the best time to reach you: ANYTIME     Who are you requesting to speak with (clinical staff, provider,  specific staff member): REFERRAL COORDINATOR     What was the call regarding: PATIENT HAS QUESTIONS ABOUT HER REFERRAL TO HOME HEALTH NURSE    Do you require a callback: YES     PATIENT ASKED FOR A CALL BACK TODAY BECAUSE IT IS VERY IMPORTANT.

## 2021-07-09 NOTE — TELEPHONE ENCOUNTER
PATIENT CALLED BACK TO INQUIRE ABOUT THE PERSON THAT WILL BE COMING INTO HER HOME TO PROVIDE HOME HEALTH SERVICES TO HER. THE INFORMATION THE PATIENT IS REQUESTING IS AS FOLLOWS:     NAME OF THE COMPANY THAT WILL BE PROVIDING THE SERVICE TO THE PATIENT.     PATIENT STATES THAT SHE WILL LIKE FOR A WOMAN TO ASSIST HER WITH HER PERSONAL CARE NEEDS.     PATIENT CONTACT: [phone] (C)    PATIENT HAS BEEN ADVISED TO ALLOW 48 HOURS FOR THE CLINICAL TEAM TO FOLLOW UP ON THIS REQUEST,  IF SYMPTOMS WORSENS TO SEEK OUT EMERGENT CARE. PATIENT  FULLY UNDERSTANDS.

## 2021-07-12 ENCOUNTER — TELEPHONE (OUTPATIENT)
Dept: INTERNAL MEDICINE | Facility: CLINIC | Age: 36
End: 2021-07-12

## 2021-07-12 DIAGNOSIS — M54.40 CHRONIC BILATERAL LOW BACK PAIN WITH SCIATICA, SCIATICA LATERALITY UNSPECIFIED: ICD-10-CM

## 2021-07-12 DIAGNOSIS — M51.36 DISC DEGENERATION, LUMBAR: ICD-10-CM

## 2021-07-12 DIAGNOSIS — G89.29 CHRONIC BILATERAL LOW BACK PAIN WITHOUT SCIATICA: Primary | ICD-10-CM

## 2021-07-12 DIAGNOSIS — G89.29 CHRONIC BILATERAL LOW BACK PAIN WITH SCIATICA, SCIATICA LATERALITY UNSPECIFIED: ICD-10-CM

## 2021-07-12 DIAGNOSIS — M54.50 CHRONIC BILATERAL LOW BACK PAIN WITHOUT SCIATICA: Primary | ICD-10-CM

## 2021-07-12 DIAGNOSIS — M51.36 DDD (DEGENERATIVE DISC DISEASE), LUMBAR: ICD-10-CM

## 2021-07-12 RX ORDER — TIZANIDINE HYDROCHLORIDE 4 MG/1
4 CAPSULE ORAL 2 TIMES DAILY
Qty: 60 CAPSULE | Refills: 1 | Status: CANCELLED | OUTPATIENT
Start: 2021-07-12

## 2021-07-12 NOTE — TELEPHONE ENCOUNTER
PT CALLED STATED PHARMACY CONTACTED PT, INS DOES NOT COVER TIZANIDINE. PT WOULD LIKE TO HAVE A PRIOR AUTH SENT TO INSURANCE COMPANY ASAP.     PT STATES TO CALL  [phone]

## 2021-07-12 NOTE — TELEPHONE ENCOUNTER
Called wanting to speak with Lakeisha regarding Pt's transportation. Stated that Lakeisha would have to contact [employer] to have the paperwork she needed faxed over from them. [employer] #[phone]

## 2021-07-12 NOTE — TELEPHONE ENCOUNTER
Provider:  Ivon VANEGAS  Caller:   Time of call:   11:17  Phone #:  [phone]  Surgery:    Surgery Date:    Last visit:   01/05/21  Next visit:     KATIE:         Reason for call:     Patient requests refill on Tizanidine.

## 2021-07-13 ENCOUNTER — TELEPHONE (OUTPATIENT)
Dept: INTERNAL MEDICINE | Facility: CLINIC | Age: 36
End: 2021-07-13

## 2021-07-13 NOTE — TELEPHONE ENCOUNTER
"I spoke with the pharmacy and it appears patient has picked up the new insurance affiliated with the state under \"[employer].\"  PA will need to be processed under her new Insurance.    Will try to submit  this today.  "

## 2021-07-13 NOTE — TELEPHONE ENCOUNTER
Caller: Marguerite Edwards    Relationship: Self    Best call back number: [phone]     Who are you requesting to speak with (clinical staff, provider,  specific staff member): ROBERT    What was the call regarding: PATIENT IS FOLLOWING UP ON HER REFERRAL TO HOME HEALTH. PATIENT STATED SHE WOULD LIKE TO BE REFERRED TO [facility] IF POSSIBLE. [facility]'S PHONE NUMBER IS  [phone]    Do you require a callback: YES

## 2021-07-13 NOTE — TELEPHONE ENCOUNTER
Caller: Marguerite Edwards    Relationship to patient: Self    Best call back number: [phone]    Patient is needing: PATIENT IS FOLLOWING UP ON HER REQUEST FOR A CALL BACK FROM ROBERT.

## 2021-07-14 NOTE — TELEPHONE ENCOUNTER
PA submitted, however [employer] states that a PA is not required.    Pharmacy notified and will change capsules to tablets per Ivon VANEGAS.     Pharmacy will notify patient.

## 2021-07-15 ENCOUNTER — TELEPHONE (OUTPATIENT)
Dept: INTERNAL MEDICINE | Facility: CLINIC | Age: 36
End: 2021-07-15

## 2021-07-15 NOTE — TELEPHONE ENCOUNTER
Spoke to patient regarding her home health referral. I have spent a numerous amount of time working with [facility] as well as home health agencies on accepting this patient. Unfortunately [facility] has denied daily visits for patients as she has requested. It is not medically necessary for patient to have this according to insurance guidelines. I explained this to patient who does understand.

## 2021-07-21 ENCOUNTER — TELEPHONE (OUTPATIENT)
Dept: NEUROSURGERY | Facility: CLINIC | Age: 36
End: 2021-07-21

## 2021-07-21 RX ORDER — TIZANIDINE 4 MG/1
4 TABLET ORAL 3 TIMES DAILY PRN
Qty: 90 TABLET | Refills: 1 | Status: CANCELLED | OUTPATIENT
Start: 2021-07-21

## 2021-07-22 RX ORDER — TIZANIDINE 4 MG/1
4 TABLET ORAL EVERY 8 HOURS PRN
Qty: 90 TABLET | Refills: 2 | Status: SHIPPED | OUTPATIENT
Start: 2021-07-22 | End: 2021-08-11 | Stop reason: SDUPTHER

## 2021-07-26 DIAGNOSIS — R11.0 NAUSEA: ICD-10-CM

## 2021-07-26 RX ORDER — ONDANSETRON 4 MG/1
4 TABLET, FILM COATED ORAL 4 TIMES DAILY PRN
Qty: 30 TABLET | Refills: 1 | Status: SHIPPED | OUTPATIENT
Start: 2021-07-26 | End: 2021-11-03 | Stop reason: SDUPTHER

## 2021-07-29 NOTE — TELEPHONE ENCOUNTER
DELETE AFTER REVIEWING: Telephone encounter to be sent to the clinical pool     [facility]     Relationship: Self    Best call back number:     What orders are you requesting (i.e. lab or imaging): HOME HEALTH    In what timeframe would the patient need to come in: ASAP     Additional notes: HAS THE ORDER BEEN DONE YET?  PLEASE CALL ME

## 2021-08-02 ENCOUNTER — TELEPHONE (OUTPATIENT)
Dept: NEUROSURGERY | Facility: CLINIC | Age: 36
End: 2021-08-02

## 2021-08-02 NOTE — TELEPHONE ENCOUNTER
Caller: CLAUDIA    Relationship: SELF  Best call back number: [phone]  What form or medical record are you requesting: REFERRAL FOR PAIN MANAGEMENT    Who is requesting this form or medical record from you: [facility]    How would you like to receive the form or medical records (pick-up, mail, fax): FAX  If fax, what is the fax number: [phone]    Timeframe paperwork needed: ASAP    Additional notes: PATIENT IS ASKING FOR A REFERRAL FOR A WOMAN AT THE PAIN CLINIC AT [facility]. PATIENT STATES THAT THE DOCTOR SHE HAD PREVIOUSLY HAS LEFT AND SHE IS REQUESTING TO SEE [facility] INSTEAD.   PLEASE CALL PATIENT WITH QUESTIONS OR CONCERNS @ [phone]    THANK YOU

## 2021-08-03 NOTE — TELEPHONE ENCOUNTER
PT CALLED CHECKING PAIN MANAGEMENT REFERRAL ADVISED MESSAGE SENT TO CLINIC. REFERRAL WILL BE WORKED ON AND ADVISED PT TO CALL BACK IF NO COMMUNICATION AFTER 48 HOURS. THANK YOU

## 2021-08-05 ENCOUNTER — TELEPHONE (OUTPATIENT)
Dept: INTERNAL MEDICINE | Facility: CLINIC | Age: 36
End: 2021-08-05

## 2021-08-05 NOTE — TELEPHONE ENCOUNTER
Caller: CLAUDIA CISNEROS    Relationship to patient: SELF    Best call back number: [phone]    Patient is needing: PATIENT CALLING FOR AN UPDATE. SHE WOULD LIKE THIS REFERRAL TO BE SENT OVER ASAP AND IS REQUESTING A CALL BACK ONCE ITS BEEN SENT. (REFERRAL INFO IN ENCOUNTER 08-02-21). PLEASE ADVISE.

## 2021-08-09 ENCOUNTER — TELEPHONE (OUTPATIENT)
Dept: INTERNAL MEDICINE | Facility: CLINIC | Age: 36
End: 2021-08-09

## 2021-08-09 NOTE — TELEPHONE ENCOUNTER
I spoke to pt and her .  I let them know as soon as we receive this and when it is completed we will call to let them know

## 2021-08-09 NOTE — TELEPHONE ENCOUNTER
YOU WILL BE RECEIVING A FAX TO [employer]. CLAUDIA ALSO REQUESTED THAT YOU CALL HER.  [phone]

## 2021-08-09 NOTE — TELEPHONE ENCOUNTER
Caller: CLAUDIA CISNEROS     Relationship to patient: SELF     Best call back number: [phone]     Patient is needing: PT CALLED BACK AND STATED THAT THE PAIN CLINIC AT [facility] AS NOT RECEIVED HER REFERRAL.    PLEASE FAX TO : [phone]    PT WOULD ALSO LIKE A CALL BACK ONCE IT HAS BEEN COMPLETED.       THANK YOU

## 2021-08-09 NOTE — TELEPHONE ENCOUNTER
I spoke to pt  he said the Liliya would need to send a waiver packet to Medicaid to have them help pay for the home health nurse. He will call them to see if they will send the paperwork for this since we do not have this in our office.

## 2021-08-10 ENCOUNTER — TELEPHONE (OUTPATIENT)
Dept: INTERNAL MEDICINE | Facility: CLINIC | Age: 36
End: 2021-08-10

## 2021-08-10 NOTE — TELEPHONE ENCOUNTER
Caller: Marguerite Edwards    Relationship: Self    Best call back number: [phone]    What was the call regarding:     PATIENT CALLED SHE IS HAVING THE PAIN MANAGEMENT OFFICE SEND A REQUEST TO OUR PRACTICE -[phone].    SHE DOES NOT WANT TO SEE DR. BAH AS HE IS NO LONGER AT THAT LOCATION.

## 2021-08-11 NOTE — TELEPHONE ENCOUNTER
Provider:  Jcarlos  Caller:  Automated refill request  Surgery:  n/a  Surgery Date: n/a    Last visit:  04/13/2021 televisit  Next visit: TBD    Reason for call:         Requested Prescriptions     Pending Prescriptions Disp Refills   • tiZANidine (Zanaflex) 4 MG tablet 90 tablet 2     Sig: Take 1 tablet by mouth Every 8 (Eight) Hours As Needed for Muscle Spasms.

## 2021-08-11 NOTE — TELEPHONE ENCOUNTER
Caller: Marguerite Edwards    Relationship: Self    Best call back number: [phone]    Medication needed:   Requested Prescriptions     Pending Prescriptions Disp Refills   • tiZANidine (Zanaflex) 4 MG tablet 90 tablet 2     Sig: Take 1 tablet by mouth Every 8 (Eight) Hours As Needed for Muscle Spasms.       When do you need the refill by:     What additional details did the patient provide when requesting the medication:     Does the patient have less than a 3 day supply:  [x] Yes  [] No    What is the patient's preferred pharmacy:  [facility]    PLEASE CALL PT AFTER RX SENT TO PHARMACY.  PT ALSO HAS SOME QUESTIONS REFERRING TO PAIN CLINIC

## 2021-08-11 NOTE — TELEPHONE ENCOUNTER
Pt called: She said [facility] did not receive the referral. Please refax referral. Thanks!      Pt contact: [phone]  Best time to call: anytime

## 2021-08-11 NOTE — TELEPHONE ENCOUNTER
Attempted to contact Judah at [facility] in regards to patient, unable to reach her at this time but did leave a voicemail for her to return call.

## 2021-08-11 NOTE — TELEPHONE ENCOUNTER
See note from 6/30/2021   Needs help with ADLs (self care deficits r/t psychological issues)   PT/OT- for back pain/ DDD  She also wanted some help with in and out cath but that order will harry to come from her urologist.

## 2021-08-12 RX ORDER — TIZANIDINE 4 MG/1
4 TABLET ORAL EVERY 8 HOURS PRN
Qty: 90 TABLET | Refills: 2 | Status: SHIPPED | OUTPATIENT
Start: 2021-08-12 | End: 2022-01-17 | Stop reason: SDUPTHER

## 2021-08-12 NOTE — TELEPHONE ENCOUNTER
Left message for patient to clarify who she wants to see for pain management.  We have made multiple referrals.  We need the correct providers name and location.

## 2021-08-17 ENCOUNTER — TELEPHONE (OUTPATIENT)
Dept: INTERNAL MEDICINE | Facility: CLINIC | Age: 36
End: 2021-08-17

## 2021-08-17 NOTE — TELEPHONE ENCOUNTER
[facility] [employer] reverification or certification for this patient. She was going to be faxing it over for Liliya to fill out so that the patient could have transportation.  I had her put it to my attention so that it would stand out in the fax queue.

## 2021-08-18 RX ORDER — MELOXICAM 15 MG/1
15 TABLET ORAL DAILY
Qty: 30 TABLET | Refills: 5 | Status: SHIPPED | OUTPATIENT
Start: 2021-08-18 | End: 2022-01-17 | Stop reason: ALTCHOICE

## 2021-08-18 NOTE — TELEPHONE ENCOUNTER
Provider:  Jcarlos  Caller:  Automated refill request  Surgery:  NA  Surgery Date:    Last visit:  Telemedicine with Vanessa Garber PA-C (04/13/2021)    Next visit: NA    Reason for call:         Requested Prescriptions     Pending Prescriptions Disp Refills   • meloxicam (MOBIC) 15 MG tablet 30 tablet 5     Sig: Take 1 tablet by mouth Daily.

## 2021-08-18 NOTE — TELEPHONE ENCOUNTER
ADVISED THAT WE DO HAVE THE PAPER WORK, UNFORTUNATELY SOULEYMANE ISN'T IN THIS WEEK BUT WOULD BE BACK ON 8/23/21. OSCAR SAID THAT SHE HAD SENT THE REQUEST ALSO TO HER MENTAL HEALTH PROVIDER AND MAY NOT NEED US TO FILL OUT PAPER WORK. SHE WOULD CALL BACK TO LET US KNOW EITHER WAY

## 2021-08-24 ENCOUNTER — TELEPHONE (OUTPATIENT)
Dept: INTERNAL MEDICINE | Facility: CLINIC | Age: 36
End: 2021-08-24

## 2021-08-24 NOTE — TELEPHONE ENCOUNTER
Caller: SUSY     Relationship:  [facility]     Best call back number: [phone]     What form or medical record are you requesting: MAP 10 PAPERWORK THAT WAS MAILED TO THE OFFICE  08/13/2021    Who is requesting this form or medical record from you: [facility]    How would you like to receive the form or medical records (pick-up, mail, fax): FAX [phone]    Timeframe paperwork needed: AS SOON AS POSSIBLE    Additional notes: PLEASE CALL SUSY TO CONFIRM IF THE PAPERWORK WAS RECEIVED AND COMPLETED

## 2021-08-24 NOTE — TELEPHONE ENCOUNTER
Left detailed message on Belén's self identified voicemail box stating that we did receive paperwork last week but it isn't completed at this time. Left office number in case she returns call and has additional questions.

## 2021-09-03 ENCOUNTER — TELEMEDICINE (OUTPATIENT)
Dept: INTERNAL MEDICINE | Facility: CLINIC | Age: 36
End: 2021-09-03

## 2021-09-03 VITALS — SYSTOLIC BLOOD PRESSURE: 100 MMHG | DIASTOLIC BLOOD PRESSURE: 50 MMHG

## 2021-09-03 DIAGNOSIS — R23.9 SKIN CHANGE: Primary | ICD-10-CM

## 2021-09-03 PROCEDURE — 99213 OFFICE O/P EST LOW 20 MIN: CPT | Performed by: NURSE PRACTITIONER

## 2021-09-03 RX ORDER — QUETIAPINE FUMARATE 100 MG/1
400 TABLET, FILM COATED ORAL NIGHTLY
COMMUNITY
End: 2023-01-11 | Stop reason: ALTCHOICE

## 2021-09-03 NOTE — PROGRESS NOTES
You have chosen to receive care through a telehealth visit.  Do you consent to use a video/audio connection for your medical care today? Yes  This was an audio and video enabled telemedicine encounter.    Subjective   Marguerite Edwards is a 36 y.o. female.     Chief Complaint   Patient presents with   • Skin Lesion       History of Present Illness    wants a referral to dermatology.   Used to see Jaci Torres with [facility].   She has an area on her right chest that is concerning.   She had it removed last year and has come back.   Area was benign per her report .   No breast lumps noted- reports her  did a full breast exam on her yesterday.   She has not had a mammogram.  Declines today.  Just wants to see dermatology for now.    Urology has her self cathing again- 2 times a day currently.       Had some nausea and abd pain yesterday. Now resolved.  No diarrhea or vomiting.           The following portions of the patient's history were reviewed and updated as appropriate: allergies, current medications, past family history, past medical history, past social history, past surgical history and problem list.        Review of Systems   Constitutional: Negative for fatigue, fever and unexpected weight loss.   Eyes: Negative for blurred vision, double vision and visual disturbance.   Respiratory: Negative for cough, shortness of breath and wheezing.    Cardiovascular: Negative for chest pain, palpitations and leg swelling.   Gastrointestinal: Positive for nausea ( resolved) and vomiting (resolved). Negative for abdominal pain, constipation, diarrhea and GERD.   Genitourinary: Negative for difficulty urinating, frequency and urgency.   Musculoskeletal: Positive for arthralgias and back pain. Negative for myalgias.   Skin: Positive for skin lesions. Negative for color change and rash.   Neurological: Negative for dizziness, weakness and headache.   Hematological: Negative for adenopathy. Does not bruise/bleed  easily.           Outpatient Medications Marked as Taking for the 9/3/21 encounter (Telemedicine) with Carroll LiliyaKATRINA Jerry   Medication Sig Dispense Refill   • albuterol (PROVENTIL) (2.5 MG/3ML) 0.083% nebulizer solution Take 2.5 mg by nebulization 4 (Four) Times a Day As Needed for Wheezing. 125 vial 3   • albuterol sulfate  (90 Base) MCG/ACT inhaler Inhale 2 puffs Every 4 (Four) Hours As Needed for Wheezing. 18 g 6   • Alcohol Swabs (Alcohol Prep) pads 1 pad Daily. 100 each 11   • ARIPiprazole (Abilify) 5 MG tablet Take 7.5 mg by mouth Daily.     • buPROPion XL (WELLBUTRIN XL) 300 MG 24 hr tablet Take  by mouth Every Morning.     • busPIRone (BUSPAR) 10 MG tablet 10 mg 3 (Three) Times a Day.     • cholecalciferol (VITAMIN D3) 25 MCG (1000 UT) tablet Take 1,000 Units by mouth Daily.     • cholestyramine (QUESTRAN) 4 GM/DOSE powder Take 1 packet by mouth 2 (Two) Times a Day. mixed with a liquid 378 g 11   • colestipol (COLESTID) 1 g tablet Take 2 tablets by mouth 2 (Two) Times a Day. 90 tablet 3   • Dexilant 60 MG capsule      • dicyclomine (Bentyl) 10 MG capsule Take 1 capsule by mouth 3 (Three) Times a Day As Needed (pain). Indications: Irritable Bowel Syndrome 120 capsule 3   • escitalopram (LEXAPRO) 20 MG tablet Take 20 mg by mouth Daily.     • fluticasone (Flonase) 50 MCG/ACT nasal spray 2 sprays into the nostril(s) as directed by provider Daily. 16 g 11   • Fluticasone Furoate-Vilanterol (BREO ELLIPTA) 100-25 MCG/INH inhaler Inhale 1 puff Daily. 1 inhalation once a day 1 each 6   • glucose blood test strip Use as instructed 100 each 12   • hydrOXYzine pamoate (Vistaril) 50 MG capsule Take 50 mg by mouth Every 6 (Six) Hours. Every 6 hours  As needed and 2 pills at night     • Lancets Ultra Fine misc 1 each 3 (Three) Times a Day. 100 each 3   • loratadine (Claritin) 10 MG tablet Take 1 tablet by mouth Daily. 30 tablet 11   • meloxicam (MOBIC) 15 MG tablet Take 1 tablet by mouth Daily. 30 tablet 5   •  Menthol (Icy Hot) 5 % patch Apply 1 patch topically Daily As Needed (back pain). 30 patch 3   • mirtazapine (REMERON) 45 MG tablet Take 45 mg by mouth Every Night.     • montelukast (Singulair) 10 MG tablet Take 1 tablet by mouth Every Night. 30 tablet 6   • norelgestromin-ethinyl estradiol (ORTHO EVRA) 150-35 MCG/24HR Place 1 patch on the skin as directed by provider Every 7 (Seven) Days for 3 weeks, THEN skip 7 days. 3 patch 9   • nystatin (MYCOSTATIN) 657337 UNIT/GM powder Apply  topically to the appropriate area as directed 3 (Three) Times a Day. 60 g 1   • OLANZapine (zyPREXA) 5 MG tablet      • ondansetron (ZOFRAN) 4 MG tablet Take 1 tablet by mouth 4 (Four) Times a Day As Needed for Nausea or Vomiting. 30 tablet 1   • promethazine (PHENERGAN) 25 MG tablet Take 1 tablet by mouth Every 6 (Six) Hours As Needed for Nausea or Vomiting. 45 tablet 0   • QUEtiapine (SEROquel) 100 MG tablet Take 300 mg by mouth Every Night.     • Sanitary Napkins & Tampons (MAXI PAD OVERNIGHT) pads 1 pad 4 (Four) Times a Day As Needed (menses). 96 each 11   • simvastatin (ZOCOR) 10 MG tablet Take 1 tablet by mouth Every Night. 90 tablet 1   • tiZANidine (Zanaflex) 4 MG tablet Take 1 tablet by mouth Every 8 (Eight) Hours As Needed for Muscle Spasms. 90 tablet 2   • Vitamin D, Cholecalciferol, 25 MCG (1000 UT) capsule Take 1 capsule by mouth Daily. 30 capsule 11     Allergies   Allergen Reactions   • Lamotrigine Other (See Comments)     bruising  bruising   • Paxil [Paroxetine Hcl] Mental Status Change   • Prozac [Fluoxetine Hcl] Mental Status Change   • Chlorhexidine Gluconate Unknown - Low Severity   • Fluoxetine Unknown - Low Severity   • Lubiprostone Palpitations, Dizziness and Unknown - Low Severity   • Paroxetine Unknown - Low Severity           Objective   Physical Exam   Constitutional: She is oriented to person, place, and time. She appears well-developed and well-nourished. No distress. She appears obese.  HENT:   Head:  Atraumatic.   Pulmonary/Chest:     .  Neurological: She is alert and oriented to person, place, and time.   Skin: She is not diaphoretic.     rigth chest. Area of red/brown  Size of a dime        Vitals:    09/03/21 1558   BP: 100/50     There is no height or weight on file to calculate BMI.        Assessment/Plan   Diagnoses and all orders for this visit:    1. Skin change (Primary)  -     Cancel: Ambulatory Referral to Dermatology  -     Ambulatory Referral to Dermatology      Refer to dermatology at her request.  Consider in office breast exam and mammogram if area does not resolve.  She will let me know.           Return if symptoms worsen or fail to improve.    I have reviewed the limitations of a telehealth visit (such as lack of a physical exam and unable to obtain vital signs) and advised the patient that they may need to follow up for an office visit should their symptoms or concerns persist, worsen, or change.  Patient was encouraged to keep me informed of any acute changes, lack of improvement, or any new concerning symptoms.   I discussed my findings,recommendations, and plan of care was with the patient. They verbalized understanding and agreement.        * Please note that portions of this note were completed with a voice recognition program. Efforts were made to edit the dictation but occasionally words are erroneously transcribed.

## 2021-09-08 ENCOUNTER — TELEPHONE (OUTPATIENT)
Dept: GASTROENTEROLOGY | Facility: CLINIC | Age: 36
End: 2021-09-08

## 2021-09-08 NOTE — TELEPHONE ENCOUNTER
Patient called stating she is having constipation she has tried miralax didn't help. Shes having cramping, nausea, and heartburn. She would like to know what you recommend for her to do. Please advise?

## 2021-09-10 ENCOUNTER — TELEPHONE (OUTPATIENT)
Dept: INTERNAL MEDICINE | Facility: CLINIC | Age: 36
End: 2021-09-10

## 2021-09-10 NOTE — TELEPHONE ENCOUNTER
Caller: Marguerite Edwards    Relationship: Self    Best call back number: [phone]     What form or medical record are you requesting: MEDICAL RECORDS RELEASE FORM    Who is requesting this form or medical record from you: PSYCHIATRIST    How would you like to receive the form or medical records (pick-up, mail, fax): MAILED    If mail, what is the address: ADDRESS IN THE CHART    Timeframe paperwork needed: AS SOON AS POSSIBLE    Additional notes: PATIENT STATES THAT SHE WOUILD LIKE TO TALK WITH THE PRACTICE MANAGER ABOUT THE WAY SHE HAS BEEN TREATED ON THE PHONE AND IN THE OFFICE.  HER PSYCHIATRIST IS REQUESTING HEIGHT, WEIGHT, AND LAST LABWORK.

## 2021-09-12 ENCOUNTER — DOCUMENTATION (OUTPATIENT)
Dept: GASTROENTEROLOGY | Facility: CLINIC | Age: 36
End: 2021-09-12

## 2021-09-12 RX ORDER — ENEMA BAG, DISPOSABLE
1 EACH RECTAL NIGHTLY
Qty: 1 KIT | Refills: 0 | Status: SHIPPED | OUTPATIENT
Start: 2021-09-12 | End: 2021-09-19

## 2021-09-12 RX ORDER — SENNOSIDES 8.6 MG
17.2 CAPSULE ORAL DAILY
Qty: 90 EACH | Refills: 5 | Status: SHIPPED | OUTPATIENT
Start: 2021-09-12 | End: 2021-12-11

## 2021-09-13 ENCOUNTER — TELEPHONE (OUTPATIENT)
Dept: GASTROENTEROLOGY | Facility: CLINIC | Age: 36
End: 2021-09-13

## 2021-09-17 ENCOUNTER — TELEPHONE (OUTPATIENT)
Dept: GASTROENTEROLOGY | Facility: CLINIC | Age: 36
End: 2021-09-17

## 2021-09-17 NOTE — TELEPHONE ENCOUNTER
Got the message. Yes, she should be on linzess and 2 dulcolax a day with as needed miralax.  If she hasnt had a bm by 4 days, she should take an enema. Thanks alexia

## 2021-09-17 NOTE — TELEPHONE ENCOUNTER
Marguerite Goode called this morning stating that she hasn't had a bowel movement for 2 days and what should she take. She is currently taking Linzess and wants to know if ducolax will be alright to take. I explained to Marguerite to make sure she takes Linzess first thing in the morning on an empty stomach. Only take 2 Ducolax capsules a day. May take 1 capful of Miralax as needed. If symptoms worsens go to the ER and call us back on Monday with an update if not feeling any better. Patient voiced understanding.

## 2021-09-24 DIAGNOSIS — R10.13 EPIGASTRIC PAIN: ICD-10-CM

## 2021-09-24 DIAGNOSIS — E78.2 MIXED HYPERLIPIDEMIA: ICD-10-CM

## 2021-09-24 DIAGNOSIS — E55.9 VITAMIN D DEFICIENCY: ICD-10-CM

## 2021-09-24 RX ORDER — SIMVASTATIN 10 MG
10 TABLET ORAL NIGHTLY
Qty: 90 TABLET | Refills: 1 | Status: CANCELLED | OUTPATIENT
Start: 2021-09-24

## 2021-09-24 RX ORDER — FAMOTIDINE 20 MG
1 TABLET ORAL DAILY
Qty: 30 CAPSULE | Refills: 11 | Status: CANCELLED | OUTPATIENT
Start: 2021-09-24

## 2021-09-24 NOTE — TELEPHONE ENCOUNTER
Caller: Marguerite Edwards    Relationship: Self      Medication requested (name and dosage):   simvastatin (ZOCOR) 10 MG tablet  Vitamin D, Cholecalciferol, 25 MCG (1000 UT) capsule  Pharmacy where request should be sent:   [facility]  Additional details provided by patient: PATIENT IS NEEDING REFILL ON SIMVASTATIN AND NEW PRESCRIPTION ON VITAMIN D    Best call back number: [phone]    Does the patient have less than a 3 day supply:  [x] Yes  [] No    Maira Guzman   09/24/21 14:58 EDT

## 2021-09-24 NOTE — TELEPHONE ENCOUNTER
Pt has refills at the pharm. I called them to get them ready for her.  They will mail them out for her

## 2021-09-27 RX ORDER — DICYCLOMINE HYDROCHLORIDE 10 MG/1
10 CAPSULE ORAL 3 TIMES DAILY PRN
Qty: 120 CAPSULE | Refills: 3 | Status: SHIPPED | OUTPATIENT
Start: 2021-09-27 | End: 2022-08-16

## 2021-09-28 ENCOUNTER — TELEPHONE (OUTPATIENT)
Dept: INTERNAL MEDICINE | Facility: CLINIC | Age: 36
End: 2021-09-28

## 2021-09-29 NOTE — TELEPHONE ENCOUNTER
Maryjane, from [facility], called on behalf Marguerite.  She stated she is a  for her and Marguerite called to ask her if she would reach out to her PCP and ask for referrals to home health for PT, OT and nursing.  I asked why this patient was requesting this and she stated that Marguerite said she is having trouble doing basic ADLs and overall endurance.  I informed Maryjane that I would send a message back to her provider but we may need to see her for these issues so we can have documentation to back up the referral, if warranted.

## 2021-10-04 ENCOUNTER — TELEPHONE (OUTPATIENT)
Dept: NEUROSURGERY | Facility: CLINIC | Age: 36
End: 2021-10-04

## 2021-10-04 NOTE — TELEPHONE ENCOUNTER
In looking through patient's chart and multiple pain management referrals, she has an appointment with Dr. Farnaz Bacon on Tues. 10/5/21 at 11am.     I called patient and left a VM regarding this appointment. I left physician's name, phone number, and appointment info. I advised she call their office to ask them any questions she would have about the appointment.

## 2021-10-04 NOTE — TELEPHONE ENCOUNTER
Caller: CLAUDIA CISNEROS     Relationship: SELF    Best call back number: [phone]    What is the medical concern/diagnosis: BACK PAIN     What specialty or service is being requested: PAIN MGMT    What is the provider, practice or medical service name: [facility]    What is the office location: [address] [place] [address]    What is the office phone number: [phone]    Any additional details: PATIENT IS REQUESTING A REFERRAL TO [facility]. PLEASE REVIEW AND ADVISE.

## 2021-10-05 NOTE — TELEPHONE ENCOUNTER
PATIENT IS REQUESTING A WAIVER PACKET BECAUSE THEIR INSURANCE WILL NOT COVER HOME HEALTH. THEY ARE REQUESTING A CALLBACK ABOUT THIS.    CONTACT: [phone]   Recommended artificial tears to use: 1 drop 4x a day in both eyes.

## 2021-10-07 ENCOUNTER — TELEPHONE (OUTPATIENT)
Dept: INTERNAL MEDICINE | Facility: CLINIC | Age: 36
End: 2021-10-07

## 2021-10-07 NOTE — TELEPHONE ENCOUNTER
Caller: Claudia Edwards    Relationship: Self    Best call back number: [phone] (M)      What is the medical concern/diagnosis:       What specialty or service is being requested: HOME HEALTH NURSE      Any additional details: PLEASE CALL CLAUDIA TO ASK ABOUT SETTING UP A HOME HEALTH NURSE

## 2021-10-08 NOTE — TELEPHONE ENCOUNTER
Patient states that her  Maryjane phone number: [phone] states she needs home health to help her with getting around better (PT).    Spoke with Maryjane and patient told her she needs help with self cathing and getting around her house better.  Liliya do you think she needs home health?

## 2021-10-08 NOTE — TELEPHONE ENCOUNTER
I do not think she needs home health. I already referred her and they denied it. If she wants PT she will have to do outpatient at a facility.

## 2021-10-11 NOTE — TELEPHONE ENCOUNTER
Called and spoke with patient, informed her that when we did place referral for her to have home health come out and evaluate they did deny referral. Attempted to inform her that it was not just because of the insurance but it was also in regards to her not meeting the criteria for home health. She repeated several times that the referral was denied for insurance purposes not because of the criteria being met. Informed her that it was not only because of the insurance but also the medical necessity factor. Informed her that I did consult with the referral coordinator about the referral and it was denied due to insurance purposes on her side. Spoke with KATRINA Trivedi who is her PCP and she stated that yes it was because of insurance and the fact that she did not meet the medical necessity for home health because she is completing the cath process well with no problems. Patient requested that I contact her new  Jena Franco regarding this and that she would know more information about her case. Informed patient that I would call and speak with Jena. Called and spoke with Jena her . She states that she just recently obtained Marguerite under her care and this was the first she had heard about a home health request. She stated that they have been able to get Medicaid to approve an attendant care aid (CNA) to be able to come and stay with her to help with housework and basic functions around the house. She states that she got the notification on Friday from the insurance and relayed this message to the patient on Friday afternoon. I did ask Jena if she could relay this message again to the patient. She stated that she would reach out to the patient again to confirm this information with her.

## 2021-10-15 ENCOUNTER — TELEMEDICINE (OUTPATIENT)
Dept: INTERNAL MEDICINE | Facility: CLINIC | Age: 36
End: 2021-10-15

## 2021-10-15 DIAGNOSIS — R30.9 PAINFUL URINATION: ICD-10-CM

## 2021-10-15 DIAGNOSIS — F51.01 PRIMARY INSOMNIA: Primary | ICD-10-CM

## 2021-10-15 PROCEDURE — 99213 OFFICE O/P EST LOW 20 MIN: CPT | Performed by: NURSE PRACTITIONER

## 2021-10-15 RX ORDER — PHENAZOPYRIDINE HYDROCHLORIDE 95 MG/1
95 TABLET ORAL DAILY PRN
Qty: 30 TABLET | Refills: 0 | Status: SHIPPED | OUTPATIENT
Start: 2021-10-15 | End: 2022-01-17 | Stop reason: ALTCHOICE

## 2021-10-15 NOTE — PROGRESS NOTES
This was an audio and video enabled telemedicine encounter.  You have chosen to receive care through a telehealth visit.  Do you consent to use a video/audio connection for your medical care today? Yes    Marguerite Edwards presents to [facility] for     Chief Complaint  Chief Complaint   Patient presents with   • Insomnia         Subjective        Insomnia  This is a new problem. The current episode started in the past 7 days. The problem occurs intermittently. The problem has been gradually improving. Associated symptoms include arthralgias and urinary symptoms. Pertinent negatives include no abdominal pain, chest pain, coughing, fatigue, fever, myalgias, nausea, rash, vomiting or weakness.     She reports she has been irritable and angry. Not sleeping well. Did not sleep well for 3 nights. She was able to sleep last night.      she has steroid injection in her back Monday and did not sleep well for 3 nights but was able to sleep last night      painful urination. He urologist gave her some AZO to use daily but this is too expensive for her. She wants a prescription for it because it has been helping        Review of Systems   Constitutional: Negative for fatigue, fever and unexpected weight loss.   Eyes: Negative for blurred vision, double vision and visual disturbance.   Respiratory: Negative for cough, shortness of breath and wheezing.    Cardiovascular: Negative for chest pain, palpitations and leg swelling.   Gastrointestinal: Negative for abdominal pain, constipation, diarrhea, nausea and vomiting.   Genitourinary: Positive for dysuria (chronic) and urinary incontinence (chronic intermittent). Negative for difficulty urinating, flank pain, frequency, pelvic pressure and urgency.   Musculoskeletal: Positive for arthralgias and back pain. Negative for myalgias.   Skin: Negative for color change and rash.   Neurological: Negative for dizziness, weakness and headache.    Hematological: Negative for adenopathy. Does not bruise/bleed easily.   Psychiatric/Behavioral: Positive for sleep disturbance. The patient has insomnia.          Allergies   Allergen Reactions   • Lamotrigine Other (See Comments)     bruising  bruising   • Paxil [Paroxetine Hcl] Mental Status Change   • Prozac [Fluoxetine Hcl] Mental Status Change   • Chlorhexidine Gluconate Unknown - Low Severity   • Fluoxetine Unknown - Low Severity   • Lubiprostone Palpitations, Dizziness and Unknown - Low Severity   • Paroxetine Unknown - Low Severity     Current Outpatient Medications on File Prior to Visit   Medication Sig Dispense Refill   • albuterol (PROVENTIL) (2.5 MG/3ML) 0.083% nebulizer solution Take 2.5 mg by nebulization 4 (Four) Times a Day As Needed for Wheezing. 125 vial 3   • albuterol sulfate  (90 Base) MCG/ACT inhaler Inhale 2 puffs Every 4 (Four) Hours As Needed for Wheezing. 18 g 6   • Alcohol Swabs (Alcohol Prep) pads 1 pad Daily. 100 each 11   • ARIPiprazole (Abilify) 5 MG tablet Take 7.5 mg by mouth Daily.     • buPROPion XL (WELLBUTRIN XL) 300 MG 24 hr tablet Take  by mouth Every Morning.     • busPIRone (BUSPAR) 10 MG tablet 10 mg 3 (Three) Times a Day.     • cholecalciferol (VITAMIN D3) 25 MCG (1000 UT) tablet Take 1,000 Units by mouth Daily.     • cholestyramine (QUESTRAN) 4 GM/DOSE powder Take 1 packet by mouth 2 (Two) Times a Day. mixed with a liquid 378 g 11   • colestipol (COLESTID) 1 g tablet Take 2 tablets by mouth 2 (Two) Times a Day. 90 tablet 3   • Dexilant 60 MG capsule      • dicyclomine (Bentyl) 10 MG capsule Take 1 capsule by mouth 3 (Three) Times a Day As Needed (pain). Indications: Irritable Bowel Syndrome 120 capsule 3   • escitalopram (LEXAPRO) 20 MG tablet Take 20 mg by mouth Daily.     • fluticasone (Flonase) 50 MCG/ACT nasal spray 2 sprays into the nostril(s) as directed by provider Daily. 16 g 11   • Fluticasone Furoate-Vilanterol (BREO ELLIPTA) 100-25 MCG/INH inhaler  Inhale 1 puff Daily. 1 inhalation once a day 1 each 6   • glucose blood test strip Use as instructed 100 each 12   • hydrOXYzine pamoate (Vistaril) 50 MG capsule Take 50 mg by mouth Every 6 (Six) Hours. Every 6 hours  As needed and 2 pills at night     • Lancets Ultra Fine misc 1 each 3 (Three) Times a Day. 100 each 3   • linaclotide (LINZESS) 72 MCG capsule capsule Take 1 capsule by mouth Every Morning Before Breakfast. 30 capsule 0   • loratadine (Claritin) 10 MG tablet Take 1 tablet by mouth Daily. 30 tablet 11   • meloxicam (MOBIC) 15 MG tablet Take 1 tablet by mouth Daily. 30 tablet 5   • Menthol (Icy Hot) 5 % patch Apply 1 patch topically Daily As Needed (back pain). 30 patch 3   • mirtazapine (REMERON) 45 MG tablet Take 45 mg by mouth Every Night.     • montelukast (Singulair) 10 MG tablet Take 1 tablet by mouth Every Night. 30 tablet 6   • norelgestromin-ethinyl estradiol (ORTHO EVRA) 150-35 MCG/24HR Place 1 patch on the skin as directed by provider Every 7 (Seven) Days for 3 weeks, THEN skip 7 days. 3 patch 9   • nystatin (MYCOSTATIN) 894555 UNIT/GM powder Apply  topically to the appropriate area as directed 3 (Three) Times a Day. 60 g 1   • OLANZapine (zyPREXA) 5 MG tablet      • ondansetron (ZOFRAN) 4 MG tablet Take 1 tablet by mouth 4 (Four) Times a Day As Needed for Nausea or Vomiting. 30 tablet 1   • promethazine (PHENERGAN) 25 MG tablet Take 1 tablet by mouth Every 6 (Six) Hours As Needed for Nausea or Vomiting. 45 tablet 0   • QUEtiapine (SEROquel) 100 MG tablet Take 300 mg by mouth Every Night.     • Sanitary Napkins & Tampons (MAXI PAD OVERNIGHT) pads 1 pad 4 (Four) Times a Day As Needed (menses). 96 each 11   • Sennosides (Senna) 8.6 MG capsule Take 17.2 mg by mouth Daily for 90 days. 90 each 5   • simvastatin (ZOCOR) 10 MG tablet Take 1 tablet by mouth Every Night. 90 tablet 1   • tiZANidine (Zanaflex) 4 MG tablet Take 1 tablet by mouth Every 8 (Eight) Hours As Needed for Muscle Spasms. 90 tablet  2   • Vitamin D, Cholecalciferol, 25 MCG (1000 UT) capsule Take 1 capsule by mouth Daily. 30 capsule 11     No current facility-administered medications on file prior to visit.         The following portions of the patient's history were reviewed and updated as appropriate: allergies, current medications, past family history, past medical history, past social history, past surgical history and problem list and are available for review within electronic record    Objective       Vital Signs:   There were no vitals taken for this visit.      Physical Exam   Constitutional: She appears well-developed and well-nourished. No distress.   Eyes: EOM are normal. Right eye exhibits no discharge. Left eye exhibits no discharge. No scleral icterus.   Neck: Neck normal appearance.  Cardiovascular:   No conjunctival pallor noted.    Pulmonary/Chest: Effort normal.  No respiratory distress.  Abdominal: Abdomen appears normal. She exhibits no distension.   Neurological: She is alert.   Skin: Skin is dry. She is not diaphoretic. No erythema. No pallor.   Psychiatric: She has a normal mood and affect.            Assessment and Plan      Diagnoses and all orders for this visit:    1. Primary insomnia (Primary)  -     Melatonin 5 MG capsule; Take 5 mg by mouth At Night As Needed (insomnia).  Dispense: 30 each; Refill: 3  Add melatonin PRN. She wanted to increase her seroquel- I have advised her to check with the prescribinbg provider to see if that  Is something they want her to do.     2. Painful urination  -     phenazopyridine (PYRIDIUM) 95 MG tablet; Take 1 tablet by mouth Daily As Needed (painful urination).  Dispense: 30 tablet; Refill: 0  Will go ahead and send her a script for PRN azo since it is too expensive OTC for her and her urologist wants her on it.       Follow Up     Patient was given instructions and counseling regarding her condition or for health maintenance advice. Please see specific information pulled into the  AVS if appropriate.   Any new medication's adverse effects have been discussed in detail with patient  Patient was encouraged to keep me informed of any acute changes, lack of improvement, or any new concerning symptoms.    No follow-ups on file.          * Please note that portions of this note were completed with a voice recognition program. Efforts were made to edit the dictation but occasionally words are erroneously transcribed.

## 2021-10-18 ENCOUNTER — TELEPHONE (OUTPATIENT)
Dept: PAIN MEDICINE | Facility: CLINIC | Age: 36
End: 2021-10-18

## 2021-10-18 NOTE — TELEPHONE ENCOUNTER
Spoke with pts  and advised that the ID must be  3 months or less, and must have an appointment for the ID/Drivers license to be renewed. Pts  stated that at this time pt is seeing someone else for pain management, and inquired what he they would need to do if they wanted to come to our practice. I advised that pt would need another referral, that Dr. Lovelace would review and then we would go from there. Pts  understood.

## 2021-10-18 NOTE — TELEPHONE ENCOUNTER
Provider:  DR. TRAN    Caller:  CLAUDIA CISNEROS     Relationship to Patient: SELF    Phone Number: [phone]    Reason for Call:  PATIENT WANTS TO KNOW IF DR. TRAN WILL SEE HER IF HER 'S LICENSE IS  ? PATIENT WOULD LIKE FOR SOMEONE TO CALL HER BACK AND LET HER KNOW.

## 2021-11-01 ENCOUNTER — PRIOR AUTHORIZATION (OUTPATIENT)
Dept: INTERNAL MEDICINE | Facility: CLINIC | Age: 36
End: 2021-11-01

## 2021-11-01 NOTE — TELEPHONE ENCOUNTER
Received request from pharmacy for PA for Breo Inhaler.     PA Submitted today through CoverMyMeds.     Key: [id number]

## 2021-11-03 ENCOUNTER — TELEMEDICINE (OUTPATIENT)
Dept: INTERNAL MEDICINE | Facility: CLINIC | Age: 36
End: 2021-11-03

## 2021-11-03 DIAGNOSIS — R11.0 NAUSEA: ICD-10-CM

## 2021-11-03 DIAGNOSIS — B37.2 YEAST DERMATITIS: Primary | ICD-10-CM

## 2021-11-03 PROCEDURE — 99213 OFFICE O/P EST LOW 20 MIN: CPT | Performed by: NURSE PRACTITIONER

## 2021-11-03 RX ORDER — ONDANSETRON 4 MG/1
4 TABLET, FILM COATED ORAL 4 TIMES DAILY PRN
Qty: 30 TABLET | Refills: 1 | Status: SHIPPED | OUTPATIENT
Start: 2021-11-03 | End: 2022-03-07 | Stop reason: SDUPTHER

## 2021-11-03 RX ORDER — NYSTATIN 100000 [USP'U]/G
POWDER TOPICAL 3 TIMES DAILY
Qty: 60 G | Refills: 1 | Status: SHIPPED | OUTPATIENT
Start: 2021-11-03 | End: 2023-01-11

## 2021-11-03 NOTE — PROGRESS NOTES
You have chosen to receive care through a telehealth visit.  Do you consent to use a video/audio connection for your medical care today? Yes  This was an audio and video enabled telemedicine encounter.  Patient location : home    Subjective   Marguerite Edwards is a 36 y.o. female.     Chief Complaint   Patient presents with   • Rash       Rash  This is a recurrent problem. The current episode started more than 1 month ago. The problem has been waxing and waning since onset. Location: bilateral groins. The rash is characterized by redness. She was exposed to nothing. Pertinent negatives include no anorexia, congestion, cough, eye pain, facial edema, fatigue, fever, nail changes, rhinorrhea, shortness of breath or vomiting. Past treatments include nothing. The treatment provided mild relief. Her past medical history is significant for allergies and asthma.         Having some nausea and decreased appetite.   She took injection for her covid recently and thinks that may be causing it.   She does not think she is constipated as had a bowel movement.     she hate some chili and cereal yesterday but tells me that she is not eating anything at all. This started after she had her Seroquel increased last week as well      The following portions of the patient's history were reviewed and updated as appropriate: allergies, current medications, past family history, past medical history, past social history, past surgical history and problem list.        Review of Systems   Constitutional: Negative for fatigue and fever.   HENT: Negative for congestion and rhinorrhea.    Eyes: Negative for pain.   Respiratory: Negative for cough and shortness of breath.    Gastrointestinal: Negative for anorexia and vomiting.   Skin: Positive for rash. Negative for nail changes.         Outpatient Medications Marked as Taking for the 11/3/21 encounter (Telemedicine) with Liliya Hodges APRN   Medication Sig Dispense Refill   • albuterol  (PROVENTIL) (2.5 MG/3ML) 0.083% nebulizer solution Take 2.5 mg by nebulization 4 (Four) Times a Day As Needed for Wheezing. 125 vial 3   • albuterol sulfate  (90 Base) MCG/ACT inhaler Inhale 2 puffs Every 4 (Four) Hours As Needed for Wheezing. 18 g 6   • Alcohol Swabs (Alcohol Prep) pads 1 pad Daily. 100 each 11   • ARIPiprazole (Abilify) 5 MG tablet Take 7.5 mg by mouth Daily.     • buPROPion XL (WELLBUTRIN XL) 300 MG 24 hr tablet Take  by mouth Every Morning.     • busPIRone (BUSPAR) 10 MG tablet 10 mg 3 (Three) Times a Day.     • cholecalciferol (VITAMIN D3) 25 MCG (1000 UT) tablet Take 1,000 Units by mouth Daily.     • cholestyramine (QUESTRAN) 4 GM/DOSE powder Take 1 packet by mouth 2 (Two) Times a Day. mixed with a liquid 378 g 11   • colestipol (COLESTID) 1 g tablet Take 2 tablets by mouth 2 (Two) Times a Day. 90 tablet 3   • Dexilant 60 MG capsule      • dicyclomine (Bentyl) 10 MG capsule Take 1 capsule by mouth 3 (Three) Times a Day As Needed (pain). Indications: Irritable Bowel Syndrome 120 capsule 3   • escitalopram (LEXAPRO) 20 MG tablet Take 20 mg by mouth Daily.     • fluticasone (Flonase) 50 MCG/ACT nasal spray 2 sprays into the nostril(s) as directed by provider Daily. 16 g 11   • Fluticasone Furoate-Vilanterol (BREO ELLIPTA) 100-25 MCG/INH inhaler Inhale 1 puff Daily. 1 inhalation once a day 1 each 6   • glucose blood test strip Use as instructed 100 each 12   • hydrOXYzine pamoate (Vistaril) 50 MG capsule Take 50 mg by mouth Every 6 (Six) Hours. Every 6 hours  As needed and 2 pills at night     • Lancets Ultra Fine misc 1 each 3 (Three) Times a Day. 100 each 3   • linaclotide (LINZESS) 72 MCG capsule capsule Take 1 capsule by mouth Every Morning Before Breakfast. 30 capsule 0   • loratadine (Claritin) 10 MG tablet Take 1 tablet by mouth Daily. 30 tablet 11   • Melatonin 5 MG capsule Take 5 mg by mouth At Night As Needed (insomnia). 30 each 3   • meloxicam (MOBIC) 15 MG tablet Take 1 tablet  by mouth Daily. 30 tablet 5   • Menthol (Icy Hot) 5 % patch Apply 1 patch topically Daily As Needed (back pain). 30 patch 3   • mirtazapine (REMERON) 45 MG tablet Take 45 mg by mouth Every Night.     • montelukast (Singulair) 10 MG tablet Take 1 tablet by mouth Every Night. 30 tablet 6   • norelgestromin-ethinyl estradiol (ORTHO EVRA) 150-35 MCG/24HR Place 1 patch on the skin as directed by provider Every 7 (Seven) Days for 3 weeks, THEN skip 7 days. 3 patch 9   • nystatin (MYCOSTATIN) 846162 UNIT/GM powder Apply  topically to the appropriate area as directed 3 (Three) Times a Day. 60 g 1   • OLANZapine (zyPREXA) 5 MG tablet      • ondansetron (ZOFRAN) 4 MG tablet Take 1 tablet by mouth 4 (Four) Times a Day As Needed for Nausea or Vomiting. 30 tablet 1   • phenazopyridine (PYRIDIUM) 95 MG tablet Take 1 tablet by mouth Daily As Needed (painful urination). 30 tablet 0   • promethazine (PHENERGAN) 25 MG tablet Take 1 tablet by mouth Every 6 (Six) Hours As Needed for Nausea or Vomiting. 45 tablet 0   • QUEtiapine (SEROquel) 100 MG tablet Take 400 mg by mouth Every Night.     • Sanitary Napkins & Tampons (MAXI PAD OVERNIGHT) pads 1 pad 4 (Four) Times a Day As Needed (menses). 96 each 11   • Sennosides (Senna) 8.6 MG capsule Take 17.2 mg by mouth Daily for 90 days. 90 each 5   • simvastatin (ZOCOR) 10 MG tablet Take 1 tablet by mouth Every Night. 90 tablet 1   • tiZANidine (Zanaflex) 4 MG tablet Take 1 tablet by mouth Every 8 (Eight) Hours As Needed for Muscle Spasms. 90 tablet 2   • Vitamin D, Cholecalciferol, 25 MCG (1000 UT) capsule Take 1 capsule by mouth Daily. 30 capsule 11   • [DISCONTINUED] ondansetron (ZOFRAN) 4 MG tablet Take 1 tablet by mouth 4 (Four) Times a Day As Needed for Nausea or Vomiting. 30 tablet 1     Allergies   Allergen Reactions   • Lamotrigine Other (See Comments)     bruising  bruising   • Paxil [Paroxetine Hcl] Mental Status Change   • Prozac [Fluoxetine Hcl] Mental Status Change   •  Chlorhexidine Gluconate Unknown - Low Severity   • Fluoxetine Unknown - Low Severity   • Lubiprostone Palpitations, Dizziness and Unknown - Low Severity   • Paroxetine Unknown - Low Severity           Objective   Physical Exam   Constitutional: She appears well-developed and well-nourished. No distress.   HENT:   Head: Normocephalic and atraumatic.   Eyes: No scleral icterus.   Neck: Neck normal appearance.  Cardiovascular:   No conjunctival pallor noted.    Pulmonary/Chest: Effort normal.  No respiratory distress.  Abdominal: Abdomen appears normal. She exhibits no distension.   Skin: Skin is dry. Rash noted. She is not diaphoretic. There is erythema.        Psychiatric: She has a normal mood and affect.         There were no vitals filed for this visit.  There is no height or weight on file to calculate BMI.        Assessment/Plan   Diagnoses and all orders for this visit:    1. Yeast dermatitis (Primary)  Comments:  Nystatin topical as directed  Orders:  -     nystatin (MYCOSTATIN) 905468 UNIT/GM powder; Apply  topically to the appropriate area as directed 3 (Three) Times a Day.  Dispense: 60 g; Refill: 1    2. Nausea  -     ondansetron (ZOFRAN) 4 MG tablet; Take 1 tablet by mouth 4 (Four) Times a Day As Needed for Nausea or Vomiting.  Dispense: 30 tablet; Refill: 1    Zofran as needed for nausea.  Follow-up if symptoms do not improve.              Return if symptoms worsen or fail to improve.    I have reviewed the limitations of a telehealth visit (such as lack of a physical exam and unable to obtain vital signs) and advised the patient that they may need to follow up for an office visit should their symptoms or concerns persist, worsen, or change.  Patient was encouraged to keep me informed of any acute changes, lack of improvement, or any new concerning symptoms.   I discussed my findings,recommendations, and plan of care was with the patient. They verbalized understanding and agreement.    Dictated Utilizing  Dragon Dictation   Please note that portions of this note were completed with a voice recognition program.   Part of this note may be an electronic transcription/translation of spoken language to printed text using the Dragon Dictation System.

## 2021-11-08 ENCOUNTER — TELEPHONE (OUTPATIENT)
Dept: NEUROSURGERY | Facility: CLINIC | Age: 36
End: 2021-11-08

## 2021-11-08 NOTE — TELEPHONE ENCOUNTER
Caller: CLAUDIA CISNEROS    Relationship to patient: SELF    Best call back number: [phone]    Patient is needing: PATIENT IS CALLING REQUESTING DOCUMENTS BE SENT TO HER ADDRESS STATING WHAT FOOD IS GOOD FOR HER BACK AND WHAT FOOD TO STAY AWAY FROM. CONFIRMED ADDRESS IN CHART IS CORRECT. SHE WOULD LIKE A CALL BACK ONCE PAPERWORK HAS BEEN MAILED. PLEASE REVIEW AND ADVISE.

## 2021-11-09 NOTE — TELEPHONE ENCOUNTER
Could someone let the patient know what kind of foods to eat or not to eat? This is concerning her back. She is also wanting to know if this could be sent to her mailing address. Please Advise. Thank you.

## 2021-11-09 NOTE — TELEPHONE ENCOUNTER
Would you try to reach this patient and tell her that we do not specifically have that kind of information in our office.  We always recommend a healthy diet for anyone but do not have any specifics for back pain or degenerative disc problems.

## 2021-11-09 NOTE — TELEPHONE ENCOUNTER
Attempted to call patient in regard to her questions. No answer, called back. She wanted something printed out and mailed to her.   She said tomatoes, etc. She wanted us to look something up from the internet and print it out and send it to her.

## 2021-11-15 ENCOUNTER — TELEPHONE (OUTPATIENT)
Dept: NEUROSURGERY | Facility: CLINIC | Age: 36
End: 2021-11-15

## 2021-11-15 NOTE — TELEPHONE ENCOUNTER
Caller: CLAUDIA CISNEROS    Relationship to patient: SELF    Best call back number: [phone]    Chief complaint: BACK ISSUES    Type of visit: F/U     Requested date:   AS SOON AS POSSIBLE.      If rescheduling, when is the original appointment:  PT CALLED AND STATED THAT SHE WOULD LIKE TO SPEAK WITH GODWIN PRADO RELATED TO HER PAIN DR, OPTIONS AND MEDICATION.  PT WAS LAST SEEN IN 04/13/21.  PT STATES SHE WOULD LIKE TO KNOW IF GODWIN PRADO AGREES WITH THE PROCESS OF PAIN MANANGEMENT.    Additional notes:     PLEASE CALL PT   THANK YOU

## 2021-11-18 ENCOUNTER — PRIOR AUTHORIZATION (OUTPATIENT)
Dept: GASTROENTEROLOGY | Facility: CLINIC | Age: 36
End: 2021-11-18

## 2021-11-18 ENCOUNTER — TELEPHONE (OUTPATIENT)
Dept: GASTROENTEROLOGY | Facility: CLINIC | Age: 36
End: 2021-11-18

## 2021-11-18 DIAGNOSIS — K59.00 CONSTIPATION, UNSPECIFIED CONSTIPATION TYPE: ICD-10-CM

## 2021-11-18 DIAGNOSIS — K21.9 GASTROESOPHAGEAL REFLUX DISEASE, UNSPECIFIED WHETHER ESOPHAGITIS PRESENT: Primary | ICD-10-CM

## 2021-11-18 RX ORDER — DEXLANSOPRAZOLE 60 MG/1
60 CAPSULE, DELAYED RELEASE ORAL DAILY
Qty: 90 CAPSULE | Refills: 1 | Status: SHIPPED | OUTPATIENT
Start: 2021-11-18 | End: 2022-09-19 | Stop reason: SDUPTHER

## 2021-11-18 NOTE — TELEPHONE ENCOUNTER
[facility] has denied several times patients Dexilant. She would like to know if there is something else we can prescribe her please advise?

## 2021-11-18 NOTE — TELEPHONE ENCOUNTER
PATIENT CALLED OR AN UPDATE ON SCHEDULING TELE VISIT. INFORMED HER SOMEONE FROM THE OFFICE WILL BE CONTACTING HER TO SCHEDULE.

## 2021-11-29 ENCOUNTER — TELEPHONE (OUTPATIENT)
Dept: INTERNAL MEDICINE | Facility: CLINIC | Age: 36
End: 2021-11-29

## 2021-11-29 ENCOUNTER — TELEPHONE (OUTPATIENT)
Dept: GASTROENTEROLOGY | Facility: CLINIC | Age: 36
End: 2021-11-29

## 2021-11-29 DIAGNOSIS — E78.2 MIXED HYPERLIPIDEMIA: ICD-10-CM

## 2021-11-29 RX ORDER — SIMVASTATIN 10 MG
10 TABLET ORAL NIGHTLY
Qty: 90 TABLET | Refills: 0 | Status: SHIPPED | OUTPATIENT
Start: 2021-11-29 | End: 2022-01-17 | Stop reason: SDUPTHER

## 2021-11-29 NOTE — TELEPHONE ENCOUNTER
Marguerite Goode's  called and stated that patient has been very tired yesterday and today, loose stools, nausea. Appetite is fine & no fever. Stop taking Miralax yesterday due to loose stools. I advised patient to also call PCP. She taking all medications prescribed.

## 2021-11-29 NOTE — TELEPHONE ENCOUNTER
Caller: PARISHDEBBY TRINO    Relationship: Emergency Contact    Best call back number: [phone]    Requested Prescriptions:   Requested Prescriptions     Pending Prescriptions Disp Refills   • simvastatin (ZOCOR) 10 MG tablet 90 tablet 1     Sig: Take 1 tablet by mouth Every Night.        Pharmacy where request should be sent: [place], [place] - [id number] - [phone]  - [phone] FX     Does the patient have less than a 3 day supply:  [] Yes  [x] No    JarredOleg Hernandez Rep   11/29/21 15:49 EST       ADDITIONAL ISSUE: TRINO STATED THE PATIENT'S PRESCRIPTION FOR Fluticasone Furoate-Vilanterol (BREO ELLIPTA) 100-25 MCG/INH inhaler NEEDS A PRIOR AUTHORIZATION.    ADDITIONAL ISSUE: TRINO STATED THE PATIENT HAS STOMACH CRAMPS, DIARRHEA, AND FATIGUE THAT STARTED ON 11/28/21. TRINO STATED THE PATIENT HAS BEEN TAKING TYLENOL AND HAS NOT TAKEN ANY linaclotide (LINZESS) 72 MCG capsule capsule OR MIRALAX TODAY. TRINO WOULD LIKE TO KNOW IF IT WOULD BE OKAY FOR THE PATIENT TO TAKE IMODIUM TO HELP RELIEVE HER DIARRHEA. TRINO WOULD ALSO LIKE TO KNOW IF THE PATIENT CAN USE A HEATING PAD ON HER STOMACH TO HELP WITH THE CRAMPS.

## 2021-11-30 NOTE — TELEPHONE ENCOUNTER
I called patient's  back. Gave her Dr Thomas's recommendation.. Ezekiel stated that patient got up this morning feeling better. He will continue Miralax unless stools becomes watery then he will stop it. The abdominal cramps usually comes from taking the laxatives( Linzess and Miralax).

## 2021-11-30 NOTE — TELEPHONE ENCOUNTER
LVM for the patient to return our call, office number was provided      HUB TO READ: Should be ok to try OTC imodium. If no better should FU

## 2021-12-01 ENCOUNTER — TELEMEDICINE (OUTPATIENT)
Dept: NEUROSURGERY | Facility: CLINIC | Age: 36
End: 2021-12-01

## 2021-12-01 DIAGNOSIS — G89.29 CHRONIC BILATERAL LOW BACK PAIN WITHOUT SCIATICA: Primary | ICD-10-CM

## 2021-12-01 DIAGNOSIS — M54.50 CHRONIC BILATERAL LOW BACK PAIN WITHOUT SCIATICA: Primary | ICD-10-CM

## 2021-12-01 PROCEDURE — 99213 OFFICE O/P EST LOW 20 MIN: CPT | Performed by: PHYSICIAN ASSISTANT

## 2021-12-01 RX ORDER — METAXALONE 800 MG/1
800 TABLET ORAL 3 TIMES DAILY PRN
Qty: 60 TABLET | Refills: 0 | Status: SHIPPED | OUTPATIENT
Start: 2021-12-01 | End: 2022-09-26

## 2021-12-01 RX ORDER — DICLOFENAC SODIUM 75 MG/1
75 TABLET, DELAYED RELEASE ORAL 2 TIMES DAILY
Qty: 60 TABLET | Refills: 0 | Status: SHIPPED | OUTPATIENT
Start: 2021-12-01 | End: 2022-09-26
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
## 2021-12-09 ENCOUNTER — TELEPHONE (OUTPATIENT)
Dept: NEUROSURGERY | Facility: CLINIC | Age: 36
End: 2021-12-09

## 2021-12-09 NOTE — TELEPHONE ENCOUNTER
PATIENT WAS TOLD BY THE PHARMACY THAT THE METAXALONE 800MG NEEDS PRIOR AUTH.  PLEASE ADVISE    [phone]

## 2021-12-10 NOTE — TELEPHONE ENCOUNTER
DOCUMENTATION ONLY    PA submitted for Skelaxin.  Patient will be notified when we receive a response.

## 2021-12-16 ENCOUNTER — TELEPHONE (OUTPATIENT)
Dept: INTERNAL MEDICINE | Facility: CLINIC | Age: 36
End: 2021-12-16

## 2021-12-16 DIAGNOSIS — J45.20 MILD INTERMITTENT ASTHMA WITHOUT COMPLICATION: ICD-10-CM

## 2021-12-16 NOTE — TELEPHONE ENCOUNTER
PT CALLED AND IS CHECKING THE STATUS OF HER MEDICATION-PT STATES THAT THE PHARMACY NEEDED THE PRE-AUTH-PT STATES SHE HAS NOT HEARD ANYTHING BACK FROM ANYONE-PLEASE ADVISE THANK YOU!

## 2021-12-16 NOTE — TELEPHONE ENCOUNTER
We have rec'd a couple of faxes from [employer] that did not come through.    I have faxed the request again and will let her know when I receive a response.

## 2021-12-17 NOTE — TELEPHONE ENCOUNTER
PT CALLED TO CHECK STATUS ON PA.  INFORMED WAITING ON RESPONSE FROM INSURANCE,    PLEASE CALL PT WHEN RESPONSE COME IN.

## 2021-12-17 NOTE — TELEPHONE ENCOUNTER
"Fax was resent and marked \"urgent' for the 3rd time.  I have asked them to please respond to this as it was submitted on the 10 th.  "

## 2021-12-17 NOTE — TELEPHONE ENCOUNTER
Yadira with [facility] called regarding the prior authorization that was faxed through to her.   Yadira stated in their system, she is showing the patient has not been active since 09/30/2021 with their insurance. Yadira stated there was nothing she could process with this showing inactive, and the patient would have to call and see what was going on. Yadira also stated when this fax was received, there were two additional patients attached, which can not be  in their system.      I called and talked to the patient. Patient stated she has not had that insurance in a while, and has had [place] Medicaid for about 3 months now. Patient requested the Prior Authorization be sent through the correct insurance. Patient was advised I would submit this though today, but the insurance company legally has up to 48 hours to approve or deny the claim. Patient verbalized understanding, and was thankful for the call back.

## 2021-12-17 NOTE — TELEPHONE ENCOUNTER
New Prior Authorization was sent through again. It appears that all information was sent through to the correct information we currently have on file, that patient confirmed in the previous telephone encounter.     I will check the status of this on Monday, and call the patient back. She may need to contact her insurance company, and determine the issue with them not showing coverage.

## 2021-12-20 NOTE — TELEPHONE ENCOUNTER
"Received letter from Patients insurance company. Letter states, \"Our records indicate that you are not eligible for pharmacy benefits at this time. If this is incorrect, please contact your plan at the Member Services telephone number listed on the back your card.\"    Attempted to call the patient. LVM for patient to return my call.   "

## 2022-01-03 DIAGNOSIS — J45.20 MILD INTERMITTENT ASTHMA WITHOUT COMPLICATION: ICD-10-CM

## 2022-01-03 NOTE — TELEPHONE ENCOUNTER
Caller: PARISHDEBBYTRINO    Relationship: Emergency Contact    Best call back number: [phone]     Requested Prescriptions:   Requested Prescriptions     Pending Prescriptions Disp Refills   • Fluticasone Furoate-Vilanterol (BREO ELLIPTA) 100-25 MCG/INH inhaler 1 each 6     Sig: Inhale 1 puff Daily. 1 inhalation once a day        Pharmacy where request should be sent: [id number] [address] [place] [address] [address] [facility] [address]  [phone] [facility] [phone]      Additional details provided by patient: PATIENT IS OUT OF MEDICATION    Does the patient have less than a 3 day supply:  [x] Yes  [] No    Oleg Villavicencio Rep   01/03/22 12:19 EST     PATIENT WAS DIAGNOSED WITH E COLI IN HER BLADDER BY DR SANCHEZ HER UROLOGIST.  PATIENT WAS PRESCRIBED BACTRIM.

## 2022-01-03 NOTE — TELEPHONE ENCOUNTER
Rx Refill Note  Requested Prescriptions     Pending Prescriptions Disp Refills   • Fluticasone Furoate-Vilanterol (BREO ELLIPTA) 100-25 MCG/INH inhaler 1 each 6     Sig: Inhale 1 puff Daily. 1 inhalation once a day      Last office visit with prescribing clinician: 6/8/2021      Next office visit with prescribing clinician: Visit date not found     Megan Irving MA  01/03/22, 14:10 EST

## 2022-01-11 ENCOUNTER — PRIOR AUTHORIZATION (OUTPATIENT)
Dept: NEUROSURGERY | Facility: CLINIC | Age: 37
End: 2022-01-11

## 2022-01-11 ENCOUNTER — TELEPHONE (OUTPATIENT)
Dept: NEUROSURGERY | Facility: CLINIC | Age: 37
End: 2022-01-11

## 2022-01-11 NOTE — TELEPHONE ENCOUNTER
Caller: CLAUDIA CISNEROS     Relationship to patient: SELF    Best call back number: [phone]    Patient is needing: PATIENT IS CALLING STATING SHE SW HER UROLOGIST TODAY AND HER UROLOGIST ADVISED THAT ERIC PRADO UP HER TIZANIDINE TO TWO TABLETS THREE TIMES A DAY. PLEASE REVIEW.

## 2022-01-11 NOTE — TELEPHONE ENCOUNTER
Prior authorization for patients Metaxalone 800mg submitted to insurance plan, which looks like it had been update through the pharmacy. Should have a response with in 48 hours.

## 2022-01-12 NOTE — TELEPHONE ENCOUNTER
Caller: Marguerite Edwards    Relationship: Self    Best call back number: [phone] IF NO ANSWER PLEASE CALL HER HUSBANDS NUMBER @ [phone]    What was the call regarding: PT RETURNED CALL AND STATES THAT SHE SEES KASEY SANCHEZ AT .  OVN 1/11/22 IS LOCATED IN CARE EVERYWHERE.     Do you require a callback:     PLEASE CALL PT WITH MEDICATION INFO.  PT STATES THIS SHOULD BE OBTAINED ASAP BECAUSE SHE IS SUPPOSED TO START TAKING THE UPDATED DOSAGE AND SHE STATES SHE HAS LESS THAN A 3 DAY SUPPLY.  PT STATES [facility] IS HER PREFERRED PHARMACY PHONE #[phone]    THANK YOU

## 2022-01-12 NOTE — TELEPHONE ENCOUNTER
PT CALLED BACK AND WOULD LIKE TO SPEAK WITH GODWIN PRADO PA-C.    RE: TIZANADINE 2TAB THREE TABS A DAY.    RECORDS/NOTES WERE SENT YESTERDAY 1/11/2021    PT GETTING UPSET AND ASKING FOR APPT WITH SERGE PRADO TODAY    WARM TRANSFERRED PT TO [facility].

## 2022-01-12 NOTE — TELEPHONE ENCOUNTER
Could you please call this patient for me and explained to her that for some reason the urologist at the [facility] thinks that I am her pain doctor, we unfortunately are not pain doctors.  Please tell Marguerite that I have been in contact with her primary care provider, Liliay, Liliya will take over her tizanidine since she needs that for bladder spasms and that is something that I do not treat.  Thank you so much.

## 2022-01-12 NOTE — TELEPHONE ENCOUNTER
Pt called today repeating previous message.      Pt requesting increase on tizanidine to 2 tabs TID.      Called Dr. Deshpande's office (urologist on file) and they stated pt has not been seen since April 2021.     Left msg for Marguerite to call me back with updated information about who her urologist is so that we can obtain records.

## 2022-01-14 NOTE — TELEPHONE ENCOUNTER
PATIENT CALLED TO ASK WHETHER JOHNNY PRADO HAD SEEN HER MESSAGE. I ADVISED JOHNNY IS UNABLE TO PROVIDE TIZANADINE, AND ATTEMPTED TO EXPLAIN THAT JOHNNY HAS SPOKEN WITH PCP, WHO WILL TAKE CARE OF THIS RX. PATIENT VOICED UNDERSTANDING THAT OUR OFFICE WILL NOT BE ABLE TO PROVIDE RX, BUT DISMISSED THE IDEA THAT HER PCP WOULD AND ABRUPTLY DISCONNECTED THE CALL.

## 2022-01-14 NOTE — TELEPHONE ENCOUNTER
Informed the patient's  that her PA was submitted on DEC 17th and I have been fighting with the patient's insurance to try and get this approved. He stated that he will try to call the insurance company to see what the hold up is and let us know what he finds out.

## 2022-01-17 ENCOUNTER — TELEMEDICINE (OUTPATIENT)
Dept: INTERNAL MEDICINE | Facility: CLINIC | Age: 37
End: 2022-01-17

## 2022-01-17 VITALS — HEART RATE: 93 BPM | DIASTOLIC BLOOD PRESSURE: 71 MMHG | SYSTOLIC BLOOD PRESSURE: 109 MMHG | TEMPERATURE: 97.7 F

## 2022-01-17 DIAGNOSIS — E55.9 VITAMIN D DEFICIENCY: ICD-10-CM

## 2022-01-17 DIAGNOSIS — E78.2 MIXED HYPERLIPIDEMIA: Primary | ICD-10-CM

## 2022-01-17 DIAGNOSIS — F20.9 SCHIZOPHRENIA, UNSPECIFIED TYPE: Chronic | ICD-10-CM

## 2022-01-17 DIAGNOSIS — N32.89 BLADDER SPASMS: ICD-10-CM

## 2022-01-17 PROCEDURE — 99214 OFFICE O/P EST MOD 30 MIN: CPT | Performed by: NURSE PRACTITIONER

## 2022-01-17 RX ORDER — SIMVASTATIN 10 MG
10 TABLET ORAL NIGHTLY
Qty: 90 TABLET | Refills: 0 | Status: SHIPPED | OUTPATIENT
Start: 2022-01-17 | End: 2022-03-07 | Stop reason: SDUPTHER

## 2022-01-17 RX ORDER — TIZANIDINE 4 MG/1
TABLET ORAL
Qty: 180 TABLET | Refills: 3 | Status: SHIPPED | OUTPATIENT
Start: 2022-01-17 | End: 2022-09-26 | Stop reason: SDUPTHER

## 2022-01-17 RX ORDER — MELATONIN
1000 DAILY
Qty: 30 TABLET | Refills: 11 | Status: SHIPPED | OUTPATIENT
Start: 2022-01-17 | End: 2022-03-07 | Stop reason: SDUPTHER

## 2022-01-17 RX ORDER — MULTIVITAMIN
1 CAPSULE ORAL DAILY
Qty: 30 CAPSULE | Refills: 11 | Status: SHIPPED | OUTPATIENT
Start: 2022-01-17 | End: 2022-03-07 | Stop reason: SDUPTHER

## 2022-01-17 NOTE — PROGRESS NOTES
You have chosen to receive care through a telehealth visit.  Do you consent to use a video/audio connection for your medical care today? Yes  This was an audio and video enabled telemedicine encounter.  Patient location : home    Subjective   Marguerite Edwards is a 36 y.o. female.     Chief Complaint   Patient presents with   • Memory Loss   • Hyperlipidemia   • Bladder spasm     Wants me to start prescribing tizanidine     History of Present Illness   Marguerite is being seen today via video visit and is accompanied by her  for the duration of the visit.  Has been provides a great deal of her history as she is disabled (schizophrenia)   Marguerite is concerned about memory.  She has a family history of dementia and now she wants to have a test for dementia.    Marguerite has been having trouble with memory for her entire life.   her  reports she has always had trouble with memory but it is worse when she is aggravated/irritated.      says she had a mental health breakdown last week and he thought he was going to have to send her to the hospital. Would not take medications or eat for a day,. She saw her counselor that day and symptoms are better now.  reports because of this was that she expected howsimple money but is was late arriving.   She does work closely with psychiatry to manage her schizophrenia.    Marguerite reports that her bladder is causing issues for her. She see's [facility].  They started her on oxybutynin and recommended that she use tizanidine 4-8 mg up to TID for her bladder spasms and have directed to get it from her pain mgmt provider.  Apparently there was some confusion that GUILHERME Bacon was her pain management provider and should be providing this to her.  Ivon is unable to provide this to her as she does not see her routinely for pain management.     She saw her allergist a couple of days ago who stopped her Breo and started her on nasal sprays.     She has hyperlipidemia.  Her last  lipids were checked February 2021.  Her LDL was okay at 91 but triglycerides slightly elevated at 188.  Last liver function tests looked okay in February 2021 as well.  She is on statin.  Compliant with dosing and denies any adverse effects.  Diet is relatively healthy.  She reports she has been eating healthier and drinking nothing but water recently.  She tells me she has lost about 5 pounds  Lab Results   Component Value Date    CHOL 171 02/26/2021    CHLPL 207 (H) 03/13/2018    TRIG 188 (H) 02/26/2021    HDL 48 02/26/2021    LDL 91 02/26/2021     Lab Results   Component Value Date    GLUCOSE 90 02/26/2021    BUN 11 02/26/2021    CREATININE 0.79 02/26/2021    EGFRIFNONA 82 02/26/2021    EGFRIFAFRI 99 05/25/2018    BCR 13.9 02/26/2021    K 4.6 02/26/2021    CO2 22.9 02/26/2021    CALCIUM 9.4 02/26/2021    PROTENTOTREF 7.3 05/25/2018    ALBUMIN 4.10 02/26/2021    LABIL2 1.6 05/25/2018    AST 11 02/26/2021    ALT 7 02/26/2021         The following portions of the patient's history were reviewed and updated as appropriate: allergies, current medications, past family history, past medical history, past social history, past surgical history and problem list.        Review of Systems   Constitutional: Negative for fatigue, fever and unexpected weight loss.   HENT: Positive for postnasal drip. Negative for congestion, rhinorrhea and sinus pressure.    Eyes: Negative for blurred vision, double vision, pain and visual disturbance.   Respiratory: Negative for cough, chest tightness, shortness of breath and wheezing.    Cardiovascular: Negative for chest pain, palpitations and leg swelling.   Gastrointestinal: Positive for constipation, diarrhea and GERD. Negative for abdominal pain, nausea and vomiting.   Genitourinary: Positive for dysuria (chronic) and urinary incontinence. Negative for difficulty urinating, flank pain, frequency, pelvic pressure and urgency.        Bladder spasms/pain   Musculoskeletal: Positive for  arthralgias and back pain. Negative for myalgias.   Skin: Negative for color change and rash.   Neurological: Negative for dizziness, weakness, light-headedness, headache and confusion.   Hematological: Negative for adenopathy. Does not bruise/bleed easily.   Psychiatric/Behavioral: Positive for decreased concentration, sleep disturbance and stress. Negative for suicidal ideas. The patient is nervous/anxious.            Outpatient Medications Marked as Taking for the 1/17/22 encounter (Telemedicine) with Liliya Hodges APRN   Medication Sig Dispense Refill   • albuterol (PROVENTIL) (2.5 MG/3ML) 0.083% nebulizer solution Take 2.5 mg by nebulization 4 (Four) Times a Day As Needed for Wheezing. 125 vial 3   • albuterol sulfate  (90 Base) MCG/ACT inhaler Inhale 2 puffs Every 4 (Four) Hours As Needed for Wheezing. 18 g 6   • Alcohol Swabs (Alcohol Prep) pads 1 pad Daily. 100 each 11   • buPROPion XL (WELLBUTRIN XL) 300 MG 24 hr tablet Take  by mouth Every Morning.     • busPIRone (BUSPAR) 10 MG tablet 10 mg 3 (Three) Times a Day.     • cholecalciferol (VITAMIN D3) 25 MCG (1000 UT) tablet Take 1 tablet by mouth Daily. 30 tablet 11   • cholestyramine (QUESTRAN) 4 GM/DOSE powder Take 1 packet by mouth 2 (Two) Times a Day. mixed with a liquid 378 g 11   • colestipol (COLESTID) 1 g tablet Take 2 tablets by mouth 2 (Two) Times a Day. 90 tablet 3   • Dexilant 60 MG capsule Take 1 capsule by mouth Daily. 90 capsule 1   • diclofenac (VOLTAREN) 75 MG EC tablet Take 1 tablet by mouth 2 (Two) Times a Day. 60 tablet 0   • dicyclomine (Bentyl) 10 MG capsule Take 1 capsule by mouth 3 (Three) Times a Day As Needed (pain). Indications: Irritable Bowel Syndrome 120 capsule 3   • escitalopram (LEXAPRO) 20 MG tablet Take 20 mg by mouth Daily.     • fluticasone (Flonase) 50 MCG/ACT nasal spray 2 sprays into the nostril(s) as directed by provider Daily. 16 g 11   • glucose blood test strip Use as instructed 100 each 12   •  hydrOXYzine pamoate (Vistaril) 50 MG capsule Take 50 mg by mouth Every 6 (Six) Hours. Every 6 hours  As needed and 2 pills at night     • Lancets Ultra Fine misc 1 each 3 (Three) Times a Day. 100 each 3   • linaclotide (LINZESS) 72 MCG capsule capsule Take 1 capsule by mouth Every Morning Before Breakfast. 90 capsule 0   • loratadine (Claritin) 10 MG tablet Take 1 tablet by mouth Daily. 30 tablet 11   • Menthol (Icy Hot) 5 % patch Apply 1 patch topically Daily As Needed (back pain). 30 patch 3   • metaxalone (Skelaxin) 800 MG tablet Take 1 tablet by mouth 3 (Three) Times a Day As Needed for Muscle Spasms. 60 tablet 0   • mirtazapine (REMERON) 45 MG tablet Take 45 mg by mouth Every Night.     • montelukast (Singulair) 10 MG tablet Take 1 tablet by mouth Every Night. 30 tablet 6   • norelgestromin-ethinyl estradiol (ORTHO EVRA) 150-35 MCG/24HR Place 1 patch on the skin as directed by provider Every 7 (Seven) Days for 3 weeks, THEN skip 7 days. 3 patch 9   • nystatin (MYCOSTATIN) 877264 UNIT/GM powder Apply  topically to the appropriate area as directed 3 (Three) Times a Day. 60 g 1   • ondansetron (ZOFRAN) 4 MG tablet Take 1 tablet by mouth 4 (Four) Times a Day As Needed for Nausea or Vomiting. 30 tablet 1   • Sanitary Napkins & Tampons (MAXI PAD OVERNIGHT) pads 1 pad 4 (Four) Times a Day As Needed (menses). 96 each 11   • simvastatin (ZOCOR) 10 MG tablet Take 1 tablet by mouth Every Night. 90 tablet 0   • tiZANidine (Zanaflex) 4 MG tablet 1-2 pills every 8 hours as needed for muscle or bladder spasms. 180 tablet 3   • [DISCONTINUED] cholecalciferol (VITAMIN D3) 25 MCG (1000 UT) tablet Take 1,000 Units by mouth Daily.     • [DISCONTINUED] simvastatin (ZOCOR) 10 MG tablet Take 1 tablet by mouth Every Night. 90 tablet 0   • [DISCONTINUED] tiZANidine (Zanaflex) 4 MG tablet Take 1 tablet by mouth Every 8 (Eight) Hours As Needed for Muscle Spasms. (Patient taking differently: Take 8 mg by mouth Every 8 (Eight) Hours As  Needed for Muscle Spasms.) 90 tablet 2     Allergies   Allergen Reactions   • Lamotrigine Other (See Comments)     bruising  bruising   • Paxil [Paroxetine Hcl] Mental Status Change   • Prozac [Fluoxetine Hcl] Mental Status Change   • Chlorhexidine Gluconate Unknown - Low Severity   • Fluoxetine Unknown - Low Severity   • Lubiprostone Palpitations, Dizziness and Unknown - Low Severity   • Paroxetine Unknown - Low Severity           Objective   Physical Exam   Constitutional: She appears well-developed and well-nourished. She is cooperative. She does not have a sickly appearance. She does not appear ill. No distress. She appears obese.  HENT:   Head: Normocephalic and atraumatic.   Right Ear: External ear normal.   Left Ear: External ear normal.   Mouth/Throat: Oropharynx is clear and moist.   Eyes: Right eye exhibits no discharge. Left eye exhibits no discharge. No scleral icterus.   Neck: Neck normal appearance.  Cardiovascular:   No conjunctival pallor noted.    Pulmonary/Chest: Effort normal.  No respiratory distress.  Abdominal: Abdomen appears normal.   Neurological: She is alert.   Skin: Skin is dry. She is not diaphoretic. No nail bed cyanosis or erythema. No pallor.   Psychiatric: Her behavior is normal. She mood appears anxious. Her affect is not angry and not flattened. Her speech is tangential. Her speech is not soft in volume. Thought content is not paranoid. She expresses impulsivity. She does not exhibit a depressed mood. She expresses no homicidal and no suicidal ideation. She is communicative. She exhibits loose associations. She is attentive.         Vitals:    01/17/22 1631   BP: 109/71   Pulse: 93   Temp: 97.7 °F (36.5 °C)     There is no height or weight on file to calculate BMI.        Assessment/Plan   Diagnoses and all orders for this visit:    1. Mixed hyperlipidemia (Primary)  -     simvastatin (ZOCOR) 10 MG tablet; Take 1 tablet by mouth Every Night.  Dispense: 90 tablet; Refill: 0  Last  lipids were stable.  Continue statin.  We will need to get her in in the next couple of months to recheck her lipids.  2. Bladder spasms  -     tiZANidine (Zanaflex) 4 MG tablet; 1-2 pills every 8 hours as needed for muscle or bladder spasms.  Dispense: 180 tablet; Refill: 3  Agree with increasing tizanidine for bladder spasms.  We can use 1 to 2 pills every 8 hours as needed.  Adverse effects discussed  3. Vitamin D deficiency  -     cholecalciferol (VITAMIN D3) 25 MCG (1000 UT) tablet; Take 1 tablet by mouth Daily.  Dispense: 30 tablet; Refill: 11  Continue vitamin D supplement.  When we get her in the office next time for labs we can recheck her vitamin D.  4. Schizophrenia, unspecified type (HCC)  Schizophrenia symptoms seem to be fluctuating but appears stable today during visit.  Encouraged her to follow closely with her psychiatrist for medication management.  I suspect that some of her complaints of memory issues are related to her schizophrenia as well as her psychiatric medications and have discussed this with her and her  at length.    Other orders  -     Multiple Vitamin (multivitamin) capsule; Take 1 capsule by mouth Daily.  Dispense: 30 capsule; Refill: 11  Okay to start multivitamin daily                Return in about 3 months (around 4/17/2022) for chronic condition follow up.    I have reviewed the limitations of a telehealth visit (such as lack of a physical exam and unable to obtain vital signs) and advised the patient that they may need to follow up for an office visit should their symptoms or concerns persist, worsen, or change.  Patient was encouraged to keep me informed of any acute changes, lack of improvement, or any new concerning symptoms.   I discussed my findings,recommendations, and plan of care was with the patient. They verbalized understanding and agreement.    Dictated Utilizing Dragon Dictation   Please note that portions of this note were completed with a voice recognition  program.   Part of this note may be an electronic transcription/translation of spoken language to printed text using the Dragon Dictation System.

## 2022-01-25 NOTE — TELEPHONE ENCOUNTER
[place] on [place] ABDALLA to let her know I was returning her call   Outreach attempt was made to schedule a Medicare Wellness Visit. This was the first attempt. Contact was not made, no answer/busy.     Letter Sent

## 2022-01-27 NOTE — TELEPHONE ENCOUNTER
Provider:  Jcarlos  Caller: Fax from [facility]       Surgery:  sarbjit  Surgery Date:  na  Last visit:   1-5-21  Next visit: sarbjit WILSON:         Reason for call: Pharmacy is requesting a refill for Voltaren 75 mg. Please Advise. Thank you.      Requested Prescriptions     Pending Prescriptions Disp Refills   • diclofenac (VOLTAREN) 75 MG EC tablet 60 tablet 0     Sig: Take 1 tablet by mouth 2 (Two) Times a Day.

## 2022-01-28 RX ORDER — DICLOFENAC SODIUM 75 MG/1
75 TABLET, DELAYED RELEASE ORAL 2 TIMES DAILY
Qty: 60 TABLET | Refills: 0 | OUTPATIENT
Start: 2022-01-28

## 2022-01-28 NOTE — TELEPHONE ENCOUNTER
I have been in contact with her PCP who [place] be caring for patient in that she in not a surgical candidate.

## 2022-01-28 NOTE — TELEPHONE ENCOUNTER
I have called the patient to let her know that Vanessa will no longer be taking care of her medicine's and that she has been in contact with her PCP letting her know that she is not a surgical candidate. The patient got upset and ask why not and as I was explaining to her she said I will let my  deal with you. I said ok and when he got on the phone he said that he had things going on and did not have the time to play games. I said ok and was going to explain to him, but he did not give me the chance. He ask who I was and what office, I told him what office and was about to tell him my name and he then said as he handed the phone back to his wife said I don't know who this is or there name and I don't have time to play games. The phone called ended at that time. I have told Vanessa what they said and how they acted. Thank you.

## 2022-02-08 ENCOUNTER — TELEPHONE (OUTPATIENT)
Dept: GASTROENTEROLOGY | Facility: CLINIC | Age: 37
End: 2022-02-08

## 2022-02-08 NOTE — TELEPHONE ENCOUNTER
Patient called she is having watery diarrhea, cramping, she has on linzess and stopped it last Thursday due to diarrhea. Patient states shes not improving is having bowel movements 3-4 times a day. Please advise?

## 2022-02-09 ENCOUNTER — TELEMEDICINE (OUTPATIENT)
Dept: INTERNAL MEDICINE | Facility: CLINIC | Age: 37
End: 2022-02-09

## 2022-02-09 VITALS — WEIGHT: 209 LBS | HEIGHT: 63 IN | BODY MASS INDEX: 37.03 KG/M2

## 2022-02-09 DIAGNOSIS — K59.00 CONSTIPATION, UNSPECIFIED CONSTIPATION TYPE: Primary | ICD-10-CM

## 2022-02-09 DIAGNOSIS — R45.86 MOOD CHANGES: Primary | ICD-10-CM

## 2022-02-09 DIAGNOSIS — Z31.9 PATIENT DESIRES PREGNANCY: ICD-10-CM

## 2022-02-09 DIAGNOSIS — F20.9 SCHIZOPHRENIA, UNSPECIFIED TYPE: ICD-10-CM

## 2022-02-09 DIAGNOSIS — F41.9 ANXIETY: ICD-10-CM

## 2022-02-09 PROCEDURE — 99213 OFFICE O/P EST LOW 20 MIN: CPT | Performed by: NURSE PRACTITIONER

## 2022-02-09 NOTE — PROGRESS NOTES
"You have chosen to receive care through a telehealth visit.  Do you consent to use a video/audio connection for your medical care today? Yes  This was an audio and video enabled telemedicine encounter.  Patient location : home     Subjective   Marguerite Edwards is a 36 y.o. female.     Chief Complaint   Patient presents with   • discuss pregnancy planning       History of Present Illness      present for encounter with Marguerite's permission.  She was seen in ER at  on 2-3-22 for worsening anxiety in setting of history of multiple suicide attempts (2006-ingestion of 1 bottle of Tylenol, 2009-1 bottle of Ibuprofen, 2016- knifes). She denies any current desire or thoughts of harming herself or others. She does have hx of schizophrenia, anxiety, and depression. She reports she feels great today and desires pregnancy-she wants to talk about that today. ER records reflect that Anxiety was worsened during discussion with her  about him transitioning to woman. Marguerite does not want to talk about this today.     She does follow with: [facility] in [place] (Nadia-counselor; Alma Bunch-Psychiatrist; Maryjane-). She reports she saw them today and they are going to start weaning her medications in preparation for possible pregnancy.    she does follow with GYN and plans on speaking with them soon.    She reports she has been very interested in sexual encounters with her  over the last few days and prior to that had been about 3 years since they were intimate. She states \" I just can't get enough of him...[email or web address]'t he sooo sexy.\"      The following portions of the patient's history were reviewed and updated as appropriate: allergies, current medications, past family history, past medical history, past social history, past surgical history and problem list.        Review of Systems   Constitutional: Negative for fatigue, fever and unexpected weight loss.   HENT: Negative for " congestion, postnasal drip, rhinorrhea and sinus pressure.    Eyes: Negative for blurred vision, double vision, pain and visual disturbance.   Respiratory: Negative for cough, chest tightness, shortness of breath and wheezing.    Cardiovascular: Negative for chest pain, palpitations and leg swelling.   Gastrointestinal: Positive for constipation, diarrhea and GERD. Negative for abdominal pain, nausea and vomiting.   Genitourinary: Positive for dysuria (chronic) and urinary incontinence. Negative for difficulty urinating, flank pain, frequency, pelvic pressure and urgency.        Bladder spasms/pain   Musculoskeletal: Positive for arthralgias and back pain. Negative for myalgias.   Skin: Negative for color change and rash.   Neurological: Negative for dizziness, weakness, light-headedness, headache and confusion.   Hematological: Negative for adenopathy. Does not bruise/bleed easily.   Psychiatric/Behavioral: Negative for agitation, behavioral problems, decreased concentration, dysphoric mood, hallucinations, self-injury, sleep disturbance, suicidal ideas, negative for hyperactivity, depressed mood and stress. The patient is not nervous/anxious.            Outpatient Medications Marked as Taking for the 2/9/22 encounter (Telemedicine) with Liliya Hodges APRN   Medication Sig Dispense Refill   • albuterol (PROVENTIL) (2.5 MG/3ML) 0.083% nebulizer solution Take 2.5 mg by nebulization 4 (Four) Times a Day As Needed for Wheezing. 125 vial 3   • albuterol sulfate  (90 Base) MCG/ACT inhaler Inhale 2 puffs Every 4 (Four) Hours As Needed for Wheezing. 18 g 6   • Alcohol Swabs (Alcohol Prep) pads 1 pad Daily. 100 each 11   • buPROPion XL (WELLBUTRIN XL) 300 MG 24 hr tablet Take  by mouth Every Morning.     • busPIRone (BUSPAR) 10 MG tablet 10 mg 3 (Three) Times a Day.     • cholecalciferol (VITAMIN D3) 25 MCG (1000 UT) tablet Take 1 tablet by mouth Daily. 30 tablet 11   • cholestyramine (QUESTRAN) 4 GM/DOSE powder  Take 1 packet by mouth 2 (Two) Times a Day. mixed with a liquid 378 g 11   • colestipol (COLESTID) 1 g tablet Take 2 tablets by mouth 2 (Two) Times a Day. 90 tablet 3   • Dexilant 60 MG capsule Take 1 capsule by mouth Daily. 90 capsule 1   • diclofenac (VOLTAREN) 75 MG EC tablet Take 1 tablet by mouth 2 (Two) Times a Day. 60 tablet 0   • dicyclomine (Bentyl) 10 MG capsule Take 1 capsule by mouth 3 (Three) Times a Day As Needed (pain). Indications: Irritable Bowel Syndrome 120 capsule 3   • escitalopram (LEXAPRO) 20 MG tablet Take 20 mg by mouth Daily.     • fluticasone (Flonase) 50 MCG/ACT nasal spray 2 sprays into the nostril(s) as directed by provider Daily. 16 g 11   • glucose blood test strip Use as instructed 100 each 12   • hydrOXYzine pamoate (Vistaril) 50 MG capsule Take 50 mg by mouth Every 6 (Six) Hours. Every 6 hours  As needed and 2 pills at night     • Lancets Ultra Fine misc 1 each 3 (Three) Times a Day. 100 each 3   • loratadine (Claritin) 10 MG tablet Take 1 tablet by mouth Daily. 30 tablet 11   • Melatonin 5 MG capsule Take 5 mg by mouth At Night As Needed (insomnia). 30 each 3   • Menthol (Icy Hot) 5 % patch Apply 1 patch topically Daily As Needed (back pain). 30 patch 3   • metaxalone (Skelaxin) 800 MG tablet Take 1 tablet by mouth 3 (Three) Times a Day As Needed for Muscle Spasms. 60 tablet 0   • mirtazapine (REMERON) 45 MG tablet Take 45 mg by mouth Every Night.     • montelukast (Singulair) 10 MG tablet Take 1 tablet by mouth Every Night. 30 tablet 6   • Multiple Vitamin (multivitamin) capsule Take 1 capsule by mouth Daily. 30 capsule 11   • norelgestromin-ethinyl estradiol (ORTHO EVRA) 150-35 MCG/24HR Place 1 patch on the skin as directed by provider Every 7 (Seven) Days for 3 weeks, THEN skip 7 days. 3 patch 9   • nystatin (MYCOSTATIN) 183733 UNIT/GM powder Apply  topically to the appropriate area as directed 3 (Three) Times a Day. 60 g 1   • ondansetron (ZOFRAN) 4 MG tablet Take 1 tablet by  mouth 4 (Four) Times a Day As Needed for Nausea or Vomiting. 30 tablet 1   • promethazine (PHENERGAN) 25 MG tablet Take 1 tablet by mouth Every 6 (Six) Hours As Needed for Nausea or Vomiting. 45 tablet 0   • QUEtiapine (SEROquel) 100 MG tablet Take 400 mg by mouth Every Night.     • Sanitary Napkins & Tampons (MAXI PAD OVERNIGHT) pads 1 pad 4 (Four) Times a Day As Needed (menses). 96 each 11   • simvastatin (ZOCOR) 10 MG tablet Take 1 tablet by mouth Every Night. 90 tablet 0   • tiZANidine (Zanaflex) 4 MG tablet 1-2 pills every 8 hours as needed for muscle or bladder spasms. 180 tablet 3   • [DISCONTINUED] linaclotide (LINZESS) 72 MCG capsule capsule Take 1 capsule by mouth Every Morning Before Breakfast. 90 capsule 0     Allergies   Allergen Reactions   • Lamotrigine Other (See Comments)     bruising  bruising   • Paxil [Paroxetine Hcl] Mental Status Change   • Prozac [Fluoxetine Hcl] Mental Status Change   • Chlorhexidine Gluconate Unknown - Low Severity   • Fluoxetine Unknown - Low Severity   • Lubiprostone Palpitations, Dizziness and Unknown - Low Severity   • Paroxetine Unknown - Low Severity           Objective   Physical Exam   Constitutional: She is oriented to person, place, and time. She appears well-developed and well-nourished. No distress.   HENT:   Head: Normocephalic and atraumatic.   Right Ear: External ear normal.   Left Ear: External ear normal.   Mouth/Throat: Oropharynx is clear and moist.   Eyes: Right eye exhibits no discharge. Left eye exhibits no discharge. No scleral icterus.   Neck: Neck normal appearance.  Cardiovascular: Normal pulse (patient directed exam).      Pulmonary/Chest: Effort normal. No accessory muscle usage.  No respiratory distress.No use of oxygen by nasal cannula  Abdominal: Abdomen appears normal.   Neurological: She is alert and oriented to person, place, and time.   Skin: Skin is dry. She is not diaphoretic. No erythema. No pallor.   Psychiatric: Her behavior is normal.  "She mood appears anxious. Her speech is tangential. Her speech is not rapid and/or pressured, not delayed, not slurred, sensical, not mumbled and not soft in volume. Thought content is not paranoid and not delusional. She expresses impulsivity. She expresses no homicidal and no suicidal ideation. She expresses no suicidal plans and no homicidal plans. She is communicative. She is attentive.       Vitals:    02/09/22 1535   Weight: 94.8 kg (209 lb)  Comment: pt reported   Height: 159.4 cm (62.75\")     Body mass index is 37.32 kg/m².       Assessment/Plan   Diagnoses and all orders for this visit:    1. Mood changes (Primary)    2. Schizophrenia, unspecified type (HCC)    3. Anxiety    4. Patient desires pregnancy      I have discussed my concerns about her becoming pregnant at this time as she is on several medications that would not be safe during a pregnancy but I would not recommend she stop her psychiatric medications at this time as her mood seems to be rapidly fluctuating (suicidal/self harm thoughts on Friday and now elated mood with severely increased libido). I have recommended to Marguerite and her  that sh follow closely with psychiatry to manage and adjust her medications   She agrees to follow with psychiatry later this week ( to make appt)and does not plan on trying for pregnancy until later this year.     reports Marguerite does not have access to medications, knives, guns, etc.. and she denies SI/HI        Return in about 2 months (around 4/9/2022) for chronic condition follow up.    I have reviewed the limitations of a telehealth visit (such as lack of a physical exam and unable to obtain vital signs) and advised the patient that they may need to follow up for an office visit should their symptoms or concerns persist, worsen, or change.  Patient was encouraged to keep me informed of any acute changes, lack of improvement, or any new concerning symptoms.   I discussed my " findings,recommendations, and plan of care was with the patient. They verbalized understanding and agreement.    Dictated Utilizing Dragon Dictation   Please note that portions of this note were completed with a voice recognition program.   Part of this note may be an electronic transcription/translation of spoken language to printed text using the Dragon Dictation System.

## 2022-02-16 ENCOUNTER — HOSPITAL ENCOUNTER (OUTPATIENT)
Dept: GENERAL RADIOLOGY | Facility: HOSPITAL | Age: 37
Discharge: HOME | End: 2022-02-16
Admitting: INTERNAL MEDICINE

## 2022-02-16 DIAGNOSIS — K59.00 CONSTIPATION, UNSPECIFIED CONSTIPATION TYPE: ICD-10-CM

## 2022-02-16 PROCEDURE — 74018 RADEX ABDOMEN 1 VIEW: CPT

## 2022-02-17 ENCOUNTER — TELEPHONE (OUTPATIENT)
Dept: GASTROENTEROLOGY | Facility: CLINIC | Age: 37
End: 2022-02-17

## 2022-02-17 DIAGNOSIS — K59.00 CONSTIPATION, UNSPECIFIED CONSTIPATION TYPE: ICD-10-CM

## 2022-02-17 DIAGNOSIS — K59.09 CHRONIC CONSTIPATION: Primary | ICD-10-CM

## 2022-02-17 NOTE — TELEPHONE ENCOUNTER
Spoke with patient. She wants bowel prep called in and advised about miralax. She is going to come by Monday to  samples.

## 2022-02-17 NOTE — TELEPHONE ENCOUNTER
Patient called back she was given results about her xray. Patient states she doesn't have any money to buy the magnesium citrate. She would like to know if we can send her in a bowel prep because its covered under insurance. Patient also is taking linzess but she has not been taking this regularly. Please advise?

## 2022-02-21 DIAGNOSIS — K59.00 CONSTIPATION, UNSPECIFIED CONSTIPATION TYPE: ICD-10-CM

## 2022-02-21 RX ORDER — BISACODYL 5 MG/1
10 TABLET, DELAYED RELEASE ORAL DAILY PRN
Qty: 180 TABLET | Refills: 0 | Status: SHIPPED | OUTPATIENT
Start: 2022-02-21 | End: 2022-09-26

## 2022-02-23 DIAGNOSIS — K59.00 CONSTIPATION, UNSPECIFIED CONSTIPATION TYPE: ICD-10-CM

## 2022-02-25 ENCOUNTER — TELEPHONE (OUTPATIENT)
Dept: INTERNAL MEDICINE | Facility: CLINIC | Age: 37
End: 2022-02-25

## 2022-02-25 NOTE — TELEPHONE ENCOUNTER
Caller: Marguerite Edwards    Relationship: Self    Best call back number: [phone]    What is the best time to reach you: ANYTIME     Who are you requesting to speak with (clinical staff, provider,  specific staff member): NURSE         What was the call regarding: THE PATIENT WOULD LIKE A CALL BACK FROM A NURSE SHE WOULD LIKE TO KNOW IF SHE NEEDS TO FAST FOR HER NEXT APPOINTMENT SHE ALSO WOULD LIKE TO KNOW IF SHE CAN BE REFERRED TO A GASTROENTEROLOGIST AT      Do you require a callback: YES

## 2022-02-25 NOTE — TELEPHONE ENCOUNTER
Called and spoke with patient, she is asking if she needs to fast before upcoming appointment. I told her that I didn't see any pending order but that I would reach out and see what Liliya recommended.     Relating to the GI referral she stated that she just wants to discuss this with Liliya at her upcoming appointment because she has questions about whether Liliya would be ok with prescribing her medications.

## 2022-02-25 NOTE — TELEPHONE ENCOUNTER
Called and spoke with patient, she stated that we could relay this information to her . Informed him that Liliya recommended her fasting for her upcoming appointment.

## 2022-03-07 ENCOUNTER — LAB (OUTPATIENT)
Dept: LAB | Facility: HOSPITAL | Age: 37
End: 2022-03-07

## 2022-03-07 ENCOUNTER — OFFICE VISIT (OUTPATIENT)
Dept: INTERNAL MEDICINE | Facility: CLINIC | Age: 37
End: 2022-03-07

## 2022-03-07 VITALS
SYSTOLIC BLOOD PRESSURE: 122 MMHG | RESPIRATION RATE: 16 BRPM | WEIGHT: 204.8 LBS | TEMPERATURE: 97.1 F | HEIGHT: 63 IN | OXYGEN SATURATION: 99 % | DIASTOLIC BLOOD PRESSURE: 82 MMHG | HEART RATE: 91 BPM | BODY MASS INDEX: 36.29 KG/M2

## 2022-03-07 DIAGNOSIS — E55.9 VITAMIN D DEFICIENCY: ICD-10-CM

## 2022-03-07 DIAGNOSIS — K59.09 CHRONIC CONSTIPATION: Primary | ICD-10-CM

## 2022-03-07 DIAGNOSIS — J45.20 MILD INTERMITTENT ASTHMA WITHOUT COMPLICATION: ICD-10-CM

## 2022-03-07 DIAGNOSIS — R73.09 ELEVATED GLUCOSE: ICD-10-CM

## 2022-03-07 DIAGNOSIS — E78.2 MIXED HYPERLIPIDEMIA: ICD-10-CM

## 2022-03-07 DIAGNOSIS — Z91.09 ENVIRONMENTAL ALLERGIES: ICD-10-CM

## 2022-03-07 DIAGNOSIS — R11.0 NAUSEA: ICD-10-CM

## 2022-03-07 PROCEDURE — 82306 VITAMIN D 25 HYDROXY: CPT | Performed by: NURSE PRACTITIONER

## 2022-03-07 PROCEDURE — 80053 COMPREHEN METABOLIC PANEL: CPT | Performed by: NURSE PRACTITIONER

## 2022-03-07 PROCEDURE — 83036 HEMOGLOBIN GLYCOSYLATED A1C: CPT | Performed by: NURSE PRACTITIONER

## 2022-03-07 PROCEDURE — 99214 OFFICE O/P EST MOD 30 MIN: CPT | Performed by: NURSE PRACTITIONER

## 2022-03-07 PROCEDURE — 80061 LIPID PANEL: CPT | Performed by: NURSE PRACTITIONER

## 2022-03-07 RX ORDER — MULTIVITAMIN
1 CAPSULE ORAL DAILY
Qty: 30 CAPSULE | Refills: 11 | Status: SHIPPED | OUTPATIENT
Start: 2022-03-07 | End: 2023-02-27 | Stop reason: SDUPTHER

## 2022-03-07 RX ORDER — SIMVASTATIN 10 MG
10 TABLET ORAL NIGHTLY
Qty: 90 TABLET | Refills: 1 | Status: SHIPPED | OUTPATIENT
Start: 2022-03-07 | End: 2022-09-26 | Stop reason: SDUPTHER

## 2022-03-07 RX ORDER — LORATADINE 10 MG/1
10 TABLET ORAL DAILY
Qty: 30 TABLET | Refills: 11 | Status: SHIPPED | OUTPATIENT
Start: 2022-03-07 | End: 2022-09-26

## 2022-03-07 RX ORDER — ONDANSETRON 4 MG/1
4 TABLET, FILM COATED ORAL 4 TIMES DAILY PRN
Qty: 30 TABLET | Refills: 0 | Status: SHIPPED | OUTPATIENT
Start: 2022-03-07 | End: 2022-03-25 | Stop reason: SDUPTHER

## 2022-03-07 RX ORDER — MELATONIN
1000 DAILY
Qty: 30 TABLET | Refills: 11 | Status: SHIPPED | OUTPATIENT
Start: 2022-03-07

## 2022-03-07 RX ORDER — SIMVASTATIN 10 MG
10 TABLET ORAL NIGHTLY
Qty: 90 TABLET | Refills: 1 | Status: SHIPPED | OUTPATIENT
Start: 2022-03-07 | End: 2022-03-07 | Stop reason: SDUPTHER

## 2022-03-07 RX ORDER — FLUTICASONE PROPIONATE 50 MCG
2 SPRAY, SUSPENSION (ML) NASAL DAILY
Qty: 16 G | Refills: 11 | Status: SHIPPED | OUTPATIENT
Start: 2022-03-07 | End: 2022-11-30 | Stop reason: SDUPTHER

## 2022-03-07 RX ORDER — MONTELUKAST SODIUM 10 MG/1
10 TABLET ORAL NIGHTLY
Qty: 30 TABLET | Refills: 6 | Status: SHIPPED | OUTPATIENT
Start: 2022-03-07 | End: 2022-09-26

## 2022-03-07 NOTE — PROGRESS NOTES
Marguerite Edwards presents to [facility] for     Chief Complaint  Chief Complaint   Patient presents with   • Constipation         Subjective        History of Present Illness  Constipation -   Eating healthy. Walking more  Last BM was a very small amount of firm brown stool this am.   Has had linzess , miralax, and dulcolax.    She reports her GI sent in bowel prep for her but she never got it.     Vit d def  Low on last labs . Currently on vit d supplement. She wants to recheck level.      asthma/allergies- well controlled. No cough, wheezing or SOA.   Has albuterol inhaler. rarely has to use .    Hyperlipidemia- chronic. On statin. Last lipids elevate.          Elevated glucose-she had elevated glucose on most recent labs in February 2020.  Glucose was 150 at that time.  She is not sure If she was fasting or not for those labs    Wants nausea medication refilled.     Review of Systems   Constitutional: Negative for fatigue, fever and unexpected weight loss.   HENT: Negative for congestion, postnasal drip, rhinorrhea and sinus pressure.    Eyes: Negative for blurred vision, double vision, pain and visual disturbance.   Respiratory: Negative for cough, chest tightness, shortness of breath and wheezing.    Cardiovascular: Negative for chest pain, palpitations and leg swelling.   Gastrointestinal: Positive for constipation, nausea and GERD. Negative for abdominal pain, diarrhea and vomiting.   Genitourinary: Positive for dysuria (chronic) and urinary incontinence. Negative for difficulty urinating, flank pain, frequency, pelvic pressure and urgency.        Bladder spasms/pain   Musculoskeletal: Positive for back pain. Negative for arthralgias and myalgias.   Skin: Negative for color change and rash.   Neurological: Negative for dizziness, weakness, light-headedness, headache and confusion.   Hematological: Negative for adenopathy. Does not bruise/bleed easily.   Psychiatric/Behavioral:  Negative for agitation, behavioral problems, decreased concentration, dysphoric mood, hallucinations, self-injury, sleep disturbance, suicidal ideas, negative for hyperactivity, depressed mood and stress. The patient is not nervous/anxious.          Allergies   Allergen Reactions   • Lamotrigine Other (See Comments)     bruising  bruising   • Paxil [Paroxetine Hcl] Mental Status Change   • Prozac [Fluoxetine Hcl] Mental Status Change   • Chlorhexidine Gluconate Unknown - Low Severity   • Fluoxetine Unknown - Low Severity   • Lubiprostone Palpitations, Dizziness and Unknown - Low Severity   • Paroxetine Unknown - Low Severity     Current Outpatient Medications on File Prior to Visit   Medication Sig Dispense Refill   • albuterol (PROVENTIL) (2.5 MG/3ML) 0.083% nebulizer solution Take 2.5 mg by nebulization 4 (Four) Times a Day As Needed for Wheezing. 125 vial 3   • albuterol sulfate  (90 Base) MCG/ACT inhaler Inhale 2 puffs Every 4 (Four) Hours As Needed for Wheezing. 18 g 6   • Alcohol Swabs (Alcohol Prep) pads 1 pad Daily. 100 each 11   • bisacodyl (DULCOLAX) 5 MG EC tablet Take 2 tablets by mouth Daily As Needed for Constipation. 180 tablet 0   • buPROPion XL (WELLBUTRIN XL) 300 MG 24 hr tablet Take  by mouth Every Morning.     • busPIRone (BUSPAR) 10 MG tablet 10 mg 3 (Three) Times a Day.     • cholestyramine (QUESTRAN) 4 GM/DOSE powder Take 1 packet by mouth 2 (Two) Times a Day. mixed with a liquid 378 g 11   • colestipol (COLESTID) 1 g tablet Take 2 tablets by mouth 2 (Two) Times a Day. 90 tablet 3   • Dexilant 60 MG capsule Take 1 capsule by mouth Daily. 90 capsule 1   • diclofenac (VOLTAREN) 75 MG EC tablet Take 1 tablet by mouth 2 (Two) Times a Day. 60 tablet 0   • dicyclomine (Bentyl) 10 MG capsule Take 1 capsule by mouth 3 (Three) Times a Day As Needed (pain). Indications: Irritable Bowel Syndrome 120 capsule 3   • escitalopram (LEXAPRO) 20 MG tablet Take 20 mg by mouth Daily.     • glucose blood  test strip Use as instructed 100 each 12   • hydrOXYzine pamoate (VISTARIL) 50 MG capsule Take 50 mg by mouth Every 6 (Six) Hours. Every 6 hours  As needed and 2 pills at night     • Lancets Ultra Fine misc 1 each 3 (Three) Times a Day. 100 each 3   • linaclotide (LINZESS) 72 MCG capsule capsule Take 1 capsule by mouth Every Morning Before Breakfast. 90 capsule 0   • Melatonin 5 MG capsule Take 5 mg by mouth At Night As Needed (insomnia). 30 each 3   • Menthol (Icy Hot) 5 % patch Apply 1 patch topically Daily As Needed (back pain). 30 patch 3   • metaxalone (Skelaxin) 800 MG tablet Take 1 tablet by mouth 3 (Three) Times a Day As Needed for Muscle Spasms. 60 tablet 0   • mirtazapine (REMERON) 45 MG tablet Take 45 mg by mouth Every Night.     • norelgestromin-ethinyl estradiol (ORTHO EVRA) 150-35 MCG/24HR Place 1 patch on the skin as directed by provider Every 7 (Seven) Days for 3 weeks, THEN skip 7 days. 3 patch 9   • nystatin (MYCOSTATIN) 461223 UNIT/GM powder Apply  topically to the appropriate area as directed 3 (Three) Times a Day. 60 g 1   • promethazine (PHENERGAN) 25 MG tablet Take 1 tablet by mouth Every 6 (Six) Hours As Needed for Nausea or Vomiting. 45 tablet 0   • QUEtiapine (SEROquel) 100 MG tablet Take 400 mg by mouth Every Night.     • Sanitary Napkins & Tampons (MAXI PAD OVERNIGHT) pads 1 pad 4 (Four) Times a Day As Needed (menses). 96 each 11   • tiZANidine (Zanaflex) 4 MG tablet 1-2 pills every 8 hours as needed for muscle or bladder spasms. 180 tablet 3     No current facility-administered medications on file prior to visit.         The following portions of the patient's history were reviewed and updated as appropriate: allergies, current medications, past family history, past medical history, past social history, past surgical history and problem list and are available for review within electronic record    Objective     Result Review :                    Vital Signs:   /82 (BP Location:  "Right arm, Patient Position: Sitting, Cuff Size: Adult)   Pulse 91   Temp 97.1 °F (36.2 °C) (Infrared)   Resp 16   Ht 159.4 cm (62.75\")   Wt 92.9 kg (204 lb 12.8 oz)   SpO2 99%   BMI 36.57 kg/m²         Physical Exam  Vitals and nursing note reviewed.   Constitutional:       General: She is not in acute distress.     Appearance: Normal appearance. She is well-developed. She is not diaphoretic.   HENT:      Head: Normocephalic and atraumatic.   Eyes:      Conjunctiva/sclera: Conjunctivae normal.   Neck:      Vascular: No JVD.   Cardiovascular:      Rate and Rhythm: Normal rate and regular rhythm.      Heart sounds: Normal heart sounds. No murmur heard.  Pulmonary:      Effort: Pulmonary effort is normal. No respiratory distress.      Breath sounds: Normal breath sounds.   Chest:      Chest wall: No tenderness.   Abdominal:      General: Bowel sounds are normal. There is no distension.      Palpations: Abdomen is soft.      Tenderness: There is no abdominal tenderness.   Musculoskeletal:         General: Normal range of motion.      Cervical back: Normal range of motion.   Skin:     General: Skin is warm and dry.      Coloration: Skin is not pale.      Findings: No erythema.   Neurological:      Mental Status: She is alert and oriented to person, place, and time.   Psychiatric:         Behavior: Behavior normal.         Thought Content: Thought content normal.         Judgment: Judgment normal.                 Assessment and Plan      Diagnoses and all orders for this visit:    1. Chronic constipation (Primary)  -     Sod Picosulfate-Mag Ox-Cit Acd 10-3.5-12 MG-GM -GM/160ML solution; Take 160 mL by mouth Take As Directed for 2 doses.  Dispense: 320 mL; Refill: 0  Will resend her bowel prep that GI sent. FU with GI if no improvment    2. Vitamin D deficiency  -     Vitamin D 25 Hydroxy; Future  -     cholecalciferol (VITAMIN D3) 25 MCG (1000 UT) tablet; Take 1 tablet by mouth Daily.  Dispense: 30 tablet; Refill: " 11  Recheck level.   Refill vit D    3. Mixed hyperlipidemia  -     Discontinue: simvastatin (ZOCOR) 10 MG tablet; Take 1 tablet by mouth Every Night.  Dispense: 90 tablet; Refill: 1  -     Comprehensive Metabolic Panel; Future  -     Hemoglobin A1c; Future  -     Lipid Panel; Future  -     simvastatin (ZOCOR) 10 MG tablet; Take 1 tablet by mouth Every Night.  Dispense: 90 tablet; Refill: 1  Refill statin. Recheck lipids.   Low fat diet  4. Nausea  -     ondansetron (ZOFRAN) 4 MG tablet; Take 1 tablet by mouth 4 (Four) Times a Day As Needed for Nausea or Vomiting.  Dispense: 30 tablet; Refill: 0    5. Elevated glucose  -     Hemoglobin A1c; Future  Check A1c to further evaluate   6. Environmental allergies  -     fluticasone (Flonase) 50 MCG/ACT nasal spray; 2 sprays into the nostril(s) as directed by provider Daily.  Dispense: 16 g; Refill: 11  -     loratadine (Claritin) 10 MG tablet; Take 1 tablet by mouth Daily.  Dispense: 30 tablet; Refill: 11  -     montelukast (Singulair) 10 MG tablet; Take 1 tablet by mouth Every Night.  Dispense: 30 tablet; Refill: 6    7. Mild intermittent asthma without complication  -     montelukast (Singulair) 10 MG tablet; Take 1 tablet by mouth Every Night.  Dispense: 30 tablet; Refill: 6  # 6/7 well controlled. Cont current meds- refills as above.   Other orders  -     Multiple Vitamin (multivitamin) capsule; Take 1 capsule by mouth Daily.  Dispense: 30 capsule; Refill: 11        Follow Up     Patient was given instructions and counseling regarding her condition or for health maintenance advice. Please see specific information pulled into the AVS if appropriate.   Any new medication's adverse effects have been discussed in detail with patient  Patient was encouraged to keep me informed of any acute changes, lack of improvement, or any new concerning symptoms.    Return in about 3 months (around 6/7/2022) for Annual.          Dictated Utilizing Dragon Dictation   Please note that  portions of this note were completed with a voice recognition program.   Part of this note may be an electronic transcription/translation of spoken language to printed text using the Dragon Dictation System.

## 2022-03-08 ENCOUNTER — TELEPHONE (OUTPATIENT)
Dept: INTERNAL MEDICINE | Facility: CLINIC | Age: 37
End: 2022-03-08

## 2022-03-08 LAB
25(OH)D3 SERPL-MCNC: 61.4 NG/ML
ALBUMIN SERPL-MCNC: 4.7 G/DL (ref 3.5–5.2)
ALBUMIN/GLOB SERPL: 1.4 G/DL
ALP SERPL-CCNC: 122 U/L (ref 39–117)
ALT SERPL W P-5'-P-CCNC: 15 U/L (ref 1–33)
ANION GAP SERPL CALCULATED.3IONS-SCNC: 13.6 MMOL/L (ref 5–15)
AST SERPL-CCNC: 12 U/L (ref 1–32)
BILIRUB SERPL-MCNC: <0.2 MG/DL (ref 0–1.2)
BUN SERPL-MCNC: 16 MG/DL (ref 6–20)
BUN/CREAT SERPL: 18.4 (ref 7–25)
CALCIUM SPEC-SCNC: 10.3 MG/DL (ref 8.6–10.5)
CHLORIDE SERPL-SCNC: 102 MMOL/L (ref 98–107)
CHOLEST SERPL-MCNC: 171 MG/DL (ref 0–200)
CO2 SERPL-SCNC: 25.4 MMOL/L (ref 22–29)
CREAT SERPL-MCNC: 0.87 MG/DL (ref 0.57–1)
EGFRCR SERPLBLD CKD-EPI 2021: 87 ML/MIN/1.73
GLOBULIN UR ELPH-MCNC: 3.3 GM/DL
GLUCOSE SERPL-MCNC: 80 MG/DL (ref 65–99)
HBA1C MFR BLD: 5.1 % (ref 4.8–5.6)
HDLC SERPL-MCNC: 50 MG/DL (ref 40–60)
LDLC SERPL CALC-MCNC: 95 MG/DL (ref 0–100)
LDLC/HDLC SERPL: 1.82 {RATIO}
POTASSIUM SERPL-SCNC: 4.7 MMOL/L (ref 3.5–5.2)
PROT SERPL-MCNC: 8 G/DL (ref 6–8.5)
SODIUM SERPL-SCNC: 141 MMOL/L (ref 136–145)
TRIGL SERPL-MCNC: 151 MG/DL (ref 0–150)
VLDLC SERPL-MCNC: 26 MG/DL (ref 5–40)

## 2022-03-08 NOTE — TELEPHONE ENCOUNTER
Caller: Marguerite Edwards AND     Relationship: Self    Best call back number: [phone]    What is the best time to reach you: ASAP    Who are you requesting to speak with (clinical staff, provider,  specific staff member): CLINICAL  Do you know the name of the person who called:     What was the call regarding: PATIENTS  STATES HER STOOL IS DARK GREEN BUT HAS BEEN EATING MANY GREEN VEGETABLES. PATIENT IS WANTING A CALL BACK FROM [place] TO DISCUSS FURTHER.  STATES DR HURLEY WAS NOT HELPFUL BUT THEY ARE FINDING A NEW DOCTOR FOR THIS BUT IN MEANTIME WOULD LIKE CALL FROM [place]    Do you require a callback: YES

## 2022-03-08 NOTE — TELEPHONE ENCOUNTER
PATIENT CALLING IN TO ASK WHEN SHE GOT HER LAST FLU SHOT.    PLEASE CALL AND ADVISE -[phone].   Yes...

## 2022-03-09 NOTE — TELEPHONE ENCOUNTER
Sounds like her constipation is better. If stools remain abnormal after a few more days, she should follow up. She can schedule appt if wants to discuss further.

## 2022-03-09 NOTE — TELEPHONE ENCOUNTER
Attempted to reach out to patient at number requested but another female voice answered the phone and asked me to call a different number to reach patient. Number: [phone]    m on that number for a return call.

## 2022-03-09 NOTE — TELEPHONE ENCOUNTER
Called and spoke with Lamar, (on  [facility]) patient's caregiver. Informed her of Liliya's recommendations at this time. She verbalized understanding and had no further questions.

## 2022-03-11 ENCOUNTER — PRIOR AUTHORIZATION (OUTPATIENT)
Dept: INTERNAL MEDICINE | Facility: CLINIC | Age: 37
End: 2022-03-11

## 2022-03-13 PROBLEM — R73.09 ELEVATED GLUCOSE: Status: ACTIVE | Noted: 2022-03-13

## 2022-03-13 PROBLEM — K59.09 CHRONIC CONSTIPATION: Status: ACTIVE | Noted: 2022-03-13

## 2022-03-13 PROBLEM — R11.0 NAUSEA: Status: ACTIVE | Noted: 2022-03-13

## 2022-03-15 NOTE — TELEPHONE ENCOUNTER
Received fax today stating that it was sent through the wrong insurance plan. Resubmitted PA through another insurance plan. Same key was used: [id number].

## 2022-03-21 ENCOUNTER — TELEPHONE (OUTPATIENT)
Dept: INTERNAL MEDICINE | Facility: CLINIC | Age: 37
End: 2022-03-21

## 2022-03-21 NOTE — TELEPHONE ENCOUNTER
Informed the patient of her results. She stated that she was weak and passed the phone to her  to explain her diarrhea to me. He stated that she has IBS with severe constipation but he took her off the medication for this yesterday. She has been having this bout of diarrhea for about three days, he reports it is completely watery and loose. She is also complaining of cramps and over all malaise. He did say  that she has been eating a good diet with lots of ruffage and greens. He doesn't understand why her stools are so loose/watery and would like to know what to do.

## 2022-03-21 NOTE — TELEPHONE ENCOUNTER
Caller: Marguerite Edwards    Relationship: Self    Best call back number: [phone]    Caller requesting test results:     What test was performed: LAB    When was the test performed: NOT SURE WHEN SHE HAD LAB WORK DONE    Where was the test performed:     Additional notes:     SN: PT STATED THAT SHE IS HAVING DIARRHEA AND NOT SURE AS TO WHAT TO DO.

## 2022-03-21 NOTE — TELEPHONE ENCOUNTER
Spoke with the patient's caregiver (Amy on [facility]) and advised that she needs to be seen. Due to her other doctor appointments and only wanting to see Liliya SANTOS we couldn't get her in within a reasonable time frame. After discussing with Liliya SANTOS going to see her GI doctor on 3/24 @ 10am would be the best option. Amy verbalized a good understanding

## 2022-03-24 ENCOUNTER — OFFICE VISIT (OUTPATIENT)
Dept: GASTROENTEROLOGY | Facility: CLINIC | Age: 37
End: 2022-03-24

## 2022-03-24 VITALS
SYSTOLIC BLOOD PRESSURE: 120 MMHG | DIASTOLIC BLOOD PRESSURE: 78 MMHG | HEIGHT: 63 IN | HEART RATE: 68 BPM | TEMPERATURE: 96.9 F | WEIGHT: 204.3 LBS | BODY MASS INDEX: 36.2 KG/M2 | OXYGEN SATURATION: 98 %

## 2022-03-24 DIAGNOSIS — R10.9 CHRONIC ABDOMINAL PAIN: ICD-10-CM

## 2022-03-24 DIAGNOSIS — R19.4 CHANGE IN BOWEL HABITS: ICD-10-CM

## 2022-03-24 DIAGNOSIS — G89.29 CHRONIC ABDOMINAL PAIN: ICD-10-CM

## 2022-03-24 DIAGNOSIS — K58.2 IRRITABLE BOWEL SYNDROME WITH BOTH CONSTIPATION AND DIARRHEA: Primary | ICD-10-CM

## 2022-03-24 PROBLEM — N39.3 STRESS INCONTINENCE: Status: ACTIVE | Noted: 2021-06-29

## 2022-03-24 PROBLEM — N94.10 DYSPAREUNIA IN FEMALE: Status: ACTIVE | Noted: 2017-07-26

## 2022-03-24 PROBLEM — N39.0 RECURRENT UTI: Status: ACTIVE | Noted: 2021-06-29

## 2022-03-24 PROBLEM — N39.41 URGE INCONTINENCE: Status: ACTIVE | Noted: 2017-07-26

## 2022-03-24 PROBLEM — R39.15 URINARY URGENCY: Status: ACTIVE | Noted: 2021-06-29

## 2022-03-24 PROBLEM — R35.0 INCREASED FREQUENCY OF URINATION: Status: ACTIVE | Noted: 2021-06-29

## 2022-03-24 PROBLEM — N76.0 BACTERIAL VAGINOSIS: Status: ACTIVE | Noted: 2017-10-02

## 2022-03-24 PROBLEM — R23.2 HOT FLASHES: Status: ACTIVE | Noted: 2018-03-19

## 2022-03-24 PROBLEM — M62.89 PFD (PELVIC FLOOR DYSFUNCTION): Status: ACTIVE | Noted: 2021-06-29

## 2022-03-24 PROBLEM — B96.89 BACTERIAL VAGINOSIS: Status: ACTIVE | Noted: 2017-10-02

## 2022-03-24 PROBLEM — R35.1 NOCTURIA: Status: ACTIVE | Noted: 2021-06-29

## 2022-03-24 PROBLEM — J44.9 COPD (CHRONIC OBSTRUCTIVE PULMONARY DISEASE): Status: ACTIVE | Noted: 2018-02-28

## 2022-03-24 PROBLEM — R39.198 DIFFICULTY VOIDING: Status: ACTIVE | Noted: 2021-06-29

## 2022-03-24 PROBLEM — K92.1 HEMATOCHEZIA: Status: ACTIVE | Noted: 2018-01-31

## 2022-03-24 PROBLEM — R33.9 RETENTION, URINE: Status: ACTIVE | Noted: 2021-06-29

## 2022-03-24 PROBLEM — Z30.9 CONTRACEPTIVE MANAGEMENT: Status: ACTIVE | Noted: 2017-10-02

## 2022-03-24 PROBLEM — R33.9 INCOMPLETE EMPTYING OF BLADDER: Status: ACTIVE | Noted: 2017-07-26

## 2022-03-24 PROBLEM — J31.0 CHRONIC RHINITIS: Status: ACTIVE | Noted: 2019-06-10

## 2022-03-24 PROCEDURE — 99214 OFFICE O/P EST MOD 30 MIN: CPT | Performed by: PHYSICIAN ASSISTANT
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
## 2022-03-25 ENCOUNTER — TELEPHONE (OUTPATIENT)
Dept: GASTROENTEROLOGY | Facility: CLINIC | Age: 37
End: 2022-03-25

## 2022-03-25 DIAGNOSIS — R11.0 NAUSEA: ICD-10-CM

## 2022-03-25 RX ORDER — ONDANSETRON 4 MG/1
4 TABLET, FILM COATED ORAL 4 TIMES DAILY PRN
Qty: 30 TABLET | Refills: 0 | Status: SHIPPED | OUTPATIENT
Start: 2022-03-25 | End: 2022-11-28 | Stop reason: SDUPTHER

## 2022-03-25 RX ORDER — ONDANSETRON 4 MG/1
4 TABLET, FILM COATED ORAL 4 TIMES DAILY PRN
Qty: 30 TABLET | Refills: 0 | Status: SHIPPED | OUTPATIENT
Start: 2022-03-25 | End: 2022-03-25

## 2022-04-01 ENCOUNTER — TELEPHONE (OUTPATIENT)
Dept: INTERNAL MEDICINE | Facility: CLINIC | Age: 37
End: 2022-04-01

## 2022-04-01 ENCOUNTER — TELEPHONE (OUTPATIENT)
Dept: GASTROENTEROLOGY | Facility: CLINIC | Age: 37
End: 2022-04-01

## 2022-04-01 DIAGNOSIS — Z13.6 SCREENING FOR HYPERTENSION: Primary | ICD-10-CM

## 2022-04-01 DIAGNOSIS — K21.9 GASTROESOPHAGEAL REFLUX DISEASE, UNSPECIFIED WHETHER ESOPHAGITIS PRESENT: Primary | ICD-10-CM

## 2022-04-01 RX ORDER — WEIGH SCALE
MISCELLANEOUS MISCELLANEOUS
Qty: 1 EACH | Refills: 0 | Status: SHIPPED | OUTPATIENT
Start: 2022-04-01

## 2022-04-01 NOTE — TELEPHONE ENCOUNTER
PATIENT WOULD LIKE A PRESCRIPTION FOR BLOOD PRESSURE MONITOR.     PHARMACY:  [facility]     CALL IF NEEDED

## 2022-04-01 NOTE — TELEPHONE ENCOUNTER
I ordered the EGD.  Make sure she is taking her PPI.  Have her follow-up bland diet that is predominantly liquid for the next few days to see if her symptoms improve.  Ideas for meals will be things like mashed potatoes, smoothies, soups, crackers, Jell-O, yogurt...

## 2022-04-08 DIAGNOSIS — R73.09 ELEVATED GLUCOSE: ICD-10-CM

## 2022-04-11 RX ORDER — ISOPROPYL ALCOHOL 70 ML/100ML
SWAB TOPICAL
Qty: 100 EACH | Refills: 11 | Status: SHIPPED | OUTPATIENT
Start: 2022-04-11

## 2022-04-29 ENCOUNTER — APPOINTMENT (OUTPATIENT)
Dept: PREADMISSION TESTING | Facility: HOSPITAL | Age: 37
End: 2022-04-29

## 2022-08-16 DIAGNOSIS — R10.13 EPIGASTRIC PAIN: ICD-10-CM

## 2022-08-16 RX ORDER — DICYCLOMINE HYDROCHLORIDE 10 MG/1
CAPSULE ORAL
Qty: 120 CAPSULE | Refills: 3 | Status: SHIPPED | OUTPATIENT
Start: 2022-08-16 | End: 2022-10-14 | Stop reason: SDUPTHER

## 2022-08-18 ENCOUNTER — TELEPHONE (OUTPATIENT)
Dept: GASTROENTEROLOGY | Facility: CLINIC | Age: 37
End: 2022-08-18

## 2022-08-18 NOTE — TELEPHONE ENCOUNTER
Issac,  Ms Edwards called and left a message requesting a call back. Need to speak with Dr Thomas's nurse. Thanks

## 2022-09-05 NOTE — TELEPHONE ENCOUNTER
CALLED AND S/W TO [facility]EN WITH [facility] IN REGARDS TO PATIENT PRIOR AUTH FOR LINZESS AND ESOMEPRAZOLE.  AS FOR THE LINZESS, PT WILL NEED TO SIGN AN APPEAL AUTHORIZATION, WHICH WAS FAXED TO PT PHARMACY.   THE ESOMEPARAZOLE, WILL NEED TO HAVE A NEW RX FOR NEXIUM 20MG CAPSULE DELAYED RELEASE OTC SINCE INSURANCE WILL COVER THE BRAND NAME 20MG.  I CALLED PT TO INFORM HER SHE WILL NEED TO GO TO PHARMACY TO SIGN THE APPEAL AND THE PHARMACY WILL FAX BACK TO [facility]. PT VOICED UNDERSTANDING WITH NO FURTHER QUESTIONS   FOLLOW UP ITEMS POST DISCHARGE  Please call [phone] to schedule PCP appointment for patient.    Required specialty appointments include:       Discharge Instructions per SILVINA Ruggiero    -Follow-up with PCP in [facility] s/p hospitalization  -Obtain referral to follow-up with cardiology for management of new onset atrial fibrillation  -Present a copy of EKG obtained in [place] noting atrial fibrillation  -Start taking Xarelto 20 mg nightly for management of atrial fibrillation  -Continue all other home medications specifically carvedilol for management of new onset atrial fib    DIET: Cardiac and diabetic    ACTIVITY: As tolerated    DIAGNOSIS: Chest pain    Return to ER if symptoms persist, chest pain, palpitations, shortness of breath, numbness, tingling, weakness, and high fevers.Discharge Instructions    Discharged to home by car with self. Discharged via wheelchair, hospital escort: Yes.  Special equipment needed: Not Applicable    Be sure to schedule a follow-up appointment with your primary care doctor or any specialists as instructed.     Discharge Plan:   Diet Plan: Discussed  Activity Level: Discussed  Confirmed Follow up Appointment: Appointment Scheduled  Confirmed Symptoms Management: Discussed  Medication Reconciliation Updated: Yes    I understand that a diet low in cholesterol, fat, and sodium is recommended for good health. Unless I have been given specific instructions below for another diet, I accept this instruction as my diet prescription.   Other diet:     Special Instructions: None    -Is this patient being discharged with medication to prevent blood clots?  No    Is patient discharged on Warfarin / Coumadin?   No

## 2022-09-19 DIAGNOSIS — K59.00 CONSTIPATION, UNSPECIFIED CONSTIPATION TYPE: ICD-10-CM

## 2022-09-19 DIAGNOSIS — K21.9 GASTROESOPHAGEAL REFLUX DISEASE, UNSPECIFIED WHETHER ESOPHAGITIS PRESENT: ICD-10-CM

## 2022-09-20 RX ORDER — DEXLANSOPRAZOLE 60 MG/1
60 CAPSULE, DELAYED RELEASE ORAL DAILY
Qty: 90 CAPSULE | Refills: 1 | Status: SHIPPED | OUTPATIENT
Start: 2022-09-20 | End: 2022-11-10 | Stop reason: SDUPTHER

## 2022-09-26 ENCOUNTER — OFFICE VISIT (OUTPATIENT)
Dept: INTERNAL MEDICINE | Facility: CLINIC | Age: 37
End: 2022-09-26

## 2022-09-26 VITALS
SYSTOLIC BLOOD PRESSURE: 124 MMHG | RESPIRATION RATE: 18 BRPM | OXYGEN SATURATION: 97 % | DIASTOLIC BLOOD PRESSURE: 68 MMHG | WEIGHT: 180.6 LBS | BODY MASS INDEX: 32 KG/M2 | HEART RATE: 93 BPM | HEIGHT: 63 IN | TEMPERATURE: 97.8 F

## 2022-09-26 DIAGNOSIS — E78.2 MIXED HYPERLIPIDEMIA: ICD-10-CM

## 2022-09-26 DIAGNOSIS — N32.89 BLADDER SPASMS: ICD-10-CM

## 2022-09-26 DIAGNOSIS — F20.9 SCHIZOPHRENIA, UNSPECIFIED TYPE: Chronic | ICD-10-CM

## 2022-09-26 DIAGNOSIS — Z23 NEED FOR VACCINATION: ICD-10-CM

## 2022-09-26 DIAGNOSIS — R73.09 ELEVATED GLUCOSE: ICD-10-CM

## 2022-09-26 DIAGNOSIS — E55.9 VITAMIN D DEFICIENCY: ICD-10-CM

## 2022-09-26 DIAGNOSIS — F51.05 INSOMNIA DUE TO MENTAL DISORDER: Chronic | ICD-10-CM

## 2022-09-26 DIAGNOSIS — Z00.01 ANNUAL VISIT FOR GENERAL ADULT MEDICAL EXAMINATION WITH ABNORMAL FINDINGS: Primary | ICD-10-CM

## 2022-09-26 DIAGNOSIS — F41.9 ANXIETY: Chronic | ICD-10-CM

## 2022-09-26 PROCEDURE — 90472 IMMUNIZATION ADMIN EACH ADD: CPT | Performed by: NURSE PRACTITIONER

## 2022-09-26 PROCEDURE — 90677 PCV20 VACCINE IM: CPT | Performed by: NURSE PRACTITIONER

## 2022-09-26 PROCEDURE — 90471 IMMUNIZATION ADMIN: CPT | Performed by: NURSE PRACTITIONER

## 2022-09-26 PROCEDURE — 90686 IIV4 VACC NO PRSV 0.5 ML IM: CPT | Performed by: NURSE PRACTITIONER

## 2022-09-26 PROCEDURE — 99395 PREV VISIT EST AGE 18-39: CPT | Performed by: NURSE PRACTITIONER

## 2022-09-26 RX ORDER — SIMVASTATIN 10 MG
10 TABLET ORAL NIGHTLY
Qty: 90 TABLET | Refills: 1 | Status: SHIPPED | OUTPATIENT
Start: 2022-09-26 | End: 2022-11-16

## 2022-09-26 RX ORDER — ATORVASTATIN CALCIUM 10 MG/1
10 TABLET, FILM COATED ORAL DAILY
COMMUNITY
Start: 2022-09-04 | End: 2022-09-26

## 2022-09-26 RX ORDER — OLANZAPINE 5 MG/1
7.5 TABLET ORAL 2 TIMES DAILY
COMMUNITY
Start: 2022-09-04 | End: 2022-10-04

## 2022-09-26 RX ORDER — TIZANIDINE 4 MG/1
TABLET ORAL
Qty: 180 TABLET | Refills: 3 | Status: SHIPPED | OUTPATIENT
Start: 2022-09-26 | End: 2023-01-17

## 2022-10-03 ENCOUNTER — TELEPHONE (OUTPATIENT)
Dept: INTERNAL MEDICINE | Facility: CLINIC | Age: 37
End: 2022-10-03

## 2022-10-09 PROBLEM — Z30.9 CONTRACEPTIVE MANAGEMENT: Status: RESOLVED | Noted: 2017-10-02 | Resolved: 2022-10-09

## 2022-10-09 PROBLEM — N32.89 BLADDER SPASMS: Status: ACTIVE | Noted: 2022-10-09

## 2022-10-09 PROBLEM — B37.2 YEAST DERMATITIS: Status: RESOLVED | Noted: 2021-03-02 | Resolved: 2022-10-09

## 2022-10-09 PROBLEM — B96.89 BACTERIAL VAGINOSIS: Status: RESOLVED | Noted: 2017-10-02 | Resolved: 2022-10-09

## 2022-10-09 PROBLEM — J44.9 COPD (CHRONIC OBSTRUCTIVE PULMONARY DISEASE): Status: RESOLVED | Noted: 2018-02-28 | Resolved: 2022-10-09

## 2022-10-09 PROBLEM — N76.0 BACTERIAL VAGINOSIS: Status: RESOLVED | Noted: 2017-10-02 | Resolved: 2022-10-09

## 2022-10-14 DIAGNOSIS — R10.13 EPIGASTRIC PAIN: ICD-10-CM

## 2022-10-17 RX ORDER — DICYCLOMINE HYDROCHLORIDE 10 MG/1
10 CAPSULE ORAL
Qty: 120 CAPSULE | Refills: 3 | Status: SHIPPED | OUTPATIENT
Start: 2022-10-17

## 2022-10-31 ENCOUNTER — TELEPHONE (OUTPATIENT)
Dept: INTERNAL MEDICINE | Facility: CLINIC | Age: 37
End: 2022-10-31

## 2022-10-31 NOTE — TELEPHONE ENCOUNTER
Caller: Marguerite Edwards    Relationship: Self    Best call back number: [phone]     What was the call regarding: PATIENT CALLED TO REQUEST THAT HER LAB ORDERS BE SENT TO [facility].      Do you require a callback:YES

## 2022-10-31 NOTE — TELEPHONE ENCOUNTER
Caller: Marguerite Edwards    Relationship: Self    Best call back number: [phone]    What orders are you requesting (i.e. lab or imaging): ACTIVE ORDERS BE SENT TO [facility]    In what timeframe would the patient need to come in: PATIENT WOULD LIKE THIS COMPLETED TODAY, IF POSSIBLE.     Where will you receive your lab/imaging services: [facility]  [address] [id number] [phone]    Additional notes: PLEASE CALL THE PATIENT AND LET HER KNOW WHEN THIS IS COMPLETED.     THANK YOU.

## 2022-11-10 DIAGNOSIS — K21.9 GASTROESOPHAGEAL REFLUX DISEASE, UNSPECIFIED WHETHER ESOPHAGITIS PRESENT: ICD-10-CM

## 2022-11-10 RX ORDER — DEXLANSOPRAZOLE 60 MG/1
60 CAPSULE, DELAYED RELEASE ORAL DAILY
Qty: 90 CAPSULE | Refills: 3 | Status: SHIPPED | OUTPATIENT
Start: 2022-11-10

## 2022-11-10 NOTE — TELEPHONE ENCOUNTER
PATIENT CALLED FOR DEXILANT 60 MG ONCE DAILY REFILL.    Your current Orthopaedic problem we are working together to treat is: shoulder pain.      IMAGING:  You have been recommended to undergo an MRI of your shoulder to help us better understand and treat your symptoms. Please stop at reception to schedule the imaging appointment or call [facility] for Imaging - [phone] (all  sites & [facility] sites). Make a follow up appointment to discuss your results.    It is recommended you schedule a follow-up appointment with Kwesi Villavicencio MD after MRI.    Office hours are 8:00 am to 5:00 pm Monday through Friday. If it is urgent that you speak with someone outside of these hours, our [facility] will be able to assist you. You can reach the office by calling the [facility] [employer] scheduling line at [phone].    We do highly recommend [facility], if you do not already have this. You can request access via the internet or by simply talking with a  at any of the clinics.  [email or web address].      Thank you for choosing [facility] as your Orthopaedic provider!

## 2022-11-15 ENCOUNTER — TELEPHONE (OUTPATIENT)
Dept: INTERNAL MEDICINE | Facility: CLINIC | Age: 37
End: 2022-11-15

## 2022-11-15 DIAGNOSIS — E87.5 HYPERKALEMIA: Primary | ICD-10-CM

## 2022-11-15 NOTE — TELEPHONE ENCOUNTER
Per Liliya, she would need to schedule an appt.    Pt was notified and she will schedule at a later time.     She did however ask about the lab work that she had done at  on 11/2/22 and would like for you to review and advise please.

## 2022-11-15 NOTE — TELEPHONE ENCOUNTER
Caller: Marguerite Edwards    Relationship: Self    Best call back number: [phone]    PATIENT WOULD LIKE TO BE TESTED FOR PARKINSONS AND OR DEMENTIA. SHE HAS THE SHAKES. SHE GOT MEDICATION FROM HER PSYCHIATRIST FOR THE SHAKES BUT THEY ARE STARTING AGAIN    PLEASE CALL AND ADVISE

## 2022-11-16 ENCOUNTER — TELEPHONE (OUTPATIENT)
Dept: GASTROENTEROLOGY | Facility: CLINIC | Age: 37
End: 2022-11-16

## 2022-11-16 PROBLEM — E87.5 HYPERKALEMIA: Status: ACTIVE | Noted: 2022-11-16

## 2022-11-16 RX ORDER — SIMVASTATIN 20 MG
20 TABLET ORAL NIGHTLY
Qty: 90 TABLET | Refills: 1 | Status: SHIPPED | OUTPATIENT
Start: 2022-11-16 | End: 2023-03-08

## 2022-11-16 NOTE — TELEPHONE ENCOUNTER
Marguerite Davenport called and left a message on MA line stating she bowels haven't moved in a few days. Can you please give her a call back? Thanks

## 2022-11-16 NOTE — TELEPHONE ENCOUNTER
The labs at  11/2/22  show that her cholesterol is too high.  I will increase the dose of her simvastatin to 20 mg   Patient should consume lean meats, whole grains, vegetables, and fruits, while avoiding concentrated sweets, junk foods, and fast foods.  Patient should engage in a minimum of 150 minutes of moderate intensity exercise per week as well.      The labs also show that her potassium is slightly elevated.  She needs to have this lab repeated.  I have placed the order for her to complete this at any [facility] lab     The rest of the labs were stable

## 2022-11-18 ENCOUNTER — LAB (OUTPATIENT)
Dept: LAB | Facility: HOSPITAL | Age: 37
End: 2022-11-18

## 2022-11-18 DIAGNOSIS — E55.9 VITAMIN D DEFICIENCY: ICD-10-CM

## 2022-11-18 DIAGNOSIS — E78.2 MIXED HYPERLIPIDEMIA: ICD-10-CM

## 2022-11-18 DIAGNOSIS — Z00.01 ANNUAL VISIT FOR GENERAL ADULT MEDICAL EXAMINATION WITH ABNORMAL FINDINGS: ICD-10-CM

## 2022-11-18 DIAGNOSIS — E87.5 HYPERKALEMIA: ICD-10-CM

## 2022-11-18 DIAGNOSIS — R73.09 ELEVATED GLUCOSE: ICD-10-CM

## 2022-11-18 LAB
25(OH)D3 SERPL-MCNC: 59.6 NG/ML (ref 30–100)
ALBUMIN SERPL-MCNC: 3.5 G/DL (ref 3.5–5.2)
ALBUMIN/GLOB SERPL: 1.2 G/DL
ALP SERPL-CCNC: 82 U/L (ref 39–117)
ALT SERPL W P-5'-P-CCNC: 8 U/L (ref 1–33)
ANION GAP SERPL CALCULATED.3IONS-SCNC: 14.2 MMOL/L (ref 5–15)
AST SERPL-CCNC: 13 U/L (ref 1–32)
BILIRUB SERPL-MCNC: 0.2 MG/DL (ref 0–1.2)
BUN SERPL-MCNC: 14 MG/DL (ref 6–20)
BUN/CREAT SERPL: 14 (ref 7–25)
CALCIUM SPEC-SCNC: 9.7 MG/DL (ref 8.6–10.5)
CHLORIDE SERPL-SCNC: 101 MMOL/L (ref 98–107)
CHOLEST SERPL-MCNC: 181 MG/DL (ref 0–200)
CO2 SERPL-SCNC: 22.8 MMOL/L (ref 22–29)
CREAT SERPL-MCNC: 1 MG/DL (ref 0.57–1)
DEPRECATED RDW RBC AUTO: 43.5 FL (ref 37–54)
EGFRCR SERPLBLD CKD-EPI 2021: 73.6 ML/MIN/1.73
ERYTHROCYTE [DISTWIDTH] IN BLOOD BY AUTOMATED COUNT: 13.6 % (ref 12.3–15.4)
GLOBULIN UR ELPH-MCNC: 3 GM/DL
GLUCOSE SERPL-MCNC: 98 MG/DL (ref 65–99)
HBA1C MFR BLD: 5.3 % (ref 4.8–5.6)
HCT VFR BLD AUTO: 42.7 % (ref 34–46.6)
HDLC SERPL-MCNC: 36 MG/DL (ref 40–60)
HGB BLD-MCNC: 13.9 G/DL (ref 12–15.9)
LDLC SERPL CALC-MCNC: 102 MG/DL (ref 0–100)
LDLC/HDLC SERPL: 2.62 {RATIO}
MCH RBC QN AUTO: 28.5 PG (ref 26.6–33)
MCHC RBC AUTO-ENTMCNC: 32.6 G/DL (ref 31.5–35.7)
MCV RBC AUTO: 87.7 FL (ref 79–97)
PLATELET # BLD AUTO: 213 10*3/MM3 (ref 140–450)
PMV BLD AUTO: 12.2 FL (ref 6–12)
POTASSIUM SERPL-SCNC: 3.9 MMOL/L (ref 3.5–5.2)
PROT SERPL-MCNC: 6.5 G/DL (ref 6–8.5)
RBC # BLD AUTO: 4.87 10*6/MM3 (ref 3.77–5.28)
SODIUM SERPL-SCNC: 138 MMOL/L (ref 136–145)
TRIGL SERPL-MCNC: 254 MG/DL (ref 0–150)
TSH SERPL DL<=0.05 MIU/L-ACNC: 8.7 UIU/ML (ref 0.27–4.2)
VLDLC SERPL-MCNC: 43 MG/DL (ref 5–40)
WBC NRBC COR # BLD: 10.5 10*3/MM3 (ref 3.4–10.8)

## 2022-11-18 PROCEDURE — 85027 COMPLETE CBC AUTOMATED: CPT | Performed by: NURSE PRACTITIONER

## 2022-11-18 PROCEDURE — 82306 VITAMIN D 25 HYDROXY: CPT | Performed by: NURSE PRACTITIONER

## 2022-11-18 PROCEDURE — 84443 ASSAY THYROID STIM HORMONE: CPT | Performed by: NURSE PRACTITIONER

## 2022-11-18 PROCEDURE — 80053 COMPREHEN METABOLIC PANEL: CPT | Performed by: NURSE PRACTITIONER

## 2022-11-18 PROCEDURE — 80061 LIPID PANEL: CPT | Performed by: NURSE PRACTITIONER

## 2022-11-18 PROCEDURE — 83036 HEMOGLOBIN GLYCOSYLATED A1C: CPT | Performed by: NURSE PRACTITIONER

## 2022-11-21 ENCOUNTER — TELEPHONE (OUTPATIENT)
Dept: GASTROENTEROLOGY | Facility: CLINIC | Age: 37
End: 2022-11-21

## 2022-11-21 NOTE — TELEPHONE ENCOUNTER
Bita,   Patient spouse called she is having cramping, abdominal pain,heartburn  her last BM was yesterday. Friday she took her bowel prep to clean her out. She is taking miralax, dexilant and bentyl. Please advise?

## 2022-11-21 NOTE — TELEPHONE ENCOUNTER
She hasnt been seen in over 6 months and it looks like angelo wanted her back in 6 weeks back in march- can you set her up a follow up with angelo and give her the low fodmap diet

## 2022-11-23 ENCOUNTER — TELEPHONE (OUTPATIENT)
Dept: GASTROENTEROLOGY | Facility: CLINIC | Age: 37
End: 2022-11-23

## 2022-11-23 NOTE — TELEPHONE ENCOUNTER
Patient called and left a voice message regarding her diet and wondering if she should be taking Linzess or Miralax even though her poop is good.

## 2022-11-28 ENCOUNTER — TELEMEDICINE (OUTPATIENT)
Dept: INTERNAL MEDICINE | Facility: CLINIC | Age: 37
End: 2022-11-28

## 2022-11-28 DIAGNOSIS — F41.9 ANXIETY: Primary | ICD-10-CM

## 2022-11-28 DIAGNOSIS — R79.89 ELEVATED TSH: ICD-10-CM

## 2022-11-28 DIAGNOSIS — R11.0 NAUSEA: ICD-10-CM

## 2022-11-28 PROCEDURE — 99214 OFFICE O/P EST MOD 30 MIN: CPT | Performed by: NURSE PRACTITIONER

## 2022-11-28 RX ORDER — ONDANSETRON 4 MG/1
4 TABLET, FILM COATED ORAL 4 TIMES DAILY PRN
Qty: 30 TABLET | Refills: 0 | Status: SHIPPED | OUTPATIENT
Start: 2022-11-28 | End: 2023-01-11 | Stop reason: SDUPTHER

## 2022-11-28 NOTE — PROGRESS NOTES
"  This was an audio and video enabled visit.   This provider is located at the [facility] (through [facility]), [address], [place] [address] using a secure ActivePath Video Visit through [employer]. Marguerite  is being seen remotely via telehealth at their  home address in [place], and stated they are in a secure environment for this session.Marguerite may be asked to present for in office testing and/or evaluation if felt to be unsafe for telemedicine.  The provider identified herself/ credentials. The patient consents to be seen remotely, and when consent is given they understand that the consent allows for patient identifiable information to be sent to a third party as needed. They may refuse to be seen remotely at any time. The electronic data is encrypted and password protected, and the patient has been advised of the potential risks to privacy not withstanding such measures.  You have chosen to receive care through a telehealth visit. Do you consent to use a video/audio connection for your medical care today? Yes    Subjective   Marguerite Edwards is a 37 y.o. female.     Chief Complaint   Patient presents with   • feeling jittery      History of Present Illness   Marguerite is a 37-year-old female who is being seen via video visit today for complaints of \"feeling jittery for a long while.\"  She does have anxiety/depression/schizophrenia.  She follows routinely with Dane Dodd at [facility].  Has an appointment there later today.  She is concerned that her thyroid is causing her to feel jittery.  She did have a slightly high TSH at 8.7 on 11/18/2022 and a normal TSH on 11/2/2022.  She has no history of known thyroid disease.  Denies any recent medication changes.  She tells me that she previously received a medication from her psychiatrist for feeling shaky which did help but symptoms returned.    She would also like a refill on her Zofran.  She has had " some intermittent nausea for the last 2 weeks.  Uses Zofran and works well for her.    When questioned further the feeling of jitteriness and the nausea corresponded with the results she received about her slight elevation in her TSH.        The following portions of the patient's history were reviewed and updated as appropriate: allergies, current medications, past family history, past medical history, past social history, past surgical history and problem list.        Review of Systems   Constitutional: Negative for fatigue, fever and unexpected weight loss.   Eyes: Negative for blurred vision, double vision and visual disturbance.   Respiratory: Negative for cough, shortness of breath and wheezing.    Cardiovascular: Negative for chest pain, palpitations and leg swelling.   Gastrointestinal: Positive for nausea. Negative for abdominal pain, constipation, diarrhea and vomiting.   Genitourinary: Negative for difficulty urinating, frequency and urgency.   Musculoskeletal: Negative for arthralgias and myalgias.   Skin: Negative for color change and rash.   Neurological: Positive for tremors. Negative for dizziness, seizures, weakness and headache.   Hematological: Negative for adenopathy. Does not bruise/bleed easily.           Outpatient Medications Marked as Taking for the 11/28/22 encounter (Telemedicine) with Liliya Hodges APRN   Medication Sig Dispense Refill   • ondansetron (ZOFRAN) 4 MG tablet Take 1 tablet by mouth 4 (Four) Times a Day As Needed for Nausea or Vomiting. 30 tablet 0     Allergies   Allergen Reactions   • Lamotrigine Other (See Comments)     bruising  bruising   • Paxil [Paroxetine Hcl] Mental Status Change   • Prozac [Fluoxetine Hcl] Mental Status Change   • Chlorhexidine Gluconate Unknown - Low Severity   • Fluoxetine Unknown - Low Severity   • Lubiprostone Palpitations, Dizziness and Unknown - Low Severity   • Paroxetine Unknown - Low Severity           Objective   Physical Exam    Constitutional: She is oriented to person, place, and time. She appears well-developed and well-nourished. No distress.   HENT:   Head: Normocephalic and atraumatic.   Right Ear: External ear normal.   Left Ear: External ear normal.   Mouth/Throat: Oropharynx is clear and moist.   Eyes: Right eye exhibits no discharge. Left eye exhibits no discharge. No scleral icterus.   Neck: Neck normal appearance.  Pulmonary/Chest: Effort normal. No accessory muscle usage.  No respiratory distress.No use of oxygen by nasal cannula  Abdominal: Abdomen appears normal.   Neurological: She is alert and oriented to person, place, and time. She displays no tremor.   Skin: Skin is dry. She is not diaphoretic. No erythema. No pallor.   Psychiatric: She has a normal mood and affect. Her speech is normal and behavior is normal. Judgment normal. She is attentive.         There were no vitals filed for this visit.  There is no height or weight on file to calculate BMI.        Assessment & Plan   Diagnoses and all orders for this visit:    1. Anxiety (Primary)  The elevation of the TSH should not be causing her to feel jittery.  Explained to her that this is likely an error in the lab for a early indicator of hypothyroidism.  We have plans to recheck her TSH in a few weeks.  2. Nausea  -     ondansetron (ZOFRAN) 4 MG tablet; Take 1 tablet by mouth 4 (Four) Times a Day As Needed for Nausea or Vomiting.  Dispense: 30 tablet; Refill: 0  We will go ahead and refill her Zofran for her to use as needed.  I suspect that her feeling of jitteriness is coming more from her anxiety related to the slight elevation in the TSH.  I have reassured her that    3. Elevated TSH  -     TSH Rfx On Abnormal To Free T4; Future  Will need to recheck TSH in a few weeks as she had 1 normal and 1 slightly elevated earlier this month.  Order in so she can to stop in time to complete this.           Return for Next scheduled follow up.    I have reviewed the  limitations of a telehealth visit (such as lack of a physical exam and unable to obtain vital signs) and advised the patient that they may need to follow up for an office visit should their symptoms or concerns persist, worsen, or change.  Patient was encouraged to keep me informed of any acute changes, lack of improvement, or any new concerning symptoms.   I discussed my findings,recommendations, and plan of care was with the patient. They verbalized understanding and agreement.    Dictated Utilizing Dragon Dictation   Please note that portions of this note were completed with a voice recognition program.   Part of this note may be an electronic transcription/translation of spoken language to printed text using the Dragon Dictation System.

## 2022-11-30 ENCOUNTER — TELEPHONE (OUTPATIENT)
Dept: INTERNAL MEDICINE | Facility: CLINIC | Age: 37
End: 2022-11-30

## 2022-11-30 DIAGNOSIS — Z91.09 ENVIRONMENTAL ALLERGIES: ICD-10-CM

## 2022-11-30 RX ORDER — FLUTICASONE PROPIONATE 50 MCG
2 SPRAY, SUSPENSION (ML) NASAL DAILY
Qty: 16 G | Refills: 11 | Status: SHIPPED | OUTPATIENT
Start: 2022-11-30

## 2022-11-30 NOTE — TELEPHONE ENCOUNTER
Caller: Marguerite Edwards    Relationship: Self    Best call back number: [phone]    Requested Prescriptions:   THE PATIENT WOULD LIKE A REFILL ON  HER ALLERGY  MEDICATIONS THE PATIENT DID NOT KNOW THE NAME OF THE MEDICATION   THE PATIENT WOULD ALSO LIKE AN ORDER  FOR  DIPERS AND PADS SENT TO A [employer] ([employer])     Pharmacy where request should be sent: [facility] [place], [place] - [id number] - [phone]  - [phone] FX         Does the patient have less than a 3 day supply:  [x] Yes  [] No    Would you like a call back once the refill request has been completed: [x] Yes [] No    If the office needs to give you a call back, can they leave a voicemail: [x] Yes [] No    Oleg Orellana Rep   11/30/22 10:12 EST

## 2022-11-30 NOTE — TELEPHONE ENCOUNTER
Rx Refill Note  Requested Prescriptions     Pending Prescriptions Disp Refills   • fluticasone (Flonase) 50 MCG/ACT nasal spray 16 g 11     Si sprays into the nostril(s) as directed by provider Daily.      Last filled:  Last office visit with prescribing clinician: 2022 - Telemedicine    Next office visit with prescribing clinician: 3/27/2023     April ROSMERY Dodd MA  22, 10:02 EST     Nasal spray has been pended for approval. I didn't see any allergy medication in her active list. Please advise.

## 2022-11-30 NOTE — TELEPHONE ENCOUNTER
Caller: Marguerite Edwards    Relationship: Self    Best call back number: [phone]      What was the call regarding: PATIENT IS HAVING BAD ALLERGIES, REQUESTING NASAL SPRAY AND PILL SHE USED TO TAKE, PATIENT CAN NOT RECALL MEDICATION NAMES.    [facility] - [place], [place] - [id number] - [phone]  - [phone] FX        Do you require a callback: YES

## 2022-12-01 ENCOUNTER — TELEPHONE (OUTPATIENT)
Dept: INTERNAL MEDICINE | Facility: CLINIC | Age: 37
End: 2022-12-01

## 2022-12-01 DIAGNOSIS — N39.46 MIXED STRESS AND URGE URINARY INCONTINENCE: Primary | ICD-10-CM

## 2022-12-01 NOTE — TELEPHONE ENCOUNTER
PHONE CALL FROM PATIENT.  SHE NEEDS AN ORDER FOR DIAPERS AND PERIOD PADS.  DOES NOT KNOW WHERE TO SEND IT.     PLEASE CALL [phone]

## 2022-12-02 RX ORDER — LORATADINE 10 MG/1
10 TABLET ORAL DAILY
Qty: 30 TABLET | Refills: 11 | Status: SHIPPED | OUTPATIENT
Start: 2022-12-02 | End: 2023-03-28 | Stop reason: SDUPTHER

## 2022-12-02 NOTE — TELEPHONE ENCOUNTER
Spoke with Laurel at [facility] who states pt was [facility] from [facility], and subsequently discharged from [facility] due to non-compliance.     This communication was made from pt's  to [facility]

## 2022-12-02 NOTE — TELEPHONE ENCOUNTER
Spoke with pt and informed regarding DME. Pt states she requested from online DME company which should be sending fax to office soon

## 2023-01-04 ENCOUNTER — TELEPHONE (OUTPATIENT)
Dept: INTERNAL MEDICINE | Facility: CLINIC | Age: 38
End: 2023-01-04

## 2023-01-04 NOTE — TELEPHONE ENCOUNTER
Informed the patient that if she would like anything prescribed she would need to be seen. Advised of the OTC recommendations and she verbalized a good understanding.

## 2023-01-04 NOTE — TELEPHONE ENCOUNTER
Caller: Marguerite Edwards    Relationship: Self    Best call back number:[phone]    What medication are you requesting: ANYTHING TO HELP SYMPTOMS    What are your current symptoms: COUGH AND CLEAR CONGESTION    How long have you been experiencing symptoms: OVER A WEEK    Have you had these symptoms before:    [x] Yes  [] No    Have you been treated for these symptoms before:   [x] Yes  [] No    If a prescription is needed, what is your preferred pharmacy and phone number:    [facility]    Additional notes: WIFE HAS COVID

## 2023-01-09 NOTE — TELEPHONE ENCOUNTER
Addressed per provider. She needs to have an in office evaluation so we can properly assess the  area. I advise no interventions until this has been evaluated.

## 2023-01-11 ENCOUNTER — TELEMEDICINE (OUTPATIENT)
Dept: INTERNAL MEDICINE | Facility: CLINIC | Age: 38
End: 2023-01-11
Payer: COMMERCIAL

## 2023-01-11 VITALS — HEIGHT: 63 IN | WEIGHT: 192 LBS | BODY MASS INDEX: 34.02 KG/M2

## 2023-01-11 DIAGNOSIS — J45.30 MILD PERSISTENT ASTHMA WITHOUT COMPLICATION: Primary | Chronic | ICD-10-CM

## 2023-01-11 DIAGNOSIS — R11.0 NAUSEA: ICD-10-CM

## 2023-01-11 PROCEDURE — 99213 OFFICE O/P EST LOW 20 MIN: CPT | Performed by: NURSE PRACTITIONER

## 2023-01-11 RX ORDER — FLUTICASONE FUROATE AND VILANTEROL 100; 25 UG/1; UG/1
1 POWDER RESPIRATORY (INHALATION)
Qty: 60 EACH | Refills: 6 | Status: SHIPPED | OUTPATIENT
Start: 2023-01-11 | End: 2023-03-28 | Stop reason: SDUPTHER

## 2023-01-11 RX ORDER — HYDROXYZINE 50 MG/1
50 TABLET, FILM COATED ORAL DAILY
COMMUNITY
Start: 2022-12-29 | End: 2023-01-11 | Stop reason: SDUPTHER

## 2023-01-11 RX ORDER — ONDANSETRON 4 MG/1
4 TABLET, FILM COATED ORAL EVERY 8 HOURS PRN
Qty: 30 TABLET | Refills: 1 | Status: SHIPPED | OUTPATIENT
Start: 2023-01-11

## 2023-01-11 RX ORDER — BENZTROPINE MESYLATE 1 MG/1
1 TABLET ORAL DAILY
COMMUNITY
Start: 2022-12-29 | End: 2023-01-11 | Stop reason: ALTCHOICE

## 2023-01-11 RX ORDER — OLANZAPINE 10 MG/1
10 TABLET ORAL 3 TIMES DAILY
COMMUNITY
Start: 2022-12-29

## 2023-01-11 RX ORDER — OXCARBAZEPINE 150 MG/1
150 TABLET, FILM COATED ORAL DAILY
COMMUNITY
Start: 2022-12-29

## 2023-01-11 NOTE — PROGRESS NOTES
This was an audio and video enabled visit.   This provider is located at the [facility] (through [facility]), [address], [place] [address] using a secure Dailysinglet Video Visit through [email or web address] or [employer] (pt preference). Marguerite  is being seen remotely via telehealth  in [place]. Pt provided a secure environment for this session.  Marguerite may be asked to present for in office testing and/or evaluation if felt to be unsafe for telemedicine.    The provider identified herself /credentials as appropriate.   By proceeding with the telehealth visit, the patient consents to be seen remotely which allows for patient identifiable information to be sent to a third party as needed. The patient may refuse to be seen remotely at any time. The electronic data is encrypted and password protected, and the patient is advised of the potential risks to privacy not withstanding such measures.    You have chosen to receive care through a telehealth visit. Do you consent to use a video/audio connection for your medical care today? Yes      Subjective   Marguerite Edwards is a 37 y.o. female.     Chief Complaint   Patient presents with   • Nausea     PT had COVID  1/1/2023 stated she has n/v     • Asthma     Needs breo refills       History of Present Illness   She had covid last week, was out of quarantine Sunday.   She has been having some nausea. No further vomiting.   She was using zofran and it works well for her She is out of her Zofran.     Asthma-chronic.  Reports that breathing is well managed.  Not having to use rescue inhaler/neb.  Out of her Breo needs a refill today          The following portions of the patient's history were reviewed and updated as appropriate: allergies, current medications, past family history, past medical history, past social history, past surgical history and problem list.        Review of Systems   Constitutional: Negative for fatigue, fever and  unexpected weight loss.   Eyes: Negative for blurred vision, double vision and visual disturbance.   Respiratory: Negative for cough, shortness of breath and wheezing.    Cardiovascular: Negative for chest pain, palpitations and leg swelling.   Gastrointestinal: Positive for nausea. Negative for abdominal pain, constipation, diarrhea and vomiting.   Genitourinary: Negative for difficulty urinating, frequency and urgency.   Musculoskeletal: Negative for arthralgias and myalgias.   Skin: Negative for color change and rash.   Neurological: Negative for dizziness, weakness and headache.   Hematological: Negative for adenopathy. Does not bruise/bleed easily.           Outpatient Medications Marked as Taking for the 1/11/23 encounter (Telemedicine) with Liliya Hodges APRZULEYMA   Medication Sig Dispense Refill   • Alcohol Swabs (Easy Touch Alcohol Prep Medium) 70 % pads USE DAILY AS DIRECTED 100 each 11   • Blood Pressure Monitoring (Blood Pressure Monitor/L Cuff) misc Use daily to check blood pressure 1 each 0   • cholecalciferol (VITAMIN D3) 25 MCG (1000 UT) tablet Take 1 tablet by mouth Daily. 30 tablet 11   • Dexilant 60 MG capsule Take 1 capsule by mouth Daily. 90 capsule 3   • dicyclomine (BENTYL) 10 MG capsule Take 1 capsule by mouth 4 (Four) Times a Day Before Meals & at Bedtime. 120 capsule 3   • fluticasone (Flonase) 50 MCG/ACT nasal spray 2 sprays into the nostril(s) as directed by provider Daily. 16 g 11   • glucose blood test strip Use as instructed 100 each 12   • hydrOXYzine pamoate (VISTARIL) 50 MG capsule Take 50 mg by mouth Every 6 (Six) Hours. Every 6 hours  As needed and 2 pills at night     • Incontinence Supplies misc 1 each As Needed (incontinence). 150 each 112   • linaclotide (LINZESS) 72 MCG capsule capsule Take 1 capsule by mouth Every Morning Before Breakfast. 90 capsule 0   • loratadine (Claritin) 10 MG tablet Take 1 tablet by mouth Daily. 30 tablet 11   • Melatonin 5 MG capsule Take 5 mg by  mouth At Night As Needed (insomnia). 30 each 3   • Multiple Vitamin (multivitamin) capsule Take 1 capsule by mouth Daily. 30 capsule 11   • OLANZapine (zyPREXA) 10 MG tablet Take 10 mg by mouth 3 (Three) Times a Day.     • ondansetron (ZOFRAN) 4 MG tablet Take 1 tablet by mouth Every 8 (Eight) Hours As Needed for Nausea or Vomiting. 30 tablet 1   • OXcarbazepine (TRILEPTAL) 150 MG tablet Take 150 mg by mouth Daily.     • Sanitary Napkins & Tampons (MAXI PAD OVERNIGHT) pads 1 pad 4 (Four) Times a Day As Needed (menses). 96 each 11   • simvastatin (ZOCOR) 20 MG tablet Take 1 tablet by mouth Every Night. 90 tablet 1   • tiZANidine (Zanaflex) 4 MG tablet 1-2 pills every 8 hours as needed for muscle or bladder spasms. 180 tablet 3   • [DISCONTINUED] benztropine (COGENTIN) 1 MG tablet Take 1 mg by mouth Daily.     • [DISCONTINUED] hydrOXYzine (ATARAX) 50 MG tablet Take 50 mg by mouth Daily.     • [DISCONTINUED] nystatin (MYCOSTATIN) 864582 UNIT/GM powder Apply  topically to the appropriate area as directed 3 (Three) Times a Day. 60 g 1   • [DISCONTINUED] ondansetron (ZOFRAN) 4 MG tablet Take 1 tablet by mouth 4 (Four) Times a Day As Needed for Nausea or Vomiting. 30 tablet 0     Allergies   Allergen Reactions   • Lamotrigine Other (See Comments)     bruising  bruising   • Paxil [Paroxetine Hcl] Mental Status Change   • Prozac [Fluoxetine Hcl] Mental Status Change   • Chlorhexidine Gluconate Unknown - Low Severity   • Fluoxetine Unknown - Low Severity   • Lubiprostone Palpitations, Dizziness and Unknown - Low Severity   • Paroxetine Unknown - Low Severity           Objective   Physical Exam   Constitutional: She is oriented to person, place, and time. She appears well-developed and well-nourished. No distress.   HENT:   Head: Normocephalic and atraumatic.   Right Ear: External ear normal.   Left Ear: External ear normal.   Mouth/Throat: Oropharynx is clear and moist.   Eyes: Right eye exhibits no discharge. Left eye  "exhibits no discharge. No scleral icterus.   Neck: Neck normal appearance.  Pulmonary/Chest: Effort normal. No accessory muscle usage.  No respiratory distress.No use of oxygen by nasal cannula  Abdominal: Abdomen appears normal.   Neurological: She is alert and oriented to person, place, and time.   Skin: Skin is dry. She is not diaphoretic. No erythema. No pallor.   Psychiatric: She has a normal mood and affect. Her speech is normal and behavior is normal. Judgment normal. She is attentive.         Vitals:    01/11/23 1126   Weight: 87.1 kg (192 lb)   Height: 160 cm (62.99\")     Body mass index is 34.02 kg/m².        Assessment & Plan   Diagnoses and all orders for this visit:    1. Mild persistent asthma without complication (Primary)  -     Fluticasone Furoate-Vilanterol (Breo Ellipta) 100-25 MCG/ACT aerosol powder ; Inhale 1 puff Daily.  Dispense: 60 each; Refill: 6  Well-controlled.  Continue Breo.  Has rescue inhaler if needed.  Well-versed on use.  2. Nausea  -     ondansetron (ZOFRAN) 4 MG tablet; Take 1 tablet by mouth Every 8 (Eight) Hours As Needed for Nausea or Vomiting.  Dispense: 30 tablet; Refill: 1  Refill Zofran for as needed use.  Encouraged to use sparingly.  Encouraged to stay hydrated.  Follow-up for nausea if does not improve over the next couple days         Return in about 3 months (around 4/11/2023) for chronic condition follow up.    I have reviewed the limitations of a telehealth visit (such as lack of a physical exam and unable to obtain vital signs) and advised the patient that they may need to follow up for an office visit should their symptoms or concerns persist, worsen, or change.  Patient was encouraged to keep me informed of any acute changes, lack of improvement, or any new concerning symptoms.   I discussed my findings,recommendations, and plan of care was with the patient. They verbalized understanding and agreement.    Dictated Utilizing Dragon Dictation   Please note that " portions of this note were completed with a voice recognition program.   Part of this note may be an electronic transcription/translation of spoken language to printed text using the Dragon Dictation System.

## 2023-01-16 DIAGNOSIS — N32.89 BLADDER SPASMS: ICD-10-CM

## 2023-01-17 DIAGNOSIS — N32.89 BLADDER SPASMS: ICD-10-CM

## 2023-01-17 RX ORDER — TIZANIDINE 4 MG/1
TABLET ORAL
Qty: 180 TABLET | Refills: 0 | OUTPATIENT
Start: 2023-01-17

## 2023-01-17 RX ORDER — TIZANIDINE 4 MG/1
TABLET ORAL
Qty: 180 TABLET | Refills: 0 | Status: SHIPPED | OUTPATIENT
Start: 2023-01-17 | End: 2023-03-07

## 2023-01-17 NOTE — TELEPHONE ENCOUNTER
Caller: Marguerite Edwards    Relationship: Self    Best call back number:[phone]    Requested Prescriptions:   Requested Prescriptions     Pending Prescriptions Disp Refills   • tiZANidine (ZANAFLEX) 4 MG tablet 180 tablet 0     Sig: TAKE 1-2 TABLETS BY MOUTH EVERY 8 HOURS AS NEEDED FOR BLADDER SPASMS        Pharmacy where request should be sent: [place], [place] - [address] [place] [address] [email or web address] - [phone] [facility] [phone] FX         Does the patient have less than a 3 day supply:  [] Yes  [x] No    Would you like a call back once the refill request has been completed: [x] Yes [] No    If the office needs to give you a call back, can they leave a voicemail: [x] Yes [] No    Oleg Berry Rep   01/17/23 09:53 EST         .”

## 2023-01-23 ENCOUNTER — TELEPHONE (OUTPATIENT)
Dept: INTERNAL MEDICINE | Facility: CLINIC | Age: 38
End: 2023-01-23
Payer: COMMERCIAL

## 2023-01-23 NOTE — TELEPHONE ENCOUNTER
Provider: SOULEYMANE COATES     Caller: CLAUDIA CISNEROS     Phone Number: [phone]    Reason for Call: PATIENT IS NEEDING HER BLOOD ORDERS FAXED TO  WHERE SHE WILL RECEIVE THEM. SHE IS WANTING TO GO GET THE BLOOD WORK DONE TOMORROW. THE FAX NUMBER TO  IS  [phone]

## 2023-01-23 NOTE — TELEPHONE ENCOUNTER
Called and spoke with patient, informed her that the orders were faxed over to [facility]. Sent records through [employer].

## 2023-02-15 ENCOUNTER — TELEPHONE (OUTPATIENT)
Dept: INTERNAL MEDICINE | Facility: CLINIC | Age: 38
End: 2023-02-15
Payer: COMMERCIAL

## 2023-02-15 NOTE — TELEPHONE ENCOUNTER
Spoke with Thuy, gave her pt results since she was on verbal release. Verbalized good understanding

## 2023-02-22 ENCOUNTER — PRIOR AUTHORIZATION (OUTPATIENT)
Dept: GASTROENTEROLOGY | Facility: CLINIC | Age: 38
End: 2023-02-22
Payer: COMMERCIAL

## 2023-02-22 NOTE — TELEPHONE ENCOUNTER
Approvedtoday  The request has been approved. The authorization is effective for a maximum of 12 fills from 02/22/2023 to 02/21/2024, as long as the member is enrolled in their current health plan. The request was approved as submitted. A written notification letter will follow with additional details.

## 2023-02-27 ENCOUNTER — TELEMEDICINE (OUTPATIENT)
Dept: INTERNAL MEDICINE | Facility: CLINIC | Age: 38
End: 2023-02-27
Payer: COMMERCIAL

## 2023-02-27 DIAGNOSIS — Z12.31 ENCOUNTER FOR SCREENING MAMMOGRAM FOR MALIGNANT NEOPLASM OF BREAST: ICD-10-CM

## 2023-02-27 DIAGNOSIS — R53.83 FATIGUE, UNSPECIFIED TYPE: Primary | ICD-10-CM

## 2023-02-27 DIAGNOSIS — R42 DIZZINESS: ICD-10-CM

## 2023-02-27 PROCEDURE — 99213 OFFICE O/P EST LOW 20 MIN: CPT | Performed by: NURSE PRACTITIONER

## 2023-02-27 RX ORDER — MULTIVITAMIN
1 CAPSULE ORAL DAILY
Qty: 30 CAPSULE | Refills: 11 | Status: SHIPPED | OUTPATIENT
Start: 2023-02-27 | End: 2024-02-27

## 2023-02-27 RX ORDER — VENLAFAXINE HYDROCHLORIDE 37.5 MG/1
75 CAPSULE, EXTENDED RELEASE ORAL DAILY
COMMUNITY

## 2023-03-06 DIAGNOSIS — N32.89 BLADDER SPASMS: ICD-10-CM

## 2023-03-06 RX ORDER — SIMVASTATIN 20 MG
20 TABLET ORAL NIGHTLY
Qty: 90 TABLET | Refills: 1 | OUTPATIENT
Start: 2023-03-06

## 2023-03-06 NOTE — TELEPHONE ENCOUNTER
Caller: Marguerite Edwards    Relationship: Self    Best call back number: [phone]    Requested Prescriptions:   Requested Prescriptions     Pending Prescriptions Disp Refills   • tiZANidine (ZANAFLEX) 4 MG tablet 180 tablet 0     Sig: TAKE 1-2 TABLETS BY MOUTH EVERY 8 HOURS AS NEEDED FOR BLADDER SPASMS      Pharmacy where request should be sent: [place], [place] - [address] [place] [address] [id number] - [phone] [facility] [phone] FX     Does the patient have less than a 3 day supply:  [x] Yes  [] No    Would you like a call back once the refill request has been completed: [x] Yes [] No    If the office needs to give you a call back, can they leave a voicemail: [x] Yes [] No    Oleg Tirado Rep   03/06/23 13:01 EST

## 2023-03-07 RX ORDER — TIZANIDINE 4 MG/1
TABLET ORAL
Qty: 180 TABLET | Refills: 0 | OUTPATIENT
Start: 2023-03-07

## 2023-03-07 RX ORDER — TIZANIDINE 4 MG/1
TABLET ORAL
Qty: 180 TABLET | Refills: 0 | Status: SHIPPED | OUTPATIENT
Start: 2023-03-07 | End: 2023-03-28 | Stop reason: SDUPTHER

## 2023-03-07 NOTE — TELEPHONE ENCOUNTER
Rx Refill Note  Requested Prescriptions     Pending Prescriptions Disp Refills   • tiZANidine (ZANAFLEX) 4 MG tablet [Pharmacy Med Name: TIZANIDINE HCL 4 MG TABLET] 180 tablet 0     Sig: TAKE 1 OR 2 TABLETS BY MOUTH EVERY 8 HOURS AS NEEDED FOR BLADDER SPASMS      Last office visit with prescribing clinician: 9/26/2022     Next office visit with prescribing clinician: 3/15/2023     Evelyn Cano MA  03/07/23, 10:43 EST

## 2023-03-08 RX ORDER — SIMVASTATIN 20 MG
20 TABLET ORAL NIGHTLY
Qty: 90 TABLET | Refills: 0 | Status: SHIPPED | OUTPATIENT
Start: 2023-03-08 | End: 2023-03-28 | Stop reason: SDUPTHER

## 2023-03-20 ENCOUNTER — TELEPHONE (OUTPATIENT)
Dept: GASTROENTEROLOGY | Facility: CLINIC | Age: 38
End: 2023-03-20
Payer: COMMERCIAL

## 2023-03-20 NOTE — TELEPHONE ENCOUNTER
Continue miralax 1-2 times daily, Linzess daily. Encourage adequate oral hydration, fiber intake. Schedule follow up appt with Dr. Thomas. Thanks!

## 2023-03-20 NOTE — TELEPHONE ENCOUNTER
Evelia Summers called & left a message on MA line this morning.Complains of constipation. She hasn't had a bowel movement in over 9 days. Started taking Miralax this morning and took one Linzess yesterday and today. No results. Please advise. Thanks

## 2023-03-21 RX ORDER — POLYETHYLENE GLYCOL 3350 17 G/17G
POWDER, FOR SOLUTION ORAL
Qty: 765 G | Refills: 0 | Status: SHIPPED | OUTPATIENT
Start: 2023-03-21

## 2023-03-21 NOTE — TELEPHONE ENCOUNTER
I spoke with Hiliary's spouse. Gave him Melina's recommendation. Patient would like refill on Miralax.

## 2023-03-28 ENCOUNTER — TELEMEDICINE (OUTPATIENT)
Dept: INTERNAL MEDICINE | Facility: CLINIC | Age: 38
End: 2023-03-28
Payer: COMMERCIAL

## 2023-03-28 ENCOUNTER — TELEPHONE (OUTPATIENT)
Dept: GASTROENTEROLOGY | Facility: CLINIC | Age: 38
End: 2023-03-28
Payer: COMMERCIAL

## 2023-03-28 DIAGNOSIS — J45.30 MILD PERSISTENT ASTHMA WITHOUT COMPLICATION: ICD-10-CM

## 2023-03-28 DIAGNOSIS — N32.89 BLADDER SPASMS: Primary | ICD-10-CM

## 2023-03-28 DIAGNOSIS — E78.2 MIXED HYPERLIPIDEMIA: Chronic | ICD-10-CM

## 2023-03-28 DIAGNOSIS — K59.09 CHRONIC CONSTIPATION: ICD-10-CM

## 2023-03-28 PROCEDURE — 1160F RVW MEDS BY RX/DR IN RCRD: CPT | Performed by: NURSE PRACTITIONER

## 2023-03-28 PROCEDURE — 1159F MED LIST DOCD IN RCRD: CPT | Performed by: NURSE PRACTITIONER

## 2023-03-28 PROCEDURE — 99214 OFFICE O/P EST MOD 30 MIN: CPT | Performed by: NURSE PRACTITIONER

## 2023-03-28 RX ORDER — LORATADINE 10 MG/1
10 TABLET ORAL DAILY
Qty: 30 TABLET | Refills: 11 | Status: SHIPPED | OUTPATIENT
Start: 2023-03-28

## 2023-03-28 RX ORDER — SIMVASTATIN 20 MG
20 TABLET ORAL NIGHTLY
Qty: 90 TABLET | Refills: 1 | Status: SHIPPED | OUTPATIENT
Start: 2023-03-28

## 2023-03-28 RX ORDER — TIZANIDINE 4 MG/1
TABLET ORAL
Qty: 180 TABLET | Refills: 0 | Status: SHIPPED | OUTPATIENT
Start: 2023-03-28

## 2023-03-28 RX ORDER — ALBUTEROL SULFATE 90 UG/1
2 AEROSOL, METERED RESPIRATORY (INHALATION) EVERY 4 HOURS PRN
Qty: 18 G | Refills: 3 | Status: SHIPPED | OUTPATIENT
Start: 2023-03-28

## 2023-03-28 RX ORDER — FLUTICASONE FUROATE AND VILANTEROL 100; 25 UG/1; UG/1
1 POWDER RESPIRATORY (INHALATION)
Qty: 60 EACH | Refills: 6 | Status: SHIPPED | OUTPATIENT
Start: 2023-03-28

## 2023-03-28 NOTE — PROGRESS NOTES
This was an audio and video enabled visit.   This provider is located at   the [facility] (through [facility]), [address], [place] [address] using a secure [employer] Video Visit through [employer] or [email or web address] (pt preference). Marguerite  is being seen remotely via telehealth  in [place]. Pt provided a secure environment for this session.  Marguerite may be asked to present for in office testing and/or evaluation if felt to be unsafe for telemedicine.    The provider identified herself /credentials as appropriate.   By proceeding with the telehealth visit, the patient consents to be seen remotely which allows for patient identifiable information to be sent to a third party as needed. The patient may refuse to be seen remotely at any time. The electronic data is encrypted and password protected, and the patient is advised of the potential risks to privacy not withstanding such measures.    You have chosen to receive care through a telehealth visit. Do you consent to use a video/audio connection for your medical care today? Yes      Subjective   Marguerite Edwards is a 38 y.o. female.     No chief complaint on file.      History of Present Illness          Marguerite has chronic bladder spasms.  She is currently seeing urology.  They manage self cathing.  She is also using muscle relaxants which tend to help a lot with bladder leakage and spasm.     Hyperlipidemia-chronic.  She was previously on simvastatin but [facility] had changed her over to a different statin due to availability.  She would like to go back on her simvastatin.     She has chronic constipation.  She does follow with GI.  Eating healthy. Walking more  Last BM was a very small amount of firm brown stool this am.   Has had linzess, miralax, and dulcolax.        asthma/allergies- well controlled. No cough, wheezing or SOA.   Has albuterol inhaler. rarely has to use .    The following portions of the patient's  history were reviewed and updated as appropriate: allergies, current medications, past family history, past medical history, past social history, past surgical history and problem list.        Review of Systems   Constitutional: Negative for fatigue, fever and unexpected weight loss.   Eyes: Negative for blurred vision, double vision and visual disturbance.   Respiratory: Negative for cough, shortness of breath and wheezing.    Cardiovascular: Negative for chest pain, palpitations and leg swelling.   Gastrointestinal: Positive for constipation. Negative for abdominal pain, diarrhea, nausea and vomiting.   Genitourinary: Positive for urinary incontinence. Negative for difficulty urinating, frequency and urgency.   Musculoskeletal: Negative for arthralgias and myalgias.   Skin: Negative for color change and rash.   Neurological: Negative for dizziness, weakness and headache.   Hematological: Negative for adenopathy. Does not bruise/bleed easily.           Outpatient Medications Marked as Taking for the 3/28/23 encounter (Telemedicine) with Liliya Hodges APRN   Medication Sig Dispense Refill   • albuterol sulfate  (90 Base) MCG/ACT inhaler Inhale 2 puffs Every 4 (Four) Hours As Needed for Wheezing. 18 g 3   • Alcohol Swabs (Easy Touch Alcohol Prep Medium) 70 % pads USE DAILY AS DIRECTED 100 each 11   • Blood Pressure Monitoring (Blood Pressure Monitor/L Cuff) misc Use daily to check blood pressure 1 each 0   • cholecalciferol (VITAMIN D3) 25 MCG (1000 UT) tablet Take 1 tablet by mouth Daily. 30 tablet 11   • cholestyramine (QUESTRAN) 4 GM/DOSE powder Take 1 packet by mouth 2 (Two) Times a Day. mixed with a liquid 378 g 11   • Dexilant 60 MG capsule Take 1 capsule by mouth Daily. 90 capsule 3   • dicyclomine (BENTYL) 10 MG capsule Take 1 capsule by mouth 4 (Four) Times a Day Before Meals & at Bedtime. 120 capsule 3   • fluticasone (Flonase) 50 MCG/ACT nasal spray 2 sprays into the nostril(s) as directed by  provider Daily. 16 g 11   • Fluticasone Furoate-Vilanterol (Breo Ellipta) 100-25 MCG/ACT aerosol powder  Inhale 1 puff Daily. 60 each 6   • hydrOXYzine pamoate (VISTARIL) 50 MG capsule Take 1 capsule by mouth Every 6 (Six) Hours. Every 6 hours  As needed and 2 pills at night     • Incontinence Supplies misc 1 each As Needed (incontinence). 150 each 112   • linaclotide (LINZESS) 72 MCG capsule capsule Take 1 capsule by mouth Every Morning Before Breakfast. 90 capsule 0   • loratadine (Claritin) 10 MG tablet Take 1 tablet by mouth Daily. 30 tablet 11   • Melatonin 5 MG capsule Take 5 mg by mouth At Night As Needed (insomnia). 30 each 3   • Multiple Vitamin (multivitamin) capsule Take 1 capsule by mouth Daily. 30 capsule 11   • OLANZapine (zyPREXA) 10 MG tablet Take 1 tablet by mouth 3 (Three) Times a Day.     • ondansetron (ZOFRAN) 4 MG tablet Take 1 tablet by mouth Every 8 (Eight) Hours As Needed for Nausea or Vomiting. 30 tablet 1   • OXcarbazepine (TRILEPTAL) 150 MG tablet Take 1 tablet by mouth Daily.     • polyethylene glycol (MiraLax) 17 GM/SCOOP powder 1-2 capful daily as needed. 765 g 0   • Sanitary Napkins & Tampons (MAXI PAD OVERNIGHT) pads 1 pad 4 (Four) Times a Day As Needed (menses). 96 each 11   • simvastatin (ZOCOR) 20 MG tablet Take 1 tablet by mouth Every Night. 90 tablet 1   • tiZANidine (ZANAFLEX) 4 MG tablet TAKE 1 OR 2 TABLETS BY MOUTH EVERY 8 HOURS AS NEEDED FOR BLADDER SPASMS 180 tablet 0   • venlafaxine XR (EFFEXOR-XR) 37.5 MG 24 hr capsule Take 2 capsules by mouth Daily. Takes 2 37.5 tabs     • [DISCONTINUED] albuterol sulfate  (90 Base) MCG/ACT inhaler Inhale 2 puffs Every 4 (Four) Hours As Needed for Wheezing. 18 g 6   • [DISCONTINUED] Fluticasone Furoate-Vilanterol (Breo Ellipta) 100-25 MCG/ACT aerosol powder  Inhale 1 puff Daily. 60 each 6   • [DISCONTINUED] loratadine (Claritin) 10 MG tablet Take 1 tablet by mouth Daily. 30 tablet 11   • [DISCONTINUED] simvastatin (ZOCOR) 20 MG  tablet TAKE 1 TABLET BY MOUTH EVERY NIGHT 90 tablet 0   • [DISCONTINUED] tiZANidine (ZANAFLEX) 4 MG tablet TAKE 1 OR 2 TABLETS BY MOUTH EVERY 8 HOURS AS NEEDED FOR BLADDER SPASMS 180 tablet 0     Allergies   Allergen Reactions   • Lamotrigine Other (See Comments)     bruising  bruising   • Paxil [Paroxetine Hcl] Mental Status Change   • Prozac [Fluoxetine Hcl] Mental Status Change   • Chlorhexidine Gluconate Unknown - Low Severity   • Fluoxetine Unknown - Low Severity   • Lubiprostone Palpitations, Dizziness and Unknown - Low Severity   • Paroxetine Unknown - Low Severity           Objective   Physical Exam   Constitutional: She is oriented to person, place, and time. She appears well-developed and well-nourished. No distress.   HENT:   Head: Normocephalic and atraumatic.   Right Ear: External ear normal.   Left Ear: External ear normal.   Mouth/Throat: Oropharynx is clear and moist.   Eyes: Right eye exhibits no discharge. Left eye exhibits no discharge. No scleral icterus.   Neck: Neck normal appearance.  Pulmonary/Chest: Effort normal. No accessory muscle usage.  No respiratory distress.No use of oxygen by nasal cannula  Abdominal: Abdomen appears normal.   Neurological: She is alert and oriented to person, place, and time.   Skin: Skin is dry. She is not diaphoretic. No erythema. No pallor.   Psychiatric: She has a normal mood and affect. Her speech is normal and behavior is normal. Judgment normal. She is attentive.         There were no vitals filed for this visit.  There is no height or weight on file to calculate BMI.        Assessment & Plan   Diagnoses and all orders for this visit:    1. Bladder spasms (Primary)  -     tiZANidine (ZANAFLEX) 4 MG tablet; TAKE 1 OR 2 TABLETS BY MOUTH EVERY 8 HOURS AS NEEDED FOR BLADDER SPASMS  Dispense: 180 tablet; Refill: 0  Stable.  Continue tizanidine.  Continue to follow with urology as well.  Refill as above  2. Mild persistent asthma without complication  -      Fluticasone Furoate-Vilanterol (Breo Ellipta) 100-25 MCG/ACT aerosol powder ; Inhale 1 puff Daily.  Dispense: 60 each; Refill: 6  -     loratadine (Claritin) 10 MG tablet; Take 1 tablet by mouth Daily.  Dispense: 30 tablet; Refill: 11  -     albuterol sulfate  (90 Base) MCG/ACT inhaler; Inhale 2 puffs Every 4 (Four) Hours As Needed for Wheezing.  Dispense: 18 g; Refill: 3  Well-controlled.  Continue Breo, Claritin, and albuterol  3. Mixed hyperlipidemia  -     simvastatin (ZOCOR) 20 MG tablet; Take 1 tablet by mouth Every Night.  Dispense: 90 tablet; Refill: 1  Stable.  Continue statin.  Her last lipids in 11/2022 showed LDL of 102 which was improved from 123 we will plan on repeat lipids to follow-up  4. Chronic constipation  Stable.  Continue to follow with GI                Return in about 3 months (around 6/28/2023) for chronic condition follow up, In Office.    I have reviewed the limitations of a telehealth visit (such as lack of a physical exam and unable to obtain vital signs) and advised the patient that they may need to follow up for an office visit should their symptoms or concerns persist, worsen, or change.  Patient was encouraged to keep me informed of any acute changes, lack of improvement, or any new concerning symptoms.   I discussed my findings,recommendations, and plan of care was with the patient. They verbalized understanding and agreement.    Dictated Utilizing Dragon Dictation   Please note that portions of this note were completed with a voice recognition program.   Part of this note may be an electronic transcription/translation of spoken language to printed text using the Dragon Dictation System.

## 2023-03-31 NOTE — TELEPHONE ENCOUNTER
Dr Thomas,  Patient called and left a message yesterday afternoon stating that now she is experiencing loose stools.     
I SPOKE WITH CLAUDIA. WENT OVER DR HURLEY'S RECOMMENDATION REGARDING BOWEL CLEANSE INSTRUCTIONS.  
Patient called again. Let her know Dr. Alexis recommendations. Patient is aware and expressed understanding. Bowel prep sent in with cosign required.  
Patient called today stating she had gone 10 days with no BM, and yesterday she had two, very small BMs. She said she took Linzess twice as well as stool softener. She is worried she is not passing enough. How to proceed?  
no

## 2023-04-03 ENCOUNTER — TELEPHONE (OUTPATIENT)
Dept: INTERNAL MEDICINE | Facility: CLINIC | Age: 38
End: 2023-04-03
Payer: COMMERCIAL

## 2023-04-03 NOTE — TELEPHONE ENCOUNTER
It is difficult to say without evaluating.   Please be sure she is drinking plenty of water.   Can schedule in office visit if wants evaluated for this.

## 2023-04-03 NOTE — TELEPHONE ENCOUNTER
Caller: Claudia Edwards    Relationship: Self    Best call back number: [phone]    What is the best time to reach you: ANYTIME    Who are you requesting to speak with (clinical staff, provider,  specific staff member): CLINICAL    Do you know the name of the person who called: CLAUDIA    What was the call regarding: PATIENT WOULD LIKE TO KNOW WHY YOU GET CRAMPS IN YOUR FEET.    Do you require a callback: YES CAN LEAVE VOICEMAIL

## 2023-04-03 NOTE — TELEPHONE ENCOUNTER
Spoke with pt and gave her providers recommendation. Verbalized good understanding and stated she probably needs to drink more water.

## 2023-04-25 ENCOUNTER — TELEPHONE (OUTPATIENT)
Dept: INTERNAL MEDICINE | Facility: CLINIC | Age: 38
End: 2023-04-25
Payer: COMMERCIAL

## 2023-05-08 DIAGNOSIS — R10.13 EPIGASTRIC PAIN: ICD-10-CM

## 2023-05-08 DIAGNOSIS — K21.9 GASTROESOPHAGEAL REFLUX DISEASE, UNSPECIFIED WHETHER ESOPHAGITIS PRESENT: ICD-10-CM

## 2023-05-08 RX ORDER — DICYCLOMINE HYDROCHLORIDE 10 MG/1
10 CAPSULE ORAL
Qty: 120 CAPSULE | Refills: 3 | Status: SHIPPED | OUTPATIENT
Start: 2023-05-08

## 2023-05-08 RX ORDER — DEXLANSOPRAZOLE 60 MG/1
60 CAPSULE, DELAYED RELEASE ORAL DAILY
Qty: 90 CAPSULE | Refills: 3 | Status: SHIPPED | OUTPATIENT
Start: 2023-05-08

## 2023-05-11 DIAGNOSIS — K59.00 CONSTIPATION, UNSPECIFIED CONSTIPATION TYPE: ICD-10-CM

## 2023-05-11 RX ORDER — LINACLOTIDE 72 UG/1
CAPSULE, GELATIN COATED ORAL
Qty: 90 CAPSULE | Refills: 0 | Status: SHIPPED | OUTPATIENT
Start: 2023-05-11

## 2023-05-15 ENCOUNTER — TELEPHONE (OUTPATIENT)
Dept: INTERNAL MEDICINE | Facility: CLINIC | Age: 38
End: 2023-05-15
Payer: COMMERCIAL

## 2023-05-15 NOTE — TELEPHONE ENCOUNTER
Spoke with pt and she states she already canceled her apt. She states she will call back when she is finished with her menstrual cycle. Pt states she gets very fatigued on her cycle and would prefer to reschedule.

## 2023-05-15 NOTE — TELEPHONE ENCOUNTER
Caller: Claudia Edwards    Relationship: Self    Best call back number: [phone]    What is the best time to reach you: ANYTIME   Who are you requesting to speak with (clinical staff, provider,  specific staff member): CLINICAL STAFF     Do you know THE PATIENT CLAUDIA    What was the call regarding:THE PATIENT HAS AN APPOINTMENT FOR VAGINAL ITCHING TOMORROW, 05/16/2023 BUT REPORTS SHE IS MENSTRUATING AND WANTS TO KNOW IF SHE CAN STILL BE EXAMINED AT HER APPOINTMENT ON 05/16/2023    Do you require a callback: YES, PLEASE CALL AND ADVISE

## 2023-05-22 NOTE — TELEPHONE ENCOUNTER
PATIENT CALLED REQUESTING REFILL OF MIRALAX. ALSO STATED HER BOWELS HAVEN'T MOVED SINCE WEDNESDAY, HOW TO PROCEED?

## 2023-05-23 ENCOUNTER — TELEPHONE (OUTPATIENT)
Dept: GASTROENTEROLOGY | Facility: CLINIC | Age: 38
End: 2023-05-23
Payer: COMMERCIAL

## 2023-05-23 DIAGNOSIS — K59.00 CONSTIPATION, UNSPECIFIED CONSTIPATION TYPE: Primary | ICD-10-CM

## 2023-05-23 DIAGNOSIS — K59.00 CONSTIPATION, UNSPECIFIED CONSTIPATION TYPE: ICD-10-CM

## 2023-05-23 RX ORDER — POLYETHYLENE GLYCOL 3350 17 G/17G
POWDER, FOR SOLUTION ORAL
Qty: 765 G | Refills: 0 | Status: SHIPPED | OUTPATIENT
Start: 2023-05-23

## 2023-05-23 NOTE — TELEPHONE ENCOUNTER
Dr Thomas,   I spoke with Hiliary. Patient stated she has been taking Linzess 72 mcg capsule twice a day and Miralax 2 capfuls with 16 oz of water nothing is helping. Should she take bowel cleanse again? I explained to patient that she should only be taking Linzess 72 mcg once daily. Requesting refills for Linzess. Thanks

## 2023-05-24 RX ORDER — LINACLOTIDE 72 UG/1
CAPSULE, GELATIN COATED ORAL
Qty: 90 CAPSULE | Refills: 0 | OUTPATIENT
Start: 2023-05-24

## 2023-05-24 NOTE — TELEPHONE ENCOUNTER
Patient's spouse called back. I tried to give spouse Dr Thomas's recommendation. Spouse hung up the phone. Stated that he's getting several different messages and advice from different people and he frustrated.

## 2023-05-25 ENCOUNTER — TELEPHONE (OUTPATIENT)
Dept: GASTROENTEROLOGY | Facility: CLINIC | Age: 38
End: 2023-05-25
Payer: COMMERCIAL

## 2023-06-04 NOTE — TELEPHONE ENCOUNTER
I called to talk to the patient, and  answered the phone.    stated they wanted to set up a telephone visit with Ivon. Wanting to discuss patients medications, options, and the pain doctor she was referred to.     Ok for Televisit?   Transportation service

## 2023-06-16 DIAGNOSIS — E55.9 VITAMIN D DEFICIENCY: ICD-10-CM

## 2023-06-16 DIAGNOSIS — N32.89 BLADDER SPASMS: ICD-10-CM

## 2023-06-16 DIAGNOSIS — E78.2 MIXED HYPERLIPIDEMIA: Chronic | ICD-10-CM

## 2023-06-16 RX ORDER — TIZANIDINE 4 MG/1
TABLET ORAL
Qty: 180 TABLET | Refills: 0 | Status: SHIPPED | OUTPATIENT
Start: 2023-06-16

## 2023-06-16 RX ORDER — MELATONIN
1000 DAILY
Qty: 30 TABLET | Refills: 11 | Status: SHIPPED | OUTPATIENT
Start: 2023-06-16

## 2023-06-16 RX ORDER — TIZANIDINE 4 MG/1
TABLET ORAL
Qty: 180 TABLET | Refills: 0 | OUTPATIENT
Start: 2023-06-16

## 2023-06-16 RX ORDER — SIMVASTATIN 20 MG
20 TABLET ORAL NIGHTLY
Qty: 90 TABLET | Refills: 1 | OUTPATIENT
Start: 2023-06-16

## 2023-06-16 NOTE — TELEPHONE ENCOUNTER
Patient called to reschedule her physical and ask for an additional refill for   tiZANidine (ZANAFLEX) 4 MG tablet     [facility] - [place], [place] - [id number] - [phone]  - [phone] FX

## 2023-06-16 NOTE — TELEPHONE ENCOUNTER
Caller: Marguerite Edwards    Relationship: Self    Best call back number:[phone]     Requested Prescriptions:   Requested Prescriptions     Pending Prescriptions Disp Refills    tiZANidine (ZANAFLEX) 4 MG tablet 180 tablet 0     Sig: TAKE 1 OR 2 TABLETS BY MOUTH EVERY 8 HOURS AS NEEDED FOR BLADDER SPASMS        Pharmacy where request should be sent: [place], [place] - [address] [place] [address] [id number] - [phone]  - [phone] FX     Last office visit with prescribing clinician: 9/26/2022   Last telemedicine visit with prescribing clinician: 3/28/2023   Next office visit with prescribing clinician: 7/7/2023     Does the patient have less than a 3 day supply:  [x] Yes  [] No    Would you like a call back once the refill request has been completed: [x] Yes [] No    If the office needs to give you a call back, can they leave a voicemail: [x] Yes [] No    Oleg Suazo Rep   06/16/23 09:48 EDT

## 2023-06-16 NOTE — TELEPHONE ENCOUNTER
Spoke with pt and informed her that she has remaining refills at her pharmacy and that she should not run out before her annual apt next month. Pt requested her vitamin D prescription

## 2023-07-20 ENCOUNTER — TELEPHONE (OUTPATIENT)
Dept: INTERNAL MEDICINE | Facility: CLINIC | Age: 38
End: 2023-07-20

## 2023-07-20 NOTE — TELEPHONE ENCOUNTER
Caller: Marguerite Edwards    Relationship: Self    Best call back number: [phone]     What is the medical concern/diagnosis: IRRITABLE BOWEL SYNDROME-ACID REFLUX    What specialty or service is being requested: GASTROLOGY    What is the provider, practice or medical service name: [facility] OUTSIDE OF [facility]    Any additional details: PATIENT IS UNHAPPY WITH HER GASTRO. WOULD LIKE TO SEE ANOTHER ONE.

## 2023-07-25 NOTE — TELEPHONE ENCOUNTER
Pt has upcoming apt. Can be addressed at that time. Not urgent as she is still receiving care from Gastro at this time

## 2023-07-26 ENCOUNTER — TELEPHONE (OUTPATIENT)
Dept: INTERNAL MEDICINE | Facility: CLINIC | Age: 38
End: 2023-07-26
Payer: COMMERCIAL

## 2023-07-26 ENCOUNTER — TELEPHONE (OUTPATIENT)
Dept: GASTROENTEROLOGY | Facility: CLINIC | Age: 38
End: 2023-07-26
Payer: COMMERCIAL

## 2023-07-26 DIAGNOSIS — N32.89 BLADDER SPASMS: ICD-10-CM

## 2023-07-26 RX ORDER — TIZANIDINE 4 MG/1
TABLET ORAL
Qty: 180 TABLET | Refills: 0 | OUTPATIENT
Start: 2023-07-26

## 2023-07-26 NOTE — TELEPHONE ENCOUNTER
Marguerite Goode called left voice message wanting a recommendation on what type of milk should drink that's best for her diet. Please advise. Thanks

## 2023-07-26 NOTE — TELEPHONE ENCOUNTER
Caller: Marguerite Edwards    Relationship: Self    Best call back number: [phone]     What medications are you currently taking:   Current Outpatient Medications on File Prior to Visit   Medication Sig Dispense Refill    albuterol sulfate  (90 Base) MCG/ACT inhaler Inhale 2 puffs Every 4 (Four) Hours As Needed for Wheezing. 18 g 3    Alcohol Swabs (Easy Touch Alcohol Prep Medium) 70 % pads USE DAILY AS DIRECTED 100 each 11    Blood Pressure Monitoring (Blood Pressure Monitor/L Cuff) misc Use daily to check blood pressure 1 each 0    cholecalciferol (VITAMIN D3) 25 MCG (1000 UT) tablet Take 1 tablet by mouth Daily. 30 tablet 11    cholestyramine (QUESTRAN) 4 GM/DOSE powder Take 1 packet by mouth 2 (Two) Times a Day. mixed with a liquid 378 g 11    Dexilant 60 MG capsule Take 1 capsule by mouth Daily. 90 capsule 3    dicyclomine (BENTYL) 10 MG capsule Take 1 capsule by mouth 4 (Four) Times a Day Before Meals & at Bedtime. 120 capsule 3    fluticasone (Flonase) 50 MCG/ACT nasal spray 2 sprays into the nostril(s) as directed by provider Daily. 16 g 11    Fluticasone Furoate-Vilanterol (Breo Ellipta) 100-25 MCG/ACT aerosol powder  Inhale 1 puff Daily. 60 each 6    hydrOXYzine pamoate (VISTARIL) 50 MG capsule Take 1 capsule by mouth Every 6 (Six) Hours. Every 6 hours  As needed and 2 pills at night      Incontinence Supplies misc 1 each As Needed (incontinence). 150 each 112    linaclotide (Linzess) 290 MCG capsule capsule Take 1 capsule by mouth Every Morning Before Breakfast. 30 capsule 6    loratadine (Claritin) 10 MG tablet Take 1 tablet by mouth Daily. 30 tablet 11    Melatonin 5 MG capsule Take 5 mg by mouth At Night As Needed (insomnia). 30 each 3    Multiple Vitamin (multivitamin) capsule Take 1 capsule by mouth Daily. 30 capsule 11    OLANZapine (zyPREXA) 10 MG tablet Take 1 tablet by mouth 3 (Three) Times a Day.      ondansetron (ZOFRAN) 4 MG tablet Take 1 tablet by mouth Every 8 (Eight) Hours As  Needed for Nausea or Vomiting. 30 tablet 1    OXcarbazepine (TRILEPTAL) 150 MG tablet Take 1 tablet by mouth Daily.      polyethylene glycol (GoLYTELY) 236 g solution Starting at noon on day prior to procedure, drink 8 ounces every 30 minutes until all gone or stools are clear. May add flavor packet. 4000 mL 0    polyethylene glycol (MiraLax) 17 GM/SCOOP powder 1-2 capful daily as needed. 765 g 0    Sanitary Napkins & Tampons (MAXI PAD OVERNIGHT) pads 1 pad 4 (Four) Times a Day As Needed (menses). 96 each 11    simvastatin (ZOCOR) 20 MG tablet Take 1 tablet by mouth Every Night. 90 tablet 1    tiZANidine (ZANAFLEX) 4 MG tablet TAKE 1-2 TABLETS BY MOUTH EVERY 8 HOURS AS NEEDED FOR BLADDER SPASMS 180 tablet 0    venlafaxine XR (EFFEXOR-XR) 37.5 MG 24 hr capsule Take 2 capsules by mouth Daily. Takes 2 37.5 tabs       No current facility-administered medications on file prior to visit.          When did you start taking these medications:     Which medication are you concerned about: tiZANidine (ZANAFLEX) 4 MG tablet    Who prescribed you this medication:     What are your concerns: PATIENT STATES THIS MEDICATION WAS DENIED BUT SHE IS UNSURE WHY AND WOULD LIKE A CALL BACK TO DISCUSS THIS BECAUSE SHE ONLY HAS ONE DAY LEFT.     How long have you had these concerns:

## 2023-07-26 NOTE — TELEPHONE ENCOUNTER
Caller: Marguerite Edwards    Relationship: Self    Best call back number: [phone]     Requested Prescriptions:   Requested Prescriptions     Pending Prescriptions Disp Refills    tiZANidine (ZANAFLEX) 4 MG tablet 180 tablet 0     Sig: TAKE 1-2 TABLETS BY MOUTH EVERY 8 HOURS AS NEEDED FOR BLADDER SPASMS        Pharmacy where request should be sent: [place], [place] - [address] [place] [address] [id number] - [phone]  - [phone] FX     Last office visit with prescribing clinician: 9/26/2022   Last telemedicine visit with prescribing clinician: 3/28/2023   Next office visit with prescribing clinician: 8/4/2023     Additional details provided by patient: PATIENT HAS CALLED REQUESTING A NEW PRESCRIPTION ON ABOVE MEDICATION    Does the patient have less than a 3 day supply:  [x] Yes  [] No    Would you like a call back once the refill request has been completed: [] Yes [x] No    If the office needs to give you a call back, can they leave a voicemail: [] Yes [x] No    Maira Guzman   07/26/23 08:16 EDT

## 2023-07-26 NOTE — TELEPHONE ENCOUNTER
Hub staff attempted to follow warm transfer process and was unsuccessful     Caller: Marguerite Edwards A    Relationship to patient: Self    Best call back number: [phone]  CAN CALL ANY TIME    Patient is needing: PATIENT CALLED AND STATES THAT SHE NEEDS TO SPEAK TO SOMEONE IN CLINICAL ASAP DUE TO CONSTIPATION. PATIENT STATED THAT HER 'BOWELS ARE BOUND UP' AND SHE WILL BE TAKING 'FIBER VITAMINS' TOMORROW HOWEVER 'NOTHING SO FAR IS WORKING'.     DUE TO PATIENT REQUEST, SENDING WITH HIGH PRIORITY.

## 2023-07-26 NOTE — TELEPHONE ENCOUNTER
Denied the duplicate. Pt informed it was up to the provider if she chooses to refill the RX at this time.

## 2023-07-27 ENCOUNTER — TELEPHONE (OUTPATIENT)
Dept: GASTROENTEROLOGY | Facility: CLINIC | Age: 38
End: 2023-07-27

## 2023-07-27 RX ORDER — TIZANIDINE 4 MG/1
TABLET ORAL
Qty: 180 TABLET | Refills: 0 | Status: SHIPPED | OUTPATIENT
Start: 2023-07-27

## 2023-07-27 NOTE — TELEPHONE ENCOUNTER
Hub staff attempted to follow warm transfer process and was unsuccessful     Caller: Marguerite Edwards    Relationship to patient: Self    Best call back number: [phone]  CAN CALL ANY TIME    Patient is needing: PATIENT IS NOW EXPERIENCING PAIN IN THE STOMACH ALONG SIDE BURPING. PATIENT STATES THAT SHE FEELS TERRIBLE.     PATIENT WOULD LIKE A CALL FROM CLINICAL TO DISCUSS THESE NEW SYMPTOMS.

## 2023-07-28 ENCOUNTER — APPOINTMENT (OUTPATIENT)
Dept: CT IMAGING | Facility: HOSPITAL | Age: 38
End: 2023-07-28
Payer: COMMERCIAL

## 2023-07-28 ENCOUNTER — APPOINTMENT (OUTPATIENT)
Dept: GENERAL RADIOLOGY | Facility: HOSPITAL | Age: 38
End: 2023-07-28
Payer: COMMERCIAL

## 2023-07-28 ENCOUNTER — HOSPITAL ENCOUNTER (OUTPATIENT)
Facility: HOSPITAL | Age: 38
Setting detail: OBSERVATION
Discharge: HOME | End: 2023-07-30
Attending: EMERGENCY MEDICINE | Admitting: STUDENT IN AN ORGANIZED HEALTH CARE EDUCATION/TRAINING PROGRAM
Payer: COMMERCIAL

## 2023-07-28 DIAGNOSIS — R10.84 GENERALIZED ABDOMINAL PAIN: ICD-10-CM

## 2023-07-28 DIAGNOSIS — R11.2 NAUSEA AND VOMITING, UNSPECIFIED VOMITING TYPE: ICD-10-CM

## 2023-07-28 DIAGNOSIS — D72.829 LEUKOCYTOSIS, UNSPECIFIED TYPE: Primary | ICD-10-CM

## 2023-07-28 PROBLEM — R11.10 VOMITING: Status: ACTIVE | Noted: 2023-07-28

## 2023-07-28 PROBLEM — R11.0 NAUSEA: Status: ACTIVE | Noted: 2023-07-28

## 2023-07-28 PROBLEM — K59.09 CHRONIC CONSTIPATION: Status: ACTIVE | Noted: 2023-07-28

## 2023-07-28 PROBLEM — K31.84 GASTROPARESIS: Status: ACTIVE | Noted: 2023-07-28

## 2023-07-28 PROBLEM — F20.9 SCHIZOPHRENIA: Status: ACTIVE | Noted: 2023-07-28

## 2023-07-28 LAB
ALBUMIN SERPL-MCNC: 3.9 G/DL (ref 3.5–5.2)
ALBUMIN/GLOB SERPL: 1 G/DL
ALP SERPL-CCNC: 261 U/L (ref 39–117)
ALT SERPL W P-5'-P-CCNC: 74 U/L (ref 1–33)
ANION GAP SERPL CALCULATED.3IONS-SCNC: 20 MMOL/L (ref 5–15)
AST SERPL-CCNC: 110 U/L (ref 1–32)
B-HCG UR QL: NEGATIVE
BACTERIA UR QL AUTO: ABNORMAL /HPF
BASOPHILS # BLD AUTO: 0.05 10*3/MM3 (ref 0–0.2)
BASOPHILS NFR BLD AUTO: 0.2 % (ref 0–1.5)
BILIRUB SERPL-MCNC: 0.3 MG/DL (ref 0–1.2)
BILIRUB UR QL STRIP: NEGATIVE
BUN SERPL-MCNC: 6 MG/DL (ref 6–20)
BUN/CREAT SERPL: 9.1 (ref 7–25)
CALCIUM SPEC-SCNC: 9.6 MG/DL (ref 8.6–10.5)
CHLORIDE SERPL-SCNC: 102 MMOL/L (ref 98–107)
CLARITY UR: ABNORMAL
CLUE CELLS SPEC QL WET PREP: NORMAL
CO2 SERPL-SCNC: 20 MMOL/L (ref 22–29)
COLOR UR: ABNORMAL
CREAT SERPL-MCNC: 0.66 MG/DL (ref 0.57–1)
D-LACTATE SERPL-SCNC: 1.6 MMOL/L (ref 0.5–2)
DEPRECATED RDW RBC AUTO: 39.2 FL (ref 37–54)
EGFRCR SERPLBLD CKD-EPI 2021: 113.9 ML/MIN/1.73
EOSINOPHIL # BLD AUTO: 0 10*3/MM3 (ref 0–0.4)
EOSINOPHIL NFR BLD AUTO: 0 % (ref 0.3–6.2)
ERYTHROCYTE [DISTWIDTH] IN BLOOD BY AUTOMATED COUNT: 13.3 % (ref 12.3–15.4)
EXPIRATION DATE: NORMAL
GLOBULIN UR ELPH-MCNC: 4 GM/DL
GLUCOSE SERPL-MCNC: 169 MG/DL (ref 65–99)
GLUCOSE UR STRIP-MCNC: NEGATIVE MG/DL
HBA1C MFR BLD: 6.3 % (ref 4.8–5.6)
HCT VFR BLD AUTO: 43.4 % (ref 34–46.6)
HGB BLD-MCNC: 14.3 G/DL (ref 12–15.9)
HGB UR QL STRIP.AUTO: NEGATIVE
HOLD SPECIMEN: NORMAL
HYALINE CASTS UR QL AUTO: ABNORMAL /LPF
HYDATID CYST SPEC WET PREP: NORMAL
IMM GRANULOCYTES # BLD AUTO: 0.13 10*3/MM3 (ref 0–0.05)
IMM GRANULOCYTES NFR BLD AUTO: 0.6 % (ref 0–0.5)
INTERNAL NEGATIVE CONTROL: NORMAL
INTERNAL POSITIVE CONTROL: NORMAL
KETONES UR QL STRIP: ABNORMAL
KOH PREP NAIL: ABNORMAL
LEUKOCYTE ESTERASE UR QL STRIP.AUTO: ABNORMAL
LIPASE SERPL-CCNC: 95 U/L (ref 13–60)
LYMPHOCYTES # BLD AUTO: 2.18 10*3/MM3 (ref 0.7–3.1)
LYMPHOCYTES NFR BLD AUTO: 10.8 % (ref 19.6–45.3)
Lab: NORMAL
MCH RBC QN AUTO: 26.8 PG (ref 26.6–33)
MCHC RBC AUTO-ENTMCNC: 32.9 G/DL (ref 31.5–35.7)
MCV RBC AUTO: 81.3 FL (ref 79–97)
MONOCYTES # BLD AUTO: 1.68 10*3/MM3 (ref 0.1–0.9)
MONOCYTES NFR BLD AUTO: 8.3 % (ref 5–12)
NEUTROPHILS NFR BLD AUTO: 16.22 10*3/MM3 (ref 1.7–7)
NEUTROPHILS NFR BLD AUTO: 80.1 % (ref 42.7–76)
NITRITE UR QL STRIP: NEGATIVE
NRBC BLD AUTO-RTO: 0 /100 WBC (ref 0–0.2)
PH UR STRIP.AUTO: 6 [PH] (ref 5–8)
PLATELET # BLD AUTO: 365 10*3/MM3 (ref 140–450)
PMV BLD AUTO: 10.6 FL (ref 6–12)
POTASSIUM SERPL-SCNC: 3.6 MMOL/L (ref 3.5–5.2)
PROCALCITONIN SERPL-MCNC: 0.05 NG/ML (ref 0–0.25)
PROT SERPL-MCNC: 7.9 G/DL (ref 6–8.5)
PROT UR QL STRIP: ABNORMAL
RBC # BLD AUTO: 5.34 10*6/MM3 (ref 3.77–5.28)
RBC # UR STRIP: ABNORMAL /HPF
REF LAB TEST METHOD: ABNORMAL
SODIUM SERPL-SCNC: 142 MMOL/L (ref 136–145)
SP GR UR STRIP: 1.03 (ref 1–1.03)
SQUAMOUS #/AREA URNS HPF: ABNORMAL /HPF
T VAGINALIS SPEC QL WET PREP: NORMAL
TRIGL SERPL-MCNC: 58 MG/DL (ref 0–150)
UROBILINOGEN UR QL STRIP: ABNORMAL
WBC # UR STRIP: ABNORMAL /HPF
WBC NRBC COR # BLD: 20.26 10*3/MM3 (ref 3.4–10.8)
WBC SPEC QL WET PREP: NORMAL
WHOLE BLOOD HOLD COAG: NORMAL
WHOLE BLOOD HOLD SPECIMEN: NORMAL
YEAST GENITAL QL WET PREP: NORMAL

## 2023-07-28 PROCEDURE — 96376 TX/PRO/DX INJ SAME DRUG ADON: CPT

## 2023-07-28 PROCEDURE — 99223 1ST HOSP IP/OBS HIGH 75: CPT | Performed by: INTERNAL MEDICINE

## 2023-07-28 PROCEDURE — 99285 EMERGENCY DEPT VISIT HI MDM: CPT

## 2023-07-28 PROCEDURE — 96372 THER/PROPH/DIAG INJ SC/IM: CPT

## 2023-07-28 PROCEDURE — 25010000002 VANCOMYCIN 10 G RECONSTITUTED SOLUTION: Performed by: EMERGENCY MEDICINE

## 2023-07-28 PROCEDURE — 84478 ASSAY OF TRIGLYCERIDES: CPT | Performed by: NURSE PRACTITIONER

## 2023-07-28 PROCEDURE — G0378 HOSPITAL OBSERVATION PER HR: HCPCS

## 2023-07-28 PROCEDURE — 36415 COLL VENOUS BLD VENIPUNCTURE: CPT

## 2023-07-28 PROCEDURE — 81001 URINALYSIS AUTO W/SCOPE: CPT | Performed by: EMERGENCY MEDICINE

## 2023-07-28 PROCEDURE — 87591 N.GONORRHOEAE DNA AMP PROB: CPT | Performed by: EMERGENCY MEDICINE

## 2023-07-28 PROCEDURE — 63710000001 PROMETHAZINE PER 25 MG: Performed by: EMERGENCY MEDICINE

## 2023-07-28 PROCEDURE — 74177 CT ABD & PELVIS W/CONTRAST: CPT

## 2023-07-28 PROCEDURE — 96361 HYDRATE IV INFUSION ADD-ON: CPT

## 2023-07-28 PROCEDURE — 80053 COMPREHEN METABOLIC PANEL: CPT | Performed by: EMERGENCY MEDICINE

## 2023-07-28 PROCEDURE — 25510000001 IOPAMIDOL 61 % SOLUTION: Performed by: EMERGENCY MEDICINE

## 2023-07-28 PROCEDURE — 25010000002 PIPERACILLIN SOD-TAZOBACTAM PER 1 G: Performed by: EMERGENCY MEDICINE

## 2023-07-28 PROCEDURE — 83605 ASSAY OF LACTIC ACID: CPT | Performed by: EMERGENCY MEDICINE

## 2023-07-28 PROCEDURE — 83690 ASSAY OF LIPASE: CPT

## 2023-07-28 PROCEDURE — 96367 TX/PROPH/DG ADDL SEQ IV INF: CPT

## 2023-07-28 PROCEDURE — 87491 CHLMYD TRACH DNA AMP PROBE: CPT | Performed by: EMERGENCY MEDICINE

## 2023-07-28 PROCEDURE — 25010000002 ONDANSETRON PER 1 MG: Performed by: EMERGENCY MEDICINE

## 2023-07-28 PROCEDURE — 87220 TISSUE EXAM FOR FUNGI: CPT | Performed by: EMERGENCY MEDICINE

## 2023-07-28 PROCEDURE — 83036 HEMOGLOBIN GLYCOSYLATED A1C: CPT | Performed by: NURSE PRACTITIONER

## 2023-07-28 PROCEDURE — 96365 THER/PROPH/DIAG IV INF INIT: CPT

## 2023-07-28 PROCEDURE — 84145 PROCALCITONIN (PCT): CPT | Performed by: NURSE PRACTITIONER

## 2023-07-28 PROCEDURE — 25010000002 HEPARIN (PORCINE) PER 1000 UNITS: Performed by: NURSE PRACTITIONER

## 2023-07-28 PROCEDURE — 87040 BLOOD CULTURE FOR BACTERIA: CPT | Performed by: EMERGENCY MEDICINE

## 2023-07-28 PROCEDURE — 71045 X-RAY EXAM CHEST 1 VIEW: CPT

## 2023-07-28 PROCEDURE — 96375 TX/PRO/DX INJ NEW DRUG ADDON: CPT

## 2023-07-28 PROCEDURE — 25010000002 ONDANSETRON PER 1 MG: Performed by: NURSE PRACTITIONER

## 2023-07-28 PROCEDURE — 87086 URINE CULTURE/COLONY COUNT: CPT | Performed by: EMERGENCY MEDICINE

## 2023-07-28 PROCEDURE — P9612 CATHETERIZE FOR URINE SPEC: HCPCS

## 2023-07-28 PROCEDURE — 87210 SMEAR WET MOUNT SALINE/INK: CPT | Performed by: EMERGENCY MEDICINE

## 2023-07-28 PROCEDURE — 25010000002 MORPHINE PER 10 MG: Performed by: EMERGENCY MEDICINE

## 2023-07-28 PROCEDURE — 85025 COMPLETE CBC W/AUTO DIFF WBC: CPT

## 2023-07-28 PROCEDURE — 81025 URINE PREGNANCY TEST: CPT | Performed by: EMERGENCY MEDICINE

## 2023-07-28 RX ORDER — ONDANSETRON 2 MG/ML
4 INJECTION INTRAMUSCULAR; INTRAVENOUS EVERY 6 HOURS PRN
Status: DISCONTINUED | OUTPATIENT
Start: 2023-07-28 | End: 2023-07-30 | Stop reason: HOSPADM

## 2023-07-28 RX ORDER — TIZANIDINE 4 MG/1
4 TABLET ORAL EVERY 6 HOURS PRN
Status: DISCONTINUED | OUTPATIENT
Start: 2023-07-28 | End: 2023-07-30 | Stop reason: HOSPADM

## 2023-07-28 RX ORDER — MAGNESIUM CARB/ALUMINUM HYDROX 105-160MG
296 TABLET,CHEWABLE ORAL ONCE
Status: DISCONTINUED | OUTPATIENT
Start: 2023-07-28 | End: 2023-07-30 | Stop reason: HOSPADM

## 2023-07-28 RX ORDER — BENZTROPINE MESYLATE 1 MG/1
1 TABLET ORAL 2 TIMES DAILY
Status: DISCONTINUED | OUTPATIENT
Start: 2023-07-28 | End: 2023-07-30 | Stop reason: HOSPADM

## 2023-07-28 RX ORDER — AMOXICILLIN 250 MG
2 CAPSULE ORAL 2 TIMES DAILY
Status: DISCONTINUED | OUTPATIENT
Start: 2023-07-28 | End: 2023-07-29

## 2023-07-28 RX ORDER — OLANZAPINE 5 MG/1
10 TABLET ORAL 2 TIMES DAILY
Status: DISCONTINUED | OUTPATIENT
Start: 2023-07-28 | End: 2023-07-29

## 2023-07-28 RX ORDER — SODIUM CHLORIDE 0.9 % (FLUSH) 0.9 %
10 SYRINGE (ML) INJECTION AS NEEDED
Status: DISCONTINUED | OUTPATIENT
Start: 2023-07-28 | End: 2023-07-30 | Stop reason: HOSPADM

## 2023-07-28 RX ORDER — HYDROXYZINE HYDROCHLORIDE 25 MG/1
25 TABLET, FILM COATED ORAL 3 TIMES DAILY PRN
Status: DISCONTINUED | OUTPATIENT
Start: 2023-07-28 | End: 2023-07-30 | Stop reason: HOSPADM

## 2023-07-28 RX ORDER — SODIUM CHLORIDE 9 MG/ML
40 INJECTION, SOLUTION INTRAVENOUS AS NEEDED
Status: DISCONTINUED | OUTPATIENT
Start: 2023-07-28 | End: 2023-07-30 | Stop reason: HOSPADM

## 2023-07-28 RX ORDER — ACETAMINOPHEN 160 MG/5ML
650 SOLUTION ORAL EVERY 4 HOURS PRN
Status: DISCONTINUED | OUTPATIENT
Start: 2023-07-28 | End: 2023-07-30 | Stop reason: HOSPADM

## 2023-07-28 RX ORDER — OXCARBAZEPINE 150 MG/1
150 TABLET, FILM COATED ORAL EVERY 12 HOURS SCHEDULED
Status: DISCONTINUED | OUTPATIENT
Start: 2023-07-28 | End: 2023-07-30 | Stop reason: HOSPADM

## 2023-07-28 RX ORDER — HYDROCODONE BITARTRATE AND ACETAMINOPHEN 5; 325 MG/1; MG/1
1 TABLET ORAL ONCE AS NEEDED
Status: COMPLETED | OUTPATIENT
Start: 2023-07-28 | End: 2023-07-28

## 2023-07-28 RX ORDER — BENZTROPINE MESYLATE 1 MG/1
1 TABLET ORAL 2 TIMES DAILY
COMMUNITY

## 2023-07-28 RX ORDER — DEXTRAN 70, GLYCERIN, HYPROMELLOSE 1; 2; 3 MG/ML; MG/ML; MG/ML
1 SOLUTION/ DROPS OPHTHALMIC DAILY PRN
COMMUNITY

## 2023-07-28 RX ORDER — PROMETHAZINE HYDROCHLORIDE 25 MG/1
25 TABLET ORAL ONCE
Status: COMPLETED | OUTPATIENT
Start: 2023-07-28 | End: 2023-07-28

## 2023-07-28 RX ORDER — SODIUM CHLORIDE 9 MG/ML
100 INJECTION, SOLUTION INTRAVENOUS CONTINUOUS
Status: ACTIVE | OUTPATIENT
Start: 2023-07-28 | End: 2023-07-29

## 2023-07-28 RX ORDER — PANTOPRAZOLE SODIUM 40 MG/1
40 TABLET, DELAYED RELEASE ORAL
Status: DISCONTINUED | OUTPATIENT
Start: 2023-07-28 | End: 2023-07-30 | Stop reason: HOSPADM

## 2023-07-28 RX ORDER — ONDANSETRON 2 MG/ML
4 INJECTION INTRAMUSCULAR; INTRAVENOUS ONCE
Status: COMPLETED | OUTPATIENT
Start: 2023-07-28 | End: 2023-07-28

## 2023-07-28 RX ORDER — ACETAMINOPHEN 325 MG/1
650 TABLET ORAL EVERY 4 HOURS PRN
Status: DISCONTINUED | OUTPATIENT
Start: 2023-07-28 | End: 2023-07-30 | Stop reason: HOSPADM

## 2023-07-28 RX ORDER — SODIUM CHLORIDE 9 MG/ML
10 INJECTION INTRAVENOUS AS NEEDED
Status: DISCONTINUED | OUTPATIENT
Start: 2023-07-28 | End: 2023-07-30 | Stop reason: HOSPADM

## 2023-07-28 RX ORDER — ALBUTEROL SULFATE 2.5 MG/3ML
2.5 SOLUTION RESPIRATORY (INHALATION) EVERY 4 HOURS PRN
Status: DISCONTINUED | OUTPATIENT
Start: 2023-07-28 | End: 2023-07-30 | Stop reason: HOSPADM

## 2023-07-28 RX ORDER — MORPHINE SULFATE 2 MG/ML
2 INJECTION, SOLUTION INTRAMUSCULAR; INTRAVENOUS
Status: DISCONTINUED | OUTPATIENT
Start: 2023-07-28 | End: 2023-07-29

## 2023-07-28 RX ORDER — SODIUM CHLORIDE 0.9 % (FLUSH) 0.9 %
10 SYRINGE (ML) INJECTION EVERY 12 HOURS SCHEDULED
Status: DISCONTINUED | OUTPATIENT
Start: 2023-07-28 | End: 2023-07-30 | Stop reason: HOSPADM

## 2023-07-28 RX ORDER — AMITRIPTYLINE HYDROCHLORIDE 50 MG/1
100 TABLET, FILM COATED ORAL NIGHTLY
Status: DISCONTINUED | OUTPATIENT
Start: 2023-07-28 | End: 2023-07-30 | Stop reason: HOSPADM

## 2023-07-28 RX ORDER — ACETAMINOPHEN 650 MG/1
650 SUPPOSITORY RECTAL EVERY 4 HOURS PRN
Status: DISCONTINUED | OUTPATIENT
Start: 2023-07-28 | End: 2023-07-30 | Stop reason: HOSPADM

## 2023-07-28 RX ORDER — HEPARIN SODIUM 5000 [USP'U]/ML
5000 INJECTION, SOLUTION INTRAVENOUS; SUBCUTANEOUS EVERY 8 HOURS SCHEDULED
Status: DISCONTINUED | OUTPATIENT
Start: 2023-07-28 | End: 2023-07-30 | Stop reason: HOSPADM

## 2023-07-28 RX ORDER — BUDESONIDE AND FORMOTEROL FUMARATE DIHYDRATE 160; 4.5 UG/1; UG/1
1 AEROSOL RESPIRATORY (INHALATION)
Status: DISCONTINUED | OUTPATIENT
Start: 2023-07-28 | End: 2023-07-30 | Stop reason: HOSPADM

## 2023-07-28 RX ORDER — MULTIPLE VITAMINS W/ MINERALS TAB 9MG-400MCG
1 TAB ORAL DAILY
COMMUNITY
End: 2023-08-04 | Stop reason: SDUPTHER

## 2023-07-28 RX ORDER — CETIRIZINE HYDROCHLORIDE 10 MG/1
10 TABLET ORAL DAILY
Status: DISCONTINUED | OUTPATIENT
Start: 2023-07-28 | End: 2023-07-30 | Stop reason: HOSPADM

## 2023-07-28 RX ORDER — AMITRIPTYLINE HYDROCHLORIDE 150 MG/1
150 TABLET ORAL NIGHTLY
COMMUNITY

## 2023-07-28 RX ORDER — POLYETHYLENE GLYCOL 3350 17 G/17G
17 POWDER, FOR SOLUTION ORAL DAILY PRN
Status: DISCONTINUED | OUTPATIENT
Start: 2023-07-28 | End: 2023-07-29

## 2023-07-28 RX ORDER — BISACODYL 10 MG
10 SUPPOSITORY, RECTAL RECTAL DAILY PRN
Status: DISCONTINUED | OUTPATIENT
Start: 2023-07-28 | End: 2023-07-29

## 2023-07-28 RX ORDER — DICYCLOMINE HYDROCHLORIDE 10 MG/1
10 CAPSULE ORAL
Status: DISCONTINUED | OUTPATIENT
Start: 2023-07-28 | End: 2023-07-30 | Stop reason: HOSPADM

## 2023-07-28 RX ORDER — ONDANSETRON 2 MG/ML
4 INJECTION INTRAMUSCULAR; INTRAVENOUS ONCE
Status: DISCONTINUED | OUTPATIENT
Start: 2023-07-28 | End: 2023-07-30 | Stop reason: HOSPADM

## 2023-07-28 RX ORDER — BISACODYL 5 MG/1
5 TABLET, DELAYED RELEASE ORAL DAILY PRN
Status: DISCONTINUED | OUTPATIENT
Start: 2023-07-28 | End: 2023-07-29

## 2023-07-28 RX ADMIN — Medication 10 ML: at 20:13

## 2023-07-28 RX ADMIN — TIZANIDINE 4 MG: 4 TABLET ORAL at 22:01

## 2023-07-28 RX ADMIN — HYDROCODONE BITARTRATE AND ACETAMINOPHEN 1 TABLET: 5; 325 TABLET ORAL at 22:54

## 2023-07-28 RX ADMIN — IOPAMIDOL 90 ML: 612 INJECTION, SOLUTION INTRAVENOUS at 12:21

## 2023-07-28 RX ADMIN — MORPHINE SULFATE 2 MG: 2 INJECTION, SOLUTION INTRAMUSCULAR; INTRAVENOUS at 12:33

## 2023-07-28 RX ADMIN — OXCARBAZEPINE 150 MG: 150 TABLET, FILM COATED ORAL at 20:42

## 2023-07-28 RX ADMIN — PROMETHAZINE HYDROCHLORIDE 25 MG: 25 TABLET ORAL at 17:30

## 2023-07-28 RX ADMIN — SENNOSIDES AND DOCUSATE SODIUM 2 TABLET: 50; 8.6 TABLET ORAL at 20:13

## 2023-07-28 RX ADMIN — ONDANSETRON 4 MG: 2 INJECTION INTRAMUSCULAR; INTRAVENOUS at 12:33

## 2023-07-28 RX ADMIN — AMITRIPTYLINE HYDROCHLORIDE 100 MG: 50 TABLET, FILM COATED ORAL at 20:13

## 2023-07-28 RX ADMIN — SODIUM CHLORIDE 1503 ML: 9 INJECTION, SOLUTION INTRAVENOUS at 11:12

## 2023-07-28 RX ADMIN — PIPERACILLIN SODIUM AND TAZOBACTAM SODIUM 3.38 G: 3; .375 INJECTION, SOLUTION INTRAVENOUS at 11:11

## 2023-07-28 RX ADMIN — PANTOPRAZOLE SODIUM 40 MG: 40 TABLET, DELAYED RELEASE ORAL at 20:13

## 2023-07-28 RX ADMIN — SODIUM CHLORIDE 100 ML/HR: 9 INJECTION, SOLUTION INTRAVENOUS at 20:36

## 2023-07-28 RX ADMIN — BENZTROPINE MESYLATE 1 MG: 1 TABLET ORAL at 20:42

## 2023-07-28 RX ADMIN — HYDROXYZINE HYDROCHLORIDE 25 MG: 25 TABLET, FILM COATED ORAL at 20:42

## 2023-07-28 RX ADMIN — DICYCLOMINE HYDROCHLORIDE 10 MG: 10 CAPSULE ORAL at 20:13

## 2023-07-28 RX ADMIN — CETIRIZINE HYDROCHLORIDE 10 MG: 10 TABLET, FILM COATED ORAL at 20:13

## 2023-07-28 RX ADMIN — OLANZAPINE 10 MG: 5 TABLET, FILM COATED ORAL at 20:12

## 2023-07-28 RX ADMIN — VANCOMYCIN HYDROCHLORIDE 1750 MG: 10 INJECTION, POWDER, LYOPHILIZED, FOR SOLUTION INTRAVENOUS at 11:40

## 2023-07-28 RX ADMIN — HEPARIN SODIUM 5000 UNITS: 5000 INJECTION INTRAVENOUS; SUBCUTANEOUS at 22:11

## 2023-07-28 RX ADMIN — ONDANSETRON 4 MG: 2 INJECTION INTRAMUSCULAR; INTRAVENOUS at 22:21
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
## 2023-07-28 NOTE — CASE MANAGEMENT/SOCIAL WORK
Discharge Planning Assessment  [facility]     Patient Name: Marguerite Edwards  MRN: [id number]  Today's Date: 7/28/2023    Admit Date: 7/28/2023    Plan: IDP   Discharge Needs Assessment       Row Name 07/28/23 1802       Living Environment    People in Home spouse    Name(s) of People in Home Gabby Edwards    Current Living Arrangements apartment    Potentially Unsafe Housing Conditions unable to assess    Primary Care Provided by self;spouse/significant other    Provides Primary Care For no one    Family Caregiver if Needed spouse    Family Caregiver Names Gabby Edwards    Quality of Family Relationships unable to assess       Transition Planning    Patient/Family Anticipates Transition to home with family    Transportation Anticipated other (see comments)  may need assistance with transportation       Discharge Needs Assessment    Readmission Within the Last 30 Days no previous admission in last 30 days    Equipment Currently Used at Home none    Concerns to be Addressed no discharge needs identified                   Discharge Plan       Row Name 07/28/23 1803       Plan    Plan IDP    Plan Comments MSW met with pt. at bedside. Pt. reports she lives with her spouse Gabby Edwards in [facility]. Pt.'s PCP is Liliya Hodges. Pt.'s pharmacy is [facility]. Pt.'s insurance is [employer] [place]. Pt. reports that she needs assistance at baseline and her spouse assists. Pt. denies any DME, O2, or HH currently. Pt. reports she may need assistance getting back home when she is medically ready to d/c. Pt. doesn't have an advanced directive or ACP documentation on file. CM will continue to follow pt. throughout her stay.    Final Discharge Disposition Code 30 - still a patient                  Continued Care and Services - Admitted Since 7/28/2023    Coordination has not been started for this encounter.          Demographic Summary       Row Name 07/28/23 1801       General Information    Admission Type observation     Arrived From home    Referral Source admission list;emergency department    Reason for Consult discharge planning    Preferred Language English                   Functional Status       Row Name 07/28/23 1801       Functional Status, IADL    Medications assistive person    Meal Preparation assistive person    Housekeeping assistive person    Laundry assistive person    Shopping assistive person       Mental Status Summary    Recent Changes in Mental Status/Cognitive Functioning unable to assess       Employment/    Employment Status disabled                   Psychosocial    No documentation.                  Abuse/Neglect    No documentation.                  Legal    No documentation.                  Substance Abuse    No documentation.                  Patient Forms    No documentation.                     COLE Luna
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
## 2023-07-29 LAB
ALBUMIN SERPL-MCNC: 3.4 G/DL (ref 3.5–5.2)
ALBUMIN/GLOB SERPL: 1.3 G/DL
ALP SERPL-CCNC: 247 U/L (ref 39–117)
ALT SERPL W P-5'-P-CCNC: 76 U/L (ref 1–33)
ANION GAP SERPL CALCULATED.3IONS-SCNC: 15 MMOL/L (ref 5–15)
AST SERPL-CCNC: 81 U/L (ref 1–32)
BACTERIA SPEC AEROBE CULT: NORMAL
BASOPHILS # BLD AUTO: 0.06 10*3/MM3 (ref 0–0.2)
BASOPHILS NFR BLD AUTO: 0.5 % (ref 0–1.5)
BILIRUB SERPL-MCNC: 0.2 MG/DL (ref 0–1.2)
BUN SERPL-MCNC: 4 MG/DL (ref 6–20)
BUN/CREAT SERPL: 7 (ref 7–25)
CALCIUM SPEC-SCNC: 8.4 MG/DL (ref 8.6–10.5)
CHLORIDE SERPL-SCNC: 107 MMOL/L (ref 98–107)
CO2 SERPL-SCNC: 20 MMOL/L (ref 22–29)
CREAT SERPL-MCNC: 0.57 MG/DL (ref 0.57–1)
DEPRECATED RDW RBC AUTO: 43.3 FL (ref 37–54)
EGFRCR SERPLBLD CKD-EPI 2021: 118 ML/MIN/1.73
EOSINOPHIL # BLD AUTO: 0.1 10*3/MM3 (ref 0–0.4)
EOSINOPHIL NFR BLD AUTO: 0.8 % (ref 0.3–6.2)
ERYTHROCYTE [DISTWIDTH] IN BLOOD BY AUTOMATED COUNT: 14.1 % (ref 12.3–15.4)
GLOBULIN UR ELPH-MCNC: 2.7 GM/DL
GLUCOSE SERPL-MCNC: 119 MG/DL (ref 65–99)
HCT VFR BLD AUTO: 38.7 % (ref 34–46.6)
HGB BLD-MCNC: 12.3 G/DL (ref 12–15.9)
IMM GRANULOCYTES # BLD AUTO: 0.07 10*3/MM3 (ref 0–0.05)
IMM GRANULOCYTES NFR BLD AUTO: 0.6 % (ref 0–0.5)
LYMPHOCYTES # BLD AUTO: 3.63 10*3/MM3 (ref 0.7–3.1)
LYMPHOCYTES NFR BLD AUTO: 29.2 % (ref 19.6–45.3)
MCH RBC QN AUTO: 26.8 PG (ref 26.6–33)
MCHC RBC AUTO-ENTMCNC: 31.8 G/DL (ref 31.5–35.7)
MCV RBC AUTO: 84.3 FL (ref 79–97)
MONOCYTES # BLD AUTO: 1.03 10*3/MM3 (ref 0.1–0.9)
MONOCYTES NFR BLD AUTO: 8.3 % (ref 5–12)
NEUTROPHILS NFR BLD AUTO: 60.6 % (ref 42.7–76)
NEUTROPHILS NFR BLD AUTO: 7.54 10*3/MM3 (ref 1.7–7)
NRBC BLD AUTO-RTO: 0 /100 WBC (ref 0–0.2)
PLATELET # BLD AUTO: 253 10*3/MM3 (ref 140–450)
PMV BLD AUTO: 11.1 FL (ref 6–12)
POTASSIUM SERPL-SCNC: 4.5 MMOL/L (ref 3.5–5.2)
PROT SERPL-MCNC: 6.1 G/DL (ref 6–8.5)
RBC # BLD AUTO: 4.59 10*6/MM3 (ref 3.77–5.28)
SODIUM SERPL-SCNC: 142 MMOL/L (ref 136–145)
WBC NRBC COR # BLD: 12.43 10*3/MM3 (ref 3.4–10.8)

## 2023-07-29 PROCEDURE — 96376 TX/PRO/DX INJ SAME DRUG ADON: CPT

## 2023-07-29 PROCEDURE — 25010000002 MORPHINE PER 10 MG: Performed by: EMERGENCY MEDICINE

## 2023-07-29 PROCEDURE — 96361 HYDRATE IV INFUSION ADD-ON: CPT

## 2023-07-29 PROCEDURE — G0378 HOSPITAL OBSERVATION PER HR: HCPCS

## 2023-07-29 PROCEDURE — 94640 AIRWAY INHALATION TREATMENT: CPT

## 2023-07-29 PROCEDURE — 25010000002 MORPHINE PER 10 MG: Performed by: STUDENT IN AN ORGANIZED HEALTH CARE EDUCATION/TRAINING PROGRAM

## 2023-07-29 PROCEDURE — 96372 THER/PROPH/DIAG INJ SC/IM: CPT

## 2023-07-29 PROCEDURE — 94799 UNLISTED PULMONARY SVC/PX: CPT

## 2023-07-29 PROCEDURE — 25010000002 HEPARIN (PORCINE) PER 1000 UNITS: Performed by: NURSE PRACTITIONER

## 2023-07-29 PROCEDURE — 25010000002 ONDANSETRON PER 1 MG: Performed by: NURSE PRACTITIONER

## 2023-07-29 PROCEDURE — 99214 OFFICE O/P EST MOD 30 MIN: CPT | Performed by: NURSE PRACTITIONER

## 2023-07-29 PROCEDURE — 85025 COMPLETE CBC W/AUTO DIFF WBC: CPT | Performed by: NURSE PRACTITIONER

## 2023-07-29 PROCEDURE — 99232 SBSQ HOSP IP/OBS MODERATE 35: CPT | Performed by: STUDENT IN AN ORGANIZED HEALTH CARE EDUCATION/TRAINING PROGRAM

## 2023-07-29 PROCEDURE — 80053 COMPREHEN METABOLIC PANEL: CPT | Performed by: NURSE PRACTITIONER

## 2023-07-29 RX ORDER — FLUCONAZOLE 100 MG/1
150 TABLET ORAL ONCE
Status: COMPLETED | OUTPATIENT
Start: 2023-07-29 | End: 2023-07-29

## 2023-07-29 RX ORDER — SIMETHICONE 80 MG
80 TABLET,CHEWABLE ORAL 4 TIMES DAILY PRN
Status: DISCONTINUED | OUTPATIENT
Start: 2023-07-29 | End: 2023-07-30 | Stop reason: HOSPADM

## 2023-07-29 RX ORDER — AMOXICILLIN 250 MG
2 CAPSULE ORAL 2 TIMES DAILY
Status: DISCONTINUED | OUTPATIENT
Start: 2023-07-29 | End: 2023-07-30 | Stop reason: HOSPADM

## 2023-07-29 RX ORDER — BISACODYL 10 MG
10 SUPPOSITORY, RECTAL RECTAL DAILY PRN
Status: DISCONTINUED | OUTPATIENT
Start: 2023-07-29 | End: 2023-07-30 | Stop reason: HOSPADM

## 2023-07-29 RX ORDER — BISACODYL 5 MG/1
5 TABLET, DELAYED RELEASE ORAL DAILY PRN
Status: DISCONTINUED | OUTPATIENT
Start: 2023-07-29 | End: 2023-07-30 | Stop reason: HOSPADM

## 2023-07-29 RX ORDER — CHOLECALCIFEROL (VITAMIN D3) 125 MCG
5 CAPSULE ORAL NIGHTLY
Status: DISCONTINUED | OUTPATIENT
Start: 2023-07-29 | End: 2023-07-30 | Stop reason: HOSPADM

## 2023-07-29 RX ORDER — MORPHINE SULFATE 2 MG/ML
1 INJECTION, SOLUTION INTRAMUSCULAR; INTRAVENOUS EVERY 4 HOURS PRN
Status: DISCONTINUED | OUTPATIENT
Start: 2023-07-29 | End: 2023-07-30 | Stop reason: HOSPADM

## 2023-07-29 RX ORDER — POLYETHYLENE GLYCOL 3350 17 G/17G
17 POWDER, FOR SOLUTION ORAL DAILY
Status: DISCONTINUED | OUTPATIENT
Start: 2023-07-30 | End: 2023-07-30 | Stop reason: HOSPADM

## 2023-07-29 RX ORDER — LUBIPROSTONE 24 UG/1
24 CAPSULE ORAL 2 TIMES DAILY WITH MEALS
Status: DISCONTINUED | OUTPATIENT
Start: 2023-07-29 | End: 2023-07-30 | Stop reason: HOSPADM

## 2023-07-29 RX ORDER — NALOXONE HCL 0.4 MG/ML
0.4 VIAL (ML) INJECTION
Status: DISCONTINUED | OUTPATIENT
Start: 2023-07-29 | End: 2023-07-30 | Stop reason: HOSPADM

## 2023-07-29 RX ORDER — OLANZAPINE 5 MG/1
5 TABLET ORAL 2 TIMES DAILY
Status: DISCONTINUED | OUTPATIENT
Start: 2023-07-29 | End: 2023-07-30 | Stop reason: HOSPADM

## 2023-07-29 RX ORDER — LACTULOSE 10 G/15ML
10 SOLUTION ORAL DAILY
Status: DISCONTINUED | OUTPATIENT
Start: 2023-07-29 | End: 2023-07-30 | Stop reason: HOSPADM

## 2023-07-29 RX ORDER — OXYCODONE HYDROCHLORIDE AND ACETAMINOPHEN 5; 325 MG/1; MG/1
1 TABLET ORAL EVERY 6 HOURS PRN
Status: DISCONTINUED | OUTPATIENT
Start: 2023-07-29 | End: 2023-07-30 | Stop reason: HOSPADM

## 2023-07-29 RX ADMIN — BISACODYL 5 MG: 5 TABLET, COATED ORAL at 09:28

## 2023-07-29 RX ADMIN — BENZTROPINE MESYLATE 1 MG: 1 TABLET ORAL at 09:28

## 2023-07-29 RX ADMIN — LUBIPROSTONE 24 MCG: 24 CAPSULE, GELATIN COATED ORAL at 18:58

## 2023-07-29 RX ADMIN — SENNOSIDES AND DOCUSATE SODIUM 2 TABLET: 50; 8.6 TABLET ORAL at 09:28

## 2023-07-29 RX ADMIN — DICYCLOMINE HYDROCHLORIDE 10 MG: 10 CAPSULE ORAL at 21:23

## 2023-07-29 RX ADMIN — BUDESONIDE AND FORMOTEROL FUMARATE DIHYDRATE 1 PUFF: 160; 4.5 AEROSOL RESPIRATORY (INHALATION) at 21:26

## 2023-07-29 RX ADMIN — MINERAL OIL AND WHITE PETROLATUM: 150; 830 OINTMENT OPHTHALMIC at 22:53

## 2023-07-29 RX ADMIN — BENZTROPINE MESYLATE 1 MG: 1 TABLET ORAL at 21:24

## 2023-07-29 RX ADMIN — TIZANIDINE 4 MG: 4 TABLET ORAL at 04:47

## 2023-07-29 RX ADMIN — BUDESONIDE AND FORMOTEROL FUMARATE DIHYDRATE 1 PUFF: 160; 4.5 AEROSOL RESPIRATORY (INHALATION) at 07:15

## 2023-07-29 RX ADMIN — MORPHINE SULFATE 2 MG: 2 INJECTION, SOLUTION INTRAMUSCULAR; INTRAVENOUS at 06:05

## 2023-07-29 RX ADMIN — POLYETHYLENE GLYCOL 3350 17 G: 17 POWDER, FOR SOLUTION ORAL at 04:43

## 2023-07-29 RX ADMIN — DICYCLOMINE HYDROCHLORIDE 10 MG: 10 CAPSULE ORAL at 13:02

## 2023-07-29 RX ADMIN — DICYCLOMINE HYDROCHLORIDE 10 MG: 10 CAPSULE ORAL at 09:27

## 2023-07-29 RX ADMIN — OXCARBAZEPINE 150 MG: 150 TABLET, FILM COATED ORAL at 21:23

## 2023-07-29 RX ADMIN — OLANZAPINE 5 MG: 5 TABLET, FILM COATED ORAL at 21:22

## 2023-07-29 RX ADMIN — Medication 10 ML: at 21:24

## 2023-07-29 RX ADMIN — HEPARIN SODIUM 5000 UNITS: 5000 INJECTION INTRAVENOUS; SUBCUTANEOUS at 21:24

## 2023-07-29 RX ADMIN — ONDANSETRON 4 MG: 2 INJECTION INTRAMUSCULAR; INTRAVENOUS at 21:21

## 2023-07-29 RX ADMIN — SODIUM CHLORIDE 100 ML/HR: 9 INJECTION, SOLUTION INTRAVENOUS at 03:48

## 2023-07-29 RX ADMIN — HYDROXYZINE HYDROCHLORIDE 25 MG: 25 TABLET, FILM COATED ORAL at 14:31

## 2023-07-29 RX ADMIN — LACTULOSE 10 G: 20 SOLUTION ORAL at 18:59

## 2023-07-29 RX ADMIN — TIZANIDINE 4 MG: 4 TABLET ORAL at 14:31

## 2023-07-29 RX ADMIN — SENNOSIDES AND DOCUSATE SODIUM 2 TABLET: 50; 8.6 TABLET ORAL at 21:23

## 2023-07-29 RX ADMIN — Medication 5 MG: at 21:23

## 2023-07-29 RX ADMIN — HYDROXYZINE HYDROCHLORIDE 25 MG: 25 TABLET, FILM COATED ORAL at 04:47

## 2023-07-29 RX ADMIN — HEPARIN SODIUM 5000 UNITS: 5000 INJECTION INTRAVENOUS; SUBCUTANEOUS at 03:50

## 2023-07-29 RX ADMIN — CETIRIZINE HYDROCHLORIDE 10 MG: 10 TABLET, FILM COATED ORAL at 09:28

## 2023-07-29 RX ADMIN — AMITRIPTYLINE HYDROCHLORIDE 100 MG: 50 TABLET, FILM COATED ORAL at 21:23

## 2023-07-29 RX ADMIN — DICYCLOMINE HYDROCHLORIDE 10 MG: 10 CAPSULE ORAL at 18:58

## 2023-07-29 RX ADMIN — ONDANSETRON 4 MG: 2 INJECTION INTRAMUSCULAR; INTRAVENOUS at 15:31

## 2023-07-29 RX ADMIN — ACETAMINOPHEN 650 MG: 325 TABLET ORAL at 09:28

## 2023-07-29 RX ADMIN — Medication 10 ML: at 10:38

## 2023-07-29 RX ADMIN — MORPHINE SULFATE 1 MG: 2 INJECTION, SOLUTION INTRAMUSCULAR; INTRAVENOUS at 14:13

## 2023-07-29 RX ADMIN — OLANZAPINE 10 MG: 5 TABLET, FILM COATED ORAL at 09:27

## 2023-07-29 RX ADMIN — ONDANSETRON 4 MG: 2 INJECTION INTRAMUSCULAR; INTRAVENOUS at 09:28

## 2023-07-29 RX ADMIN — OXCARBAZEPINE 150 MG: 150 TABLET, FILM COATED ORAL at 09:28

## 2023-07-29 RX ADMIN — PANTOPRAZOLE SODIUM 40 MG: 40 TABLET, DELAYED RELEASE ORAL at 18:58

## 2023-07-29 RX ADMIN — OXYCODONE HYDROCHLORIDE AND ACETAMINOPHEN 1 TABLET: 5; 325 TABLET ORAL at 13:02

## 2023-07-29 RX ADMIN — HEPARIN SODIUM 5000 UNITS: 5000 INJECTION INTRAVENOUS; SUBCUTANEOUS at 13:02

## 2023-07-29 RX ADMIN — SIMETHICONE 80 MG: 80 TABLET, CHEWABLE ORAL at 06:02

## 2023-07-29 RX ADMIN — PANTOPRAZOLE SODIUM 40 MG: 40 TABLET, DELAYED RELEASE ORAL at 09:28

## 2023-07-29 RX ADMIN — OXYCODONE HYDROCHLORIDE AND ACETAMINOPHEN 1 TABLET: 5; 325 TABLET ORAL at 21:22

## 2023-07-29 RX ADMIN — FLUCONAZOLE 150 MG: 100 TABLET ORAL at 13:00

## 2023-07-29 RX ADMIN — TIZANIDINE 4 MG: 4 TABLET ORAL at 21:22
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
## 2023-07-29 NOTE — PROGRESS NOTES
"    [facility] Medicine Services  PROGRESS NOTE    Patient Name: Marguerite Edwards  : 1985  MRN: [id number]    Date of Admission: 2023  Primary Care Physician: Liliya Hodges, KATRINA    Subjective   Subjective     CC:  Follow-up nausea and vomiting    HPI:  Patient reports \"pain all over her body\".  No more vomiting.  Still nauseated.  No diarrhea.  Reports itching/burning in her vagina.    ROS:  Gen- No fevers, chills  CV- No chest pain, palpitations  Resp- No cough, dyspnea  GI-as above    Objective   Objective     Vital Signs:   Temp:  [97.6 øF (36.4 øC)-98.3 øF (36.8 øC)] 98 øF (36.7 øC)  Heart Rate:  [106-126] 108  Resp:  [16-18] 16  BP: (136-171)/() 142/71     Physical Exam:  Constitutional: No acute distress, awake, alert  HENT: NCAT, mucous membranes moist  Respiratory: Clear to auscultation bilaterally, respiratory effort normal   Cardiovascular: RRR, no murmurs, rubs, or gallops  Gastrointestinal: Normoactive bowel sounds, soft, nontender, nondistended  Musculoskeletal: No bilateral ankle edema  Psychiatric: Appropriate affect, cooperative  Neurologic: Oriented x 3, strength symmetric in all extremities, Cranial Nerves grossly intact to confrontation, speech clear  Skin: No rashes on exposed arms and legs    Results Reviewed:  LAB RESULTS:      Lab 23  03323  0935   WBC 12.43* 20.26*   HEMOGLOBIN 12.3 14.3   HEMATOCRIT 38.7 43.4   PLATELETS 253 365   NEUTROS ABS 7.54* 16.22*   IMMATURE GRANS (ABS) 0.07* 0.13*   LYMPHS ABS 3.63* 2.18   MONOS ABS 1.03* 1.68*   EOS ABS 0.10 0.00   MCV 84.3 81.3   PROCALCITONIN  --  0.05   LACTATE  --  1.6         Lab 23  0339 23  0935   SODIUM 142 142   POTASSIUM 4.5 3.6   CHLORIDE 107 102   CO2 20.0* 20.0*   ANION GAP 15.0 20.0*   BUN 4* 6   CREATININE 0.57 0.66   EGFR 118.0 113.9   GLUCOSE 119* 169*   CALCIUM 8.4* 9.6   HEMOGLOBIN A1C  --  6.30*         Lab 23  0339 23  0935   TOTAL PROTEIN 6.1 " 7.9   ALBUMIN 3.4* 3.9   GLOBULIN 2.7 4.0   ALT (SGPT) 76* 74*   AST (SGOT) 81* 110*   BILIRUBIN 0.2 0.3   ALK PHOS 247* 261*   LIPASE  --  95*             Lab 07/28/23  0935   TRIGLYCERIDES 58             Brief Urine Lab Results  (Last result in the past 365 days)        Color   Clarity   Blood   Leuk Est   Nitrite   Protein   CREAT   Urine HCG        07/28/23 1130               Negative               Microbiology Results Abnormal       Procedure Component Value - Date/Time    Wet Prep, Genital - Swab, Vagina [[id number]]  (Normal) Collected: 07/28/23 1659    Lab Status: Final result Specimen: Swab from Vagina Updated: 07/28/23 1725     YEAST No yeast seen     HYPHAL ELEMENTS No Hyphal elements seen     WBC'S No WBC's seen     Clue Cells, Wet Prep No Clue cells seen     Trichomonas, Wet Prep No Trichomonas seen            CT Abdomen Pelvis With Contrast    Result Date: 7/28/2023  CT ABDOMEN PELVIS W CONTRAST Date of Exam: 7/28/2023 12:17 PM EDT Indication: Gen Abd Pain, n/v. Comparison: CT abdomen pelvis 10/24/2020. Technique: Axial CT images were obtained of the abdomen and pelvis following the uneventful intravenous administration of 90 mL Isovue-300. Reconstructed coronal and sagittal images were also obtained. Automated exposure control and iterative construction methods were used. Findings: Lower Thorax: No focal consolidation. Scattered calcified granulomas. Liver: No focal hepatic lesion. Gallbladder and bile ducts: Cholecystectomy. No biliary ductal dilatation. Pancreas: No pancreatic duct dilation. No surrounding inflammation. Spleen: Spleen is normal size. Adrenal glands: No discrete adrenal nodule. Kidneys: No hydronephrosis. No suspicious renal lesions. Normal excretion of contrast on delayed phase. Urinary bladder: Unremarkable. Reproductive Organs: Unremarkable. Stomach and Bowel: No evidence of bowel obstruction. Moderate stool burden, predominately upstream of the redundant sigmoid colon. Normal  appendix. Lymph nodes: No pathologically enlarged lymph nodes. Vessels: No abdominal aortic aneurysm. Peritoneum and retroperitoneum: No free air or free fluid. Soft tissues: Unremarkable. Osseous structures: No suspicious osseous lesions.     Impression: Impression: Findings suggesting constipation. Otherwise, no acute abnormality in the abdomen or pelvis. Electronically Signed: Karsten Tee MD  7/28/2023 12:31 PM EDT  Workstation ID: [id number]    XR Chest 1 View    Result Date: 7/28/2023  XR CHEST 1 VW Date of Exam: 7/28/2023 10:37 AM EDT Indication: sepsis Comparison: Chest x-ray 1/23/2021 Findings: Top normal size of the heart. The lungs are clear. No pleural effusion. No pneumothorax. No acute osseous findings.     Impression: Impression: No acute cardiopulmonary findings. Electronically Signed: Anthony Casey  7/28/2023 10:54 AM EDT  Workstation ID: [id number]         Current medications:  Scheduled Meds:amitriptyline, 100 mg, Oral, Nightly  benztropine, 1 mg, Oral, BID  budesonide-formoterol, 1 puff, Inhalation, BID - RT  cetirizine, 10 mg, Oral, Daily  dicyclomine, 10 mg, Oral, 4x Daily AC & at Bedtime  heparin (porcine), 5,000 Units, Subcutaneous, Q8H  magnesium citrate, 296 mL, Oral, Once  OLANZapine, 10 mg, Oral, BID  ondansetron, 4 mg, Intravenous, Once  OXcarbazepine, 150 mg, Oral, Q12H  pantoprazole, 40 mg, Oral, BID AC  senna-docusate sodium, 2 tablet, Oral, BID  sodium chloride, 10 mL, Intravenous, Q12H      Continuous Infusions:sodium chloride, 100 mL/hr, Last Rate: 100 mL/hr (07/29/23 9131)      PRN Meds:.  acetaminophen **OR** acetaminophen **OR** acetaminophen    albuterol    artificial tears    senna-docusate sodium **AND** polyethylene glycol **AND** bisacodyl **AND** bisacodyl    hydrOXYzine    Morphine    ondansetron    simethicone    Sodium Chloride (PF)    sodium chloride    sodium chloride    tiZANidine    Assessment & Plan   Assessment & Plan     Active Hospital Problems    Diagnosis  POA     **Vomiting [R11.10]  Yes    Nausea [R11.0]  Unknown    Chronic constipation [K59.09]  Unknown    Gastroparesis [K31.84]  Unknown    Schizophrenia [F20.9]  Unknown    Abdominal pain in female [R10.9]  Yes      Resolved Hospital Problems   No resolved problems to display.        Brief Hospital Course to date:  Marguerite Edwards is a 38 y.o. female with PMH Asthma, anxiety, depression, schizophrenia, GERD, HLD, HTN, morbid obesity, constipation, gastroparesis presenting with nausea/abdominal pain/chest pain, abdominal distention for 3 days with 7 episodes of vomiting over the last 2 days PTA.    This patient's problems and plans were partially entered by my partner and updated as appropriate by me 07/29/23.     Abd pain,constipation, N/V, ketonuria   LFT elevation:  new   IBS w chronic constipation, gastroparesis   - broad DDx.  CT abd is reassuring, shows constip only.   - follows with [facility] ; patient requesting to see Dr Thomas   - despite WBC 20K, she does not look toxic and no infxn source is found.   - UA is a poor specimen    - Vanc/zosyn given in ED; hold further abx until reassessment   - recently started Fluvoxamine, which can cause nausea and 34 to 40% of people; will continue to hold  - STD screening pending, urine culture with mixed kathy  -   mils per hour; clear fluids; antiemetics   - Urine pregnancy test negative  - PPI     Schizophrenia  - has gone without meds x 2 days  - restarted home medications     Transaminitis  - ALT/AST/alk phos 74/110/261  - follow trend      Elevated glucose -- A1C     Anxiety/depression/schizophrenia  - Atarax, Zyprexa, Trileptal, Cogentin  - Reconfirmed medication doses and adjusted medicine orders per discussion with patient's spouse 7/29  - No longer takes amitriptyline  - Went 2 days without medicines at home     GERD  - PPI     HLD/HTN  - Zocor     Morbid obesity  - Complicates all aspects of care     Chronic back pain  - Tizanidine    Vaginal yeast  infection  -- Fluconazole x1    Updated the patient's  on plan of care on 7/29 per her request and verified medication list.  Verified patient name and date of birth prior to providing update.  Call finished at approximately 1:15 PM.    Expected Discharge Location and Transportation: home  Expected Discharge   Expected Discharge Date: 7/31/2023; Expected Discharge Time:      DVT prophylaxis:  Medical DVT prophylaxis orders are present.          CODE STATUS:   Code Status and Medical Interventions:   Ordered at: 07/28/23 8764     Level Of Support Discussed With:    Patient     Code Status (Patient has no pulse and is not breathing):    CPR (Attempt to Resuscitate)     Medical Interventions (Patient has pulse or is breathing):    Full Support     Release to patient:    Routine Release       Yaritza Weathers MD  07/29/23

## 2023-07-29 NOTE — PLAN OF CARE
Goal Outcome Evaluation:                   Problem: Fall Injury Risk  Goal: Absence of Fall and Fall-Related Injury  Outcome: Ongoing, Progressing  Intervention: Identify and Manage Contributors  Recent Flowsheet Documentation  Taken 7/28/2023 2000 by Elaina Rizo RN  Medication Review/Management: medications reviewed  Self-Care Promotion: independence encouraged  Intervention: Promote Injury-Free Environment  Recent Flowsheet Documentation  Taken 7/29/2023 0000 by Elaina Rizo RN  Safety Promotion/Fall Prevention:   activity supervised   assistive device/personal items within reach   lighting adjusted   nonskid shoes/slippers when out of bed   room organization consistent   safety round/check completed  Taken 7/28/2023 2200 by Elaina Rizo RN  Safety Promotion/Fall Prevention:   activity supervised   assistive device/personal items within reach   lighting adjusted   nonskid shoes/slippers when out of bed   room organization consistent   safety round/check completed  Taken 7/28/2023 2000 by Elaina Rizo RN  Safety Promotion/Fall Prevention:   activity supervised   assistive device/personal items within reach   lighting adjusted   nonskid shoes/slippers when out of bed   room organization consistent   safety round/check completed     Problem: Adjustment to Illness (Sepsis/Septic Shock)  Goal: Optimal Coping  Outcome: Ongoing, Progressing     Problem: Bleeding (Sepsis/Septic Shock)  Goal: Absence of Bleeding  Outcome: Ongoing, Progressing  Intervention: Monitor and Manage Bleeding  Recent Flowsheet Documentation  Taken 7/28/2023 2000 by Elaina Rizo RN  Bleeding Precautions: monitored for signs of bleeding  Bleeding Management: dressing monitored     Problem: Glycemic Control Impaired (Sepsis/Septic Shock)  Goal: Blood Glucose Level Within Desired Range  Outcome: Ongoing, Progressing     Problem: Infection Progression (Sepsis/Septic Shock)  Goal: Absence of Infection Signs and Symptoms  Outcome: Ongoing,  Progressing  Intervention: Initiate Sepsis Management  Recent Flowsheet Documentation  Taken 7/29/2023 0000 by Elaina Rizo RN  Infection Prevention:   environmental surveillance performed   hand hygiene promoted   rest/sleep promoted   single patient room provided  Taken 7/28/2023 2200 by Elaina Rizo RN  Infection Prevention:   environmental surveillance performed   hand hygiene promoted   rest/sleep promoted   single patient room provided  Taken 7/28/2023 2000 by Elaina Rizo RN  Infection Prevention:   environmental surveillance performed   hand hygiene promoted   rest/sleep promoted   single patient room provided  Intervention: Promote Recovery  Recent Flowsheet Documentation  Taken 7/29/2023 0000 by Elaina Rizo RN  Activity Management: activity encouraged  Taken 7/28/2023 2200 by Elaina Rioz RN  Activity Management: activity encouraged  Taken 7/28/2023 2000 by Elaina Rizo RN  Activity Management: activity encouraged     Problem: Nutrition Impaired (Sepsis/Septic Shock)  Goal: Optimal Nutrition Intake  Outcome: Ongoing, Progressing

## 2023-07-30 ENCOUNTER — READMISSION MANAGEMENT (OUTPATIENT)
Dept: CALL CENTER | Facility: HOSPITAL | Age: 38
End: 2023-07-30
Payer: COMMERCIAL

## 2023-07-30 VITALS
SYSTOLIC BLOOD PRESSURE: 151 MMHG | HEIGHT: 62 IN | HEART RATE: 117 BPM | TEMPERATURE: 97.5 F | RESPIRATION RATE: 18 BRPM | WEIGHT: 204.2 LBS | OXYGEN SATURATION: 98 % | DIASTOLIC BLOOD PRESSURE: 83 MMHG | BODY MASS INDEX: 37.58 KG/M2

## 2023-07-30 LAB
ALBUMIN SERPL-MCNC: 3.6 G/DL (ref 3.5–5.2)
ALBUMIN/GLOB SERPL: 1.2 G/DL
ALP SERPL-CCNC: 303 U/L (ref 39–117)
ALT SERPL W P-5'-P-CCNC: 84 U/L (ref 1–33)
ANION GAP SERPL CALCULATED.3IONS-SCNC: 16 MMOL/L (ref 5–15)
AST SERPL-CCNC: 68 U/L (ref 1–32)
BASOPHILS # BLD AUTO: 0.06 10*3/MM3 (ref 0–0.2)
BASOPHILS NFR BLD AUTO: 0.5 % (ref 0–1.5)
BILIRUB SERPL-MCNC: 0.2 MG/DL (ref 0–1.2)
BUN SERPL-MCNC: 3 MG/DL (ref 6–20)
BUN/CREAT SERPL: 5.9 (ref 7–25)
CALCIUM SPEC-SCNC: 9.3 MG/DL (ref 8.6–10.5)
CHLORIDE SERPL-SCNC: 102 MMOL/L (ref 98–107)
CO2 SERPL-SCNC: 25 MMOL/L (ref 22–29)
CREAT SERPL-MCNC: 0.51 MG/DL (ref 0.57–1)
DEPRECATED RDW RBC AUTO: 41.7 FL (ref 37–54)
EGFRCR SERPLBLD CKD-EPI 2021: 121.2 ML/MIN/1.73
EOSINOPHIL # BLD AUTO: 0.24 10*3/MM3 (ref 0–0.4)
EOSINOPHIL NFR BLD AUTO: 1.8 % (ref 0.3–6.2)
ERYTHROCYTE [DISTWIDTH] IN BLOOD BY AUTOMATED COUNT: 13.4 % (ref 12.3–15.4)
GLOBULIN UR ELPH-MCNC: 3 GM/DL
GLUCOSE SERPL-MCNC: 128 MG/DL (ref 65–99)
HCT VFR BLD AUTO: 40.5 % (ref 34–46.6)
HGB BLD-MCNC: 13.1 G/DL (ref 12–15.9)
IMM GRANULOCYTES # BLD AUTO: 0.07 10*3/MM3 (ref 0–0.05)
IMM GRANULOCYTES NFR BLD AUTO: 0.5 % (ref 0–0.5)
LYMPHOCYTES # BLD AUTO: 3.96 10*3/MM3 (ref 0.7–3.1)
LYMPHOCYTES NFR BLD AUTO: 29.8 % (ref 19.6–45.3)
MAGNESIUM SERPL-MCNC: 1.8 MG/DL (ref 1.6–2.6)
MCH RBC QN AUTO: 27.2 PG (ref 26.6–33)
MCHC RBC AUTO-ENTMCNC: 32.3 G/DL (ref 31.5–35.7)
MCV RBC AUTO: 84 FL (ref 79–97)
MONOCYTES # BLD AUTO: 1.02 10*3/MM3 (ref 0.1–0.9)
MONOCYTES NFR BLD AUTO: 7.7 % (ref 5–12)
NEUTROPHILS NFR BLD AUTO: 59.7 % (ref 42.7–76)
NEUTROPHILS NFR BLD AUTO: 7.94 10*3/MM3 (ref 1.7–7)
NRBC BLD AUTO-RTO: 0 /100 WBC (ref 0–0.2)
PHOSPHATE SERPL-MCNC: 2.7 MG/DL (ref 2.5–4.5)
PLATELET # BLD AUTO: 296 10*3/MM3 (ref 140–450)
PMV BLD AUTO: 10.7 FL (ref 6–12)
POTASSIUM SERPL-SCNC: 4.7 MMOL/L (ref 3.5–5.2)
PROT SERPL-MCNC: 6.6 G/DL (ref 6–8.5)
RBC # BLD AUTO: 4.82 10*6/MM3 (ref 3.77–5.28)
SODIUM SERPL-SCNC: 143 MMOL/L (ref 136–145)
WBC NRBC COR # BLD: 13.29 10*3/MM3 (ref 3.4–10.8)

## 2023-07-30 PROCEDURE — 25010000002 ONDANSETRON PER 1 MG: Performed by: NURSE PRACTITIONER

## 2023-07-30 PROCEDURE — 83735 ASSAY OF MAGNESIUM: CPT | Performed by: STUDENT IN AN ORGANIZED HEALTH CARE EDUCATION/TRAINING PROGRAM

## 2023-07-30 PROCEDURE — 99239 HOSP IP/OBS DSCHRG MGMT >30: CPT | Performed by: NURSE PRACTITIONER

## 2023-07-30 PROCEDURE — G0378 HOSPITAL OBSERVATION PER HR: HCPCS

## 2023-07-30 PROCEDURE — 25010000002 MORPHINE PER 10 MG: Performed by: STUDENT IN AN ORGANIZED HEALTH CARE EDUCATION/TRAINING PROGRAM

## 2023-07-30 PROCEDURE — 94799 UNLISTED PULMONARY SVC/PX: CPT

## 2023-07-30 PROCEDURE — 96376 TX/PRO/DX INJ SAME DRUG ADON: CPT

## 2023-07-30 PROCEDURE — 96372 THER/PROPH/DIAG INJ SC/IM: CPT

## 2023-07-30 PROCEDURE — 84100 ASSAY OF PHOSPHORUS: CPT | Performed by: STUDENT IN AN ORGANIZED HEALTH CARE EDUCATION/TRAINING PROGRAM

## 2023-07-30 PROCEDURE — 25010000002 HEPARIN (PORCINE) PER 1000 UNITS: Performed by: NURSE PRACTITIONER

## 2023-07-30 PROCEDURE — 85025 COMPLETE CBC W/AUTO DIFF WBC: CPT | Performed by: STUDENT IN AN ORGANIZED HEALTH CARE EDUCATION/TRAINING PROGRAM

## 2023-07-30 PROCEDURE — 80053 COMPREHEN METABOLIC PANEL: CPT | Performed by: STUDENT IN AN ORGANIZED HEALTH CARE EDUCATION/TRAINING PROGRAM

## 2023-07-30 RX ORDER — LUBIPROSTONE 24 UG/1
24 CAPSULE ORAL 2 TIMES DAILY WITH MEALS
Qty: 60 CAPSULE | Refills: 0 | Status: SHIPPED | OUTPATIENT
Start: 2023-07-30 | End: 2023-08-24 | Stop reason: SDUPTHER

## 2023-07-30 RX ORDER — LACTULOSE 10 G/15ML
10 SOLUTION ORAL DAILY
Qty: 473 ML | Refills: 0 | Status: SHIPPED | OUTPATIENT
Start: 2023-07-31 | End: 2023-08-24 | Stop reason: SDUPTHER

## 2023-07-30 RX ORDER — ACETAMINOPHEN 325 MG/1
650 TABLET ORAL EVERY 6 HOURS PRN
Start: 2023-07-30

## 2023-07-30 RX ORDER — PANTOPRAZOLE SODIUM 40 MG/1
40 TABLET, DELAYED RELEASE ORAL
Qty: 60 TABLET | Refills: 0 | Status: SHIPPED | OUTPATIENT
Start: 2023-07-30

## 2023-07-30 RX ORDER — POLYETHYLENE GLYCOL 3350 17 G/17G
17 POWDER, FOR SOLUTION ORAL DAILY
Qty: 238 G | Refills: 0 | Status: SHIPPED | OUTPATIENT
Start: 2023-07-31 | End: 2023-08-09

## 2023-07-30 RX ORDER — ONDANSETRON 4 MG/1
4 TABLET, ORALLY DISINTEGRATING ORAL EVERY 8 HOURS PRN
Qty: 10 TABLET | Refills: 0 | Status: SHIPPED | OUTPATIENT
Start: 2023-07-30 | End: 2023-08-08 | Stop reason: SDUPTHER

## 2023-07-30 RX ORDER — AMOXICILLIN 250 MG
2 CAPSULE ORAL 2 TIMES DAILY
Qty: 120 TABLET | Refills: 0 | Status: SHIPPED | OUTPATIENT
Start: 2023-07-30

## 2023-07-30 RX ORDER — BISACODYL 5 MG/1
5 TABLET, DELAYED RELEASE ORAL DAILY PRN
Qty: 30 TABLET | Refills: 0 | Status: SHIPPED | OUTPATIENT
Start: 2023-07-30 | End: 2023-08-24 | Stop reason: SDUPTHER

## 2023-07-30 RX ORDER — BISACODYL 10 MG
10 SUPPOSITORY, RECTAL RECTAL DAILY PRN
Qty: 10 EACH | Refills: 0 | Status: SHIPPED | OUTPATIENT
Start: 2023-07-30

## 2023-07-30 RX ADMIN — BENZTROPINE MESYLATE 1 MG: 1 TABLET ORAL at 08:11

## 2023-07-30 RX ADMIN — SENNOSIDES AND DOCUSATE SODIUM 2 TABLET: 50; 8.6 TABLET ORAL at 08:11

## 2023-07-30 RX ADMIN — Medication 10 ML: at 08:12

## 2023-07-30 RX ADMIN — CETIRIZINE HYDROCHLORIDE 10 MG: 10 TABLET, FILM COATED ORAL at 08:11

## 2023-07-30 RX ADMIN — MORPHINE SULFATE 1 MG: 2 INJECTION, SOLUTION INTRAMUSCULAR; INTRAVENOUS at 09:33

## 2023-07-30 RX ADMIN — DICYCLOMINE HYDROCHLORIDE 10 MG: 10 CAPSULE ORAL at 08:11

## 2023-07-30 RX ADMIN — OXYCODONE HYDROCHLORIDE AND ACETAMINOPHEN 1 TABLET: 5; 325 TABLET ORAL at 08:21

## 2023-07-30 RX ADMIN — BUDESONIDE AND FORMOTEROL FUMARATE DIHYDRATE 1 PUFF: 160; 4.5 AEROSOL RESPIRATORY (INHALATION) at 07:49

## 2023-07-30 RX ADMIN — LACTULOSE 10 G: 20 SOLUTION ORAL at 08:11

## 2023-07-30 RX ADMIN — PANTOPRAZOLE SODIUM 40 MG: 40 TABLET, DELAYED RELEASE ORAL at 08:11

## 2023-07-30 RX ADMIN — DICYCLOMINE HYDROCHLORIDE 10 MG: 10 CAPSULE ORAL at 11:38

## 2023-07-30 RX ADMIN — SIMETHICONE 80 MG: 80 TABLET, CHEWABLE ORAL at 12:19

## 2023-07-30 RX ADMIN — HEPARIN SODIUM 5000 UNITS: 5000 INJECTION INTRAVENOUS; SUBCUTANEOUS at 05:30

## 2023-07-30 RX ADMIN — POLYETHYLENE GLYCOL 3350 17 G: 17 POWDER, FOR SOLUTION ORAL at 08:11

## 2023-07-30 RX ADMIN — ONDANSETRON 4 MG: 2 INJECTION INTRAMUSCULAR; INTRAVENOUS at 08:21

## 2023-07-30 RX ADMIN — LUBIPROSTONE 24 MCG: 24 CAPSULE, GELATIN COATED ORAL at 08:11

## 2023-07-30 RX ADMIN — OLANZAPINE 5 MG: 5 TABLET, FILM COATED ORAL at 08:11

## 2023-07-30 RX ADMIN — OXCARBAZEPINE 150 MG: 150 TABLET, FILM COATED ORAL at 08:11

## 2023-07-30 NOTE — DISCHARGE SUMMARY
[facility]  DISCHARGE SUMMARY    Patient Name: Marguerite Edwards  : 1985  MRN: [id number]    Date of Admission: 2023  Date of Discharge:  2023  Primary Care Physician: Liliya Hodges APRN    Consults       Date and Time Order Name Status Description    2023 12:33 AM Inpatient Gastroenterology Consult Completed             Hospital Course     Presenting Problem:   Vomiting [R11.10]    Active Hospital Problems    Diagnosis  POA    **Vomiting [R11.10]  Yes    Nausea [R11.0]  Unknown    Chronic constipation [K59.09]  Unknown    Gastroparesis [K31.84]  Unknown    Schizophrenia [F20.9]  Unknown    Abdominal pain in female [R10.9]  Yes      Resolved Hospital Problems   No resolved problems to display.      Hospital Course:  Marguerite Edawrds is a 38 y.o. female PMH asthma, anxiety, depression, schizophrenia, GERD, HLD, HTN, morbid obesity and constipation, gastroparesis presenting with nausea, abdominal pain, chest pain, abdominal distention for 3 days with episodes of vomiting X 2 days PTA.  CT abdomen/pelvis with findings suggesting constipation.  Gastroenterology consulted recommending bowel regimen, Amitiza, lactulose, daily MiraLAX, encourage ambulation.  Advance to GI soft.  Patient will follow-up with Dr. Ronny Thomas as previously scheduled.      Discharge Follow Up Recommendations for labs/diagnostics:  Follow up with PCP in 1 week  Follow up with Dr Ronny Thomas as previously scheduled    Day of Discharge     HPI:   Patient sleeping on arrival awaken easily with conversation.  Patient states abdominal pain is improving.  Denies nausea vomiting.      Vital Signs:   Temp:  [97.5 øF (36.4 øC)-98.8 øF (37.1 øC)] 97.5 øF (36.4 øC)  Heart Rate:  [] 117  Resp:  [16-28] 18  BP: (109-151)/(69-93) 151/83     Physical Exam:  Constitutional: Awake, alert, NAD  HENT: NCAT, mucous membranes moist  Respiratory: Clear to auscultation bilaterally,  nonlabored respirations   Cardiovascular: RRR, no murmurs, rubs, or gallops  Gastrointestinal: Positive bowel sounds, soft, nontender, nondistended  Musculoskeletal: No bilateral ankle edema  Psychiatric: Appropriate affect, cooperative  Neurologic: Oriented x 3, strength symmetric in all extremities, Cranial Nerves grossly intact to confrontation, speech clear  Skin: No rashes    Plan  Abd pain, constipation, nausea vomiting, ketonuria-improved  Constipation, gastroparesis  - CT reassuring.  GI consulted recommending Amitiza, bowel regimen, PPI and follow-up with Dr. Ronny Thomas as previously scheduled.  Patient recently started doxepin which caused nausea and about 30 to 40% of people.  We will continue holding. STD pending.     Schizophrenia  -Continue home meds except holding fluvoxamine which can cause nausea and 30 to 40% of people.    Anxiety/depression  -Atarax, Zyprexa, Trileptal, Cogentin    Transaminitis-new  - Possibly psych medications contributing    Elevated glucose  - A1c 6.3 increased risk for diabetes    GERD  - PPI    HLD/HTN  - Zocor    Morbid obesity-complicates all aspects of care    Chronic back pain  - Tizanidine    Vaginal yeast infection  - Fluconazole    Pertinent  and/or Most Recent Results     Results from last 7 days   Lab Units 07/30/23  0337 07/29/23  0339 07/28/23  0935   WBC 10*3/mm3 13.29* 12.43* 20.26*   HEMOGLOBIN g/dL 13.1 12.3 14.3   HEMATOCRIT % 40.5 38.7 43.4   PLATELETS 10*3/mm3 296 253 365   SODIUM mmol/L 143 142 142   POTASSIUM mmol/L 4.7 4.5 3.6   CHLORIDE mmol/L 102 107 102   CO2 mmol/L 25.0 20.0* 20.0*   BUN mg/dL 3* 4* 6   CREATININE mg/dL 0.51* 0.57 0.66   GLUCOSE mg/dL 128* 119* 169*   CALCIUM mg/dL 9.3 8.4* 9.6     Results from last 7 days   Lab Units 07/30/23  0337 07/29/23  0339 07/28/23  0935   BILIRUBIN mg/dL 0.2 0.2 0.3   ALK PHOS U/L 303* 247* 261*   ALT (SGPT) U/L 84* 76* 74*   AST (SGOT) U/L 68* 81* 110*     Results from last 7 days   Lab Units  07/28/23  0935   TRIGLYCERIDES mg/dL 58     Results from last 7 days   Lab Units 07/28/23  0935   HEMOGLOBIN A1C % 6.30*   PROCALCITONIN ng/mL 0.05   LACTATE mmol/L 1.6       Brief Urine Lab Results  (Last result in the past 365 days)        Color   Clarity   Blood   Leuk Est   Nitrite   Protein   CREAT   Urine HCG        07/28/23 1130               Negative               Microbiology Results Abnormal       Procedure Component Value - Date/Time    Urine Culture - Urine, Straight Cath [[id number]] Collected: 07/28/23 1123    Lab Status: Final result Specimen: Urine from Straight Cath Updated: 07/29/23 1240     Urine Culture 50,000 CFU/mL Normal Urogenital Jen    Narrative:      Colonization of the urinary tract without infection is common. Treatment is discouraged unless the patient is symptomatic, pregnant, or undergoing an invasive urologic procedure.    Blood Culture - Blood, Arm, Left [[id number]]  (Normal) Collected: 07/28/23 1105    Lab Status: Preliminary result Specimen: Blood from Arm, Left Updated: 07/29/23 1230     Blood Culture No growth at 24 hours    Blood Culture - Blood, Arm, Right [[id number]]  (Normal) Collected: 07/28/23 1105    Lab Status: Preliminary result Specimen: Blood from Arm, Right Updated: 07/29/23 1230     Blood Culture No growth at 24 hours    Wet Prep, Genital - Swab, Vagina [[id number]]  (Normal) Collected: 07/28/23 1659    Lab Status: Final result Specimen: Swab from Vagina Updated: 07/28/23 1725     YEAST No yeast seen     HYPHAL ELEMENTS No Hyphal elements seen     WBC'S No WBC's seen     Clue Cells, Wet Prep No Clue cells seen     Trichomonas, Wet Prep No Trichomonas seen            Imaging Results (All)       Procedure Component Value Units Date/Time    CT Abdomen Pelvis With Contrast [[id number]] Collected: 07/28/23 1223     Updated: 07/28/23 1234    Narrative:      CT ABDOMEN PELVIS W CONTRAST    Date of Exam: 7/28/2023 12:17 PM EDT    Indication: Gen Abd Pain, n/v.    Comparison:  CT abdomen pelvis 10/24/2020.    Technique: Axial CT images were obtained of the abdomen and pelvis following the uneventful intravenous administration of 90 mL Isovue-300. Reconstructed coronal and sagittal images were also obtained. Automated exposure control and iterative   construction methods were used.    Findings:    Lower Thorax: No focal consolidation. Scattered calcified granulomas.    Liver: No focal hepatic lesion.    Gallbladder and bile ducts: Cholecystectomy. No biliary ductal dilatation.    Pancreas: No pancreatic duct dilation. No surrounding inflammation.    Spleen: Spleen is normal size.    Adrenal glands: No discrete adrenal nodule.    Kidneys: No hydronephrosis. No suspicious renal lesions. Normal excretion of contrast on delayed phase.    Urinary bladder: Unremarkable.    Reproductive Organs: Unremarkable.    Stomach and Bowel: No evidence of bowel obstruction. Moderate stool burden, predominately upstream of the redundant sigmoid colon. Normal appendix.    Lymph nodes: No pathologically enlarged lymph nodes.    Vessels: No abdominal aortic aneurysm.    Peritoneum and retroperitoneum: No free air or free fluid.    Soft tissues: Unremarkable.    Osseous structures: No suspicious osseous lesions.      Impression:      Impression:    Findings suggesting constipation. Otherwise, no acute abnormality in the abdomen or pelvis.      Electronically Signed: Karsten Tee MD    7/28/2023 12:31 PM EDT    Workstation ID: [id number]    XR Chest 1 View [[id number]] Collected: 07/28/23 1052     Updated: 07/28/23 1057    Narrative:      XR CHEST 1 VW    Date of Exam: 7/28/2023 10:37 AM EDT    Indication: sepsis    Comparison: Chest x-ray 1/23/2021    Findings:  Top normal size of the heart. The lungs are clear. No pleural effusion. No pneumothorax. No acute osseous findings.      Impression:      Impression:  No acute cardiopulmonary findings.      Electronically Signed: Anthony Casey    7/28/2023 10:54 AM EDT     Workstation ID: [id number]                        Pending Labs       Order Current Status    Chlamydia trachomatis, Neisseria gonorrhoeae, PCR - Swab, Cervix In process    Blood Culture - Blood, Arm, Left Preliminary result    Blood Culture - Blood, Arm, Right Preliminary result          Discharge Details        Discharge Medications        New Medications        Instructions Start Date   acetaminophen 325 MG tablet  Commonly known as: TYLENOL   650 mg, Oral, Every 6 Hours PRN      bisacodyl 5 MG EC tablet  Commonly known as: DULCOLAX   5 mg, Oral, Daily PRN      bisacodyl 10 MG suppository  Commonly known as: DULCOLAX   10 mg, Rectal, Daily PRN      lactulose 10 GM/15ML solution  Commonly known as: CHRONULAC   10 g, Oral, Daily   Start Date: July 31, 2023     lubiprostone 24 MCG capsule  Commonly known as: AMITIZA   24 mcg, Oral, 2 Times Daily With Meals      miconazole 100 MG vaginal suppository  Commonly known as: MICOTIN   100 mg, Vaginal, Nightly      ondansetron ODT 4 MG disintegrating tablet  Commonly known as: ZOFRAN-ODT   4 mg, Translingual, Every 8 Hours PRN      pantoprazole 40 MG EC tablet  Commonly known as: PROTONIX  Replaces: Dexilant 60 MG capsule   40 mg, Oral, 2 Times Daily Before Meals      sennosides-docusate 8.6-50 MG per tablet  Commonly known as: PERICOLACE   2 tablets, Oral, 2 Times Daily             Changes to Medications        Instructions Start Date   fluticasone 50 MCG/ACT nasal spray  Commonly known as: Flonase  What changed:   when to take this  reasons to take this   2 sprays, Nasal, Daily      loratadine 10 MG tablet  Commonly known as: Claritin  What changed: additional instructions   10 mg, Oral, Daily      Melatonin 5 MG capsule  What changed:   how much to take  additional instructions   5 mg, Oral, Nightly PRN      polyethylene glycol 17 GM/SCOOP powder  Commonly known as: MIRALAX  What changed:   how much to take  how to take this  when to take this  additional instructions    17 g, Oral, Daily   Start Date: July 31, 2023     tiZANidine 4 MG tablet  Commonly known as: ZANAFLEX  What changed:   how much to take  additional instructions   TAKE 1-2 TABLETS BY MOUTH EVERY 8 HOURS AS NEEDED FOR BLADDER SPASMS             Continue These Medications        Instructions Start Date   albuterol sulfate  (90 Base) MCG/ACT inhaler  Commonly known as: PROVENTIL HFA;VENTOLIN HFA;PROAIR HFA   2 puffs, Inhalation, Every 4 Hours PRN      amitriptyline 100 MG tablet  Commonly known as: ELAVIL   100 mg, Oral, Nightly      benztropine 1 MG tablet  Commonly known as: COGENTIN   1 mg, Oral, 2 Times Daily      cholecalciferol 25 MCG (1000 UT) tablet  Commonly known as: VITAMIN D3   1,000 Units, Oral, Daily      dicyclomine 10 MG capsule  Commonly known as: BENTYL   10 mg, Oral, 4 Times Daily Before Meals & Nightly      Fluticasone Furoate-Vilanterol 100-25 MCG/ACT aerosol powder   Commonly known as: Breo Ellipta   1 puff, Inhalation, Daily - RT      GenTeal Tears 0.1-0.2-0.3 % solution   1 drop, Ophthalmic, Daily PRN, OTC      hydrOXYzine pamoate 50 MG capsule  Commonly known as: VISTARIL   50 mg, Oral, 3 Times Daily PRN, Takes scheduled      multivitamin with minerals tablet tablet   1 tablet, Oral, Daily      OLANZapine 10 MG tablet  Commonly known as: zyPREXA   5 mg, Oral, 2 Times Daily      OXcarbazepine 150 MG tablet  Commonly known as: TRILEPTAL   150 mg, Oral, 2 Times Daily      simvastatin 20 MG tablet  Commonly known as: ZOCOR   20 mg, Oral, Nightly             Stop These Medications      Dexilant 60 MG capsule  Generic drug: dexlansoprazole  Replaced by: pantoprazole 40 MG EC tablet     Linzess 72 MCG capsule capsule  Generic drug: linaclotide     venlafaxine XR 37.5 MG 24 hr capsule  Commonly known as: EFFEXOR-XR              Allergies   Allergen Reactions    Lamotrigine Other (See Comments)     bruising  bruising    Paxil [Paroxetine Hcl] Mental Status Change    Prozac [Fluoxetine Hcl] Mental  Status Change    Chlorhexidine Gluconate Unknown - Low Severity    Fluoxetine Unknown - Low Severity    Lubiprostone Palpitations, Dizziness and Unknown - Low Severity    Paroxetine Unknown - Low Severity         Discharge Disposition:  Home or Self Care    Discharge Diet:  Diet Order   Procedures    Diet: Gastrointestinal Diets; Fiber-Restricted, Low Irritant; Texture: Regular Texture (IDDSI 7); Fluid Consistency: Thin (IDDSI 0)         Discharge Activity:   Activity Instructions       Activity as Tolerated      Up WIth Assist                CODE STATUS:    Code Status and Medical Interventions:   Ordered at: 07/28/23 2838     Level Of Support Discussed With:    Patient     Code Status (Patient has no pulse and is not breathing):    CPR (Attempt to Resuscitate)     Medical Interventions (Patient has pulse or is breathing):    Full Support     Release to patient:    Routine Release         Future Appointments   Date Time Provider Department Center   8/4/2023  3:15 PM Liliya Hodges APRN [facility]   8/10/2023  3:10 PM [facility] [place]   8/23/2023  2:30 PM Ronny Thomas MD [facility]       Additional Instructions for the Follow-ups that You Need to Schedule       Call MD With Problems / Concerns   As directed      Instructions: Call Provider for temp >100.4, vomiting, chest pain, heart palpitations, shortness of breath    Order Comments: Instructions: Call Provider for temp >100.4, vomiting, chest pain, heart palpitations, shortness of breath         Discharge Follow-up with PCP   As directed       Currently Documented PCP:    Liliya Hodges APRN    PCP Phone Number:    [phone]     Follow Up Details: Follow up with PCP in 1 week        Discharge Follow-up with Specified Provider: Dr Ronny Thomas   As directed      To: Dr Ronny Thomas   Follow Up Details: as previously scheduled                Time Spent on Discharge:  5 minutes    Electronically signed by Antonia  KATRINA Young, 07/30/23, 11:28 AM EDT.

## 2023-07-30 NOTE — OUTREACH NOTE
Prep Survey      Flowsheet Row Responses   [facility] patient discharged from? [place]   Is LACE score < 7 ? No   Eligibility [facility]   Date of Admission 07/28/23   Date of Discharge 07/30/23   Discharge Disposition Home or Self Care   Discharge diagnosis *Vomiting   Does the patient have one of the following disease processes/diagnoses(primary or secondary)? Other   Does the patient have Home health ordered? No   Is there a DME ordered? No   Prep survey completed? Yes            CESAR WOLFE - Registered Nurse

## 2023-07-30 NOTE — PLAN OF CARE
Goal Outcome Evaluation:  Plan of Care Reviewed With: patient        Progress: no change  Outcome Evaluation: Pt VSS, Alert and oriented x4 (able to answer all orientation questions appropriately) yet behavior is not appropriate to situation. Frequently seeking out staff, demanding,  inappropriate with staff (frequently asking staff to help her wipe her privates, look at her privates though pt fully able to wipe and clean herself). Pt educated on need for independence when able and being appropriate with staff. Pt frequently asking for medication, stating pain level is 10/10, at these times pt has been calm, resting, NSR, RR WNL, sometimes there will be a grimace noted. Pain controlled with IV and PO pain medication per orders. Pt has attempted to get out of bed several times without utilization of the call bell. Pt educated on importance of calling staff for assistance when needing ambulization, bed alarm on and in place. Nausea relieved with IV Zofran. Clear liquid diet maintained. Bowel regimen medication given per orders. Will cont to monitor.

## 2023-07-30 NOTE — PLAN OF CARE
Problem: Fall Injury Risk  Goal: Absence of Fall and Fall-Related Injury  Outcome: Ongoing, Progressing  Intervention: Identify and Manage Contributors  Recent Flowsheet Documentation  Taken 7/29/2023 2122 by Randa Barrientos RN  Medication Review/Management: medications reviewed  Self-Care Promotion:   independence encouraged   BADL personal objects within reach   safe use of adaptive equipment encouraged  Intervention: Promote Injury-Free Environment  Recent Flowsheet Documentation  Taken 7/30/2023 0400 by Randa Barrientos RN  Safety Promotion/Fall Prevention:   activity supervised   assistive device/personal items within reach   clutter free environment maintained   nonskid shoes/slippers when out of bed   room organization consistent   safety round/check completed  Taken 7/30/2023 0200 by Randa Barrientos RN  Safety Promotion/Fall Prevention:   activity supervised   assistive device/personal items within reach   clutter free environment maintained   nonskid shoes/slippers when out of bed   room organization consistent   safety round/check completed  Taken 7/30/2023 0000 by Randa Barrientos RN  Safety Promotion/Fall Prevention:   activity supervised   assistive device/personal items within reach   clutter free environment maintained   nonskid shoes/slippers when out of bed   room organization consistent   safety round/check completed  Taken 7/29/2023 2200 by Randa Barrientos RN  Safety Promotion/Fall Prevention:   assistive device/personal items within reach   clutter free environment maintained   activity supervised   nonskid shoes/slippers when out of bed   room organization consistent   safety round/check completed  Taken 7/29/2023 2122 by Randa Barrientos RN  Safety Promotion/Fall Prevention:   activity supervised   assistive device/personal items within reach   clutter free environment maintained   nonskid shoes/slippers when out of bed   room organization consistent   safety  round/check completed  Taken 7/29/2023 2000 by Randa Barrientos RN  Safety Promotion/Fall Prevention:   activity supervised   assistive device/personal items within reach   clutter free environment maintained   nonskid shoes/slippers when out of bed   room organization consistent   safety round/check completed     Problem: Adjustment to Illness (Sepsis/Septic Shock)  Goal: Optimal Coping  Outcome: Ongoing, Progressing  Intervention: Optimize Psychosocial Adjustment to Illness  Recent Flowsheet Documentation  Taken 7/29/2023 2122 by Randa Barrientos RN  Supportive Measures: active listening utilized     Problem: Bleeding (Sepsis/Septic Shock)  Goal: Absence of Bleeding  Outcome: Ongoing, Progressing     Problem: Glycemic Control Impaired (Sepsis/Septic Shock)  Goal: Blood Glucose Level Within Desired Range  Outcome: Ongoing, Progressing  Intervention: Optimize Glycemic Control  Recent Flowsheet Documentation  Taken 7/29/2023 2122 by Randa Barrientos RN  Glycemic Management: oral hydration promoted     Problem: Infection Progression (Sepsis/Septic Shock)  Goal: Absence of Infection Signs and Symptoms  Outcome: Ongoing, Progressing  Intervention: Initiate Sepsis Management  Recent Flowsheet Documentation  Taken 7/30/2023 0400 by Randa Barrientos RN  Infection Prevention:   environmental surveillance performed   hand hygiene promoted   rest/sleep promoted  Taken 7/30/2023 0200 by Randa Barrientos RN  Infection Prevention:   environmental surveillance performed   hand hygiene promoted   rest/sleep promoted  Taken 7/30/2023 0000 by Randa Barrientos RN  Infection Prevention:   environmental surveillance performed   hand hygiene promoted   rest/sleep promoted  Taken 7/29/2023 2200 by Randa Barrientos RN  Infection Prevention:   environmental surveillance performed   hand hygiene promoted   rest/sleep promoted  Taken 7/29/2023 2122 by Randa Barrientos RN  Infection Prevention:   environmental  surveillance performed   hand hygiene promoted   rest/sleep promoted  Taken 7/29/2023 2000 by Randa Barrientos RN  Infection Prevention:   environmental surveillance performed   hand hygiene promoted   rest/sleep promoted  Intervention: Promote Recovery  Recent Flowsheet Documentation  Taken 7/30/2023 0400 by Randa Barrientos RN  Activity Management: activity minimized  Taken 7/30/2023 0200 by Randa Barrientos RN  Activity Management: activity minimized  Taken 7/30/2023 0000 by Randa Barrientos RN  Activity Management: activity encouraged  Taken 7/29/2023 2200 by Randa Barrientos RN  Activity Management: activity encouraged  Taken 7/29/2023 2122 by Randa Barrientos RN  Activity Management: activity encouraged  Sleep/Rest Enhancement: awakenings minimized  Taken 7/29/2023 2000 by Randa Barrientos RN  Activity Management: activity encouraged  Intervention: Promote Stabilization  Recent Flowsheet Documentation  Taken 7/29/2023 2122 by Randa Barrientos RN  Fluid/Electrolyte Management: fluids provided     Problem: Nutrition Impaired (Sepsis/Septic Shock)  Goal: Optimal Nutrition Intake  Outcome: Ongoing, Progressing     Problem: Adult Inpatient Plan of Care  Goal: Plan of Care Review  Outcome: Ongoing, Progressing  Flowsheets  Taken 7/30/2023 0442 by Randa Barrientos RN  Progress: no change  Outcome Evaluation:   VSS throughout shift. Complaints of pain medicated per PRN MAR   effective per patient. No BM during shift. Patient rested well during shift. No acute events.  Taken 7/29/2023 2030 by Janine Aldrich RN  Plan of Care Reviewed With: patient  Goal: Patient-Specific Goal (Individualized)  Outcome: Ongoing, Progressing  Goal: Absence of Hospital-Acquired Illness or Injury  Outcome: Ongoing, Progressing  Intervention: Identify and Manage Fall Risk  Recent Flowsheet Documentation  Taken 7/30/2023 0400 by Randa Barrientos RN  Safety Promotion/Fall Prevention:   activity  supervised   assistive device/personal items within reach   clutter free environment maintained   nonskid shoes/slippers when out of bed   room organization consistent   safety round/check completed  Taken 7/30/2023 0200 by Randa Barrientos RN  Safety Promotion/Fall Prevention:   activity supervised   assistive device/personal items within reach   clutter free environment maintained   nonskid shoes/slippers when out of bed   room organization consistent   safety round/check completed  Taken 7/30/2023 0000 by Randa Barrientos RN  Safety Promotion/Fall Prevention:   activity supervised   assistive device/personal items within reach   clutter free environment maintained   nonskid shoes/slippers when out of bed   room organization consistent   safety round/check completed  Taken 7/29/2023 2200 by Randa Barrientos RN  Safety Promotion/Fall Prevention:   assistive device/personal items within reach   clutter free environment maintained   activity supervised   nonskid shoes/slippers when out of bed   room organization consistent   safety round/check completed  Taken 7/29/2023 2122 by Randa Barrientos RN  Safety Promotion/Fall Prevention:   activity supervised   assistive device/personal items within reach   clutter free environment maintained   nonskid shoes/slippers when out of bed   room organization consistent   safety round/check completed  Taken 7/29/2023 2000 by Randa Barrientos RN  Safety Promotion/Fall Prevention:   activity supervised   assistive device/personal items within reach   clutter free environment maintained   nonskid shoes/slippers when out of bed   room organization consistent   safety round/check completed  Intervention: Prevent Skin Injury  Recent Flowsheet Documentation  Taken 7/30/2023 0400 by Randa Barrientos RN  Body Position: position changed independently  Skin Protection:   adhesive use limited   transparent dressing maintained   tubing/devices free from skin  contact  Taken 7/30/2023 0200 by Randa Barrientos RN  Body Position: position changed independently  Skin Protection:   adhesive use limited   transparent dressing maintained   tubing/devices free from skin contact  Taken 7/30/2023 0000 by Randa Barrientos RN  Body Position: position changed independently  Skin Protection:   adhesive use limited   transparent dressing maintained   tubing/devices free from skin contact  Taken 7/29/2023 2200 by Randa Barrientos RN  Body Position: position changed independently  Skin Protection:   adhesive use limited   transparent dressing maintained   tubing/devices free from skin contact  Taken 7/29/2023 2122 by Randa Barrientos RN  Body Position: position changed independently  Skin Protection:   adhesive use limited   transparent dressing maintained   tubing/devices free from skin contact  Intervention: Prevent and Manage VTE (Venous Thromboembolism) Risk  Recent Flowsheet Documentation  Taken 7/30/2023 0400 by Randa Barrientos RN  Activity Management: activity minimized  Taken 7/30/2023 0200 by Randa Barrientos RN  Activity Management: activity minimized  Taken 7/30/2023 0000 by Randa Barrientos RN  Activity Management: activity encouraged  Taken 7/29/2023 2200 by Randa Barrientos RN  Activity Management: activity encouraged  Taken 7/29/2023 2122 by Randa Barrientos RN  Activity Management: activity encouraged  VTE Prevention/Management: (See MAR) other (see comments)  Range of Motion: active ROM (range of motion) encouraged  Taken 7/29/2023 2000 by Randa Barrientos RN  Activity Management: activity encouraged  Intervention: Prevent Infection  Recent Flowsheet Documentation  Taken 7/30/2023 0400 by Randa Barrientos RN  Infection Prevention:   environmental surveillance performed   hand hygiene promoted   rest/sleep promoted  Taken 7/30/2023 0200 by Randa Barrientos RN  Infection Prevention:   environmental surveillance performed    hand hygiene promoted   rest/sleep promoted  Taken 7/30/2023 0000 by Randa Barrientos RN  Infection Prevention:   environmental surveillance performed   hand hygiene promoted   rest/sleep promoted  Taken 7/29/2023 2200 by Randa Barrientos RN  Infection Prevention:   environmental surveillance performed   hand hygiene promoted   rest/sleep promoted  Taken 7/29/2023 2122 by Randa Barrientos RN  Infection Prevention:   environmental surveillance performed   hand hygiene promoted   rest/sleep promoted  Taken 7/29/2023 2000 by Randa Barrientos RN  Infection Prevention:   environmental surveillance performed   hand hygiene promoted   rest/sleep promoted  Goal: Optimal Comfort and Wellbeing  Outcome: Ongoing, Progressing  Intervention: Monitor Pain and Promote Comfort  Recent Flowsheet Documentation  Taken 7/29/2023 2122 by Randa Barrientos RN  Pain Management Interventions: see MAR  Intervention: Provide Person-Centered Care  Recent Flowsheet Documentation  Taken 7/29/2023 2122 by Randa Barrientos RN  Trust Relationship/Rapport:   care explained   choices provided   thoughts/feelings acknowledged  Goal: Readiness for Transition of Care  Outcome: Ongoing, Progressing   Goal Outcome Evaluation:           Progress: no change  Outcome Evaluation: VSS throughout shift. Complaints of pain medicated per PRN MAR; effective per patient. No BM during shift. Patient rested well during shift. No acute events.

## 2023-07-30 NOTE — CASE MANAGEMENT/SOCIAL WORK
Case Management Discharge Note      Final Note: Patient has orders for discharge. Received a call from RN who advises patient will need LYFT for transport. Spoke with patient at bedside regarding discharge plan who indicates she has no one to come and get her from the hospital and take her home. CM advised that a LYFT transport can be provided, patient agreeable to service. No other needs verbalized. CM request RN call and notify when patient has received her d/c papers and is dressed and ready to leave the hospital, a LYFT can be provided at that time.         Selected Continued Care - Admitted Since 7/28/2023       Destination    No services have been selected for the patient.                Durable Medical Equipment    No services have been selected for the patient.                Dialysis/Infusion    No services have been selected for the patient.                Home Medical Care    No services have been selected for the patient.                Therapy    No services have been selected for the patient.                Community Resources    No services have been selected for the patient.                Community & DME    No services have been selected for the patient.                         Final Discharge Disposition Code: 01 - home or self-care

## 2023-07-31 ENCOUNTER — TRANSITIONAL CARE MANAGEMENT TELEPHONE ENCOUNTER (OUTPATIENT)
Dept: CALL CENTER | Facility: HOSPITAL | Age: 38
End: 2023-07-31
Payer: COMMERCIAL

## 2023-07-31 LAB
C TRACH RRNA SPEC QL NAA+PROBE: NEGATIVE
N GONORRHOEA RRNA SPEC QL NAA+PROBE: NEGATIVE

## 2023-08-02 LAB
BACTERIA SPEC AEROBE CULT: NORMAL
BACTERIA SPEC AEROBE CULT: NORMAL

## 2023-08-03 ENCOUNTER — TELEPHONE (OUTPATIENT)
Dept: GASTROENTEROLOGY | Facility: CLINIC | Age: 38
End: 2023-08-03
Payer: COMMERCIAL

## 2023-08-04 ENCOUNTER — OFFICE VISIT (OUTPATIENT)
Dept: INTERNAL MEDICINE | Facility: CLINIC | Age: 38
End: 2023-08-04
Payer: COMMERCIAL

## 2023-08-04 VITALS
WEIGHT: 206 LBS | OXYGEN SATURATION: 95 % | DIASTOLIC BLOOD PRESSURE: 74 MMHG | TEMPERATURE: 97.3 F | BODY MASS INDEX: 37.91 KG/M2 | RESPIRATION RATE: 18 BRPM | HEART RATE: 76 BPM | SYSTOLIC BLOOD PRESSURE: 118 MMHG | HEIGHT: 62 IN

## 2023-08-04 DIAGNOSIS — F51.05 INSOMNIA DUE TO MENTAL DISORDER: Chronic | ICD-10-CM

## 2023-08-04 DIAGNOSIS — B37.31 YEAST VAGINITIS: Primary | ICD-10-CM

## 2023-08-04 DIAGNOSIS — N32.89 BLADDER SPASMS: ICD-10-CM

## 2023-08-04 DIAGNOSIS — E78.2 MIXED HYPERLIPIDEMIA: Chronic | ICD-10-CM

## 2023-08-04 DIAGNOSIS — J45.30 MILD PERSISTENT ASTHMA WITHOUT COMPLICATION: ICD-10-CM

## 2023-08-04 DIAGNOSIS — Z91.09 ENVIRONMENTAL ALLERGIES: ICD-10-CM

## 2023-08-04 RX ORDER — CHOLECALCIFEROL (VITAMIN D3) 125 MCG
7.5 CAPSULE ORAL NIGHTLY PRN
Qty: 45 TABLET | Refills: 6 | Status: SHIPPED | OUTPATIENT
Start: 2023-08-04

## 2023-08-04 RX ORDER — SIMVASTATIN 20 MG
20 TABLET ORAL NIGHTLY
Qty: 90 TABLET | Refills: 1 | Status: SHIPPED | OUTPATIENT
Start: 2023-08-04

## 2023-08-04 RX ORDER — TIZANIDINE 4 MG/1
TABLET ORAL
Qty: 180 TABLET | Refills: 0 | Status: SHIPPED | OUTPATIENT
Start: 2023-08-04

## 2023-08-04 RX ORDER — LEVOCETIRIZINE DIHYDROCHLORIDE 5 MG/1
5 TABLET, FILM COATED ORAL EVERY EVENING
Qty: 30 TABLET | Refills: 11 | Status: SHIPPED | OUTPATIENT
Start: 2023-08-04

## 2023-08-04 RX ORDER — MULTIPLE VITAMINS W/ MINERALS TAB 9MG-400MCG
1 TAB ORAL DAILY
Qty: 30 TABLET | Refills: 11 | Status: SHIPPED | OUTPATIENT
Start: 2023-08-04

## 2023-08-04 RX ORDER — METHENAMINE HIPPURATE 1000 MG/1
1 TABLET ORAL 2 TIMES DAILY WITH MEALS
COMMUNITY
Start: 2023-08-01

## 2023-08-04 RX ORDER — OXYBUTYNIN CHLORIDE 5 MG/1
5 TABLET ORAL 3 TIMES DAILY
COMMUNITY
Start: 2023-08-01

## 2023-08-04 RX ORDER — FLUCONAZOLE 150 MG/1
150 TABLET ORAL ONCE
COMMUNITY
Start: 2023-08-01 | End: 2023-08-04

## 2023-08-04 NOTE — PROGRESS NOTES
Subjective   Marguerite Edwards is a 38 y.o. female.     Chief Complaint   Patient presents with    Hospital Follow Up Visit       PCP: Liliya Hodges APRN    History of Present Illness   She is here to follow up after a recent hospital visit. She was admitted to [facility] 7/28/2023 through 7/30/2023 after a presenting problem of vomiting. She was found to have constipation on CT abdomen and pelvis. She was evaluated by gastroenterology who recommended bowel regimen of Amitiza, lactulose, MiraLAX and encouraged ambulation. She will follow up with Dr. Thomas 8/23/2023 as an outpatient.     The patient states she was presented to the hospital, and she was informed about having an irritable bowel. She reports having severe pain all over her abdomen. She currently does not know the bowel regimen she was placed on when she was in the hospital. She affirms she is taking Amitiza, lactulose, and Miralax. She had a water bowel movement on 08/03/2023. She has mild tenderness in her abdomen. She reports she has been ambulating more.     She complains of a yeast infection. She reports vaginal itching and white thick discharge. She denies fever or chills. She denies vaginal bleeding. The patient states she usually takes Diflucan which solves the issue. She inquires if she can be prescribed a suppository. She reports she had the terconazole creams but spilled it everywhere and was unable to finish the course. She adds she felt like that medication was not helping her because she still had itchiness.     She is requesting refills on some of her medications; simvastatin, loratadine, vitamin D, and melatonin. She states her melatonin has increased. She notes the loratadine has not been helping her allergies. She has been sneezing often. She has been using Flonase every day. She notes she is doing well with her Breo and albuterol inhaler. She adds her breathing has been well. She has not been having shortness of  breath, coughing, or wheezing often. However, she complains of postnasal drip.     She states her kidney specialist prescribed her a muscle relaxer for her bladder.    The patient would like to have her breast examined. She denies having any concerns.    She inquires if she needs any vaccines.         The following portions of the patient's history were reviewed and updated as appropriate: allergies, current medications, past family history, past medical history, past social history, past surgical history and problem list.        Review of Systems   Constitutional:  Negative for fatigue, fever and unexpected weight loss.   Eyes:  Negative for blurred vision, double vision and visual disturbance.   Respiratory:  Negative for cough, shortness of breath and wheezing.    Cardiovascular:  Negative for chest pain, palpitations and leg swelling.   Gastrointestinal:  Positive for constipation. Negative for abdominal pain, diarrhea, nausea and vomiting.   Genitourinary:  Positive for urinary incontinence. Negative for difficulty urinating, frequency and urgency.   Musculoskeletal:  Negative for arthralgias and myalgias.   Skin:  Negative for color change and rash.   Neurological:  Negative for dizziness, weakness and headache.   Hematological:  Negative for adenopathy. Does not bruise/bleed easily.         Outpatient Medications Marked as Taking for the 8/4/23 encounter (Office Visit) with Liliya Hodges APRN   Medication Sig Dispense Refill    acetaminophen (TYLENOL) 325 MG tablet Take 2 tablets by mouth Every 6 (Six) Hours As Needed for Mild Pain.      albuterol sulfate  (90 Base) MCG/ACT inhaler Inhale 2 puffs Every 4 (Four) Hours As Needed for Wheezing. 18 g 3    amitriptyline (ELAVIL) 150 MG tablet Take 1 tablet by mouth Every Night.      Artificial Tear Solution (GenTeal Tears) 0.1-0.2-0.3 % solution Apply 1 drop to eye(s) as directed by provider Daily As Needed. OTC      benztropine (COGENTIN) 1 MG tablet  Take 1 tablet by mouth 2 (Two) Times a Day.      bisacodyl (DULCOLAX) 10 MG suppository Insert 1 suppository into the rectum Daily As Needed for Constipation (Use if bisacodyl oral/by mouth is ineffective). 10 each 0    cholecalciferol (VITAMIN D3) 25 MCG (1000 UT) tablet Take 1 tablet by mouth Daily. 30 tablet 11    dicyclomine (BENTYL) 10 MG capsule Take 1 capsule by mouth 4 (Four) Times a Day Before Meals & at Bedtime. 120 capsule 3    fluticasone (Flonase) 50 MCG/ACT nasal spray 2 sprays into the nostril(s) as directed by provider Daily. 16 g 11    Fluticasone Furoate-Vilanterol (Breo Ellipta) 100-25 MCG/ACT aerosol powder  Inhale 1 puff Daily. 60 each 6    hydrOXYzine pamoate (VISTARIL) 50 MG capsule Take 1 capsule by mouth 3 (Three) Times a Day As Needed for Anxiety. Takes scheduled      methenamine (HIPREX) 1 g tablet Take 1 tablet by mouth 2 (Two) Times a Day With Meals.      multivitamin with minerals tablet tablet Take 1 tablet by mouth Daily. 30 tablet 11    OLANZapine (zyPREXA) 10 MG tablet Take 0.5 tablets by mouth 2 (Two) Times a Day.      OXcarbazepine (TRILEPTAL) 150 MG tablet Take 1 tablet by mouth 2 (Two) Times a Day.      oxybutynin (DITROPAN) 5 MG tablet Take 1 tablet by mouth 3 (Three) Times a Day.      simvastatin (ZOCOR) 20 MG tablet Take 1 tablet by mouth Every Night. 90 tablet 1    [] terconazole (TERAZOL 3) 0.8 % vaginal cream Insert 1 applicator into the vagina Every Night for 3 doses. 20 g 0    tiZANidine (ZANAFLEX) 4 MG tablet TAKE 1-2 TABLETS BY MOUTH EVERY 8 HOURS AS NEEDED FOR BLADDER SPASMS 180 tablet 0    [DISCONTINUED] bisacodyl (DULCOLAX) 5 MG EC tablet Take 1 tablet by mouth Daily As Needed for Constipation (Use if polyethylene glycol is ineffective). 30 tablet 0    [DISCONTINUED] lactulose (CHRONULAC) 10 GM/15ML solution Take 15 mL by mouth Daily. 473 mL 0    [DISCONTINUED] loratadine (Claritin) 10 MG tablet Take 1 tablet by mouth Daily. 30 tablet 11    [DISCONTINUED]  lubiprostone (AMITIZA) 24 MCG capsule Take 1 capsule by mouth 2 (Two) Times a Day With Meals. 60 capsule 0    [DISCONTINUED] Melatonin 5 MG capsule Take 5 mg by mouth At Night As Needed (insomnia). 30 each 3    [DISCONTINUED] multivitamin with minerals tablet tablet Take 1 tablet by mouth Daily.      [DISCONTINUED] ondansetron ODT (ZOFRAN-ODT) 4 MG disintegrating tablet Place 1 tablet on the tongue Every 8 (Eight) Hours As Needed for Nausea or Vomiting. 10 tablet 0    [DISCONTINUED] simvastatin (ZOCOR) 20 MG tablet Take 1 tablet by mouth Every Night. 90 tablet 1    [DISCONTINUED] terconazole (TERAZOL 3) 0.8 % vaginal cream Insert 1 applicator into the vagina.      [DISCONTINUED] tiZANidine (ZANAFLEX) 4 MG tablet TAKE 1-2 TABLETS BY MOUTH EVERY 8 HOURS AS NEEDED FOR BLADDER SPASMS (Patient taking differently: 2 tablets. TAKE 1-2 TABLETS BY MOUTH EVERY 8 HOURS AS NEEDED FOR BLADDER SPASMS. Takes scheduled not prn.) 180 tablet 0     Allergies   Allergen Reactions    Lamotrigine Other (See Comments)     bruising  bruising    Paxil [Paroxetine Hcl] Mental Status Change    Prozac [Fluoxetine Hcl] Mental Status Change    Chlorhexidine Gluconate Unknown - Low Severity    Fluoxetine Unknown - Low Severity    Lubiprostone Palpitations, Dizziness and Unknown - Low Severity    Paroxetine Unknown - Low Severity           Objective   Physical Exam  Constitutional:       Appearance: Normal appearance. She is well-developed.   HENT:      Head: Normocephalic and atraumatic.   Eyes:      General: No scleral icterus.     Conjunctiva/sclera: Conjunctivae normal.   Cardiovascular:      Rate and Rhythm: Normal rate and regular rhythm.      Heart sounds: Normal heart sounds.   Pulmonary:      Effort: Pulmonary effort is normal. No respiratory distress.      Breath sounds: Normal breath sounds.   Abdominal:      General: Bowel sounds are normal.      Palpations: Abdomen is soft.      Tenderness: There is no abdominal tenderness.  "  Musculoskeletal:         General: Normal range of motion.      Cervical back: Normal range of motion.      Right lower leg: No edema.      Left lower leg: No edema.   Skin:     General: Skin is warm and dry.   Neurological:      General: No focal deficit present.      Mental Status: She is alert and oriented to person, place, and time.   Psychiatric:         Attention and Perception: Attention normal.         Mood and Affect: Mood and affect normal.         Behavior: Behavior normal. Behavior is cooperative.         Thought Content: Thought content normal.         Cognition and Memory: Cognition normal.         Judgment: Judgment normal.       Vitals:    08/04/23 1533   BP: 118/74   BP Location: Right arm   Patient Position: Sitting   Cuff Size: Adult   Pulse: 76   Resp: 18   Temp: 97.3 øF (36.3 øC)   TempSrc: Infrared   SpO2: 95%   Weight: 93.4 kg (206 lb)   Height: 157.5 cm (62\")     Body mass index is 37.68 kg/mý.  Wt Readings from Last 3 Encounters:   08/23/23 90.9 kg (200 lb 6.4 oz)   08/04/23 93.4 kg (206 lb)   07/30/23 92.6 kg (204 lb 3.2 oz)             Assessment & Plan   Diagnoses and all orders for this visit:    1. Yeast vaginitis (Primary)  -     terconazole (TERAZOL 3) 0.8 % vaginal cream; Insert 1 applicator into the vagina Every Night for 3 doses.  Dispense: 20 g; Refill: 0  -     CBC (No Diff); Future  -     Comprehensive Metabolic Panel; Future  -     Lipid Panel; Future    2. Insomnia due to mental disorder  -     melatonin 5 MG tablet tablet; Take 1.5 tablets by mouth At Night As Needed (insomnia).  Dispense: 45 tablet; Refill: 6  -     CBC (No Diff); Future  -     Comprehensive Metabolic Panel; Future  -     Lipid Panel; Future    3. Mild persistent asthma without complication  -     CBC (No Diff); Future  -     Comprehensive Metabolic Panel; Future  -     Lipid Panel; Future    4. Mixed hyperlipidemia  -     simvastatin (ZOCOR) 20 MG tablet; Take 1 tablet by mouth Every Night.  Dispense: 90 " tablet; Refill: 1  -     CBC (No Diff); Future  -     Comprehensive Metabolic Panel; Future  -     Lipid Panel; Future    5. Bladder spasms  -     CBC (No Diff); Future  -     Comprehensive Metabolic Panel; Future  -     Lipid Panel; Future  -     tiZANidine (ZANAFLEX) 4 MG tablet; TAKE 1-2 TABLETS BY MOUTH EVERY 8 HOURS AS NEEDED FOR BLADDER SPASMS  Dispense: 180 tablet; Refill: 0    6. Environmental allergies  -     levocetirizine (XYZAL) 5 MG tablet; Take 1 tablet by mouth Every Evening.  Dispense: 30 tablet; Refill: 11  -     CBC (No Diff); Future  -     Comprehensive Metabolic Panel; Future  -     Lipid Panel; Future    Other orders  -     multivitamin with minerals tablet tablet; Take 1 tablet by mouth Daily.  Dispense: 30 tablet; Refill: 11      Constipation  - Continue to follow-up with gastroenterologist as planned. Continue to stay on current medication regimen.     Insomnia  - Will prescribe melatonin, she will take 7.5 mg.     Yeast infection  - Will prescribe terconazole. She will use it for 3 nights in a row.     Asthma  - Well controlled. Continue doing inhalers.    Allergies  - Will prescribe Xyzal.     Bladder spasms.   - Will refill tizanidine. Follow-up with urologist.     Hyperlipidemia  - Will refill simvastatin. Will place orders for lab work.         Return in about 2 months (around 10/4/2023) for Annual.    I discussed my findings,recommendations, and plan of care was with the patient. They verbalized understanding and agreement.  Patient was encouraged to keep me informed of any acute changes, lack of improvement, or any new concerning symptoms.     Transcribed from ambient dictation for KATRINA Gore by Sharan Childs.  08/04/23   18:21 EDT  Patient or patient representative verbalized consent to the visit recording.  I have personally performed the services described in this document as transcribed by the above individual, and it is both accurate and complete.  Liliya MANLEY  Carroll, KATRINA  8/27/2023  09:43 EDT

## 2023-08-07 ENCOUNTER — TELEPHONE (OUTPATIENT)
Dept: GASTROENTEROLOGY | Facility: CLINIC | Age: 38
End: 2023-08-07
Payer: COMMERCIAL

## 2023-08-07 NOTE — TELEPHONE ENCOUNTER
Patient states that she is nauseous and having bad pains in her stomach. Can we send her in zofran? She said she is following all of your directions.

## 2023-08-08 ENCOUNTER — DOCUMENTATION (OUTPATIENT)
Dept: GASTROENTEROLOGY | Facility: CLINIC | Age: 38
End: 2023-08-08
Payer: COMMERCIAL

## 2023-08-08 RX ORDER — ONDANSETRON 4 MG/1
4 TABLET, ORALLY DISINTEGRATING ORAL EVERY 8 HOURS PRN
Qty: 30 TABLET | Refills: 3 | Status: SHIPPED | OUTPATIENT
Start: 2023-08-08

## 2023-08-08 NOTE — PROGRESS NOTES
"Received after-hours phone call from patient with complaint of abdominal pain.  This is abdominal pain which is resulted in prior admission and evaluation.  She states that she feels like her food was \"not digesting \".    Review of the record reveals that she has been evaluated extensively for this same problem with negative imaging studies,.  It is felt that she has problems with chronic idiopathic constipation and gastroparesis.    Patient was not reporting any new complaints but is again stating that she is having some significant upper abdominal/chest discomfort which is hand pain which prompted her to present to the emergency room recently.    She states she has an upcoming GI clinic appointment with Dr. Thomas.    Patient counseled to return to emergency department for worsening symptoms otherwise to call Dr. Thomas's office tomorrow to see if they can expedite a sooner visit.  "

## 2023-08-09 RX ORDER — POLYETHYLENE GLYCOL 3350 17 G/17G
POWDER, FOR SOLUTION ORAL
Qty: 238 G | Refills: 0 | OUTPATIENT
Start: 2023-08-09

## 2023-08-09 RX ORDER — POLYETHYLENE GLYCOL 3350 17 G/17G
POWDER, FOR SOLUTION ORAL
Qty: 238 G | Refills: 0 | Status: SHIPPED | OUTPATIENT
Start: 2023-08-09

## 2023-08-09 NOTE — TELEPHONE ENCOUNTER
Marguerite Goode called left message wondering if you received on call doctors note from last night. Thanks

## 2023-08-09 NOTE — TELEPHONE ENCOUNTER
Rx Refill Note  Requested Prescriptions     Pending Prescriptions Disp Refills    polyethylene glycol (MIRALAX) 17 GM/SCOOP powder [Pharmacy Med Name: POLYETHYLENE GLYCOL 3350 POWD] 238 g 0     Sig: DISSOLVE ONE - TWO CAPFULS IN 8OZ OF WATER OR JUICE AND DRINK CONTENTS DAILY AS NEEDED      Last office visit with prescribing clinician: Visit date not found   Last telemedicine visit with prescribing clinician: Visit date not found   Next office visit with prescribing clinician: 8/23/2023                             IRWIN Nugent  08/09/23, 09:55 EDT

## 2023-08-10 ENCOUNTER — TELEPHONE (OUTPATIENT)
Dept: GASTROENTEROLOGY | Facility: CLINIC | Age: 38
End: 2023-08-10
Payer: COMMERCIAL

## 2023-08-11 ENCOUNTER — TELEPHONE (OUTPATIENT)
Dept: INTERNAL MEDICINE | Facility: CLINIC | Age: 38
End: 2023-08-11

## 2023-08-11 ENCOUNTER — LAB (OUTPATIENT)
Dept: INTERNAL MEDICINE | Facility: CLINIC | Age: 38
End: 2023-08-11
Payer: COMMERCIAL

## 2023-08-11 DIAGNOSIS — B37.31 YEAST VAGINITIS: ICD-10-CM

## 2023-08-11 DIAGNOSIS — Z91.09 ENVIRONMENTAL ALLERGIES: ICD-10-CM

## 2023-08-11 DIAGNOSIS — N32.89 BLADDER SPASMS: ICD-10-CM

## 2023-08-11 DIAGNOSIS — F51.05 INSOMNIA DUE TO MENTAL DISORDER: Chronic | ICD-10-CM

## 2023-08-11 DIAGNOSIS — J45.30 MILD PERSISTENT ASTHMA WITHOUT COMPLICATION: ICD-10-CM

## 2023-08-11 DIAGNOSIS — E78.2 MIXED HYPERLIPIDEMIA: Chronic | ICD-10-CM

## 2023-08-11 LAB
ALBUMIN SERPL-MCNC: 4.2 G/DL (ref 3.5–5.2)
ALBUMIN/GLOB SERPL: 1.4 G/DL
ALP SERPL-CCNC: 154 U/L (ref 39–117)
ALT SERPL W P-5'-P-CCNC: 27 U/L (ref 1–33)
ANION GAP SERPL CALCULATED.3IONS-SCNC: 11.8 MMOL/L (ref 5–15)
AST SERPL-CCNC: 26 U/L (ref 1–32)
BILIRUB SERPL-MCNC: <0.2 MG/DL (ref 0–1.2)
BUN SERPL-MCNC: 8 MG/DL (ref 6–20)
BUN/CREAT SERPL: 11.4 (ref 7–25)
CALCIUM SPEC-SCNC: 9.9 MG/DL (ref 8.6–10.5)
CHLORIDE SERPL-SCNC: 100 MMOL/L (ref 98–107)
CHOLEST SERPL-MCNC: 150 MG/DL (ref 0–200)
CO2 SERPL-SCNC: 26.2 MMOL/L (ref 22–29)
CREAT SERPL-MCNC: 0.7 MG/DL (ref 0.57–1)
DEPRECATED RDW RBC AUTO: 40.2 FL (ref 37–54)
EGFRCR SERPLBLD CKD-EPI 2021: 112.3 ML/MIN/1.73
ERYTHROCYTE [DISTWIDTH] IN BLOOD BY AUTOMATED COUNT: 13.1 % (ref 12.3–15.4)
GLOBULIN UR ELPH-MCNC: 3 GM/DL
GLUCOSE SERPL-MCNC: 138 MG/DL (ref 65–99)
HCT VFR BLD AUTO: 42.8 % (ref 34–46.6)
HDLC SERPL-MCNC: 51 MG/DL (ref 40–60)
HGB BLD-MCNC: 14 G/DL (ref 12–15.9)
LDLC SERPL CALC-MCNC: 86 MG/DL (ref 0–100)
LDLC/HDLC SERPL: 1.68 {RATIO}
MCH RBC QN AUTO: 27.3 PG (ref 26.6–33)
MCHC RBC AUTO-ENTMCNC: 32.7 G/DL (ref 31.5–35.7)
MCV RBC AUTO: 83.4 FL (ref 79–97)
PLATELET # BLD AUTO: 331 10*3/MM3 (ref 140–450)
PMV BLD AUTO: 11.2 FL (ref 6–12)
POTASSIUM SERPL-SCNC: 4.9 MMOL/L (ref 3.5–5.2)
PROT SERPL-MCNC: 7.2 G/DL (ref 6–8.5)
RBC # BLD AUTO: 5.13 10*6/MM3 (ref 3.77–5.28)
SODIUM SERPL-SCNC: 138 MMOL/L (ref 136–145)
TRIGL SERPL-MCNC: 67 MG/DL (ref 0–150)
VLDLC SERPL-MCNC: 13 MG/DL (ref 5–40)
WBC NRBC COR # BLD: 10.04 10*3/MM3 (ref 3.4–10.8)

## 2023-08-11 PROCEDURE — 36415 COLL VENOUS BLD VENIPUNCTURE: CPT | Performed by: NURSE PRACTITIONER

## 2023-08-11 PROCEDURE — 80061 LIPID PANEL: CPT | Performed by: NURSE PRACTITIONER

## 2023-08-11 PROCEDURE — 85027 COMPLETE CBC AUTOMATED: CPT | Performed by: NURSE PRACTITIONER

## 2023-08-11 PROCEDURE — 80053 COMPREHEN METABOLIC PANEL: CPT | Performed by: NURSE PRACTITIONER

## 2023-08-11 RX ORDER — BIFIDOBACTERIUM LONGUM SUBSP. INFANTIS, AVOBENZONE, HOMOSALATE, OCTISALATE, OCTOCRYLENE, AND OXYBENZONE
1 KIT DAILY
Qty: 30 TABLET | Refills: 3 | Status: SHIPPED | OUTPATIENT
Start: 2023-08-11

## 2023-08-11 NOTE — TELEPHONE ENCOUNTER
Caller: ASMITA VALENTE    Relationship: Emergency Contact    Best call back number:      What is the best time to reach you: ANYTIME     Who are you requesting to speak with (clinical staff, provider,  specific staff member): CLINICAL STAFF    Do you know the name of the person who called: ASMITA JAMESON    What was the call regarding:  PLEASE CALL PATIENTS SPOUSE HAS HE HAS SOME CONCERNS ABOUT HER CARE AND HER RECENT LABWORK

## 2023-08-14 ENCOUNTER — TELEPHONE (OUTPATIENT)
Dept: INTERNAL MEDICINE | Facility: CLINIC | Age: 38
End: 2023-08-14
Payer: COMMERCIAL

## 2023-08-14 ENCOUNTER — TELEPHONE (OUTPATIENT)
Dept: GASTROENTEROLOGY | Facility: CLINIC | Age: 38
End: 2023-08-14
Payer: COMMERCIAL

## 2023-08-14 ENCOUNTER — TELEPHONE (OUTPATIENT)
Dept: GASTROENTEROLOGY | Facility: CLINIC | Age: 38
End: 2023-08-14

## 2023-08-14 NOTE — TELEPHONE ENCOUNTER
DELETE AFTER REVIEWING: Telephone encounter to be sent to the clinical pool   Hub staff attempted to follow warm transfer process and was unsuccessful     Caller: Marguerite Edwards    Relationship to patient: Self    Best call back number: [phone]    Patient is needing: PT IS SEEING TWO DIFFERENT INSTRUCTIONS WITH HER PROBIOTIC. THE BOTTLE IS SAYING TO TAKE 2 A DAY BUT SCRIPT IS ONLY CALLING FOR ONE. PT IS WANTING TO TAKE TWICE A DAY BECAUSE IT HELPS. IS THIS OKAY TO DO?    PT REQUESTING CALL BACK SINCE SHE DOES NOT NAVIGATE HER MYCHART.

## 2023-08-14 NOTE — TELEPHONE ENCOUNTER
----- Message from KATRINA Acevedo sent at 8/11/2023  4:43 PM EDT -----  Please let her know that her labs are stable overall -no acute concerning findings

## 2023-08-14 NOTE — TELEPHONE ENCOUNTER
Marguerite Summers called wondering if its okay for her to take a Probiotic. Please advise. Dr Thomas is out today and tomorrow. Thanks

## 2023-08-18 NOTE — TELEPHONE ENCOUNTER
Dr. Thomas  Ms. Edwards called to inform you she is having ongoing nausea, diarrhea, abdominal cramping, fatigue and bacterial infection. Stool was a dark green yesterday. She said she feels awful. I advised she would need to seek evaluation and treatment at ED but she wanted me to consult with you first. She denies any fever, but does have a lot of fatigue and she is weak.  Please advise.  Thank you  Nadia    Encounter Date: 8/18/2023       History     Chief Complaint   Patient presents with    Flank Pain     Bilateral flank pain that started yesterday. Nausea but no vomiting. Has history of kidney problems but can not recall what kind.     Patient presents with bilateral flank pain greatest on the right that began yesterday and notes a history of kidney stone in the past; complains of dry mouth; no nausea or vomiting, no fever, no cough, no dysuria, no hematuria    The history is provided by the patient.     Review of patient's allergies indicates:   Allergen Reactions    Quetiapine Anaphylaxis    Iodine     Shellfish derived      and fish    Sulfamethoxazole-trimethoprim      Past Medical History:   Diagnosis Date    Hyperlipidemia     Mental disorder     depression, anxiety    Syphilis affecting pregnancy in third trimester 7/13/2022    Trauma      No past surgical history on file.  Family History   Problem Relation Age of Onset    Emphysema Father     Lung cancer Father     Throat cancer Father     COPD Father     COPD Mother     Lung cancer Mother     Emphysema Mother      Social History     Tobacco Use    Smoking status: Some Days     Current packs/day: 0.25     Types: Cigarettes    Smokeless tobacco: Never   Substance Use Topics    Alcohol use: Not Currently    Drug use: Yes     Types: Methamphetamines     Comment: last use 3d ago     Review of Systems   Constitutional: Negative.    HENT: Negative.     Respiratory: Negative.     Cardiovascular: Negative.    Gastrointestinal: Negative.    Musculoskeletal: Negative.    Skin: Negative.    Neurological: Negative.    Psychiatric/Behavioral:  The patient is nervous/anxious.    All other systems reviewed and are negative.      Physical Exam     Initial Vitals [08/18/23 0306]   BP Pulse Resp Temp SpO2   110/81 (!) 141 20 99.4 °F (37.4 °C) 96 %      MAP       --         Physical Exam    Nursing note and vitals reviewed.  Constitutional: She appears well-developed and  well-nourished.   HENT:   Head: Normocephalic and atraumatic.   Eyes: EOM are normal. Pupils are equal, round, and reactive to light.   Neck: Neck supple.   Normal range of motion.  Cardiovascular:  Regular rhythm, normal heart sounds and intact distal pulses.   Tachycardia present.         Pulmonary/Chest: Breath sounds normal.   Abdominal: Abdomen is soft. She exhibits no distension. There is no abdominal tenderness.   Musculoskeletal:         General: Normal range of motion.      Cervical back: Normal range of motion and neck supple.     Neurological: She is alert and oriented to person, place, and time. She has normal strength.   Skin: Skin is warm and dry.         ED Course   Procedures  Labs Reviewed   URINALYSIS, REFLEX TO URINE CULTURE - Abnormal; Notable for the following components:       Result Value    Protein, UA Trace (*)     Blood, UA Moderate (*)     Leukocyte Esterase, UA Trace (*)     All other components within normal limits   DRUG SCREEN, URINE (BEAKER) - Abnormal; Notable for the following components:    Amphetamines, Urine Positive (*)     All other components within normal limits    Narrative:     Cut off concentrations:    Amphetamines - 1000 ng/ml  Barbiturates - 200 ng/ml  Benzodiazepine - 200 ng/ml  Cannabinoids (THC) - 50 ng/ml  Cocaine - 300 ng/ml  Fentanyl - 1.0 ng/ml  MDMA - 500 ng/ml  Opiates - 300 ng/ml   Phencyclidine (PCP) - 25 ng/ml    Specimen submitted for drug analysis and tested for pH and specific gravity in order to evaluate sample integrity. Suspect tampering if specific gravity is <1.003 and/or pH is not within the range of 4.5 - 8.0  False negatives may result form substances such as bleach added to urine.  False positives may result for the presence of a substance with similar chemical structure to the drug or its metabolite.    This test provides only a PRELIMINARY analytical test result. A more specific alternate chemical method must be used in order to obtain a  confirmed analytical result. Gas chromatography/mass spectrometry (GC/MS) is the preferred confirmatory method. Other chemical confirmation methods are available. Clinical consideration and professional judgement should be applied to any drug of abuse test result, particularly when preliminary positive results are used.    Positive results will be confirmed only at the physicians request. Unconfirmed screening results are to be used only for medical purposes (treatment).        COMPREHENSIVE METABOLIC PANEL - Abnormal; Notable for the following components:    Sodium Level 132 (*)     Glucose Level 125 (*)     Albumin Level 3.3 (*)     Globulin 4.0 (*)     Albumin/Globulin Ratio 0.8 (*)     All other components within normal limits   CBC WITH DIFFERENTIAL - Abnormal; Notable for the following components:    WBC 24.47 (*)     MCV 99.8 (*)     MCH 31.6 (*)     MCHC 31.7 (*)     Platelet 425 (*)     All other components within normal limits   URINALYSIS, MICROSCOPIC - Abnormal; Notable for the following components:    Squamous Epithelial Cells, UA Moderate (*)     All other components within normal limits   PREGNANCY TEST, URINE RAPID - Normal   LIPASE - Normal   CBC W/ AUTO DIFFERENTIAL    Narrative:     The following orders were created for panel order CBC auto differential.  Procedure                               Abnormality         Status                     ---------                               -----------         ------                     CBC with Differential[[id number]]        Abnormal            Final result               Manual Differential[[id number]]                              In process                   Please view results for these tests on the individual orders.   MANUAL DIFFERENTIAL     EKG Readings: (Independently Interpreted)   Rhythm: Sinus Tachycardia. Heart Rate: 139.   Possible LAE; RSR or QR pattern in V1 suggests RV conduction delay; nonspecific ST abnormality       Imaging Results               CT Renal Stone Study ABD Pelvis WO (Preliminary result)  Result time 08/18/23 04:14:25      Preliminary result by Shaan Hernandez Jr., MD (08/18/23 04:14:25)                   Narrative:    START OF REPORT:  TECHNIQUE: CT OF THE ABDOMEN AND PELVIS WAS PERFORMED WITH AXIAL IMAGES AS WELL AS SAGITTAL AND CORONAL RECONSTRUCTION IMAGES WITHOUT INTRAVENOUS CONTRAST.    COMPARISON: NONE AVAILABLE.    CLINICAL HISTORY: FLANK PAIN (BILATERAL FLANK PAIN THAT STARTED YESTERDAY. NAUSEA BUT NO VOMITING. HAS HISTORY OF KIDNEY PROBLEMS BUT CAN NOT RECALL WHAT KIND.).    DOSAGE INFORMATION: AUTOMATED EXPOSURE CONTROL WAS UTILIZED.    Findings:  Lines and Tubes: None.  Thorax:  Lungs: The visualized lung bases appear unremarkable.  Heart: The heart size is within normal limits.  Abdomen:  Abdominal Wall: No abdominal wall pathology is seen.  Liver: The liver appears unremarkable.  Biliary System: No extrahepatic biliary duct dilatation is seen.  Gallbladder: The gallbladder appears unremarkable.  Pancreas: The pancreas appears unremarkable.  Spleen: The spleen appears unremarkable.  Trauma: No specific evidence of splenic trauma is seen.  Adrenals: The adrenal glands appear unremarkable.  Kidneys: The left kidney is unremarkable with no stones and hydronephrosis. Enlarged edematous right kidney with perinephric fluid and stranding and no hydronephrosis suggesting right pyelonephritis.  Aorta: The abdominal aorta appears unremarkable.  IVC: Unremarkable.  Bowel:  Esophagus: The visualized esophagus appears unremarkable.  Stomach: The stomach appears unremarkable.  Duodenum: Unremarkable appearing duodenum.  Small Bowel: The small bowel appears unremarkable.  Colon: Nondistended.  Appendix: The appendix appears unremarkable and is partially seen on Image 104, Series 3.  Peritoneum: No intraperitoneal free air or ascites is seen.  Retroperitoneal lymph nodes: There is single paracaval lymph node measures up to 10,5mm on the  short axis.    Pelvis:  Bladder: The bladder is nondistended but appears otherwise unremarkable.  Female:  Uterus: The uterus appears unremarkable.  Ovaries: The ovaries appear unremarkable.    Bony structures:  Dorsal Spine: The visualized dorsal spine appears unremarkable.  Bony Pelvis: The visualized bony structures of the pelvis appear unremarkable.      Impression:  1. Enlarged edematous right kidney with perinephric fluid and stranding and no hydronephrosis suggesting right pyelonephritis.  2. Details and other findings as discussed above.                                         X-Ray Chest AP Portable (In process)                      Medications   sodium chloride 0.9% bolus 1,000 mL 1,000 mL (0 mLs Intravenous Stopped 8/18/23 0501)   ketorolac injection 30 mg (30 mg Intravenous Given 8/18/23 0405)   LORazepam injection 0.5 mg (0.5 mg Intravenous Given 8/18/23 0457)   ciprofloxacin HCl tablet 500 mg (500 mg Oral Given 8/18/23 0459)     Medical Decision Making  Patient presents with bilateral flank pain greatest on the right that began yesterday and notes a history of kidney stone in the past; complains of dry mouth; no nausea or vomiting, no fever, no cough, no dysuria, no hematuria    Problems Addressed:  Pyelonephritis:     Details: Patient given a L of normal saline and Cipro 500 mg p.o.; patient feels much improved    Amount and/or Complexity of Data Reviewed  Labs: ordered. Decision-making details documented in ED Course.  Radiology: ordered. Decision-making details documented in ED Course.    Risk  Prescription drug management.               ED Course as of 08/18/23 0529   Fri Aug 18, 2023   0332 Amphetamine Screen, Ur(!): Positive [DD]   0339 WBC(!): 24.47 [DD]   0353 Sodium(!): 132 [DD]      ED Course User Index  [DD] Fuad Jesus MD        Patient Vitals for the past 24 hrs:   BP Temp Pulse Resp SpO2 Height Weight   08/18/23 0517 111/64 -- (!) 117 13 (!) 93 % -- --   08/18/23 0446 108/61 -- (!)  "112 13 97 % -- --   08/18/23 0431 119/71 -- (!) 118 15 (!) 93 % -- --   08/18/23 0416 115/77 -- (!) 120 19 98 % -- --   08/18/23 0414 121/77 -- (!) 127 20 95 % -- --   08/18/23 0346 132/73 -- (!) 113 20 (!) 90 % -- --   08/18/23 0331 119/73 -- (!) 121 15 (!) 94 % -- --   08/18/23 0306 110/81 99.4 °F (37.4 °C) (!) 141 20 96 % 5' 2" (1.575 m) 54.4 kg (120 lb)     The patient is resting comfortably and in no acute distress.  She states that her symptoms have improved after treatment in Emergency Department. I personally discussed her test results and treatment plan.  Gave strict ED precautions.  Specific conditions for return to the emergency department and importance of follow up with her primary care provided or the physician listed on the discharge instructions.  Patient voices understanding and agrees to the plan discussed. All patients' questions have been answered at this time.   She has remained hemodynamically stable throughout entire stay in ED and is stable for discharge home.             Clinical Impression:   Final diagnoses:  [R00.0] Tachycardia  [R82.5] Positive urine drug screen (Primary)  [N12] Pyelonephritis        ED Disposition Condition    Discharge Stable          ED Prescriptions       Medication Sig Dispense Start Date End Date Auth. Provider    ciprofloxacin HCl (CIPRO) 500 MG tablet Take 1 tablet (500 mg total) by mouth 2 (two) times daily. for 5 days 10 tablet 8/18/2023 8/23/2023 Fuad Jesus MD    ibuprofen (ADVIL,MOTRIN) 600 MG tablet Take 1 tablet (600 mg total) by mouth every 6 (six) hours as needed for Pain. 20 tablet 8/18/2023 -- Fuad Jesus MD          Follow-up Information       Follow up With Specialties Details Why Contact Info [facility] - Emergency Dept Emergency Medicine  As needed [address] [facility] [address] [phone]             Fuad Jesus MD  08/18/23 0529    "

## 2023-08-21 RX ORDER — POLYETHYLENE GLYCOL 3350 17 G/17G
POWDER, FOR SOLUTION ORAL
Qty: 238 G | Refills: 0 | Status: SHIPPED | OUTPATIENT
Start: 2023-08-21 | End: 2023-08-25 | Stop reason: SDUPTHER

## 2023-08-21 NOTE — TELEPHONE ENCOUNTER
Rx Refill Note  Requested Prescriptions     Pending Prescriptions Disp Refills    polyethylene glycol (MIRALAX) 17 GM/SCOOP powder [Pharmacy Med Name: POLYETHYLENE GLYCOL 3350 POWD] 238 g 0     Sig: DISSOLVE ONE TO TWO CAPFUL (17GM-34GM) IN 8OZ OF WATER OR JUICE AND DRINK CONTENTS DAILY AS NEEDED      Last office visit with prescribing clinician: Visit date not found   Last telemedicine visit with prescribing clinician: Visit date not found   Next office visit with prescribing clinician: 8/23/2023                             IRWIN Nugent  08/21/23, 13:07 EDT

## 2023-08-23 ENCOUNTER — OFFICE VISIT (OUTPATIENT)
Dept: GASTROENTEROLOGY | Facility: CLINIC | Age: 38
End: 2023-08-23
Payer: COMMERCIAL

## 2023-08-23 VITALS
DIASTOLIC BLOOD PRESSURE: 74 MMHG | HEART RATE: 111 BPM | TEMPERATURE: 97.3 F | WEIGHT: 200.4 LBS | BODY MASS INDEX: 36.65 KG/M2 | OXYGEN SATURATION: 98 % | SYSTOLIC BLOOD PRESSURE: 130 MMHG

## 2023-08-23 DIAGNOSIS — K59.00 CONSTIPATION, UNSPECIFIED CONSTIPATION TYPE: Primary | ICD-10-CM

## 2023-08-23 PROCEDURE — 99214 OFFICE O/P EST MOD 30 MIN: CPT | Performed by: INTERNAL MEDICINE

## 2023-08-23 PROCEDURE — 1160F RVW MEDS BY RX/DR IN RCRD: CPT | Performed by: INTERNAL MEDICINE

## 2023-08-23 PROCEDURE — 1159F MED LIST DOCD IN RCRD: CPT | Performed by: INTERNAL MEDICINE
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
## 2023-08-24 ENCOUNTER — TELEPHONE (OUTPATIENT)
Dept: GASTROENTEROLOGY | Facility: CLINIC | Age: 38
End: 2023-08-24
Payer: COMMERCIAL

## 2023-08-24 RX ORDER — LUBIPROSTONE 24 UG/1
24 CAPSULE ORAL 2 TIMES DAILY WITH MEALS
Qty: 60 CAPSULE | Refills: 0 | Status: SHIPPED | OUTPATIENT
Start: 2023-08-24

## 2023-08-24 RX ORDER — BISACODYL 5 MG/1
5 TABLET, DELAYED RELEASE ORAL DAILY PRN
Qty: 30 TABLET | Refills: 0 | Status: SHIPPED | OUTPATIENT
Start: 2023-08-24

## 2023-08-24 RX ORDER — LACTULOSE 10 G/15ML
10 SOLUTION ORAL DAILY
Qty: 473 ML | Refills: 0 | Status: SHIPPED | OUTPATIENT
Start: 2023-08-24 | End: 2023-08-25 | Stop reason: SDUPTHER

## 2023-08-24 NOTE — TELEPHONE ENCOUNTER
Rx Refill Note  Pending Prescriptions:                       Disp   Refills    bisacodyl (DULCOLAX) 5 MG EC tablet        30 tab*0        Sig: Take 1 tablet by mouth Daily As Needed for           Constipation (Use if polyethylene glycol is           ineffective).    lactulose (CHRONULAC) 10 GM/15ML solution  473 mL 0        Sig: Take 15 mL by mouth Daily.    lubiprostone (AMITIZA) 24 MCG capsule      60 cap*0        Sig: Take 1 capsule by mouth 2 (Two) Times a Day With           Meals.    Last office visit with prescribing clinician: 8/23/2023   Last telemedicine visit with prescribing clinician: Visit date not found   Next office visit with prescribing clinician: Visit date not found         Glenis Marina MA  08/24/23, 10:01 EDT

## 2023-08-24 NOTE — TELEPHONE ENCOUNTER
"Patient called wanting refills on medicine from her ER visit. I asked patient the medicines and went over medicine list and the medicines she was asked were not list . Then patient began to be upset and gave phone to \"AGUSTÍN\" (). He states he that I was refusing to refill medicines when I was trying to get the correct medicines to make sure they are on the medicine list so we can refill. He proceeds to tell me that he will be calling state. Please advise?  "

## 2023-08-24 NOTE — TELEPHONE ENCOUNTER
Caller: ASMITA VALENTE    Relationship: Emergency Contact    Best call back number: [phone]     Requested Prescriptions:   Requested Prescriptions      No prescriptions requested or ordered in this encounter        Pharmacy where request should be sent:      Last office visit with prescribing clinician: 8/23/2023   Last telemedicine visit with prescribing clinician: Visit date not found   Next office visit with prescribing clinician: Visit date not found     Additional details provided by patient: THE PATIENTS EMERGENCY CONTACT ASMITA VALENTE CALLED REGARDING NEEDING MEDS REFILLED. PATIENT STATE WHEN THEY WERE IN THE HOSPITAL THEY WOULD NOT GIVE THEM THE MEDS AND WANTED TO KNOW IF DR HURLEY DID NOT WANT THEM TO HAVE IT. I HAVE A PUT IN A REFILL FOR THE PATIENT. THEY PATIENT IS ALSO NEEDING THE STIMULANT LAXATIVE 50MG REFILLED BUT I DON'T SEE THAT ON THE MEDS LIST. PLEASE CALL EMERGENCY CONTACT ASMITA VALENTE @[phone], IF NOT AVAILABLE LEAVE A MESSAGE.     Does the patient have less than a 3 day supply:  [] Yes  [x] No    Would you like a call back once the refill request has been completed: [x] Yes [] No    If the office needs to give you a call back, can they leave a voicemail: [x] Yes [] No    Oleg Benjamin Rep   08/24/23 09:47 EDT

## 2023-08-25 ENCOUNTER — TELEPHONE (OUTPATIENT)
Dept: GASTROENTEROLOGY | Facility: CLINIC | Age: 38
End: 2023-08-25
Payer: COMMERCIAL

## 2023-08-25 RX ORDER — LACTULOSE 10 G/15ML
10 SOLUTION ORAL DAILY
Qty: 473 ML | Refills: 0 | Status: SHIPPED | OUTPATIENT
Start: 2023-08-25

## 2023-08-25 RX ORDER — LUBIPROSTONE 24 UG/1
24 CAPSULE ORAL 2 TIMES DAILY WITH MEALS
Qty: 90 CAPSULE | Refills: 3 | Status: SHIPPED | OUTPATIENT
Start: 2023-08-25

## 2023-08-25 RX ORDER — POLYETHYLENE GLYCOL 3350 17 G/17G
238 POWDER, FOR SOLUTION ORAL TAKE AS DIRECTED
Qty: 238 G | Refills: 0 | Status: SHIPPED | OUTPATIENT
Start: 2023-08-25

## 2023-08-25 NOTE — TELEPHONE ENCOUNTER
Dr Thomas  [facility] called and left a message wondering if you will her medication she don't know the name of it. She just knows its a laxative.  Its a 50 mg brownish pill- takes 2 tablets twice a day.

## 2023-08-25 NOTE — TELEPHONE ENCOUNTER
Hub staff attempted to follow warm transfer process and was unsuccessful     Caller: Marguerite Edwards    Relationship to patient: Self    Best call back number:[phone]     Patient is needing: PATIENT IS WANTING TO SPEAK WITH A NURSE REGARDING WHY A MEDICATION HAS NOT BEEN FILLED. THE MEDICATION IS THE STIMULANT LAXATIVE. PLEASE CALL PATIENT, IF NOT AVAILABLE LEAVE A MESSAGE.

## 2023-08-28 ENCOUNTER — TELEPHONE (OUTPATIENT)
Dept: GASTROENTEROLOGY | Facility: CLINIC | Age: 38
End: 2023-08-28

## 2023-08-28 ENCOUNTER — TELEPHONE (OUTPATIENT)
Dept: GASTROENTEROLOGY | Facility: CLINIC | Age: 38
End: 2023-08-28
Payer: COMMERCIAL

## 2023-08-28 NOTE — TELEPHONE ENCOUNTER
Caller: Marguerite Edwadrs    Relationship: Self    Best call back number: [phone]     What is the best time to reach you: ANYTIME    Who are you requesting to speak with (clinical staff, provider,  specific staff member): CLINICAL (EDITH)     What was the call regarding: PATIENT STATES WAS TOLD BY EDITH TO TAKE A PICTURE OF THE MEDICATION SO IT CAN BE PRESCRIBED TO THEM BUT THE PATIENT IS HAVING ISSUES TAKING THE PICTURE. PLEASE CALL PATIENT, IF NOT AVAILABLE LEAVE A MESSAGE.

## 2023-08-28 NOTE — TELEPHONE ENCOUNTER
Caller: Marguerite Edwards    Relationship to patient: Self    Best call back number: [phone]     Chief complaint: WANTING A SOONER APPT     Type of visit: EGD/COLONOSCOPY     Requested date: SOONER APPT     If rescheduling, when is the original appointment: 11/13     Additional notes: PATIENT IS CURRENTLY SCHEDULED WITH DR HURLEY ON 11/13 FOR A PROCEDURE AND THEY WANTED TO RESCHEDULE FOR A SOONER APPT. PLEASE CALL PATIENT, IF NOT AVAILABLE LEAVE A MESSAGE.

## 2023-08-30 ENCOUNTER — TELEPHONE (OUTPATIENT)
Dept: GASTROENTEROLOGY | Facility: CLINIC | Age: 38
End: 2023-08-30
Payer: COMMERCIAL

## 2023-08-30 NOTE — TELEPHONE ENCOUNTER
Camila,   Please call Marguerite back. Patient left a message complaining of new symptoms. Thanks

## 2023-08-30 NOTE — TELEPHONE ENCOUNTER
Hub staff attempted to follow warm transfer process and was unsuccessful     Caller: Marguerite Edwards    Relationship to patient: Self    Best call back number: [phone]  CAN CALL ANY TIME    Patient is needing: PATIENT STATES THAT SHE HAS BEEN EXPERIENCING NEW SYMPTOMS OF NAUSEA THAT HAS BEEN ON-GOING 'FOR A WHILE NOW'. PATIENT WAS TOLD TO CALL BACK IF THE NAUSEA CONTINUED. PATIENT STATES THAT SHE ALSO HAS HAD PAIN IN BOTH SIDES OF HER ABDOMEN THAT MOVES AND SWITCHES SIDES. PAIN ALSO HAS MOVED AROUND TO HER CHEST AND UNDER THE BREASTS.

## 2023-09-01 ENCOUNTER — TELEPHONE (OUTPATIENT)
Dept: GASTROENTEROLOGY | Facility: CLINIC | Age: 38
End: 2023-09-01

## 2023-09-01 NOTE — TELEPHONE ENCOUNTER
Caller: CLAUDIA CISNEROS    Relationship to patient: SELF    Best call back number: [phone]    Patient is needing: PATIENT REQUESTED A SOONER EGD/COLON. SHE IS CURRENTLY SCHEDULED FOR 11/08/23. HUB WAS ADVISED THERE IS NOT, AND TO INSTRUCT THE PATIENT TO CALL BACK NEXT WEEK. PT VERBALIZED UNDERSTANDING, BUT REQUESTED FOR THE OFFICE TO CALL HER NEXT WEEK IF A SOONER TIME SLOT OPENED. PLEASE CALL HER [phone] IF SOMETHING COMES OPEN NEXT WEEK.

## 2023-09-05 ENCOUNTER — TELEPHONE (OUTPATIENT)
Dept: GASTROENTEROLOGY | Facility: CLINIC | Age: 38
End: 2023-09-05
Payer: COMMERCIAL

## 2023-09-05 ENCOUNTER — TELEPHONE (OUTPATIENT)
Dept: INTERNAL MEDICINE | Facility: CLINIC | Age: 38
End: 2023-09-05
Payer: COMMERCIAL

## 2023-09-05 NOTE — TELEPHONE ENCOUNTER
Marguerite Goode called and left a message stating that she is hurting around her abdominal area and chest area on the right side. Same symptoms as before when she went to the hospital . She can't eat. Please advise.

## 2023-09-05 NOTE — TELEPHONE ENCOUNTER
Hub staff attempted to follow warm transfer process and was unsuccessful     Caller: Marguerite Edwards    Relationship to patient: Self    Best call back number: [phone]     Patient is needing: REPORTING SHARP PAIN OVER ENTIRE ABDOMINAL AND UPPER CHEST AREA. SHE IS REQUESTING A CALL BACK FROM ONE OF THE NURSES ASAP.

## 2023-09-05 NOTE — TELEPHONE ENCOUNTER
Caller: Marguerite Edwards    Relationship to patient: Self    Best call back number: [phone]     Chief complaint: SHARP ABDOMINAL AND UPPER CHEST PAIN    Type of visit: PROCEDURE    Requested date: ANYTHING EARLIER THAN ORIGINAL DATE.     If rescheduling, when is the original appointment: 11/8/23

## 2023-09-05 NOTE — TELEPHONE ENCOUNTER
SPOKE WITH PT, SHE WAS WANTING TO MAKE AN ASAP TELEHEALTH APPT FOR A CYST ON HER ARM THAT NEEDS A REFERRAL TO [facility]. ADVISED THAT PT WOULD NEED TO BE SEEN IN PERSON. PT SAID THAT THEY DO NOT HAVE TRANSPORTATION, SO SHE WOULD BE CALLING BACK AT A LATER DATE TO SCHEDULE.

## 2023-09-13 ENCOUNTER — TELEPHONE (OUTPATIENT)
Dept: GASTROENTEROLOGY | Facility: CLINIC | Age: 38
End: 2023-09-13
Payer: COMMERCIAL

## 2023-09-13 NOTE — TELEPHONE ENCOUNTER
Patient says she thinks she has an ulcer because her stomach is burning in different places and she is stressed out. She wants to know if we can up her dose of bentyl. Please advise.

## 2023-09-15 ENCOUNTER — TELEPHONE (OUTPATIENT)
Dept: GASTROENTEROLOGY | Facility: CLINIC | Age: 38
End: 2023-09-15

## 2023-09-15 NOTE — TELEPHONE ENCOUNTER
Hub staff attempted to follow warm transfer process and was unsuccessful     Caller: Marguerite Edwards    Relationship to patient: Self    Best call back number: [phone]    Patient is needing: PT CALLING TO SPEAK TO A NURSE, SAID SHE HASN'T HAD A BOWEL MOVEMENT IN 2 DAYS, UNABLE TO WT. PLEASE CALL BACK AND ADVISE, VM IS OKAY.

## 2023-09-19 ENCOUNTER — TELEMEDICINE (OUTPATIENT)
Dept: INTERNAL MEDICINE | Facility: CLINIC | Age: 38
End: 2023-09-19
Payer: COMMERCIAL

## 2023-09-19 DIAGNOSIS — R73.03 PREDIABETES: Primary | ICD-10-CM

## 2023-09-19 DIAGNOSIS — M54.50 ACUTE MIDLINE LOW BACK PAIN WITHOUT SCIATICA: ICD-10-CM

## 2023-09-19 DIAGNOSIS — L08.9 SKIN INFECTION: ICD-10-CM

## 2023-09-19 PROCEDURE — 1160F RVW MEDS BY RX/DR IN RCRD: CPT | Performed by: NURSE PRACTITIONER

## 2023-09-19 PROCEDURE — 99214 OFFICE O/P EST MOD 30 MIN: CPT | Performed by: NURSE PRACTITIONER

## 2023-09-19 PROCEDURE — 1159F MED LIST DOCD IN RCRD: CPT | Performed by: NURSE PRACTITIONER

## 2023-09-19 RX ORDER — LANCETS 30 GAUGE
1 EACH MISCELLANEOUS DAILY
Qty: 100 EACH | Refills: 11 | Status: SHIPPED | OUTPATIENT
Start: 2023-09-19

## 2023-09-19 RX ORDER — BUPROPION HYDROCHLORIDE 300 MG/1
300 TABLET ORAL DAILY
COMMUNITY

## 2023-09-19 RX ORDER — DEXLANSOPRAZOLE 60 MG/1
60 CAPSULE, DELAYED RELEASE ORAL DAILY
COMMUNITY

## 2023-09-19 RX ORDER — CEPHALEXIN 500 MG/1
500 CAPSULE ORAL 2 TIMES DAILY
Qty: 20 CAPSULE | Refills: 0 | Status: SHIPPED | OUTPATIENT
Start: 2023-09-19

## 2023-09-19 RX ORDER — BLOOD-GLUCOSE METER
1 KIT MISCELLANEOUS DAILY
Qty: 1 EACH | Refills: 0 | Status: SHIPPED | OUTPATIENT
Start: 2023-09-19

## 2023-09-19 RX ORDER — IBUPROFEN 800 MG/1
800 TABLET ORAL EVERY 6 HOURS PRN
Qty: 30 TABLET | Refills: 0 | Status: SHIPPED | OUTPATIENT
Start: 2023-09-19

## 2023-09-19 RX ORDER — SERTRALINE HYDROCHLORIDE 100 MG/1
150 TABLET, FILM COATED ORAL DAILY
COMMUNITY

## 2023-09-19 RX ORDER — MIRTAZAPINE 45 MG/1
45 TABLET, FILM COATED ORAL NIGHTLY
COMMUNITY

## 2023-09-19 RX ORDER — DIVALPROEX SODIUM 250 MG/1
250 TABLET, DELAYED RELEASE ORAL DAILY
COMMUNITY

## 2023-09-19 RX ORDER — ERYTHROMYCIN 5 MG/G
1 OINTMENT OPHTHALMIC 2 TIMES DAILY
COMMUNITY
End: 2024-09-19

## 2023-09-19 RX ORDER — FLUCONAZOLE 150 MG/1
TABLET ORAL
Qty: 2 TABLET | Refills: 0 | Status: SHIPPED | OUTPATIENT
Start: 2023-09-19

## 2023-09-19 NOTE — PROGRESS NOTES
This was an audio and video enabled visit.   This provider is located at their home address in [place] using a secure [employer] Video Visit through [employer] or [employer] (pt preference). Marguerite  is being seen remotely via telehealth  in [place]. Pt provided a secure environment for this session.  Marguerite may be asked to present for in office testing and/or evaluation if felt to be unsafe for telemedicine.    The provider identified herself /credentials as appropriate.   By proceeding with the telehealth visit, the patient consents to be seen remotely which allows for patient identifiable information to be sent to a third party as needed. The patient may refuse to be seen remotely at any time. The electronic data is encrypted and password protected, and the patient is advised of the potential risks to privacy not withstanding such measures.    You have chosen to receive care through a telehealth visit. Do you consent to use a video/audio connection for your medical care today? Yes      Subjective   Marguerite Edwards is a 38 y.o. female.     Chief Complaint   Patient presents with    Rash     arm       Rash  Pertinent negatives include no cough, diarrhea, fatigue, fever, shortness of breath or vomiting.      The patient presents today for a video visit.     She reports she has a rash on her right arm. She notes the rash has been present for approximately one week. She states the rash is worsening. She reports the rash is hard and sore. She denies any itching. She denies any injuries or trauma to her arm. She notes it is warm to touch. She denies any fevers or chills.     She inquires if she needs a glucometer. She notes she has lost 5 pounds. She does have prediabetes and is working on eating healthier and losing weight  Lab Results   Component Value Date    HGBA1C 6.30 (H) 07/28/2023         She reports she injured her back recently and has been going to the chiropractor. This has been helping. She inquires about taking  some ibuprofen for this.    The following portions of the patient's history were reviewed and updated as appropriate: allergies, current medications, past family history, past medical history, past social history, past surgical history and problem list.        Review of Systems   Constitutional:  Negative for fatigue, fever and unexpected weight loss.   Eyes:  Negative for blurred vision, double vision and visual disturbance.   Respiratory:  Negative for cough, shortness of breath and wheezing.    Cardiovascular:  Negative for chest pain, palpitations and leg swelling.   Gastrointestinal:  Positive for constipation. Negative for abdominal pain, diarrhea, nausea and vomiting.   Genitourinary:  Negative for difficulty urinating, frequency and urgency.   Musculoskeletal:  Positive for arthralgias, back pain and myalgias. Negative for joint swelling.   Skin:  Positive for rash. Negative for color change.   Neurological:  Negative for dizziness, weakness and headache.   Hematological:  Negative for adenopathy. Does not bruise/bleed easily.   Psychiatric/Behavioral:  Positive for sleep disturbance.          Outpatient Medications Marked as Taking for the 9/19/23 encounter (Telemedicine) with Liliya Hodges APRN   Medication Sig Dispense Refill    acetaminophen (TYLENOL) 325 MG tablet Take 2 tablets by mouth Every 6 (Six) Hours As Needed for Mild Pain.      albuterol sulfate  (90 Base) MCG/ACT inhaler Inhale 2 puffs Every 4 (Four) Hours As Needed for Wheezing. 18 g 3    Bacillus Coagulans-Inulin (Align Prebiotic-Probiotic) 5-1.25 MG-GM chewable tablet Chew 1 tablet Daily. 30 tablet 3    benztropine (COGENTIN) 1 MG tablet Take 1 tablet by mouth 2 (Two) Times a Day.      bisacodyl (DULCOLAX) 10 MG suppository Insert 1 suppository into the rectum Daily As Needed for Constipation (Use if bisacodyl oral/by mouth is ineffective). 10 each 0    bisacodyl (DULCOLAX) 5 MG EC tablet Take 1 tablet by mouth Daily As Needed  for Constipation (Use if polyethylene glycol is ineffective). 30 tablet 0    buPROPion XL (Wellbutrin XL) 300 MG 24 hr tablet Take 1 tablet by mouth Daily.      cholecalciferol (VITAMIN D3) 25 MCG (1000 UT) tablet Take 1 tablet by mouth Daily. 30 tablet 11    dexlansoprazole (Dexilant) 60 MG capsule Take 1 capsule by mouth Daily.      dicyclomine (BENTYL) 10 MG capsule Take 1 capsule by mouth 4 (Four) Times a Day Before Meals & at Bedtime. 120 capsule 3    divalproex (DEPAKOTE) 250 MG DR tablet Take 1 tablet by mouth Daily.      erythromycin (ROMYCIN) 5 MG/GM ophthalmic ointment Administer 1 application  to both eyes 2 (Two) Times a Day.      fluticasone (Flonase) 50 MCG/ACT nasal spray 2 sprays into the nostril(s) as directed by provider Daily. 16 g 11    Fluticasone Furoate-Vilanterol (Breo Ellipta) 100-25 MCG/ACT aerosol powder  Inhale 1 puff Daily. 60 each 6    hydrOXYzine pamoate (VISTARIL) 50 MG capsule Take 1 capsule by mouth 3 (Three) Times a Day As Needed for Anxiety. Takes scheduled      lactulose (CHRONULAC) 10 GM/15ML solution Take 15 mL by mouth Daily. 473 mL 0    levocetirizine (XYZAL) 5 MG tablet Take 1 tablet by mouth Every Evening. 30 tablet 11    lubiprostone (AMITIZA) 24 MCG capsule Take 1 capsule by mouth 2 (Two) Times a Day With Meals. 60 capsule 0    melatonin 5 MG tablet tablet Take 1.5 tablets by mouth At Night As Needed (insomnia). (Patient taking differently: Take 4 tablets by mouth At Night As Needed (insomnia).) 45 tablet 6    methenamine (HIPREX) 1 g tablet Take 1 tablet by mouth 2 (Two) Times a Day With Meals.      mirtazapine (REMERON) 45 MG tablet Take 1 tablet by mouth Every Night. Taking 1.5 tabs      multivitamin with minerals tablet tablet Take 1 tablet by mouth Daily. 30 tablet 11    OLANZapine (zyPREXA) 10 MG tablet Take 0.5 tablets by mouth 2 (Two) Times a Day.      ondansetron ODT (ZOFRAN-ODT) 4 MG disintegrating tablet Place 1 tablet on the tongue Every 8 (Eight) Hours As  Needed for Nausea or Vomiting. 30 tablet 3    oxybutynin (DITROPAN) 5 MG tablet Take 1 tablet by mouth 3 (Three) Times a Day.      polyethylene glycol (MIRALAX) 17 GM/SCOOP powder Take 238 g by mouth Take As Directed. 238 g 0    sennosides-docusate (PERICOLACE) 8.6-50 MG per tablet Take 2 tablets by mouth 2 (Two) Times a Day. 120 tablet 0    sertraline (ZOLOFT) 100 MG tablet Take 1.5 tablets by mouth Daily.      simvastatin (ZOCOR) 20 MG tablet Take 1 tablet by mouth Every Night. 90 tablet 1    tiZANidine (ZANAFLEX) 4 MG tablet TAKE 1-2 TABLETS BY MOUTH EVERY 8 HOURS AS NEEDED FOR BLADDER SPASMS 180 tablet 0     Allergies   Allergen Reactions    Lamotrigine Other (See Comments)     bruising  bruising    Paxil [Paroxetine Hcl] Mental Status Change    Prozac [Fluoxetine Hcl] Mental Status Change    Chlorhexidine Gluconate Unknown - Low Severity    Fluoxetine Unknown - Low Severity    Lubiprostone Palpitations, Dizziness and Unknown - Low Severity    Paroxetine Unknown - Low Severity           Objective   Physical Exam   Constitutional: She is oriented to person, place, and time. She appears well-developed and well-nourished. No distress.   HENT:   Head: Normocephalic and atraumatic.   Right Ear: External ear normal.   Left Ear: External ear normal.   Mouth/Throat: Oropharynx is clear and moist.   Eyes: Right eye exhibits no discharge. Left eye exhibits no discharge. No scleral icterus.   Neck: Neck normal appearance.  Pulmonary/Chest: Effort normal. No accessory muscle usage.  No respiratory distress.No use of oxygen by nasal cannula  Abdominal: Abdomen appears normal.   Neurological: She is alert and oriented to person, place, and time.   Skin: Skin is dry. She is not diaphoretic. No erythema. No pallor.        Psychiatric: She has a normal mood and affect. Her speech is normal and behavior is normal. Judgment normal. She is attentive.       There were no vitals filed for this visit.  There is no height or weight on  file to calculate BMI.        Assessment & Plan   Diagnoses and all orders for this visit:    1. Prediabetes (Primary)  -     glucose blood test strip; 1 each by Other route Daily.  Dispense: 100 each; Refill: 11  -     glucose monitor monitoring kit; Use 1 each Daily.  Dispense: 1 each; Refill: 0  -     Lancets misc; Use 1 Device Daily.  Dispense: 100 each; Refill: 11    2. Skin infection  -     cephalexin (Keflex) 500 MG capsule; Take 1 capsule by mouth 2 (Two) Times a Day.  Dispense: 20 capsule; Refill: 0    3. Acute midline low back pain without sciatica  -     ibuprofen (ADVIL,MOTRIN) 800 MG tablet; Take 1 tablet by mouth Every 6 (Six) Hours As Needed for Mild Pain.  Dispense: 30 tablet; Refill: 0    Other orders  -     fluconazole (Diflucan) 150 MG tablet; Take 1 pill if needed for yeast infection and then repeat in 3 days if needed  Dispense: 2 tablet; Refill: 0       Prediabetes  -  We will send in glucometer and monitoring supplies for her to use daily. Discussed normal glucose levels and levels to report. She will follow ADA diet.    Rash  - She does have a small area on her forearm that appears to be an infection. Exam is limited due to the video nature of the visit. We will go ahead and send in some cephalexin to treat this. If it fails to resolve, she will need to be seen in office.    Back pain  - Will send in ibuprofen 800 mg for her to use as needed. Encouraged her to use sparingly due to her GI issues and she should continue her PPI therapy of Dexilant.    She request to have Diflucan sent in as she typically gets a yeast infection with antibiotic.  We will send this in for her -if she does not need that she will not take       Return for Next scheduled follow up.    I have reviewed the limitations of a telehealth visit (such as lack of a physical exam and unable to obtain vital signs) and advised the patient that they may need to follow up for an office visit should their symptoms or concerns  persist, worsen, or change.  Patient was encouraged to keep me informed of any acute changes, lack of improvement, or any new concerning symptoms.   I discussed my findings,recommendations, and plan of care was with the patient. They verbalized understanding and agreement.    Transcribed from ambient dictation for KATRINA Gore by Jarred Pierce.  09/19/23   12:37 EDT    Patient or patient representative verbalized consent to the visit recording.  I have personally performed the services described in this document as transcribed by the above individual, and it is both accurate and complete.  KATRINA Gore  9/19/2023  15:09 EDT

## 2023-09-20 ENCOUNTER — TELEPHONE (OUTPATIENT)
Dept: INTERNAL MEDICINE | Facility: CLINIC | Age: 38
End: 2023-09-20
Payer: COMMERCIAL

## 2023-09-20 NOTE — TELEPHONE ENCOUNTER
PT CALLED TO SPEAK WITH SOMEONE ABOUT USING A COMPRESS. STATES THAT ARM IS PAINFUL AND SWOLLEN.  PLEASE CALL BACK AS SOON AS POSSIBLE.

## 2023-09-20 NOTE — TELEPHONE ENCOUNTER
Caller: Marguerite Edwards     Relationship: SELF    Best call back number: [phone]     What is your medical concern? PATIENT STATED THAT SHE FORGOT TO ASK SOULEYMANE COATES AT HER APPOINTMENT YESTERDAY 9/19/23 ABOUT WHAT SHE SHOULD DO TO RELIEVE THE PAIN AND SWELLING SHE HAS ON HER ARM.    SHOULD SHE BE USING MOIST HEAT OR ICE?    PLEASE ADVISE

## 2023-09-25 ENCOUNTER — TELEPHONE (OUTPATIENT)
Dept: INTERNAL MEDICINE | Facility: CLINIC | Age: 38
End: 2023-09-25

## 2023-09-25 NOTE — TELEPHONE ENCOUNTER
PATIENT HAS CALLED AND STATED HER RIGHT ARM IS STILL IN A LOT OF PAIN AND IS REQUESTING A CALL BACK TO ADVISE ON WHETHER OR NOT SHE NEEDS TO GO TO THE ER OR WHAT DOES THE PCP SUGGEST SHE DOES.    CALL BACK NUMBER [phone]

## 2023-09-26 RX ORDER — BISACODYL 5 MG
TABLET, DELAYED RELEASE (ENTERIC COATED) ORAL
Qty: 30 TABLET | Refills: 0 | Status: SHIPPED | OUTPATIENT
Start: 2023-09-26

## 2023-09-26 NOTE — TELEPHONE ENCOUNTER
Rx Refill Note  Requested Prescriptions     Pending Prescriptions Disp Refills    bisacodyl 5 MG EC tablet [Pharmacy Med Name: BISACODYL EC 5 MG TABLET] 30 tablet 0     Sig: TAKE 1 TABLET BY MOUTH DAILY AS NEEDED FOR CONSTIPATION. USE IF POLYETHYLENE GLYCOL IS INEFFECTIVE      Last office visit with prescribing clinician: 8/23/2023   Last telemedicine visit with prescribing clinician: Visit date not found   Next office visit with prescribing clinician: Visit date not found                           IRWIN Nugent  09/26/23, 08:45 EDT

## 2023-09-27 ENCOUNTER — HOSPITAL ENCOUNTER (EMERGENCY)
Facility: HOSPITAL | Age: 38
Discharge: HOME | End: 2023-09-27
Attending: EMERGENCY MEDICINE
Payer: COMMERCIAL

## 2023-09-27 ENCOUNTER — TELEPHONE (OUTPATIENT)
Dept: INTERNAL MEDICINE | Facility: CLINIC | Age: 38
End: 2023-09-27
Payer: COMMERCIAL

## 2023-09-27 ENCOUNTER — TELEPHONE (OUTPATIENT)
Dept: GASTROENTEROLOGY | Facility: CLINIC | Age: 38
End: 2023-09-27

## 2023-09-27 VITALS
BODY MASS INDEX: 36.62 KG/M2 | RESPIRATION RATE: 20 BRPM | WEIGHT: 199 LBS | OXYGEN SATURATION: 96 % | DIASTOLIC BLOOD PRESSURE: 82 MMHG | HEIGHT: 62 IN | TEMPERATURE: 98 F | HEART RATE: 115 BPM | SYSTOLIC BLOOD PRESSURE: 122 MMHG

## 2023-09-27 DIAGNOSIS — B35.4 TINEA CORPORIS: Primary | ICD-10-CM

## 2023-09-27 PROCEDURE — 99282 EMERGENCY DEPT VISIT SF MDM: CPT

## 2023-09-27 RX ORDER — CLOTRIMAZOLE 1 %
1 CREAM (GRAM) TOPICAL 2 TIMES DAILY
Qty: 30 G | Refills: 0 | Status: SHIPPED | OUTPATIENT
Start: 2023-09-27 | End: 2023-10-04 | Stop reason: SDUPTHER

## 2023-09-27 NOTE — TELEPHONE ENCOUNTER
Marguerite Goode called left a voice message stating that her heartburn has gotten worse. Patient is requesting if she can take two Dexilant daily. One isn't working. Please advise.

## 2023-09-27 NOTE — TELEPHONE ENCOUNTER
PT WENT TO ER TODAY AND HAS BEEN DIAGNOSED WITH RING WORM, SHE IS WONDERING IF SHE NEEDS TO SHOWER EVERY DAY PER THE AV[facility].    PT ALSO HAS REQUESTED SOME LARGE BANDAGES TO COVER THE AFFECTED AREA IF POSSIBLE.    REQUESTED TO PLEASE RETURN CALL AS SOON AS POSSIBLE.
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
## 2023-09-27 NOTE — DISCHARGE INSTRUCTIONS
We are going to treat this like a ringworm.  Apply antifungal cream as directed.  I would continue taking the Keflex.  Follow-up with your primary care physician.

## 2023-09-28 ENCOUNTER — TELEPHONE (OUTPATIENT)
Dept: INTERNAL MEDICINE | Facility: CLINIC | Age: 38
End: 2023-09-28
Payer: COMMERCIAL

## 2023-09-28 DIAGNOSIS — J45.30 MILD PERSISTENT ASTHMA WITHOUT COMPLICATION: ICD-10-CM

## 2023-09-28 DIAGNOSIS — Z91.09 ENVIRONMENTAL ALLERGIES: ICD-10-CM

## 2023-09-28 DIAGNOSIS — N32.89 BLADDER SPASMS: ICD-10-CM

## 2023-09-28 DIAGNOSIS — E78.2 MIXED HYPERLIPIDEMIA: Chronic | ICD-10-CM

## 2023-09-28 RX ORDER — FLUTICASONE FUROATE AND VILANTEROL 100; 25 UG/1; UG/1
1 POWDER RESPIRATORY (INHALATION)
Qty: 60 EACH | Refills: 6 | Status: SHIPPED | OUTPATIENT
Start: 2023-09-28

## 2023-09-28 RX ORDER — FLUTICASONE PROPIONATE 50 MCG
2 SPRAY, SUSPENSION (ML) NASAL DAILY
Qty: 16 G | Refills: 2 | Status: SHIPPED | OUTPATIENT
Start: 2023-09-28

## 2023-09-28 RX ORDER — TIZANIDINE 4 MG/1
TABLET ORAL
Qty: 180 TABLET | Refills: 0 | Status: SHIPPED | OUTPATIENT
Start: 2023-09-28

## 2023-09-28 RX ORDER — SIMVASTATIN 20 MG
20 TABLET ORAL NIGHTLY
Qty: 90 TABLET | Refills: 1 | OUTPATIENT
Start: 2023-09-28

## 2023-09-28 NOTE — TELEPHONE ENCOUNTER
Caller: Marguerite Edwards    Relationship: Self    Best call back number: [phone]     Requested Prescriptions:   Requested Prescriptions     Pending Prescriptions Disp Refills    tiZANidine (ZANAFLEX) 4 MG tablet 180 tablet 0     Sig: TAKE 1-2 TABLETS BY MOUTH EVERY 8 HOURS AS NEEDED FOR BLADDER SPASMS    simvastatin (ZOCOR) 20 MG tablet 90 tablet 1     Sig: Take 1 tablet by mouth Every Night.    Fluticasone Furoate-Vilanterol (Breo Ellipta) 100-25 MCG/ACT aerosol powder  60 each 6     Sig: Inhale 1 puff Daily.    fluticasone (Flonase) 50 MCG/ACT nasal spray 16 g 11     Si sprays into the nostril(s) as directed by provider Daily.        Pharmacy where request should be sent: [facility] [place], [place] - [email or web address] - [phone]  - [phone]     Last office visit with prescribing clinician: 2023   Last telemedicine visit with prescribing clinician: 2023   Next office visit with prescribing clinician: 10/4/2023     Additional details provided by patient: PATIENT IS OUT OF MEDICATION.    Does the patient have less than a 3 day supply:  [x] Yes  [] No    Would you like a call back once the refill request has been completed: [x] Yes [] No    If the office needs to give you a call back, can they leave a voicemail: [x] Yes [] No    Oleg Brand   23 08:38 EDT

## 2023-09-28 NOTE — TELEPHONE ENCOUNTER
PT CALLED TO SWITCH OFFICE VISIT ON 10/4/23 TO A [facility] VIDEO VISIT. SINCE SHE IS WANTING TO BE REFERRED TO OTHER DEPARTMENTS, I LET HER KNOW WE WOULD NEED TO SEE HER IN PERSON.    PT ADVISED THAT SHE DOES NOT HAVE TRANSPORTATION FOR THIS DAY AND WOULD HAVE TO CANCEL IF NOT SWITCHED.    I LET HER KNOW THAT I WOULD SEND A MESSAGE TO [facility] AND WE WOULD LET PT KNOW WHAT SHE DECIDES.

## 2023-09-29 NOTE — TELEPHONE ENCOUNTER
Pt already shoud have these specialists.   She was referred to Derm in the past. Dr. Tai. Please give her the info.   She also sees someone for her back already- Ivon Garber.   Please giver her that info as well.

## 2023-10-02 ENCOUNTER — TELEPHONE (OUTPATIENT)
Dept: INTERNAL MEDICINE | Facility: CLINIC | Age: 38
End: 2023-10-02

## 2023-10-02 NOTE — TELEPHONE ENCOUNTER
Caller: Marguerite Edwards    Relationship: Self    Best call back number: [phone]     What is the medical concern/diagnosis:     What specialty or service is being requested: DERMATOLOGY-ENDOCRINOLOGY     What is the provider, practice or medical service name: [facility]  [facility]        Any additional details: PATIENT STATES SHE HAS ALREADY SPOKEN TO SOULEYMANE COATES ABOUT THESE REFERRALS AND IS ASKING FOR AN UPDATE.

## 2023-10-03 ENCOUNTER — TELEPHONE (OUTPATIENT)
Dept: GASTROENTEROLOGY | Facility: CLINIC | Age: 38
End: 2023-10-03
Payer: COMMERCIAL

## 2023-10-03 DIAGNOSIS — K21.9 GASTROESOPHAGEAL REFLUX DISEASE, UNSPECIFIED WHETHER ESOPHAGITIS PRESENT: Primary | ICD-10-CM

## 2023-10-03 DIAGNOSIS — G89.29 CHRONIC ABDOMINAL PAIN: ICD-10-CM

## 2023-10-03 DIAGNOSIS — R10.9 CHRONIC ABDOMINAL PAIN: ICD-10-CM

## 2023-10-03 NOTE — TELEPHONE ENCOUNTER
Spoke with patient and informed her that we have not referred her yet to Endo and she can speak to Liliya about this during video visit tomorrow. Informed patient again to call [facility] as she is already an existing patient to get scheduled

## 2023-10-04 ENCOUNTER — TELEMEDICINE (OUTPATIENT)
Dept: INTERNAL MEDICINE | Facility: CLINIC | Age: 38
End: 2023-10-04
Payer: COMMERCIAL

## 2023-10-04 DIAGNOSIS — R52 PAIN: ICD-10-CM

## 2023-10-04 DIAGNOSIS — N92.6 MENSTRUAL ABNORMALITY: ICD-10-CM

## 2023-10-04 DIAGNOSIS — B35.9 RINGWORM: Primary | ICD-10-CM

## 2023-10-04 PROCEDURE — 1159F MED LIST DOCD IN RCRD: CPT | Performed by: NURSE PRACTITIONER

## 2023-10-04 PROCEDURE — 99213 OFFICE O/P EST LOW 20 MIN: CPT | Performed by: NURSE PRACTITIONER

## 2023-10-04 PROCEDURE — 1160F RVW MEDS BY RX/DR IN RCRD: CPT | Performed by: NURSE PRACTITIONER

## 2023-10-04 RX ORDER — DEXLANSOPRAZOLE 60 MG/1
60 CAPSULE, DELAYED RELEASE ORAL 2 TIMES DAILY
Qty: 60 CAPSULE | Refills: 2 | Status: SHIPPED | OUTPATIENT
Start: 2023-10-04

## 2023-10-04 RX ORDER — CLOTRIMAZOLE 1 %
1 CREAM (GRAM) TOPICAL 2 TIMES DAILY
Qty: 30 G | Refills: 0 | Status: SHIPPED | OUTPATIENT
Start: 2023-10-04

## 2023-10-04 RX ORDER — IBUPROFEN 800 MG/1
800 TABLET ORAL EVERY 6 HOURS PRN
Qty: 30 TABLET | Refills: 0 | Status: SHIPPED | OUTPATIENT
Start: 2023-10-04

## 2023-10-04 NOTE — PROGRESS NOTES
This was an audio and video enabled visit.   This provider is located at   the [facility] (through [facility]), [address], [place] [address] using a secure [facility] Video Visit through [facility] or [facility] (pt preference). Marguerite  is being seen remotely via telehealth  in [place]. Pt provided a secure environment for this session.  Marguerite may be asked to present for in office testing and/or evaluation if felt to be unsafe for telemedicine.    The provider identified herself /credentials as appropriate.   By proceeding with the telehealth visit, the patient consents to be seen remotely which allows for patient identifiable information to be sent to a third party as needed. The patient may refuse to be seen remotely at any time. The electronic data is encrypted and password protected, and the patient is advised of the potential risks to privacy not withstanding such measures.    You have chosen to receive care through a telehealth visit. Do you consent to use a video/audio connection for your medical care today? Yes      Subjective   Marguerite Edwards is a 38 y.o. female.     Chief Complaint   Patient presents with    Rash    hormones       History of Present Illness   She presents via video visit. She is accompanied by an adult male.     She report she still has a ringworm on her arm. She complains of a stabbing pain in her arm. She advises she has taken all of the antibiotics.     She reports she has concerns with her sex drive. She notes she has a decreased sex drive. She states she does follow with gynecology. She advises she was placed on Estradiol in the past, which did help. She reports she has a menses every 2 weeks. She notes she is on a contraception for her menstrual period.    The following portions of the patient's history were reviewed and updated as appropriate: allergies, current medications, past family history, past medical history, past social  history, past surgical history and problem list.        Review of Systems   Constitutional:  Negative for fatigue, fever and unexpected weight loss.   Eyes:  Negative for blurred vision, double vision and visual disturbance.   Respiratory:  Negative for cough, shortness of breath and wheezing.    Cardiovascular:  Negative for chest pain, palpitations and leg swelling.   Gastrointestinal:  Negative for abdominal pain, constipation, diarrhea, nausea and vomiting.   Genitourinary:  Positive for decreased libido. Negative for difficulty urinating, frequency and urgency.   Musculoskeletal:  Positive for arthralgias. Negative for myalgias.   Skin:  Positive for rash. Negative for color change.   Neurological:  Negative for dizziness, weakness and headache.   Hematological:  Negative for adenopathy. Does not bruise/bleed easily.           Outpatient Medications Marked as Taking for the 10/4/23 encounter (Telemedicine) with Liliya Hodges APRN   Medication Sig Dispense Refill    clotrimazole (LOTRIMIN) 1 % cream Apply 1 application  topically to the appropriate area as directed 2 (Two) Times a Day. 30 g 0    ibuprofen (ADVIL,MOTRIN) 800 MG tablet Take 1 tablet by mouth Every 6 (Six) Hours As Needed for Mild Pain. 30 tablet 0     Allergies   Allergen Reactions    Lamotrigine Other (See Comments)     bruising  bruising    Paxil [Paroxetine Hcl] Mental Status Change    Prozac [Fluoxetine Hcl] Mental Status Change    Chlorhexidine Gluconate Unknown - Low Severity    Fluoxetine Unknown - Low Severity    Lubiprostone Palpitations, Dizziness and Unknown - Low Severity    Paroxetine Unknown - Low Severity           Objective   Physical Exam   Constitutional: She is oriented to person, place, and time. She appears well-developed and well-nourished. No distress.   HENT:   Head: Normocephalic and atraumatic.   Right Ear: External ear normal.   Left Ear: External ear normal.   Mouth/Throat: Oropharynx is clear and moist.   Eyes:  Right eye exhibits no discharge. Left eye exhibits no discharge. No scleral icterus.   Neck: Neck normal appearance.  Pulmonary/Chest: Effort normal. No accessory muscle usage.  No respiratory distress.No use of oxygen by nasal cannula  Abdominal: Abdomen appears normal.   Neurological: She is alert and oriented to person, place, and time.   Skin: Skin is dry. She is not diaphoretic. No erythema. No pallor.        Psychiatric: She has a normal mood and affect. Her speech is normal and behavior is normal. Judgment normal. She is attentive.         There were no vitals filed for this visit.  There is no height or weight on file to calculate BMI.        Assessment & Plan   Diagnoses and all orders for this visit:    1. Ringworm (Primary)  -     clotrimazole (LOTRIMIN) 1 % cream; Apply 1 application  topically to the appropriate area as directed 2 (Two) Times a Day.  Dispense: 30 g; Refill: 0    2. Pain  -     ibuprofen (ADVIL,MOTRIN) 800 MG tablet; Take 1 tablet by mouth Every 6 (Six) Hours As Needed for Mild Pain.  Dispense: 30 tablet; Refill: 0    3. Menstrual abnormality      Ringworm  - Will send in a refill for the clotrimazole. She may take ibuprofen or Tylenol for pain relief.     Hormones  - She will need to follow up with gynecology.        Return if symptoms worsen or fail to improve.    I have reviewed the limitations of a telehealth visit (such as lack of a physical exam and unable to obtain vital signs) and advised the patient that they may need to follow up for an office visit should their symptoms or concerns persist, worsen, or change.  Patient was encouraged to keep me informed of any acute changes, lack of improvement, or any new concerning symptoms.   I discussed my findings,recommendations, and plan of care was with the patient. They verbalized understanding and agreement.    Transcribed from ambient dictation for KATRINA Gore by Jarred Pierce.  10/04/23   10:36 EDT    Patient or patient  representative verbalized consent to the visit recording.  I have personally performed the services described in this document as transcribed by the above individual, and it is both accurate and complete.  Liliya Hodges, APRN  10/8/2023  20:59 EDT

## 2023-10-10 ENCOUNTER — TELEPHONE (OUTPATIENT)
Dept: GASTROENTEROLOGY | Facility: OTHER | Age: 38
End: 2023-10-10
Payer: COMMERCIAL

## 2023-10-10 ENCOUNTER — TELEPHONE (OUTPATIENT)
Dept: GASTROENTEROLOGY | Facility: CLINIC | Age: 38
End: 2023-10-10
Payer: COMMERCIAL

## 2023-10-10 NOTE — TELEPHONE ENCOUNTER
Patient called this evening and reports some crampy abdominal pain and is wondering if she could double up on her Bentyl.  She reports she has continued on lactulose and has run out of miralax.  She reports she has been having some bowel movements although has a history of chronic constipation.  I advised her not to take more Bentyl than prescribed.  I also advised her to resume miralax when able and she was prescribed bisacodyl by her primary GI doctor a few weeks ago.  She and her wife are worried she could have a UTI.  I encouraged them to discuss urinary symptoms with her PCP.  I also discussed worrisome signs/symptoms with patient that would warrant presentation to the ED for further evaluation.

## 2023-10-10 NOTE — TELEPHONE ENCOUNTER
Patient says she's been having bad cramps for 4 days. She said she's not constipated because she has been having bowel movements once a day. She wants to know if she can take 2 bentyl pills. Please advise.

## 2023-10-10 NOTE — TELEPHONE ENCOUNTER
Hub staff attempted to follow warm transfer process and was unsuccessful     Provider: MEI    Caller: CLAUDIA CISNEROS    Relationship to Patient: SELF    Pharmacy: [facility]    Phone Number: [phone]    Reason for Call: REPORTING NEW SYMPTOMS    When was the patient last seen: 08/23/23    When did it start: 10/06/23    Where is it located: ALL OVER    Characteristics of symptom/severity: SHARP    Timing- Is it constant or intermittent: INTERMITTENT    What makes it worse: N/A    What makes it better: NONE    What therapies/medications have you tried: TYLENOL     PATIENT CALLED; SHE IS HAVING INTERMITTENT, SHARP ALL-OVER ABDOMINAL PAIN. SHE HAS TRIED TYLENOL, BUT IT IS NOT HELPING. PLEASE ADVISE HER IF THERE IS SOMETHING SHE CAN TAKE OR HAVE PRESCRIBED TO HER. PLEASE CALL HER BACK, NOT HER WIFE, [phone]. SHE HAS AN APPOINTMENT AT 2:30 TODAY AND HAS ASKED TO NOT CALL AT THAT TIME. OK TO LEAVE A VOICEMAIL.

## 2023-10-11 DIAGNOSIS — K59.00 CONSTIPATION, UNSPECIFIED CONSTIPATION TYPE: Primary | ICD-10-CM

## 2023-10-11 RX ORDER — POLYETHYLENE GLYCOL 3350 17 G/17G
17 POWDER, FOR SOLUTION ORAL DAILY
Qty: 850 G | Refills: 0 | Status: SHIPPED | OUTPATIENT
Start: 2023-10-11

## 2023-10-11 RX ORDER — POLYETHYLENE GLYCOL 3350 17 G/17G
POWDER, FOR SOLUTION ORAL
Qty: 238 G | Refills: 0 | Status: SHIPPED | OUTPATIENT
Start: 2023-10-11

## 2023-10-11 NOTE — TELEPHONE ENCOUNTER
Rx Refill Note  Pending Prescriptions:                       Disp   Refills    polyethylene glycol (MIRALAX) 17 GM/SCOOP *238 g  0        Sig: DISSOLVE ONE-TWO CAPFUL (17-34GM) IN 8OZ OF WATER OR           JUICE AND DRINK CONTENTS DAILY    Last office visit with prescribing clinician: 8/23/2023   Last telemedicine visit with prescribing clinician: Visit date not found   Next office visit with prescribing clinician: Visit date not found         Glenis Marina MA  10/11/23, 08:06 EDT

## 2023-10-13 NOTE — TELEPHONE ENCOUNTER
Caller: Marguerite Edwards    Relationship: Self    Best call back number: [phone]    What is the best time to reach you: ASAP    Who are you requesting to speak with (clinical staff, provider,  specific staff member): CLINICAL    Do you know the name of the person who called: EDITH    What was the call regarding: RX  MANAGEMENT    Is it okay if the provider responds through MyChart: NO

## 2023-10-15 ENCOUNTER — TELEPHONE (OUTPATIENT)
Dept: GASTROENTEROLOGY | Facility: OTHER | Age: 38
End: 2023-10-15
Payer: COMMERCIAL

## 2023-10-15 NOTE — TELEPHONE ENCOUNTER
Marguerite and her wife Thuy called the on-call GI team with concerns for ongoing issues with constipation.  Patient reports she has gone 4 days without MiraLAX due to insurance coverage issues but just received it and began taking it again.  Has not pooped in 4 days.  Requesting advice on how to have BM as her abdomen is distended and uncomfortable.    Patient instructed to:  >>> Take extra dose of MiraLAX x1  Resume twice daily dosing tomorrow  >>> Dulcolax suppository x1  >>> Continue Amitiza 24 mcg p.o. twice daily with meals  >>> resume Docusate 1 tablet nightly; was on Elis-Colace until recently when prescription .    If no response to above, may use OTC agents such as milk of mag, or mag citrate periodically for constipation.     Instructed to contact us with any further questions or concerns.     Next Office Visit: 2023 with Dr. BONG Thomas MD.

## 2023-10-16 ENCOUNTER — TELEPHONE (OUTPATIENT)
Dept: GASTROENTEROLOGY | Facility: CLINIC | Age: 38
End: 2023-10-16

## 2023-10-16 DIAGNOSIS — K59.00 CONSTIPATION, UNSPECIFIED CONSTIPATION TYPE: Primary | ICD-10-CM

## 2023-10-16 NOTE — TELEPHONE ENCOUNTER
I SPOKE WITH PATIENT. SHE HASN'T HAD A BOWEL MOVEMENT FOR 5 DAYS. CURRENTLY TAKING MEDICATION AS RECOMMENDED BY [facility] LAST NIGHT. AGUSTÍN (PATIENT'S SPOUSE) STATED THAT THEY DON'T HAVE THE MONEY UNTIL NEXT WEEK TO BUY MILK OF MAG OR MAG CITRATE. SPOUSE WANTS TO KNOW IF PATIENT DOESN'T HAVE A BOWEL MOVEMENT BY TOMORROW. CAN SHE TAKE ONE OF THE BOWEL PREP THAT SHE HAS ON HAND? PLEASE ADVISE. THANKS    Pt states 1 hour PTA while at school, felt pain to left side of head and right arm started to tingle. Denies injury. Mother states similar issue happened to brother and he was diagnosed with seizures. No seizure activity noted.

## 2023-10-16 NOTE — TELEPHONE ENCOUNTER
Caller: Marguerite Edwards    Relationship to patient: Self    Best call back number:  [phone]    Patient is needing:  PATIENT WOULD LIKE TO SPEAK WITH SOMEONE ON THE CLINICAL TEAM. PATIENT IS HAVING SYMPTOMS OF CONSTIPATION. PLEASE CALL AGUSTÍN HER WIFE -[phone]

## 2023-10-25 DIAGNOSIS — K21.9 GASTROESOPHAGEAL REFLUX DISEASE, UNSPECIFIED WHETHER ESOPHAGITIS PRESENT: ICD-10-CM

## 2023-10-25 DIAGNOSIS — R10.9 CHRONIC ABDOMINAL PAIN: ICD-10-CM

## 2023-10-25 DIAGNOSIS — G89.29 CHRONIC ABDOMINAL PAIN: ICD-10-CM

## 2023-10-25 RX ORDER — DEXLANSOPRAZOLE 60 MG/1
60 CAPSULE, DELAYED RELEASE ORAL 2 TIMES DAILY
Qty: 60 CAPSULE | Refills: 2 | Status: SHIPPED | OUTPATIENT
Start: 2023-10-25

## 2023-10-25 NOTE — TELEPHONE ENCOUNTER
Caller: Marguerite Edwards    Relationship: Self    Best call back number: [phone]     Requested Prescriptions:   Requested Prescriptions     Pending Prescriptions Disp Refills    dexlansoprazole (Dexilant) 60 MG capsule 60 capsule 2     Sig: Take 1 capsule by mouth 2 (Two) Times a Day.        Pharmacy where request should be sent:  [facility], [address]    Last office visit with prescribing clinician: 8/23/2023   Last telemedicine visit with prescribing clinician: Visit date not found   Next office visit with prescribing clinician: Visit date not found     Additional details provided by patient: PATIENT CALLED; SHE NEEDS A REFILL OF HER DEXILANT. SHE DOES NOT KNOW IF SHE SHOULD TAKE 1 PER DAY OR 2 PER DAY. THE PHARMACY WILL NOT FILL THIS FOR HER UNTIL THEY HAVE CLEAR INSTRUCTIONS. PLEASE REVIEW AND SEND RX. SHE WOULD LIKE A CALL BACK [phone]. OK  TO LEAVE A VOICEMAIL.    Would you like a call back once the refill request has been completed: [x] Yes [] No    If the office needs to give you a call back, can they leave a voicemail: [x] Yes [] No

## 2023-10-30 ENCOUNTER — TELEPHONE (OUTPATIENT)
Dept: INTERNAL MEDICINE | Facility: CLINIC | Age: 38
End: 2023-10-30

## 2023-10-30 NOTE — TELEPHONE ENCOUNTER
Caller: Marguerite Edwards    Relationship: Self    Best call back number: [phone]     What was the call regarding: PATIENT CALLED STATING THAT THE SINK DOCTOR DOES TAKE HER INSURANCE.  PATIENT ASKED WHAT HER NEXT STEPS SHOULD BE. PATIENT STATED THAT IT STILL HURTS.

## 2023-10-31 NOTE — TELEPHONE ENCOUNTER
PT CALLING BACK ABOUT DERMATOLOGY REFERRAL.    STATED THAT THE OFFICE SHE WAS ORIGINALLY SENT TO DOES NOT ACCEPT MEDICAID AND HAS REQUESTED TO HAVE ANOTHER REFERRAL SUBMITTED. PT ALSO STATES THAT THE WOUND ON HER ARM IS STILL PAINFUL, SWOLLEN, AND RED.    CALLBACK WAS REQUESTED ASAP FOR A STATUS UPDATE.

## 2023-11-01 ENCOUNTER — TELEPHONE (OUTPATIENT)
Dept: INTERNAL MEDICINE | Facility: CLINIC | Age: 38
End: 2023-11-01

## 2023-11-01 DIAGNOSIS — N32.89 BLADDER SPASMS: ICD-10-CM

## 2023-11-01 DIAGNOSIS — E78.2 MIXED HYPERLIPIDEMIA: Chronic | ICD-10-CM

## 2023-11-01 DIAGNOSIS — E55.9 VITAMIN D DEFICIENCY: ICD-10-CM

## 2023-11-01 RX ORDER — TIZANIDINE 4 MG/1
TABLET ORAL
Qty: 180 TABLET | Refills: 0 | Status: SHIPPED | OUTPATIENT
Start: 2023-11-01

## 2023-11-01 RX ORDER — SODIUM PICOSULFATE, MAGNESIUM OXIDE, AND ANHYDROUS CITRIC ACID 10; 3.5; 12 MG/160ML; G/160ML; G/160ML
350 LIQUID ORAL TAKE AS DIRECTED
Qty: 350 ML | Refills: 0 | Status: SHIPPED | OUTPATIENT
Start: 2023-11-01

## 2023-11-01 NOTE — TELEPHONE ENCOUNTER
Caller: Margeurite Edwards    Relationship: Self    Best call back number: [phone]     What is the medical concern/diagnosis: SKIN RED AND HAS BUMPS SORE    What specialty or service is being requested: DERMATOLOGY    What is the provider, practice or medical service name: N/A  Any additional details: THE OFFICE SHE WAS REFERRED TO DOES NOT TAKE HER INSURANCE.     PLEASE MAKE SURE THAT PATIENTS BRIO, ALLERGY MEDICATION, AND NASAL SPRAY.

## 2023-11-01 NOTE — TELEPHONE ENCOUNTER
Called and spoke with patient, she states that it is the same rash but it now is in two areas. She states that now it is red and irritated and it is very painful. She states that she does have an upcoming appointment with the dermatologist but it isn't until 2/8/24. She would like to know what else Liliya recommends to do for this rash.       Called and spoke with pharmacy   Breo was mailed to her on 10/23  Flonase is in process  Levocetirizine is in process  Simvastatin was mailed on 10/24

## 2023-11-01 NOTE — TELEPHONE ENCOUNTER
Please see if we can get her derm appt moved up.   If not able to, I would like to see her to look at the rash before I treat

## 2023-11-01 NOTE — TELEPHONE ENCOUNTER
Patient has been informed several times that she is a current patient at [facility] and they are the only practice in town that takes her insurance. Called [facility] sure, and they do take her insurance. Called patient and LVM with [facility] contact info [phone] again

## 2023-11-02 NOTE — TELEPHONE ENCOUNTER
Called patient and her wife and informed her that unfortunately I cannot get Derm appt moved up. Informed them that Liliya could see her in office and try to see if there is something we can try until derm appt. Patient declined an appt at this time due to other Dr appts she has coming up. Will call to schedule if things change or get worse

## 2023-11-03 DIAGNOSIS — R11.0 NAUSEA: ICD-10-CM

## 2023-11-03 RX ORDER — PROMETHAZINE HYDROCHLORIDE 25 MG/1
12.5 TABLET ORAL EVERY 6 HOURS PRN
Qty: 6 TABLET | Refills: 0 | Status: SHIPPED | OUTPATIENT
Start: 2023-11-03

## 2023-11-06 ENCOUNTER — TELEPHONE (OUTPATIENT)
Dept: GASTROENTEROLOGY | Facility: CLINIC | Age: 38
End: 2023-11-06

## 2023-11-06 NOTE — TELEPHONE ENCOUNTER
Provider: DR DENICE HURLEY     Caller: CLAUDIA CISNEROS    Relationship to Patient: SELF    Pharmacy:  [facility]    Phone Number: [phone]    Reason for Call: PT WOULD LIKE PHENERGAN CALLED INTO HER PHARMACY BECAUSE SHE GETS SICK WHILE TAKING THE PREP FOR HER PROCEDURE ON WEDNESDAY

## 2023-11-07 ENCOUNTER — TELEPHONE (OUTPATIENT)
Dept: GASTROENTEROLOGY | Facility: CLINIC | Age: 38
End: 2023-11-07
Payer: COMMERCIAL

## 2023-11-07 ENCOUNTER — TELEPHONE (OUTPATIENT)
Dept: GASTROENTEROLOGY | Facility: CLINIC | Age: 38
End: 2023-11-07

## 2023-11-07 RX ORDER — ONDANSETRON 4 MG/1
4 TABLET, FILM COATED ORAL EVERY 8 HOURS PRN
Qty: 30 TABLET | Refills: 1 | OUTPATIENT
Start: 2023-11-07

## 2023-11-07 RX ORDER — PROMETHAZINE HYDROCHLORIDE 25 MG/1
12.5 TABLET ORAL EVERY 6 HOURS PRN
Qty: 6 TABLET | Refills: 0 | Status: SHIPPED | OUTPATIENT
Start: 2023-11-07

## 2023-11-07 NOTE — TELEPHONE ENCOUNTER
Hub staff attempted to follow warm transfer process and was unsuccessful     Caller: Marguerite Edwards    Relationship to patient: Self    Best call back number: [phone]     Patient is needing: PT HAS QUESTIONS ABOUT BOWEL PREP FOR PROCEDURE ON 11/8/23. PLEASE RETURN CALL ASAP.

## 2023-11-07 NOTE — TELEPHONE ENCOUNTER
Hub staff attempted to follow warm transfer process and was unsuccessful     Caller: Marguerite Edwards    Relationship to patient: Self    Best call back number: [phone]    Patient is needing: PATIENT CALLED IN WITH QUESTIONS ABOUT WHAT MEDICATION SHE SHOULD OR SHOULD NOT BE TAKING AS SHE GOES THROUGH THE THE PREP FOR HER UPCOMING PROCEDURE SCHEDULED FOR 11/08/2023. PLEASE CALL BACK ANYTIME, OKAY TO LEAVE VM.

## 2023-11-08 ENCOUNTER — OUTSIDE FACILITY SERVICE (OUTPATIENT)
Dept: GASTROENTEROLOGY | Facility: CLINIC | Age: 38
End: 2023-11-08
Payer: COMMERCIAL

## 2023-11-08 PROCEDURE — 45378 DIAGNOSTIC COLONOSCOPY: CPT | Performed by: INTERNAL MEDICINE

## 2023-11-08 PROCEDURE — 88305 TISSUE EXAM BY PATHOLOGIST: CPT

## 2023-11-08 PROCEDURE — 43239 EGD BIOPSY SINGLE/MULTIPLE: CPT | Performed by: INTERNAL MEDICINE

## 2023-11-08 RX ORDER — LUBIPROSTONE 24 UG/1
24 CAPSULE ORAL 2 TIMES DAILY WITH MEALS
Qty: 60 CAPSULE | Refills: 2 | Status: SHIPPED | OUTPATIENT
Start: 2023-11-08 | End: 2024-02-06

## 2023-11-08 RX ORDER — BISACODYL 5 MG/1
15 TABLET, DELAYED RELEASE ORAL DAILY
Qty: 90 TABLET | Refills: 2 | Status: SHIPPED | OUTPATIENT
Start: 2023-11-08 | End: 2024-02-06

## 2023-11-09 ENCOUNTER — TELEPHONE (OUTPATIENT)
Dept: GASTROENTEROLOGY | Facility: CLINIC | Age: 38
End: 2023-11-09
Payer: COMMERCIAL

## 2023-11-09 ENCOUNTER — LAB REQUISITION (OUTPATIENT)
Dept: LAB | Facility: HOSPITAL | Age: 38
End: 2023-11-09
Payer: COMMERCIAL

## 2023-11-09 DIAGNOSIS — K31.89 OTHER DISEASES OF STOMACH AND DUODENUM: ICD-10-CM

## 2023-11-09 DIAGNOSIS — R10.9 UNSPECIFIED ABDOMINAL PAIN: ICD-10-CM

## 2023-11-09 NOTE — TELEPHONE ENCOUNTER
Called Marguerite back and she said she let the surgery center know what is going on when they called and checked on her. The surgery center said her symptoms are normal.

## 2023-11-09 NOTE — TELEPHONE ENCOUNTER
"    Hub staff attempted to follow warm transfer process and was unsuccessful     Caller: Marguerite Edwards    Relationship to patient: Self    Best call back number: [phone]    Patient is needing: PT IS C/O PAIN ACROSS STOMACH AND NEAR BREASTS. CRAMPING REALLY BAD, FEELING NAUSEOUS. IS NOW \"POOPIN\" LIQUID.    PT HAD COLONOSCOPY DONE 11/8 WITH MEI"

## 2023-11-10 ENCOUNTER — TELEPHONE (OUTPATIENT)
Dept: GASTROENTEROLOGY | Facility: CLINIC | Age: 38
End: 2023-11-10
Payer: COMMERCIAL

## 2023-11-10 LAB — REF LAB TEST METHOD: NORMAL

## 2023-11-10 NOTE — TELEPHONE ENCOUNTER
Hub staff attempted to follow warm transfer process and was unsuccessful     Caller: Marguerite Edwards    Relationship to patient: Self    Best call back number: [phone]     Patient is needing: PATIENT CALLED; SHE IS HAVING SEVERE NAUSEA FOLLOWING HER 11/08/23 EGD/COLONOSCOPY. SHE WOULD LIKE FOR A NURSE TO CALL HER BACK AND DISCUSS THIS WITH HER, AS SHE HAS BEEN UNABLE TO SPEAK WITH ONE YET. PLEASE RETURN HER CALL [phone].

## 2023-11-17 ENCOUNTER — TELEPHONE (OUTPATIENT)
Dept: GASTROENTEROLOGY | Facility: CLINIC | Age: 38
End: 2023-11-17

## 2023-11-17 NOTE — TELEPHONE ENCOUNTER
Hub staff attempted to follow warm transfer process and was unsuccessful     Caller: Marguerite Edwards    Relationship to patient: Self    Best call back number: [phone]    Patient is needing: PATIENT CALLED IN AND STATED THAT SHE IS ABLE TO HAVE A BOWEL MOVEMENT, BUT SHE IS VERY NAUSEOUS AND WOULD LIKE TO SPEAK WITH SOMEONE FOR MEDICAL ADVISE. PLEASE CALL BACK ANYTIME, OKAY TO LEAVE VM.

## 2023-11-20 ENCOUNTER — TELEPHONE (OUTPATIENT)
Dept: GASTROENTEROLOGY | Facility: CLINIC | Age: 38
End: 2023-11-20

## 2023-11-20 NOTE — TELEPHONE ENCOUNTER
CLAUDIA SAYS MIRALAX IS MAKING HER NAUSEOUS AND WANTS TO KNOW IF THERE IS SOMETHING ELSE SHE CAN TAKE FOR HER CONSTIPATION.

## 2023-11-20 NOTE — TELEPHONE ENCOUNTER
Caller: Marguerite Edwards    Relationship: Self    Best call back number: [phone]    Which medication are you concerned about: MIRALAX    What are your concerns: PT CALLED STATING THAT DR. HURLEY TOLD HER TO TAKE MIRALAX// PT STATES THAT THE MIRALAX IS MAKING HER NAUSEOUS AND SHE WOULD LIKE AN ALTERNATIVE// PLEASE CALL THE PT BACK ASAP

## 2023-11-22 ENCOUNTER — OFFICE VISIT (OUTPATIENT)
Dept: INTERNAL MEDICINE | Facility: CLINIC | Age: 38
End: 2023-11-22
Payer: COMMERCIAL

## 2023-11-22 VITALS
HEART RATE: 78 BPM | DIASTOLIC BLOOD PRESSURE: 56 MMHG | RESPIRATION RATE: 18 BRPM | WEIGHT: 186.8 LBS | HEIGHT: 62 IN | OXYGEN SATURATION: 97 % | BODY MASS INDEX: 34.37 KG/M2 | TEMPERATURE: 96.9 F | SYSTOLIC BLOOD PRESSURE: 90 MMHG

## 2023-11-22 DIAGNOSIS — R39.9 UTI SYMPTOMS: Primary | ICD-10-CM

## 2023-11-22 DIAGNOSIS — R11.0 NAUSEA: ICD-10-CM

## 2023-11-22 DIAGNOSIS — Z23 NEED FOR INFLUENZA VACCINATION: ICD-10-CM

## 2023-11-22 DIAGNOSIS — Z23 NEED FOR COVID-19 VACCINE: ICD-10-CM

## 2023-11-22 PROCEDURE — 1160F RVW MEDS BY RX/DR IN RCRD: CPT | Performed by: NURSE PRACTITIONER

## 2023-11-22 PROCEDURE — 90480 ADMN SARSCOV2 VAC 1/ONLY CMP: CPT | Performed by: NURSE PRACTITIONER

## 2023-11-22 PROCEDURE — 1159F MED LIST DOCD IN RCRD: CPT | Performed by: NURSE PRACTITIONER

## 2023-11-22 PROCEDURE — 99213 OFFICE O/P EST LOW 20 MIN: CPT | Performed by: NURSE PRACTITIONER

## 2023-11-22 PROCEDURE — 91320 SARSCV2 VAC 30MCG TRS-SUC IM: CPT | Performed by: NURSE PRACTITIONER

## 2023-11-22 PROCEDURE — 90686 IIV4 VACC NO PRSV 0.5 ML IM: CPT | Performed by: NURSE PRACTITIONER

## 2023-11-22 PROCEDURE — 90471 IMMUNIZATION ADMIN: CPT | Performed by: NURSE PRACTITIONER

## 2023-11-22 RX ORDER — ONDANSETRON 4 MG/1
4 TABLET, ORALLY DISINTEGRATING ORAL EVERY 8 HOURS PRN
Qty: 30 TABLET | Refills: 0 | Status: SHIPPED | OUTPATIENT
Start: 2023-11-22

## 2023-11-22 RX ORDER — BLOOD-GLUCOSE METER
KIT MISCELLANEOUS
COMMUNITY
Start: 2023-09-19

## 2023-11-22 RX ORDER — ZOLPIDEM TARTRATE 10 MG/1
10 TABLET ORAL NIGHTLY PRN
COMMUNITY
Start: 2023-10-23

## 2023-11-22 RX ORDER — BUSPIRONE HYDROCHLORIDE 30 MG/1
30 TABLET ORAL 2 TIMES DAILY
COMMUNITY
Start: 2023-10-11

## 2023-11-22 RX ORDER — NITROFURANTOIN 25; 75 MG/1; MG/1
100 CAPSULE ORAL 2 TIMES DAILY
Qty: 10 CAPSULE | Refills: 0 | Status: SHIPPED | OUTPATIENT
Start: 2023-11-22

## 2023-11-22 RX ORDER — DIVALPROEX SODIUM 500 MG/1
1000 TABLET, DELAYED RELEASE ORAL 2 TIMES DAILY
COMMUNITY
Start: 2023-11-06

## 2023-11-22 RX ORDER — NORELGESTROMIN AND ETHINYL ESTRADIOL 150; 35 UG/D; UG/D
1 PATCH TRANSDERMAL
COMMUNITY

## 2023-11-22 RX ORDER — SODIUM FLUORIDE 5 MG/G
PASTE, DENTIFRICE DENTAL
COMMUNITY
Start: 2023-10-13

## 2023-11-22 NOTE — PROGRESS NOTES
"Subjective   Marguerite Evelyn Edwards is a 38 y.o. female who presents to the clinic today for evaluation of nausea, dysuria, and recent falls.   Chief Complaint   Patient presents with    Fall     Increased meds per Psych 2 weeks ago, has had 3 falls since.Bruised on lower back and on legs.     Abdominal Pain     lower    Urinary Tract Infection       PCP: Liliya Hodges APRN    History of Present Illness     Ms. Edwards reports that her psychiatrist recently increased her Depakote, and since that time, she has suffered a few falls. She does have bruising located on her back and her right thigh. She has been stable while ambulating through the clinic today. The patient denies any recent falls, but she reports that her head has been dizzy. She will discuss her dizziness and recent falls with her psychiatrist.     The patient has been experiencing nausea and has been unable to keep anything down. Her appetite has been decreased, and she has only been able to tolerate a couple of bites of food. She has observed some weight loss. Although she mentions that foods and drinks have been tasting \"nasty,\" she continues to drink fluids to avoid dehydration. She has requesting a refill of her nausea medication. The patient denies any constipation or diarrhea, and her most recent bowel movement was on 11/21/2023. She denies passing hard stools. She has concerns that she may have a bladder infection because she has been experiencing significant dysuria. She denies any hematuria, but she is currently menstruating. The patient has been having pain in her suprapubic region and urinary urgency. She is under the care of urology and uses a catheter. She states she has been stressed and is unsure if this may be related. She has been experiencing some itchiness. There was an instance where she discovered some white discharge, but this did not occur again.    The following portions of the patient's history were reviewed and updated as " appropriate: allergies, current medications, past family history, past medical history, past social history, past surgical history and problem list.        Review of Systems   Constitutional:  Positive for appetite change. Negative for fatigue, fever and unexpected weight loss.   Eyes:  Negative for blurred vision, double vision and visual disturbance.   Respiratory:  Negative for cough, shortness of breath and wheezing.    Cardiovascular:  Negative for chest pain, palpitations and leg swelling.   Gastrointestinal:  Positive for abdominal pain and nausea. Negative for constipation, diarrhea and vomiting.   Genitourinary:  Positive for dysuria, urgency and vaginal discharge. Negative for difficulty urinating, frequency and hematuria.   Musculoskeletal:  Negative for arthralgias and myalgias.   Skin:  Negative for color change and rash.   Neurological:  Positive for dizziness. Negative for weakness and headache.   Hematological:  Negative for adenopathy. Does not bruise/bleed easily.           Outpatient Medications Marked as Taking for the 11/22/23 encounter (Office Visit) with Liliya Hodges APRN   Medication Sig Dispense Refill    acetaminophen (TYLENOL) 325 MG tablet Take 2 tablets by mouth Every 6 (Six) Hours As Needed for Mild Pain.      albuterol sulfate  (90 Base) MCG/ACT inhaler Inhale 2 puffs Every 4 (Four) Hours As Needed for Wheezing. 18 g 3    benztropine (COGENTIN) 1 MG tablet Take 1 tablet by mouth 2 (Two) Times a Day.      bisacodyl (DULCOLAX) 10 MG suppository Insert 1 suppository into the rectum Daily As Needed for Constipation (Use if bisacodyl oral/by mouth is ineffective). 10 each 0    bisacodyl 5 MG EC tablet Take 3 tablets by mouth Daily for 90 days. 90 tablet 2    Blood Glucose Monitoring Suppl (FreeStyle Lite) w/Device kit       buPROPion XL (Wellbutrin XL) 300 MG 24 hr tablet Take 1 tablet by mouth Daily.      busPIRone (BUSPAR) 30 MG tablet Take 1 tablet by mouth 2 (Two) Times a  Day.      cholecalciferol (VITAMIN D3) 25 MCG (1000 UT) tablet Take 1 tablet by mouth Daily. 30 tablet 11    dexlansoprazole (Dexilant) 60 MG capsule Take 1 capsule by mouth 2 (Two) Times a Day. 60 capsule 2    dicyclomine (BENTYL) 10 MG capsule Take 1 capsule by mouth 4 (Four) Times a Day Before Meals & at Bedtime. 120 capsule 3    divalproex (DEPAKOTE) 500 MG DR tablet Take 2 tablets by mouth 2 (Two) Times a Day.      erythromycin (ROMYCIN) 5 MG/GM ophthalmic ointment Administer 1 application  to both eyes 2 (Two) Times a Day.      fluticasone (Flonase) 50 MCG/ACT nasal spray 2 sprays into the nostril(s) as directed by provider Daily. 16 g 2    Fluticasone Furoate-Vilanterol (Breo Ellipta) 100-25 MCG/ACT aerosol powder  Inhale 1 puff Daily. 60 each 6    glucose blood test strip 1 each by Other route Daily. 100 each 11    glucose monitor monitoring kit Use 1 each Daily. 1 each 0    hydrOXYzine pamoate (VISTARIL) 50 MG capsule Take 1 capsule by mouth 3 (Three) Times a Day As Needed for Anxiety. Takes scheduled      ibuprofen (ADVIL,MOTRIN) 800 MG tablet Take 1 tablet by mouth Every 6 (Six) Hours As Needed for Mild Pain. 30 tablet 0    lactulose (CHRONULAC) 10 GM/15ML solution Take 15 mL by mouth Daily. 473 mL 0    Lancets misc Use 1 Device Daily. 100 each 11    levocetirizine (XYZAL) 5 MG tablet Take 1 tablet by mouth Every Evening. 30 tablet 11    lubiprostone (AMITIZA) 24 MCG capsule Take 1 capsule by mouth 2 (Two) Times a Day With Meals for 90 days. 60 capsule 2    melatonin 5 MG tablet tablet Take 1.5 tablets by mouth At Night As Needed (insomnia). (Patient taking differently: Take 4 tablets by mouth At Night As Needed (insomnia).) 45 tablet 6    multivitamin with minerals tablet tablet Take 1 tablet by mouth Daily. 30 tablet 11    OLANZapine (zyPREXA) 10 MG tablet Take 0.5 tablets by mouth 2 (Two) Times a Day.      ondansetron ODT (ZOFRAN-ODT) 4 MG disintegrating tablet Place 1 tablet on the tongue Every 8  (Eight) Hours As Needed for Nausea or Vomiting. 30 tablet 0    oxybutynin (DITROPAN) 5 MG tablet Take 1 tablet by mouth 3 (Three) Times a Day.      promethazine (PHENERGAN) 25 MG tablet Take 0.5 tablets by mouth Every 6 (Six) Hours As Needed for Nausea or Vomiting. 6 tablet 0    sertraline (ZOLOFT) 100 MG tablet Take 1.5 tablets by mouth Daily.      simvastatin (ZOCOR) 20 MG tablet Take 1 tablet by mouth Every Night. 90 tablet 1    Sodium Fluoride 5000 PPM 1.1 % cream       tiZANidine (ZANAFLEX) 4 MG tablet TAKE 1-2 TABLETS BY MOUTH EVERY 8 HOURS AS NEEDED FOR BLADDER SPASMS 180 tablet 0    Xulane 150-35 MCG/24HR 1 patch.      zolpidem (AMBIEN) 10 MG tablet Take 1 tablet by mouth At Night As Needed.      [DISCONTINUED] ondansetron ODT (ZOFRAN-ODT) 4 MG disintegrating tablet Place 1 tablet on the tongue Every 8 (Eight) Hours As Needed for Nausea or Vomiting. 30 tablet 3    [DISCONTINUED] polyethylene glycol (GoLYTELY) 236 g solution Take 4 liters within 24 hour period 4000 mL 0     Allergies   Allergen Reactions    Lamotrigine Other (See Comments)     bruising  bruising    Paxil [Paroxetine Hcl] Mental Status Change    Prozac [Fluoxetine Hcl] Mental Status Change    Chlorhexidine Gluconate Unknown - Low Severity    Fluoxetine Unknown - Low Severity    Lubiprostone Palpitations, Dizziness and Unknown - Low Severity    Paroxetine Unknown - Low Severity           Objective   Physical Exam  Constitutional:       Appearance: Normal appearance. She is well-developed.   HENT:      Head: Normocephalic and atraumatic.   Eyes:      General: No scleral icterus.     Conjunctiva/sclera: Conjunctivae normal.   Cardiovascular:      Rate and Rhythm: Normal rate and regular rhythm.      Heart sounds: Normal heart sounds.   Pulmonary:      Effort: Pulmonary effort is normal. No respiratory distress.      Breath sounds: Normal breath sounds.   Abdominal:      General: Bowel sounds are normal.      Palpations: Abdomen is soft.       "Tenderness: There is no abdominal tenderness.      Comments: Mild suprapubic tenderness.    Musculoskeletal:         General: Normal range of motion.      Cervical back: Normal range of motion.      Right lower leg: No edema.      Left lower leg: No edema.      Comments: No bony tenderness on the low back, but there is soft tissue tenderness. Bruising noted on right thigh.   Skin:     General: Skin is warm and dry.   Neurological:      General: No focal deficit present.      Mental Status: She is alert and oriented to person, place, and time.   Psychiatric:         Attention and Perception: Attention normal.         Mood and Affect: Mood and affect normal.         Behavior: Behavior normal. Behavior is cooperative.         Thought Content: Thought content normal.         Cognition and Memory: Cognition normal.         Judgment: Judgment normal.         Vitals:    11/22/23 0856   BP: 90/56   BP Location: Right arm   Patient Position: Sitting   Cuff Size: Adult   Pulse: 78   Resp: 18   Temp: 96.9 °F (36.1 °C)   TempSrc: Infrared   SpO2: 97%   Weight: 84.7 kg (186 lb 12.8 oz)   Height: 157.5 cm (62\")     Body mass index is 34.17 kg/m².  Wt Readings from Last 3 Encounters:   11/22/23 84.7 kg (186 lb 12.8 oz)   09/27/23 90.3 kg (199 lb)   08/23/23 90.9 kg (200 lb 6.4 oz)             Assessment & Plan   Diagnoses and all orders for this visit:    1. UTI symptoms (Primary)  -     nitrofurantoin, macrocrystal-monohydrate, (Macrobid) 100 MG capsule; Take 1 capsule by mouth 2 (Two) Times a Day.  Dispense: 10 capsule; Refill: 0    2. Nausea  -     ondansetron ODT (ZOFRAN-ODT) 4 MG disintegrating tablet; Place 1 tablet on the tongue Every 8 (Eight) Hours As Needed for Nausea or Vomiting.  Dispense: 30 tablet; Refill: 0    3. Need for influenza vaccination  -     Fluzone >6 Months ([id number])    4. Need for COVID-19 vaccine  -     COVID-19 F23 (Pfizer) 12yrs+ (COMIRNATY)      I did speak with her spouse, Thuy, on the phone " with the patient's permission. They plan on following up with psychiatry next week for further medication adjustment and encouraged fall precautions.    UTI symptoms  The patient was unable to leave another urinary sample today. Unfortunately, her first urine sample could not be processed due to improper collection. We will go ahead and treat for suspected UTI. The patient will follow up in office if her symptoms do not resolve.     Nausea  A prescription for Zofran 4 mg has been sent to the patient's pharmacy.     Need for influenza vaccination  The patient received her influenza vaccination in the clinic today.    Need for COVID-19 vaccine  COVID-19 vaccination has been administered during today's visit.    BMI is >= 30 and <35. (Class 1 Obesity). The following options were offered after discussion;: weight loss educational material (shared in after visit summary)      Return if symptoms worsen or fail to improve.    I discussed my findings,recommendations, and plan of care was with the patient. They verbalized understanding and agreement.  Patient was encouraged to keep me informed of any acute changes, lack of improvement, or any new concerning symptoms.     Transcribed from ambient dictation for KATRINA Gore by Machelle Varghese.  11/22/23   12:19 EST    Patient or patient representative verbalized consent to the visit recording.  I have personally performed the services described in this document as transcribed by the above individual, and it is both accurate and complete.  KATRINA Gore  11/27/2023  12:28 EST

## 2023-11-27 NOTE — PATIENT INSTRUCTIONS
Calorie Counting for Weight Loss  Calories are units of energy. Your body needs a certain number of calories from food to keep going throughout the day. When you eat or drink more calories than your body needs, your body stores the extra calories mostly as fat. When you eat or drink fewer calories than your body needs, your body burns fat to get the energy it needs.  Calorie counting means keeping track of how many calories you eat and drink each day. Calorie counting can be helpful if you need to lose weight. If you eat fewer calories than your body needs, you should lose weight. Ask your health care provider what a healthy weight is for you.  For calorie counting to work, you will need to eat the right number of calories each day to lose a healthy amount of weight per week. A dietitian can help you figure out how many calories you need in a day and will suggest ways to reach your calorie goal.  A healthy amount of weight to lose each week is usually 1-2 lb (0.5-0.9 kg). This usually means that your daily calorie intake should be reduced by 500-750 calories.  Eating 1,200-1,500 calories a day can help most women lose weight.  Eating 1,500-1,800 calories a day can help most men lose weight.  What do I need to know about calorie counting?  Work with your health care provider or dietitian to determine how many calories you should get each day. To meet your daily calorie goal, you will need to:  Find out how many calories are in each food that you would like to eat. Try to do this before you eat.  Decide how much of the food you plan to eat.  Keep a food log. Do this by writing down what you ate and how many calories it had.  To successfully lose weight, it is important to balance calorie counting with a healthy lifestyle that includes regular activity.  Where do I find calorie information?    The number of calories in a food can be found on a Nutrition Facts label. If a food does not have a Nutrition Facts label, try  to look up the calories online or ask your dietitian for help.  Remember that calories are listed per serving. If you choose to have more than one serving of a food, you will have to multiply the calories per serving by the number of servings you plan to eat. For example, the label on a package of bread might say that a serving size is 1 slice and that there are 90 calories in a serving. If you eat 1 slice, you will have eaten 90 calories. If you eat 2 slices, you will have eaten 180 calories.  How do I keep a food log?  After each time that you eat, record the following in your food log as soon as possible:  What you ate. Be sure to include toppings, sauces, and other extras on the food.  How much you ate. This can be measured in cups, ounces, or number of items.  How many calories were in each food and drink.  The total number of calories in the food you ate.  Keep your food log near you, such as in a pocket-sized notebook or on an azeem or website on your mobile phone. Some programs will calculate calories for you and show you how many calories you have left to meet your daily goal.  What are some portion-control tips?  Know how many calories are in a serving. This will help you know how many servings you can have of a certain food.  Use a measuring cup to measure serving sizes. You could also try weighing out portions on a kitchen scale. With time, you will be able to estimate serving sizes for some foods.  Take time to put servings of different foods on your favorite plates or in your favorite bowls and cups so you know what a serving looks like.  Try not to eat straight from a food's packaging, such as from a bag or box. Eating straight from the package makes it hard to see how much you are eating and can lead to overeating. Put the amount you would like to eat in a cup or on a plate to make sure you are eating the right portion.  Use smaller plates, glasses, and bowls for smaller portions and to prevent  overeating.  Try not to multitask. For example, avoid watching TV or using your computer while eating. If it is time to eat, sit down at a table and enjoy your food. This will help you recognize when you are full. It will also help you be more mindful of what and how much you are eating.  What are tips for following this plan?  Reading food labels  Check the calorie count compared with the serving size. The serving size may be smaller than what you are used to eating.  Check the source of the calories. Try to choose foods that are high in protein, fiber, and vitamins, and low in saturated fat, trans fat, and sodium.  Shopping  Read nutrition labels while you shop. This will help you make healthy decisions about which foods to buy.  Pay attention to nutrition labels for low-fat or fat-free foods. These foods sometimes have the same number of calories or more calories than the full-fat versions. They also often have added sugar, starch, or salt to make up for flavor that was removed with the fat.  Make a grocery list of lower-calorie foods and stick to it.  Cooking  Try to cook your favorite foods in a healthier way. For example, try baking instead of frying.  Use low-fat dairy products.  Meal planning  Use more fruits and vegetables. One-half of your plate should be fruits and vegetables.  Include lean proteins, such as chicken, turkey, and fish.  Lifestyle  Each week, aim to do one of the followin minutes of moderate exercise, such as walking.  75 minutes of vigorous exercise, such as running.  General information  Know how many calories are in the foods you eat most often. This will help you calculate calorie counts faster.  Find a way of tracking calories that works for you. Get creative. Try different apps or programs if writing down calories does not work for you.  What foods should I eat?    Eat nutritious foods. It is better to have a nutritious, high-calorie food, such as an avocado, than a food with  few nutrients, such as a bag of potato chips.  Use your calories on foods and drinks that will fill you up and will not leave you hungry soon after eating.  Examples of foods that fill you up are nuts and nut butters, vegetables, lean proteins, and high-fiber foods such as whole grains. High-fiber foods are foods with more than 5 g of fiber per serving.  Pay attention to calories in drinks. Low-calorie drinks include water and unsweetened drinks.  The items listed above may not be a complete list of foods and beverages you can eat. Contact a dietitian for more information.  What foods should I limit?  Limit foods or drinks that are not good sources of vitamins, minerals, or protein or that are high in unhealthy fats. These include:  Candy.  Other sweets.  Sodas, specialty coffee drinks, alcohol, and juice.  The items listed above may not be a complete list of foods and beverages you should avoid. Contact a dietitian for more information.  How do I count calories when eating out?  Pay attention to portions. Often, portions are much larger when eating out. Try these tips to keep portions smaller:  Consider sharing a meal instead of getting your own.  If you get your own meal, eat only half of it. Before you start eating, ask for a container and put half of your meal into it.  When available, consider ordering smaller portions from the menu instead of full portions.  Pay attention to your food and drink choices. Knowing the way food is cooked and what is included with the meal can help you eat fewer calories.  If calories are listed on the menu, choose the lower-calorie options.  Choose dishes that include vegetables, fruits, whole grains, low-fat dairy products, and lean proteins.  Choose items that are boiled, broiled, grilled, or steamed. Avoid items that are buttered, battered, fried, or served with cream sauce. Items labeled as crispy are usually fried, unless stated otherwise.  Choose water, low-fat milk,  unsweetened iced tea, or other drinks without added sugar. If you want an alcoholic beverage, choose a lower-calorie option, such as a glass of wine or light beer.  Ask for dressings, sauces, and syrups on the side. These are usually high in calories, so you should limit the amount you eat.  If you want a salad, choose a garden salad and ask for grilled meats. Avoid extra toppings such as hernandez, cheese, or fried items. Ask for the dressing on the side, or ask for olive oil and vinegar or lemon to use as dressing.  Estimate how many servings of a food you are given. Knowing serving sizes will help you be aware of how much food you are eating at restaurants.  Where to find more information  Centers for Disease Control and Prevention: www.cdc.gov  [place] Department of Agriculture: myplate.gov  Summary  Calorie counting means keeping track of how many calories you eat and drink each day. If you eat fewer calories than your body needs, you should lose weight.  A healthy amount of weight to lose per week is usually 1-2 lb (0.5-0.9 kg). This usually means reducing your daily calorie intake by 500-750 calories.  The number of calories in a food can be found on a Nutrition Facts label. If a food does not have a Nutrition Facts label, try to look up the calories online or ask your dietitian for help.  Use smaller plates, glasses, and bowls for smaller portions and to prevent overeating.  Use your calories on foods and drinks that will fill you up and not leave you hungry shortly after a meal.  This information is not intended to replace advice given to you by your health care provider. Make sure you discuss any questions you have with your health care provider.  Document Revised: 01/28/2021 Document Reviewed: 01/28/2021  Elsevier Patient Education © 2023 [employer]  Exercising to Lose Weight  Getting regular exercise is important for everyone. It is especially important if you are overweight. Being overweight increases your  risk of heart disease, stroke, diabetes, high blood pressure, and several types of cancer. Exercising, and reducing the calories you consume, can help you lose weight and improve fitness and health.  Exercise can be moderate or vigorous intensity. To lose weight, most people need to do a certain amount of moderate or vigorous-intensity exercise each week.  How can exercise affect me?  You lose weight when you exercise enough to burn more calories than you eat. Exercise also reduces body fat and builds muscle. The more muscle you have, the more calories you burn. Exercise also:  Improves mood.  Reduces stress and tension.  Improves your overall fitness, flexibility, and endurance.  Increases bone strength.  Moderate-intensity exercise    Moderate-intensity exercise is any activity that gets you moving enough to burn at least three times more energy (calories) than if you were sitting.  Examples of moderate exercise include:  Walking a mile in 15 minutes.  Doing light yard work.  Biking at an easy pace.  Most people should get at least 150 minutes of moderate-intensity exercise a week to maintain their body weight.  Vigorous-intensity exercise  Vigorous-intensity exercise is any activity that gets you moving enough to burn at least six times more calories than if you were sitting. When you exercise at this intensity, you should be working hard enough that you are not able to carry on a conversation.  Examples of vigorous exercise include:  Running.  Playing a team sport, such as football, basketball, and soccer.  Jumping rope.  Most people should get at least 75 minutes a week of vigorous exercise to maintain their body weight.  What actions can I take to lose weight?  The amount of exercise you need to lose weight depends on:  Your age.  The type of exercise.  Any health conditions you have.  Your overall physical ability.  Talk to your health care provider about how much exercise you need and what types of  activities are safe for you.  Nutrition    Make changes to your diet as told by your health care provider or diet and nutrition specialist (dietitian). This may include:  Eating fewer calories.  Eating more protein.  Eating less unhealthy fats.  Eating a diet that includes fresh fruits and vegetables, whole grains, low-fat dairy products, and lean protein.  Avoiding foods with added fat, salt, and sugar.  Drink plenty of water while you exercise to prevent dehydration or heat stroke.  Activity  Choose an activity that you enjoy and set realistic goals. Your health care provider can help you make an exercise plan that works for you.  Exercise at a moderate or vigorous intensity most days of the week.  The intensity of exercise may vary from person to person. You can tell how intense a workout is for you by paying attention to your breathing and heartbeat. Most people will notice their breathing and heartbeat get faster with more intense exercise.  Do resistance training twice each week, such as:  Push-ups.  Sit-ups.  Lifting weights.  Using resistance bands.  Getting short amounts of exercise can be just as helpful as long, structured periods of exercise. If you have trouble finding time to exercise, try doing these things as part of your daily routine:  Get up, stretch, and walk around every 30 minutes throughout the day.  Go for a walk during your lunch break.  Park your car farther away from your destination.  If you take public transportation, get off one stop early and walk the rest of the way.  Make phone calls while standing up and walking around.  Take the stairs instead of elevators or escalators.  Wear comfortable clothes and shoes with good support.  Do not exercise so much that you hurt yourself, feel dizzy, or get very short of breath.  Where to find more information  [place] Department of Health and Human Services: www.hhs.gov  Centers for Disease Control and Prevention: www.cdc.gov  Contact a health care  provider:  Before starting a new exercise program.  If you have questions or concerns about your weight.  If you have a medical problem that keeps you from exercising.  Get help right away if:  You have any of the following while exercising:  Injury.  Dizziness.  Difficulty breathing or shortness of breath that does not go away when you stop exercising.  Chest pain.  Rapid heartbeat.  These symptoms may represent a serious problem that is an emergency. Do not wait to see if the symptoms will go away. Get medical help right away. Call your local emergency services (911 in the [place] Do not drive yourself to the hospital.  Summary  Getting regular exercise is especially important if you are overweight.  Being overweight increases your risk of heart disease, stroke, diabetes, high blood pressure, and several types of cancer.  Losing weight happens when you burn more calories than you eat.  Reducing the amount of calories you eat, and getting regular moderate or vigorous exercise each week, helps you lose weight.  This information is not intended to replace advice given to you by your health care provider. Make sure you discuss any questions you have with your health care provider.  Document Revised: 02/13/2022 Document Reviewed: 02/13/2022  Elsevier Patient Education © 2023 [employer]

## 2023-12-05 NOTE — TELEPHONE ENCOUNTER
Hub staff attempted to follow warm transfer process and was unsuccessful     Caller: Marguerite Edwards    Relationship to patient: Self    Best call back number: [phone]    Patient is needing: PT NEVER RECEIVED A CALL BACK ABOUT THE MEDICATION ISSUE SHE'S BEEN HAVING AND IS FEELING REALLY SICK STILL .PLEASE ADVISE.

## 2023-12-11 DIAGNOSIS — N32.89 BLADDER SPASMS: ICD-10-CM

## 2023-12-12 RX ORDER — TIZANIDINE 4 MG/1
TABLET ORAL
Qty: 180 TABLET | Refills: 0 | Status: SHIPPED | OUTPATIENT
Start: 2023-12-12

## 2023-12-14 DIAGNOSIS — R11.0 NAUSEA: ICD-10-CM

## 2023-12-15 RX ORDER — ONDANSETRON 4 MG/1
4 TABLET, ORALLY DISINTEGRATING ORAL EVERY 8 HOURS PRN
Qty: 30 TABLET | Refills: 0 | Status: SHIPPED | OUTPATIENT
Start: 2023-12-15

## 2023-12-20 ENCOUNTER — TELEPHONE (OUTPATIENT)
Dept: GASTROENTEROLOGY | Facility: CLINIC | Age: 38
End: 2023-12-20
Payer: COMMERCIAL

## 2023-12-21 RX ORDER — DOCUSATE SODIUM 100 MG/1
100 CAPSULE, LIQUID FILLED ORAL 2 TIMES DAILY
Qty: 90 CAPSULE | Refills: 3 | Status: SHIPPED | OUTPATIENT
Start: 2023-12-21

## 2023-12-27 ENCOUNTER — TELEPHONE (OUTPATIENT)
Dept: GASTROENTEROLOGY | Facility: OTHER | Age: 38
End: 2023-12-27
Payer: COMMERCIAL

## 2023-12-28 ENCOUNTER — HOSPITAL ENCOUNTER (EMERGENCY)
Facility: HOSPITAL | Age: 38
Discharge: HOME | End: 2023-12-28
Attending: EMERGENCY MEDICINE | Admitting: EMERGENCY MEDICINE
Payer: COMMERCIAL

## 2023-12-28 ENCOUNTER — APPOINTMENT (OUTPATIENT)
Dept: CT IMAGING | Facility: HOSPITAL | Age: 38
End: 2023-12-28
Payer: COMMERCIAL

## 2023-12-28 ENCOUNTER — APPOINTMENT (OUTPATIENT)
Dept: GENERAL RADIOLOGY | Facility: HOSPITAL | Age: 38
End: 2023-12-28
Payer: COMMERCIAL

## 2023-12-28 VITALS
HEIGHT: 62 IN | SYSTOLIC BLOOD PRESSURE: 131 MMHG | HEART RATE: 109 BPM | WEIGHT: 200 LBS | DIASTOLIC BLOOD PRESSURE: 90 MMHG | OXYGEN SATURATION: 99 % | RESPIRATION RATE: 14 BRPM | BODY MASS INDEX: 36.8 KG/M2 | TEMPERATURE: 98.4 F

## 2023-12-28 DIAGNOSIS — R07.9 CHEST PAIN, UNSPECIFIED TYPE: Primary | ICD-10-CM

## 2023-12-28 LAB
ALBUMIN SERPL-MCNC: 3.6 G/DL (ref 3.5–5.2)
ALBUMIN/GLOB SERPL: 1.1 G/DL
ALP SERPL-CCNC: 119 U/L (ref 39–117)
ALT SERPL W P-5'-P-CCNC: 23 U/L (ref 1–33)
AMPHET+METHAMPHET UR QL: NEGATIVE
AMPHETAMINES UR QL: NEGATIVE
ANION GAP SERPL CALCULATED.3IONS-SCNC: 13 MMOL/L (ref 5–15)
AST SERPL-CCNC: 31 U/L (ref 1–32)
BARBITURATES UR QL SCN: NEGATIVE
BASOPHILS # BLD AUTO: 0.05 10*3/MM3 (ref 0–0.2)
BASOPHILS NFR BLD AUTO: 0.5 % (ref 0–1.5)
BENZODIAZ UR QL SCN: NEGATIVE
BILIRUB SERPL-MCNC: 0.3 MG/DL (ref 0–1.2)
BILIRUB UR QL STRIP: NEGATIVE
BUN SERPL-MCNC: 16 MG/DL (ref 6–20)
BUN/CREAT SERPL: 18.4 (ref 7–25)
BUPRENORPHINE SERPL-MCNC: NEGATIVE NG/ML
CALCIUM SPEC-SCNC: 9.1 MG/DL (ref 8.6–10.5)
CANNABINOIDS SERPL QL: NEGATIVE
CHLORIDE SERPL-SCNC: 98 MMOL/L (ref 98–107)
CLARITY UR: CLEAR
CO2 SERPL-SCNC: 26 MMOL/L (ref 22–29)
COCAINE UR QL: NEGATIVE
COLOR UR: ABNORMAL
CREAT SERPL-MCNC: 0.87 MG/DL (ref 0.57–1)
D-LACTATE SERPL-SCNC: 1.2 MMOL/L (ref 0.5–2)
DEPRECATED RDW RBC AUTO: 46.4 FL (ref 37–54)
EGFRCR SERPLBLD CKD-EPI 2021: 86.5 ML/MIN/1.73
EOSINOPHIL # BLD AUTO: 0.04 10*3/MM3 (ref 0–0.4)
EOSINOPHIL NFR BLD AUTO: 0.4 % (ref 0.3–6.2)
ERYTHROCYTE [DISTWIDTH] IN BLOOD BY AUTOMATED COUNT: 14.6 % (ref 12.3–15.4)
FENTANYL UR-MCNC: NEGATIVE NG/ML
FLUAV RNA RESP QL NAA+PROBE: NOT DETECTED
FLUBV RNA RESP QL NAA+PROBE: NOT DETECTED
GLOBULIN UR ELPH-MCNC: 3.2 GM/DL
GLUCOSE SERPL-MCNC: 77 MG/DL (ref 65–99)
GLUCOSE UR STRIP-MCNC: NEGATIVE MG/DL
HCT VFR BLD AUTO: 45.7 % (ref 34–46.6)
HGB BLD-MCNC: 14.8 G/DL (ref 12–15.9)
HGB UR QL STRIP.AUTO: NEGATIVE
HOLD SPECIMEN: NORMAL
IMM GRANULOCYTES # BLD AUTO: 0.03 10*3/MM3 (ref 0–0.05)
IMM GRANULOCYTES NFR BLD AUTO: 0.3 % (ref 0–0.5)
KETONES UR QL STRIP: ABNORMAL
LEUKOCYTE ESTERASE UR QL STRIP.AUTO: NEGATIVE
LIPASE SERPL-CCNC: 46 U/L (ref 13–60)
LYMPHOCYTES # BLD AUTO: 3.02 10*3/MM3 (ref 0.7–3.1)
LYMPHOCYTES NFR BLD AUTO: 31 % (ref 19.6–45.3)
MCH RBC QN AUTO: 28.1 PG (ref 26.6–33)
MCHC RBC AUTO-ENTMCNC: 32.4 G/DL (ref 31.5–35.7)
MCV RBC AUTO: 86.7 FL (ref 79–97)
METHADONE UR QL SCN: NEGATIVE
MONOCYTES # BLD AUTO: 0.94 10*3/MM3 (ref 0.1–0.9)
MONOCYTES NFR BLD AUTO: 9.7 % (ref 5–12)
NEUTROPHILS NFR BLD AUTO: 5.66 10*3/MM3 (ref 1.7–7)
NEUTROPHILS NFR BLD AUTO: 58.1 % (ref 42.7–76)
NITRITE UR QL STRIP: NEGATIVE
NRBC BLD AUTO-RTO: 0 /100 WBC (ref 0–0.2)
NT-PROBNP SERPL-MCNC: 150.6 PG/ML (ref 0–450)
OPIATES UR QL: NEGATIVE
OXYCODONE UR QL SCN: NEGATIVE
PCP UR QL SCN: NEGATIVE
PH UR STRIP.AUTO: 5.5 [PH] (ref 5–8)
PLATELET # BLD AUTO: 206 10*3/MM3 (ref 140–450)
PMV BLD AUTO: 11.3 FL (ref 6–12)
POTASSIUM SERPL-SCNC: 4.1 MMOL/L (ref 3.5–5.2)
PROT SERPL-MCNC: 6.8 G/DL (ref 6–8.5)
PROT UR QL STRIP: NEGATIVE
QT INTERVAL: 352 MS
QTC INTERVAL: 440 MS
RBC # BLD AUTO: 5.27 10*6/MM3 (ref 3.77–5.28)
SARS-COV-2 RNA RESP QL NAA+PROBE: NOT DETECTED
SODIUM SERPL-SCNC: 137 MMOL/L (ref 136–145)
SP GR UR STRIP: 1.04 (ref 1–1.03)
TRICYCLICS UR QL SCN: POSITIVE
TROPONIN T SERPL HS-MCNC: <6 NG/L
UROBILINOGEN UR QL STRIP: ABNORMAL
WBC NRBC COR # BLD AUTO: 9.74 10*3/MM3 (ref 3.4–10.8)
WHOLE BLOOD HOLD COAG: NORMAL
WHOLE BLOOD HOLD SPECIMEN: NORMAL

## 2023-12-28 PROCEDURE — 70450 CT HEAD/BRAIN W/O DYE: CPT

## 2023-12-28 PROCEDURE — 81003 URINALYSIS AUTO W/O SCOPE: CPT | Performed by: EMERGENCY MEDICINE

## 2023-12-28 PROCEDURE — 80053 COMPREHEN METABOLIC PANEL: CPT | Performed by: EMERGENCY MEDICINE

## 2023-12-28 PROCEDURE — 85025 COMPLETE CBC W/AUTO DIFF WBC: CPT | Performed by: EMERGENCY MEDICINE

## 2023-12-28 PROCEDURE — 87636 SARSCOV2 & INF A&B AMP PRB: CPT | Performed by: EMERGENCY MEDICINE

## 2023-12-28 PROCEDURE — 83605 ASSAY OF LACTIC ACID: CPT | Performed by: EMERGENCY MEDICINE

## 2023-12-28 PROCEDURE — 83690 ASSAY OF LIPASE: CPT | Performed by: EMERGENCY MEDICINE

## 2023-12-28 PROCEDURE — 84484 ASSAY OF TROPONIN QUANT: CPT | Performed by: EMERGENCY MEDICINE

## 2023-12-28 PROCEDURE — 93005 ELECTROCARDIOGRAM TRACING: CPT

## 2023-12-28 PROCEDURE — 99284 EMERGENCY DEPT VISIT MOD MDM: CPT

## 2023-12-28 PROCEDURE — 71045 X-RAY EXAM CHEST 1 VIEW: CPT

## 2023-12-28 PROCEDURE — 83880 ASSAY OF NATRIURETIC PEPTIDE: CPT | Performed by: EMERGENCY MEDICINE

## 2023-12-28 PROCEDURE — 93005 ELECTROCARDIOGRAM TRACING: CPT | Performed by: EMERGENCY MEDICINE

## 2023-12-28 PROCEDURE — P9612 CATHETERIZE FOR URINE SPEC: HCPCS

## 2023-12-28 PROCEDURE — 80307 DRUG TEST PRSMV CHEM ANLYZR: CPT | Performed by: EMERGENCY MEDICINE

## 2023-12-28 RX ORDER — SODIUM CHLORIDE 0.9 % (FLUSH) 0.9 %
10 SYRINGE (ML) INJECTION AS NEEDED
Status: DISCONTINUED | OUTPATIENT
Start: 2023-12-28 | End: 2023-12-28 | Stop reason: HOSPADM
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
## 2023-12-28 NOTE — Clinical Note
[facility] EMERGENCY DEPARTMENT  [address]  [facility] [address]  Phone: [phone]    Marguerite Edwards was seen and treated in our emergency department on 12/28/2023.  She may return to work on 01/01/2024.         Thank you for choosing [facility].    Ivis Meneses MD

## 2023-12-28 NOTE — TELEPHONE ENCOUNTER
Patient called this evening and reports she is having chest pain as well as nausea.  She reports ongoing issues with chronic nausea and constipation for which she has seen us in office before however now reports chest pain.  I advised her if she is having chest pain with nausea she should be evaluated in the emergency department to ensure there is no cardiac issue.  I was also able to speak to her wife who reported they are  but that she would try to help her follow-up and seek appropriate care.

## 2023-12-28 NOTE — DISCHARGE INSTRUCTIONS
Follow-up with cardiology for further outpatient evaluation.    Observe symptoms and follow-up with primary care physician as needed.    Return to the ER with any further concern.

## 2023-12-28 NOTE — TELEPHONE ENCOUNTER
I SPOKE WITH MRS CISNEROS. PATIENT IS STILL HAVING CHEST PAIN ON THE LEFT SIDE. I ADVISED PATIENT TO GO TO THE ER PER DR COATES FROM PREVIOUS RECOMMENDED WITH PATIENT LAST NIGHT.,

## 2023-12-29 DIAGNOSIS — N32.89 BLADDER SPASMS: ICD-10-CM

## 2023-12-29 DIAGNOSIS — R10.13 EPIGASTRIC PAIN: ICD-10-CM

## 2023-12-29 RX ORDER — TIZANIDINE 4 MG/1
TABLET ORAL
Qty: 180 TABLET | Refills: 0 | Status: SHIPPED | OUTPATIENT
Start: 2023-12-29

## 2023-12-29 RX ORDER — DICYCLOMINE HYDROCHLORIDE 10 MG/1
CAPSULE ORAL
Qty: 120 CAPSULE | Refills: 3 | OUTPATIENT
Start: 2023-12-29

## 2023-12-29 NOTE — TELEPHONE ENCOUNTER
Sent refill per request of pharmacy, pt is unable to return home at this time. Trying to get insurance to cover RX by sending new prescription.

## 2023-12-29 NOTE — TELEPHONE ENCOUNTER
Rx Refill Note  Pending Prescriptions:                       Disp   Refills    dicyclomine (BENTYL) 10 MG capsule [Pharma*120 ca*3        Sig: TAKE 1 CAPSULE BY MOUTH FOUR TIMES A DAY BEFORE MEALS           AND AT BEDTIME    Last office visit with prescribing clinician: 8/23/2023   Last telemedicine visit with prescribing clinician: Visit date not found   Next office visit with prescribing clinician: Visit date not found         Glenis Marina MA  12/29/23, 13:07 EST

## 2024-01-03 ENCOUNTER — PRIOR AUTHORIZATION (OUTPATIENT)
Dept: GASTROENTEROLOGY | Facility: CLINIC | Age: 39
End: 2024-01-03
Payer: COMMERCIAL

## 2024-01-03 LAB
QT INTERVAL: 352 MS
QTC INTERVAL: 440 MS

## 2024-01-18 DIAGNOSIS — R11.0 NAUSEA: ICD-10-CM

## 2024-01-18 RX ORDER — ONDANSETRON 4 MG/1
4 TABLET, ORALLY DISINTEGRATING ORAL EVERY 8 HOURS PRN
Qty: 30 TABLET | Refills: 0 | Status: SHIPPED | OUTPATIENT
Start: 2024-01-18

## 2024-02-14 ENCOUNTER — TELEPHONE (OUTPATIENT)
Dept: INTERNAL MEDICINE | Facility: CLINIC | Age: 39
End: 2024-02-14

## 2024-02-14 NOTE — TELEPHONE ENCOUNTER
Caller: Marguerite Cisneros    Relationship: Self    Best call back number: [phone]     Who are you requesting to speak with (clinical staff, provider,  specific staff member): SOULEYMANE COATES    What was the call regarding: PATIENT ADVISED SHE HAD TO SUDDENLY MOVE AWAY, SO CAN'T COME IN ANYMORE. SHE SAID SHE WANTS SOULEYMANE AMINATA TO KNOW SHE APPRECIATES THE GREAT CARE SHE GAVE HER AND SHE LOVES HER.   PATIENT STATED SHE NEEDS CAM CISNEROS TO BE TAKEN  OFF HER  VERBAL/CHART AND NOT TO RECEIVE ANY INFORMATION ABOUT HER.  SHE STATED SHE'S A CHANGED PERSON, GOES TO [employer] NOW, LOST WEIGHT AND IS DOING SO MUCH BETTER NOW.
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
## 2024-05-24 NOTE — TELEPHONE ENCOUNTER
"\"but does report improvement in her bladder spasms with use of oxybutynin IR 5 mg TID and tizanidine 4 mg TID without ASE. Her tizanidine is provided by her [facility] provider Dr. Vanessa Garber.     She was encouraged to discuss increased doses of tizanidine to 8 mg TID for her PFD and to continue heating pad use and use warm tub baths.\"   - Priyanka Michelle NP Urology at .       DOS:  Yesterday, 01/11/22  - Note in Epic.       " SROM

## 2025-07-15 NOTE — TELEPHONE ENCOUNTER
Patient returned my call. And I scheduled her with KATRINA Main on Wednesday 6/6/2018 @ 1pm.   CL 99 04/07/2025 02:00 PM    CO2 25 04/07/2025 02:00 PM    BUN 25 04/07/2025 02:00 PM    CREATININE 0.9 04/07/2025 02:00 PM    GFRAA >60 05/01/2018 10:25 AM    LABGLOM 69 04/07/2025 02:00 PM    LABGLOM >60 09/14/2023 12:04 PM    GLUCOSE 211 04/07/2025 02:00 PM    GLUCOSE 176 06/13/2011 09:01 AM    CALCIUM 9.1 04/07/2025 02:00 PM    BILITOT 0.6 04/07/2025 02:00 PM    ALKPHOS 93 04/07/2025 02:00 PM    AST 17 04/07/2025 02:00 PM    ALT 20 04/07/2025 02:00 PM     HgBA1c:    Lab Results   Component Value Date/Time    LABA1C 7.3 04/07/2025 02:00 PM     Lipid:   Lab Results   Component Value Date    CHOL 144 03/30/2011     Lab Results   Component Value Date    TRIG 105 03/30/2011     Lab Results   Component Value Date    HDL 46 04/07/2025    HDL 51 11/14/2024    HDL 46 05/10/2024     No components found for: \"LDLCHOLESTEROL\", \"LDLCALC\"    Lab Results   Component Value Date    VLDL 24 04/07/2025    VLDL 25 11/14/2024    VLDL 21 05/10/2024     No results found for: \"CHOLHDLRATIO\"  TSH:    Lab Results   Component Value Date/Time    TSH 2.95 04/07/2025 02:00 PM     Free T4:   Lab Results   Component Value Date/Time    T4FREE 1.6 04/07/2025 02:00 PM     Urine/Microalbumin Cr Ratio:   Microalb/Crt. Ratio   Date Value Ref Range Status   11/14/2024 22 0.0 - 30.0 mcg/mg creat Final            Objective   /70   Pulse 72   Temp 97.7 °F (36.5 °C) (Temporal)   Ht 1.626 m (5' 4\")   Wt (!) 207 kg (456 lb 4.8 oz)   SpO2 96%   BMI 78.32 kg/m²   Current Outpatient Medications   Medication Sig Dispense Refill    venlafaxine (EFFEXOR) 75 MG tablet Take 1 tablet by mouth daily 30 tablet 4    montelukast (SINGULAIR) 10 MG tablet Take 1 tablet by mouth daily 30 tablet 4    levothyroxine (SYNTHROID) 88 MCG tablet Take 1 tablet by mouth Daily 90 tablet 1    Insulin Degludec (TRESIBA FLEXTOUCH) 200 UNIT/ML SOPN Inject 70 Units into the skin daily (with breakfast) 12 mL 5    insulin aspart (NOVOLOG FLEXPEN) 100 UNIT/ML injection pen